# Patient Record
Sex: MALE | Race: BLACK OR AFRICAN AMERICAN | Employment: FULL TIME | URBAN - METROPOLITAN AREA
[De-identification: names, ages, dates, MRNs, and addresses within clinical notes are randomized per-mention and may not be internally consistent; named-entity substitution may affect disease eponyms.]

---

## 2017-10-05 ENCOUNTER — HOSPITAL ENCOUNTER (EMERGENCY)
Age: 37
Discharge: HOME | End: 2017-10-05
Attending: EMERGENCY MEDICINE
Payer: MEDICAID

## 2017-10-05 VITALS
OXYGEN SATURATION: 100 % | DIASTOLIC BLOOD PRESSURE: 97 MMHG | RESPIRATION RATE: 18 BRPM | TEMPERATURE: 98.4 F | SYSTOLIC BLOOD PRESSURE: 171 MMHG | HEART RATE: 83 BPM

## 2017-10-05 DIAGNOSIS — R03.0 ELEVATED BLOOD PRESSURE READING: Primary | ICD-10-CM

## 2017-10-05 DIAGNOSIS — M54.50 ACUTE BILATERAL LOW BACK PAIN WITHOUT SCIATICA: ICD-10-CM
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
## 2017-10-05 PROCEDURE — 99283 EMERGENCY DEPT VISIT LOW MDM: CPT

## 2017-10-05 RX ORDER — CYCLOBENZAPRINE HCL 10 MG
10 TABLET ORAL 3 TIMES DAILY PRN
Qty: 12 TABLET | Refills: 0 | Status: SHIPPED | OUTPATIENT
Start: 2017-10-05 | End: 2017-10-12

## 2017-10-05 RX ORDER — IBUPROFEN 800 MG/1
800 TABLET ORAL EVERY 8 HOURS PRN
Qty: 20 TABLET | Refills: 0 | Status: SHIPPED | OUTPATIENT
Start: 2017-10-05 | End: 2017-10-14

## 2017-10-05 ASSESSMENT — ENCOUNTER SYMPTOMS
NAUSEA: 0
WHEEZING: 0
COUGH: 0
RHINORRHEA: 0
SHORTNESS OF BREATH: 0
EYE PAIN: 0
SORE THROAT: 0
VOMITING: 0
EYE ITCHING: 0
EYE DISCHARGE: 0
BACK PAIN: 1

## 2017-10-05 ASSESSMENT — PAIN SCALES - GENERAL: PAINLEVEL_OUTOF10: 8

## 2017-10-05 ASSESSMENT — PAIN DESCRIPTION - ORIENTATION: ORIENTATION: LOWER

## 2017-10-05 ASSESSMENT — PAIN DESCRIPTION - LOCATION: LOCATION: BACK

## 2017-10-05 ASSESSMENT — PAIN DESCRIPTION - ONSET: ONSET: PROGRESSIVE

## 2017-10-05 ASSESSMENT — PAIN DESCRIPTION - PROGRESSION: CLINICAL_PROGRESSION: GRADUALLY WORSENING

## 2017-10-05 ASSESSMENT — PAIN DESCRIPTION - PAIN TYPE: TYPE: ACUTE PAIN

## 2017-10-05 ASSESSMENT — PAIN DESCRIPTION - DESCRIPTORS: DESCRIPTORS: ACHING
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
## 2017-10-05 NOTE — ED AVS SNAPSHOT
This section may also include educational information about certain health topics that may be of help to you. Important Information if you smoke or are exposed to smoking       SMOKING: QUIT SMOKING. THIS IS THE MOST IMPORTANT ACTION YOU CAN TAKE TO IMPROVE YOUR CURRENT AND FUTURE HEALTH. Call the [facility] [place] NOW ([phone])    Smoking harms nonsmokers. When nonsmokers are around people who smoke, they absorb nicotine, carbon monoxide, and other ingredients of tobacco smoke. DO NOT SMOKE AROUND CHILDREN     Children exposed to secondhand smoke are at an increased risk of:  Sudden Infant Death Syndrome (SIDS), acute respiratory infections, inflammation of the middle ear, and severe asthma. Over a longer time, it causes heart disease and lung cancer. There is no safe level of exposure to secondhand smoke. [employer] Signup     [employer] allows you to send messages to your doctor, view your test results, renew your prescriptions, schedule appointments, view visit notes, and more. How Do I Sign Up? 1. In your Internet browser, go to [email or web address]  2. Click on the Sign Up Now link in the Sign In box. You will see the New Member Sign Up page. 3. Enter your [employer] Access Code exactly as it appears below. You will not need to use this code after youve completed the sign-up process. If you do not sign up before the expiration date, you must request a new code. [employer] Access Code: [email or web address]  Expires: 12/4/2017 12:43 PM    4. Enter your Social Security Number ([id number]) and Date of Birth (mm/dd/yyyy) as indicated and click Submit. You will be taken to the next sign-up page. 5. Create a [employer] ID. This will be your [employer] login ID and cannot be changed, so think of one that is secure and easy to remember. 6. Create a [employer] password. You can change your password at any time. 7. Enter your Password Reset Question and Answer. This can be used at a later time if you forget your password. 8. Enter your e-mail address. You will receive e-mail notification when new information is available in [address] 9. Click Sign Up. You can now view your medical record. Additional Information  If you have questions, please contact the physician practice where you receive care. Remember, MyChart is NOT to be used for urgent needs. For medical emergencies, dial 911. For questions regarding your MyChart account call [phone]. If you have a clinical question, please call your doctor's office. View your information online  ? Review your current list of  medications, immunization, and allergies. ? Review your future test results online . ? Review your discharge instructions provided by your caregivers at discharge    Certain functionality such as prescription refills, scheduling appointments or sending messages to your provider are not activated if your provider does not use [employer] in his/her office    For questions regarding your MyChart account call [phone]. If you have a clinical question, please call your doctor's office. The information on all pages of the After Visit Summary has been reviewed with me, the patient and/or responsible adult, by my health care provider(s). I had the opportunity to ask questions regarding this information. I understand I should dispose of my armband safely at home to protect my health information. A complete copy of the After Visit Summary has been given to me, the patient and/or responsible adult.          Patient Signature/Responsible Adult: ___________________________________    Nurse Signature: ___________________________________  Resident/MLP Signature: ___________________________________  Attending Signature: ___________________________________    Date:____________Time:____________              Discharge Instructions Rest.  No heavy lifting. No driving or working while taking flexeril as it may make you sleepy    Please return to the ED for increased pain, numbness, tingling, weakness, bladder or bowel dysfunction or other concerns deemed emergent. Warm compresses to the area such as a heating pad may be helpful. Do not sleep on a heating pad    ________________________________________________________________________    Your last vital signs in the emergency department were:    BP (!) 171/97  Pulse 83  Temp 98.4 °F (36.9 °C) (Oral)   Resp 18  SpO2 100%    If your systolic blood pressure (the top number) was above 233 or your diastolic blood pressure (the bottom number) was more than 80 you may have early hypertension or pre-hypertension. You should follow up with your family doctor or call to establish a family doctor for follow up within 3 days. ________________________________________________________________________             Back Pain, Emergency or Urgent Symptoms: Care Instructions  Your Care Instructions  Many people have back pain at one time or another. In most cases, pain gets better with self-care that includes over-the-counter pain medicine, ice, heat, and exercises. Unless you have symptoms of a severe injury or heart attack, you may be able to give yourself a few days before you call a doctor. But some back problems are very serious. Do not ignore symptoms that need to be checked right away. Follow-up care is a key part of your treatment and safety. Be sure to make and go to all appointments, and call your doctor if you are having problems. It's also a good idea to know your test results and keep a list of the medicines you take. How can you care for yourself at home? · Sit or lie in positions that are most comfortable and that reduce your pain. Try one of these positions when you lie down:  ¨ Lie on your back with your knees bent and supported by large pillows. Call your doctor now or seek immediate medical care if:  · You have new or worse symptoms in your arms, legs, chest, belly, or buttocks. Symptoms may include:  ¨ Numbness or tingling. ¨ Weakness. ¨ Pain. · You lose bladder or bowel control. · You have back pain and:  ¨ You have injured your back while lifting or doing some other activity. Call if the pain is severe, has not gone away after 1 or 2 days, and you cannot do your normal daily activities. ¨ You have had a back injury before that needed treatment. ¨ Your pain has lasted longer than 4 weeks. ¨ You have had weight loss you cannot explain. ¨ You are age 48 or older. ¨ You have cancer now or have had it before. Watch closely for changes in your health, and be sure to contact your doctor if you are not getting better as expected. Where can you learn more? Go to [email or web address] org and sign in to your [employer] account. Enter A545 in the [place] box to learn more about \"Back Pain, Emergency or Urgent Symptoms: Care Instructions. \"     If you do not have an account, please click on the \"Sign Up Now\" link. Current as of: March 20, 2017  Content Version: 11.3  © 8504-6103 [employer]. Care instructions adapted under license by [facility] ([facility]). If you have questions about a medical condition or this instruction, always ask your healthcare professional. [place] [address] any warranty or liability for your use of this information. Learning About Relief for Back Pain  What is back tension and strain? Back strain happens when you overstretch, or pull, a muscle in your back. You may hurt your back in an accident or when you exercise or lift something. Most back pain will get better with rest and time. You can take care of yourself at home to help your back heal.  What can you do first to relieve back pain?   When you first feel back pain, try these steps: · Walk. Take a short walk (10 to 20 minutes) on a level surface (no slopes, hills, or stairs) every 2 to 3 hours. Walk only distances you can manage without pain, especially leg pain. · Relax. Find a comfortable position for rest. Some people are comfortable on the floor or a medium-firm bed with a small pillow under their head and another under their knees. Some people prefer to lie on their side with a pillow between their knees. Don't stay in one position for too long. · Try heat or ice. Try using a heating pad on a low or medium setting, or take a warm shower, for 15 to 20 minutes every 2 to 3 hours. Or you can buy single-use heat wraps that last up to 8 hours. You can also try an ice pack for 10 to 15 minutes every 2 to 3 hours. You can use an ice pack or a bag of frozen vegetables wrapped in a thin towel. There is not strong evidence that either heat or ice will help, but you can try them to see if they help. You may also want to try switching between heat and cold. · Take pain medicine exactly as directed. ¨ If the doctor gave you a prescription medicine for pain, take it as prescribed. ¨ If you are not taking a prescription pain medicine, ask your doctor if you can take an over-the-counter medicine. What else can you do? · Stretch and exercise. Exercises that increase flexibility may relieve your pain and make it easier for your muscles to keep your spine in a good, neutral position. And don't forget to keep walking. · Do self-massage. You can use self-massage to unwind after work or school or to energize yourself in the morning. You can easily massage your feet, hands, or neck. Self-massage works best if you are in comfortable clothes and are sitting or lying in a comfortable position. Use oil or lotion to massage bare skin. · Reduce stress. Back pain can lead to a vicious [place]: Distress about the pain tenses the muscles in your back, which in turn causes more pain. using your back muscles. As your press up, do not let your hips or pelvis come off the floor. 3. Hold for 15 to 30 seconds, then relax. 4. Repeat 2 to 4 times. Relax and rest    1. Lie on your back with a rolled towel under your neck and a pillow under your knees. Extend your arms comfortably to your sides. 2. Relax and breathe normally. 3. Remain in this position for about 10 minutes. 4. If you can, do this 2 or 3 times each day. Follow-up care is a key part of your treatment and safety. Be sure to make and go to all appointments, and call your doctor if you are having problems. It's also a good idea to know your test results and keep a list of the medicines you take. Where can you learn more? Go to https://Heatwave InteractivepeodetteValnevaeb.Doyenz. org and sign in to your [employer] account. Enter X852 in the Frilp box to learn more about \"Back Stretches: Exercises. \"     If you do not have an account, please click on the \"Sign Up Now\" link. Current as of: March 21, 2017  Content Version: 11.3  © 6777-4844 [employer]. Care instructions adapted under license by [facility] ([facility]). If you have questions about a medical condition or this instruction, always ask your healthcare professional. [address] any warranty or liability for your use of this information.

## 2017-10-14 ENCOUNTER — HOSPITAL ENCOUNTER (EMERGENCY)
Age: 37
Discharge: HOME | End: 2017-10-14
Attending: EMERGENCY MEDICINE
Payer: MEDICAID

## 2017-10-14 VITALS
OXYGEN SATURATION: 100 % | RESPIRATION RATE: 18 BRPM | WEIGHT: 315 LBS | DIASTOLIC BLOOD PRESSURE: 118 MMHG | HEIGHT: 77 IN | BODY MASS INDEX: 37.19 KG/M2 | SYSTOLIC BLOOD PRESSURE: 165 MMHG | TEMPERATURE: 97.7 F | HEART RATE: 82 BPM

## 2017-10-14 DIAGNOSIS — Z76.0 ENCOUNTER FOR MEDICATION REFILL: Primary | ICD-10-CM

## 2017-10-14 PROCEDURE — 6370000000 HC RX 637 (ALT 250 FOR IP): Performed by: EMERGENCY MEDICINE

## 2017-10-14 PROCEDURE — G0380 LEV 1 HOSP TYPE B ED VISIT: HCPCS

## 2017-10-14 RX ORDER — CYCLOBENZAPRINE HCL 10 MG
10 TABLET ORAL ONCE
Status: COMPLETED | OUTPATIENT
Start: 2017-10-14 | End: 2017-10-14

## 2017-10-14 RX ORDER — IBUPROFEN 800 MG/1
800 TABLET ORAL EVERY 8 HOURS PRN
Qty: 20 TABLET | Refills: 1 | Status: SHIPPED | OUTPATIENT
Start: 2017-10-14 | End: 2018-12-31

## 2017-10-14 RX ORDER — IBUPROFEN 800 MG/1
800 TABLET ORAL ONCE
Status: COMPLETED | OUTPATIENT
Start: 2017-10-14 | End: 2017-10-14

## 2017-10-14 RX ADMIN — CYCLOBENZAPRINE 10 MG: 10 TABLET, FILM COATED ORAL at 13:13

## 2017-10-14 RX ADMIN — IBUPROFEN 800 MG: 800 TABLET ORAL at 13:13

## 2017-10-14 ASSESSMENT — PAIN DESCRIPTION - PAIN TYPE: TYPE: ACUTE PAIN

## 2017-10-14 ASSESSMENT — PAIN DESCRIPTION - LOCATION: LOCATION: BACK

## 2017-10-14 ASSESSMENT — ENCOUNTER SYMPTOMS
RESPIRATORY NEGATIVE: 1
ALLERGIC/IMMUNOLOGIC NEGATIVE: 1
GASTROINTESTINAL NEGATIVE: 1
BACK PAIN: 1
EYES NEGATIVE: 1

## 2017-10-14 ASSESSMENT — PAIN DESCRIPTION - ORIENTATION: ORIENTATION: LOWER

## 2017-10-14 ASSESSMENT — PAIN SCALES - GENERAL
PAINLEVEL_OUTOF10: 9
PAINLEVEL_OUTOF10: 9

## 2017-10-14 ASSESSMENT — PAIN DESCRIPTION - FREQUENCY: FREQUENCY: INTERMITTENT

## 2017-10-14 ASSESSMENT — PAIN DESCRIPTION - DESCRIPTORS: DESCRIPTORS: ACHING;SHOOTING

## 2017-10-14 ASSESSMENT — PAIN DESCRIPTION - PROGRESSION: CLINICAL_PROGRESSION: GRADUALLY WORSENING
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
## 2017-10-14 NOTE — ED NOTES
Scheduled pt an [facility] appointment for 11/6/2017 at 0930. Pt advised of appointment date and time as well as facility no show policy. Pt demonstrated verbal understanding.       Milton Hanna  10/14/17 8490

## 2018-12-31 ENCOUNTER — OFFICE VISIT (OUTPATIENT)
Dept: FAMILY MEDICINE CLINIC | Age: 38
End: 2018-12-31
Payer: OTHER MISCELLANEOUS

## 2018-12-31 VITALS
TEMPERATURE: 97.7 F | WEIGHT: 315 LBS | DIASTOLIC BLOOD PRESSURE: 154 MMHG | SYSTOLIC BLOOD PRESSURE: 203 MMHG | BODY MASS INDEX: 37.19 KG/M2 | HEIGHT: 77 IN | HEART RATE: 82 BPM

## 2018-12-31 DIAGNOSIS — I10 ESSENTIAL HYPERTENSION: Primary | ICD-10-CM

## 2018-12-31 DIAGNOSIS — R73.03 PREDIABETES: ICD-10-CM

## 2018-12-31 PROCEDURE — 99203 OFFICE O/P NEW LOW 30 MIN: CPT | Performed by: STUDENT IN AN ORGANIZED HEALTH CARE EDUCATION/TRAINING PROGRAM

## 2018-12-31 PROCEDURE — 99211 OFF/OP EST MAY X REQ PHY/QHP: CPT | Performed by: STUDENT IN AN ORGANIZED HEALTH CARE EDUCATION/TRAINING PROGRAM

## 2018-12-31 RX ORDER — AMLODIPINE BESYLATE 10 MG/1
10 TABLET ORAL DAILY
Qty: 30 TABLET | Refills: 2 | Status: SHIPPED | OUTPATIENT
Start: 2018-12-31 | End: 2018-12-31

## 2018-12-31 RX ORDER — HYDROCHLOROTHIAZIDE 25 MG/1
25 TABLET ORAL DAILY
Qty: 90 TABLET | Refills: 1 | Status: SHIPPED | OUTPATIENT
Start: 2018-12-31 | End: 2020-03-16

## 2018-12-31 RX ORDER — AMLODIPINE BESYLATE 10 MG/1
10 TABLET ORAL DAILY
Qty: 90 TABLET | Refills: 1 | Status: SHIPPED | OUTPATIENT
Start: 2018-12-31 | End: 2020-03-16 | Stop reason: SDUPTHER

## 2018-12-31 ASSESSMENT — PATIENT HEALTH QUESTIONNAIRE - PHQ9
2. FEELING DOWN, DEPRESSED OR HOPELESS: 0
SUM OF ALL RESPONSES TO PHQ QUESTIONS 1-9: 0
1. LITTLE INTEREST OR PLEASURE IN DOING THINGS: 0
SUM OF ALL RESPONSES TO PHQ QUESTIONS 1-9: 0
SUM OF ALL RESPONSES TO PHQ9 QUESTIONS 1 & 2: 0

## 2020-02-25 ENCOUNTER — TELEPHONE (OUTPATIENT)
Dept: FAMILY MEDICINE CLINIC | Age: 40
End: 2020-02-25

## 2020-03-16 ENCOUNTER — OFFICE VISIT (OUTPATIENT)
Dept: FAMILY MEDICINE CLINIC | Age: 40
End: 2020-03-16
Payer: COMMERCIAL

## 2020-03-16 VITALS
BODY MASS INDEX: 40.59 KG/M2 | HEART RATE: 85 BPM | TEMPERATURE: 97.3 F | DIASTOLIC BLOOD PRESSURE: 90 MMHG | WEIGHT: 315 LBS | SYSTOLIC BLOOD PRESSURE: 140 MMHG

## 2020-03-16 PROBLEM — I16.1 HYPERTENSIVE EMERGENCY: Status: ACTIVE | Noted: 2020-02-24

## 2020-03-16 LAB — HBA1C MFR BLD: 5.9 %

## 2020-03-16 PROCEDURE — 99213 OFFICE O/P EST LOW 20 MIN: CPT | Performed by: STUDENT IN AN ORGANIZED HEALTH CARE EDUCATION/TRAINING PROGRAM

## 2020-03-16 PROCEDURE — 83036 HEMOGLOBIN GLYCOSYLATED A1C: CPT | Performed by: STUDENT IN AN ORGANIZED HEALTH CARE EDUCATION/TRAINING PROGRAM

## 2020-03-16 PROCEDURE — 1111F DSCHRG MED/CURRENT MED MERGE: CPT | Performed by: STUDENT IN AN ORGANIZED HEALTH CARE EDUCATION/TRAINING PROGRAM

## 2020-03-16 PROCEDURE — 99211 OFF/OP EST MAY X REQ PHY/QHP: CPT | Performed by: FAMILY MEDICINE

## 2020-03-16 RX ORDER — PREDNISOLONE ACETATE 10 MG/ML
1 SUSPENSION/ DROPS OPHTHALMIC
COMMUNITY
Start: 2020-03-12 | End: 2021-03-12

## 2020-03-16 RX ORDER — AMLODIPINE BESYLATE 10 MG/1
10 TABLET ORAL DAILY
Qty: 90 TABLET | Refills: 1 | Status: ON HOLD | OUTPATIENT
Start: 2020-03-16 | End: 2021-10-13 | Stop reason: SDUPTHER

## 2020-03-16 RX ORDER — CARVEDILOL 25 MG/1
25 TABLET ORAL 2 TIMES DAILY
Qty: 60 TABLET | Refills: 0 | Status: SHIPPED | OUTPATIENT
Start: 2020-03-16 | End: 2020-06-11

## 2020-03-16 RX ORDER — NIFEDIPINE 60 MG/1
60 TABLET, EXTENDED RELEASE ORAL DAILY
Qty: 30 TABLET | Refills: 2 | Status: ON HOLD | OUTPATIENT
Start: 2020-03-16 | End: 2021-10-13 | Stop reason: HOSPADM

## 2020-03-16 RX ORDER — FAMOTIDINE 20 MG/1
20 TABLET, FILM COATED ORAL 2 TIMES DAILY
COMMUNITY
Start: 2020-02-27 | End: 2020-03-28

## 2020-03-16 RX ORDER — POLYMYXIN B SULFATE AND TRIMETHOPRIM 1; 10000 MG/ML; [USP'U]/ML
SOLUTION OPHTHALMIC
COMMUNITY
Start: 2020-02-21 | End: 2021-09-28

## 2020-03-16 RX ORDER — HYDROCHLOROTHIAZIDE 50 MG/1
50 TABLET ORAL DAILY
COMMUNITY
Start: 2020-02-28 | End: 2020-03-16 | Stop reason: SDUPTHER

## 2020-03-16 RX ORDER — HYDROCHLOROTHIAZIDE 50 MG/1
50 TABLET ORAL DAILY
Qty: 30 TABLET | Refills: 0 | Status: SHIPPED | OUTPATIENT
Start: 2020-03-16 | End: 2020-06-11

## 2020-03-16 RX ORDER — FERROUS SULFATE 325(65) MG
325 TABLET ORAL
Status: ON HOLD | COMMUNITY
Start: 2020-02-28 | End: 2021-10-13

## 2020-03-16 RX ORDER — NIFEDIPINE 60 MG/1
60 TABLET, EXTENDED RELEASE ORAL
COMMUNITY
Start: 2020-02-28 | End: 2020-03-16 | Stop reason: SDUPTHER

## 2020-03-16 RX ORDER — BLOOD PRESSURE TEST KIT
1 KIT MISCELLANEOUS DAILY
Qty: 1 KIT | Refills: 0 | Status: SHIPPED | OUTPATIENT
Start: 2020-03-16

## 2020-03-16 RX ORDER — CARVEDILOL 25 MG/1
25 TABLET ORAL 2 TIMES DAILY
COMMUNITY
Start: 2020-02-27 | End: 2020-03-16 | Stop reason: SDUPTHER

## 2020-03-16 ASSESSMENT — ENCOUNTER SYMPTOMS
EYE PAIN: 1
PHOTOPHOBIA: 1
WHEEZING: 0
EYE DISCHARGE: 0
CHEST TIGHTNESS: 0
SHORTNESS OF BREATH: 0
COUGH: 0
EYE ITCHING: 0
EYE REDNESS: 1

## 2020-03-19 NOTE — PROGRESS NOTES
Attending Physician Statement    Wt Readings from Last 3 Encounters:   03/16/20 (!) 342 lb 6.4 oz (155.3 kg)   12/31/18 (!) 322 lb 6.4 oz (146.2 kg)   10/14/17 (!) 315 lb (142.9 kg)     Temp Readings from Last 3 Encounters:   03/16/20 97.3 °F (36.3 °C) (Temporal)   12/31/18 97.7 °F (36.5 °C) (Oral)   10/14/17 97.7 °F (36.5 °C) (Oral)     BP Readings from Last 3 Encounters:   03/16/20 (!) 140/90   12/31/18 (!) 203/154   10/14/17 (!) 165/118     Pulse Readings from Last 3 Encounters:   03/16/20 85   12/31/18 82   10/14/17 82         I have discussed the care of Cyndee Vee, including pertinent history and exam findings with the resident. I have reviewed the key elements of all parts of the encounter with the resident. I agree with the assessment, plan and orders as documented by the resident.   (GE Modifier)
Visit Information    Have you changed or started any medications since your last visit including any over-the-counter medicines, vitamins, or herbal medicines? no   Have you stopped taking any of your medications? Is so, why? -  no  Are you having any side effects from any of your medications? - no    Have you seen any other physician or provider since your last visit?  no   Have you had any other diagnostic tests since your last visit?  no   Have you been seen in the emergency room and/or had an admission in a hospital since we last saw you?  no   Have you had your routine dental cleaning in the past 6 months?  no     Do you have an active MyChart account? If no, what is the barrier?   No:     Patient Care Team:  Cal Yanez MD as PCP - General (Family Medicine)    Medical History Review  Past Medical, Family, and Social History reviewed and does not contribute to the patient presenting condition    Health Maintenance   Topic Date Due    HIV screen  01/22/1994    A1C test (Diabetic or Prediabetic)  02/26/2016    Flu vaccine (1) 09/01/2019    Lipid screen  02/26/2020    DTaP/Tdap/Td vaccine (2 - Td) 02/26/2025    Shingles Vaccine (1 of 2) 01/22/2029    Pneumococcal 0-64 years Vaccine  Completed    Hepatitis A vaccine  Aged Out    Hepatitis B vaccine  Aged Out    Hib vaccine  Aged Out    Meningococcal (ACWY) vaccine  Aged Out
breath sounds. Musculoskeletal: Normal range of motion. Neurological:      General: No focal deficit present. Mental Status: He is alert and oriented to person, place, and time. Lab Results   Component Value Date    WBC 4.3 03/25/2014    HGB 13.2 (L) 03/25/2014    HCT 40.9 (L) 03/25/2014     03/25/2014    CHOL 171 02/26/2015    TRIG 46 02/26/2015    HDL 63 02/26/2015    ALT 64 (H) 03/25/2014    AST 50 (H) 03/25/2014     03/10/2015    K 4.2 03/10/2015     03/10/2015    CREATININE 0.89 03/10/2015    BUN 11 03/10/2015    CO2 22 03/10/2015    TSH 1.07 02/26/2015    LABA1C 6.0 02/26/2015    LABMICR 5 03/10/2015     Lab Results   Component Value Date    CALCIUM 8.6 03/10/2015     Lab Results   Component Value Date    LDLCHOLESTEROL 99 02/26/2015       Assessment and Plan:    1. Essential hypertension  - BP better today  - Continue regimen below  - Extensive counseling provided on wt loss and dietary habits  - Blood Pressure KIT; 1 kit by Does not apply route daily  Dispense: 1 kit; Refill: 0  - carvedilol (COREG) 25 MG tablet; Take 1 tablet by mouth 2 times daily  Dispense: 60 tablet; Refill: 0  - NIFEdipine (PROCARDIA XL) 60 MG extended release tablet; Take 1 tablet by mouth daily  Dispense: 30 tablet; Refill: 2  - hydroCHLOROthiazide (HYDRODIURIL) 50 MG tablet; Take 1 tablet by mouth daily  Dispense: 30 tablet; Refill: 0  - amLODIPine (NORVASC) 10 MG tablet; Take 1 tablet by mouth daily  Dispense: 90 tablet; Refill: 1    2. Screening for hyperlipidemia  - Lipid, Fasting; Future    3. Prediabetes  - A1c was 5.9 percent today, previous was 6.0 about a year ago  - continue weight loss and lifestyle modifications  - POCT glycosylated hemoglobin (Hb A1C)      4.  Anterior Uveitis  -continue care by ophthalmology including follow up visits  -continue steroid eye drops as directed  -no further blurry vision    Requested Prescriptions     Signed Prescriptions Disp Refills    Blood Pressure

## 2020-06-10 NOTE — TELEPHONE ENCOUNTER
Please address the medication refill and close the encounter. If I can be of assistance, please route to the applicable pool. Thank you. Last visit: 03/16/2020  Last Med refill: 04/10/2020  Does patient have enough medication for 72 hours: No:     Next Visit Date:  No future appointments. Health Maintenance   Topic Date Due    HIV screen  01/22/1994    Lipid screen  02/26/2020    Flu vaccine (Season Ended) 09/01/2020    A1C test (Diabetic or Prediabetic)  03/16/2021    DTaP/Tdap/Td vaccine (2 - Td) 02/26/2025    Pneumococcal 0-64 years Vaccine  Completed    Hepatitis A vaccine  Aged Out    Hepatitis B vaccine  Aged Out    Hib vaccine  Aged Out    Meningococcal (ACWY) vaccine  Aged Out       Hemoglobin A1C (%)   Date Value   03/16/2020 5.9   02/26/2015 6.0   03/25/2014 5.8             ( goal A1C is < 7)   Microalb/Crt.  Ratio (mcg/mg creat)   Date Value   03/10/2015 5     LDL Cholesterol (mg/dL)   Date Value   02/26/2015 99   03/25/2014 103 (H)       (goal LDL is <100)   AST (U/L)   Date Value   03/25/2014 50 (H)     ALT (U/L)   Date Value   03/25/2014 64 (H)     BUN (mg/dL)   Date Value   03/10/2015 11     BP Readings from Last 3 Encounters:   03/16/20 (!) 140/90   12/31/18 (!) 203/154   10/14/17 (!) 165/118          (goal 120/80)    All Future Testing planned in [facility]  Lab Frequency Next Occurrence   Lipid, Fasting Once 10/10/2020               Patient Active Problem List:     Moderate persistent asthma     Bipolar 1 disorder ([address]     Hypertension     Obesity, morbid, BMI 40.0-49.9 ([address]     Need for prophylactic vaccination with combined diphtheria-tetanus-pertussis (DTP) vaccine     Hypertensive emergency

## 2020-06-11 RX ORDER — HYDROCHLOROTHIAZIDE 50 MG/1
TABLET ORAL
Qty: 30 TABLET | Refills: 0 | Status: ON HOLD | OUTPATIENT
Start: 2020-06-11 | End: 2021-10-13 | Stop reason: HOSPADM

## 2020-06-11 RX ORDER — CARVEDILOL 25 MG/1
TABLET ORAL
Qty: 60 TABLET | Refills: 0 | Status: ON HOLD | OUTPATIENT
Start: 2020-06-11 | End: 2021-10-13 | Stop reason: SDUPTHER

## 2021-09-28 ENCOUNTER — OFFICE VISIT (OUTPATIENT)
Dept: UROLOGY | Age: 41
End: 2021-09-28
Payer: COMMERCIAL

## 2021-09-28 ENCOUNTER — HOSPITAL ENCOUNTER (OUTPATIENT)
Age: 41
Discharge: HOME | End: 2021-09-28
Payer: COMMERCIAL

## 2021-09-28 VITALS
HEIGHT: 77 IN | BODY MASS INDEX: 37.19 KG/M2 | TEMPERATURE: 97.6 F | SYSTOLIC BLOOD PRESSURE: 136 MMHG | HEART RATE: 85 BPM | DIASTOLIC BLOOD PRESSURE: 84 MMHG | WEIGHT: 315 LBS

## 2021-09-28 DIAGNOSIS — Z13.220 SCREENING FOR HYPERLIPIDEMIA: ICD-10-CM

## 2021-09-28 DIAGNOSIS — R36.1 HEMATOSPERMIA: ICD-10-CM

## 2021-09-28 DIAGNOSIS — R36.1 HEMATOSPERMIA: Primary | ICD-10-CM

## 2021-09-28 LAB — PROSTATE SPECIFIC ANTIGEN: 0.8 UG/L

## 2021-09-28 PROCEDURE — 84153 ASSAY OF PSA TOTAL: CPT

## 2021-09-28 PROCEDURE — 1036F TOBACCO NON-USER: CPT | Performed by: UROLOGY

## 2021-09-28 PROCEDURE — 87491 CHLMYD TRACH DNA AMP PROBE: CPT

## 2021-09-28 PROCEDURE — G8427 DOCREV CUR MEDS BY ELIG CLIN: HCPCS | Performed by: UROLOGY

## 2021-09-28 PROCEDURE — G8417 CALC BMI ABV UP PARAM F/U: HCPCS | Performed by: UROLOGY

## 2021-09-28 PROCEDURE — 99203 OFFICE O/P NEW LOW 30 MIN: CPT | Performed by: UROLOGY

## 2021-09-28 PROCEDURE — 87591 N.GONORRHOEAE DNA AMP PROB: CPT

## 2021-09-28 PROCEDURE — 36415 COLL VENOUS BLD VENIPUNCTURE: CPT

## 2021-09-28 ASSESSMENT — ENCOUNTER SYMPTOMS
VOMITING: 0
EYE PAIN: 0
BACK PAIN: 0
COUGH: 0
SHORTNESS OF BREATH: 0
NAUSEA: 0
ABDOMINAL PAIN: 0
WHEEZING: 0
EYE REDNESS: 0
COLOR CHANGE: 0

## 2021-09-28 NOTE — PROGRESS NOTES
[address] [address]  Dept: [phone]  Dept Fax: [phone] Urology Office Note - New patient    Patient:  Sun Teixeira  YOB: 1980  Date: 9/28/2021    The patient is a 42 y.o. male who presents todayfor evaluation of the following problems:   Chief Complaint   Patient presents with    New Patient     blood in semen     referred by Marcio Samayoa MD.      HPI  He is here for hematospermia. He had one episode, and has resolved. No pain with ejaculation. No pain with urination. He is voiding well. No FHx of prostate cancer that he is aware of. No complaints of any discharge. (Patient's old records have been requested, reviewed and summarized in today's note.)    Summary of old records: N/A    Additional History: N/A    Procedures Today: N/A    Last several PSA's:  No results found for: PSA  Last total testosterone:  No results found for: TESTOSTERONE  Urinalysis today:  No results found for this visit on 09/28/21. AUA Symptom Score (9/28/2021):   INCOMPLETE EMPTYING: How often have you had the sensation of not emptying your bladder?: Not at all  FREQUENCY: How often do you have to urinate less than every two hours?: Not at all  INTERMITTENCY: How often have you found you stopped and started again several times when you urinated?: Not at all  URGENCY: How often have you found it difficult to postpone urination?: Not at all  WEAK STREAM: How often have you had a weak urinary stream?: Not at all  STRAINING: How often have you had to strain to start  urination?: Not at all  NOCTURIA: How many times did you typically get up at night to uriniate?: NONE  TOTAL I-PSS SCORE[de-identified] 0  How would you feel if you were to spend the rest of your life with your urinary condition?: Delighted    Last BUN and creatinine:  Lab Results   Component Value Date    BUN 11 03/10/2015     Lab Results   Component Value Date    CREATININE 0.89 03/10/2015       Additional Lab/Culture results: none    Imaging Reviewed during this Office Visit: none  (results were independently reviewed by physician and radiology report verified)    PAST MEDICAL, FAMILY AND SOCIAL HISTORY:  Past Medical History:   Diagnosis Date    Bipolar 1 disorder ([facility] 2/26/2015    Bronchitis     Bronchitis     Hypertension     Moderate persistent asthma 2/26/2015    Obesity      No past surgical history on file. Family History   Problem Relation Age of Onset    High Blood Pressure Mother     Diabetes Maternal Grandmother      Outpatient Medications Marked as Taking for the 9/28/21 encounter (Office Visit) with Kai Rodriguez MD   Medication Sig Dispense Refill    carvedilol (COREG) 25 MG tablet take 1 tablet by mouth twice a day 60 tablet 0    hydroCHLOROthiazide (HYDRODIURIL) 50 MG tablet take 1 tablet by mouth once daily 30 tablet 0    Blood Pressure KIT 1 kit by Does not apply route daily 1 kit 0    amLODIPine (NORVASC) 10 MG tablet Take 1 tablet by mouth daily 90 tablet 1        Patient has no known allergies. Social History     Tobacco Use   Smoking Status Never Smoker   Smokeless Tobacco Never Used      (If patient a smoker, smoking cessation counseling offered)   Social History     Substance and Sexual Activity   Alcohol Use No       REVIEW OF SYSTEMS:  Review of Systems    Physical Exam:    This a 42 y.o. male   Vitals:    09/28/21 0857   BP: 136/84   Pulse: 85   Temp: 97.6 °F (36.4 °C)     Body mass index is 37.35 kg/m². Physical Exam  Constitutional: Patient in no acute distress. Neuro: Alert and oriented to person, place and time. Psych: Mood normal, affect normal  Skin: No rash noted  Lungs:Respiratory effort is normal  Cardiovascular: Warm & Pink  Abdomen: Soft, non-tender, non-distendedwith no CVA,  No flank tenderness,  Orhepatosplenomegaly   Lymphatics: No palpable lymphadenopathy.   Bladder non-tender and not distended. Musculoskeletal: Normal gait and station  Penis normal and circumcised  Urethral meatus normal  Scrotal exam normal  Testicles normal bilaterally  Epididymis normal bilaterally  ARISTIDES 30 grams, smooth, no nodules. Assessment and Plan      1. Hematospermia           Plan:        Doing better  Blood has resolved. No concern for UTI or STD  Will obtain PSA. F/U as needed. Prescriptions Ordered:  No orders of the defined types were placed in this encounter. Orders Placed:  Orders Placed This Encounter   Procedures    Chlamydia/GC DNA, Urine     Standing Status:   Future     Standing Expiration Date:   9/28/2022    PSA, Diagnostic     Standing Status:   Future     Standing Expiration Date:   9/28/2022             Cielo Guy MD    Agree with the ROS entered by the MA.

## 2021-09-29 LAB
C. TRACHOMATIS DNA ,URINE: NEGATIVE
N. GONORRHOEAE DNA, URINE: NEGATIVE
SPECIMEN DESCRIPTION: NORMAL

## 2021-10-09 ENCOUNTER — APPOINTMENT (OUTPATIENT)
Dept: GENERAL RADIOLOGY | Age: 41
DRG: 280 | End: 2021-10-09
Payer: COMMERCIAL

## 2021-10-09 ENCOUNTER — HOSPITAL ENCOUNTER (INPATIENT)
Age: 41
LOS: 4 days | Discharge: HOME | DRG: 280 | End: 2021-10-13
Attending: EMERGENCY MEDICINE | Admitting: INTERNAL MEDICINE
Payer: COMMERCIAL

## 2021-10-09 ENCOUNTER — APPOINTMENT (OUTPATIENT)
Dept: CT IMAGING | Age: 41
DRG: 280 | End: 2021-10-09
Payer: COMMERCIAL

## 2021-10-09 DIAGNOSIS — I16.1 HYPERTENSIVE EMERGENCY: ICD-10-CM

## 2021-10-09 DIAGNOSIS — I10 ESSENTIAL HYPERTENSION: ICD-10-CM

## 2021-10-09 DIAGNOSIS — J96.01 ACUTE RESPIRATORY FAILURE WITH HYPOXIA (HCC): ICD-10-CM

## 2021-10-09 DIAGNOSIS — N17.9 AKI (ACUTE KIDNEY INJURY) (HCC): Primary | ICD-10-CM

## 2021-10-09 PROBLEM — R77.8 TROPONIN LEVEL ELEVATED: Status: ACTIVE | Noted: 2021-10-09

## 2021-10-09 PROBLEM — R79.89 TROPONIN LEVEL ELEVATED: Status: ACTIVE | Noted: 2021-10-09

## 2021-10-09 PROBLEM — I67.4 HYPERTENSIVE ENCEPHALOPATHY: Status: ACTIVE | Noted: 2021-10-09

## 2021-10-09 PROBLEM — E66.9 OBESITY (BMI 30-39.9): Status: ACTIVE | Noted: 2021-10-09

## 2021-10-09 PROBLEM — R09.02 HYPOXIA: Status: ACTIVE | Noted: 2021-10-09

## 2021-10-09 LAB
-: ABNORMAL
-: NORMAL
ABSOLUTE EOS #: 0.14 K/UL (ref 0–0.44)
ABSOLUTE IMMATURE GRANULOCYTE: 0.03 K/UL (ref 0–0.3)
ABSOLUTE LYMPH #: 1.32 K/UL (ref 1.1–3.7)
ABSOLUTE MONO #: 0.56 K/UL (ref 0.1–1.2)
ALBUMIN SERPL-MCNC: 3.8 G/DL (ref 3.5–5.2)
ALBUMIN/GLOBULIN RATIO: 0.9 (ref 1–2.5)
ALLEN TEST: ABNORMAL
ALLEN TEST: POSITIVE
ALP BLD-CCNC: 85 U/L (ref 40–129)
ALT SERPL-CCNC: 29 U/L (ref 5–41)
AMORPHOUS: ABNORMAL
ANION GAP SERPL CALCULATED.3IONS-SCNC: 14 MMOL/L (ref 9–17)
AST SERPL-CCNC: 36 U/L
BACTERIA: ABNORMAL
BASOPHILS # BLD: 1 % (ref 0–2)
BASOPHILS ABSOLUTE: 0.09 K/UL (ref 0–0.2)
BILIRUB SERPL-MCNC: 0.26 MG/DL (ref 0.3–1.2)
BILIRUBIN URINE: NEGATIVE
BNP INTERPRETATION: ABNORMAL
BUN BLDV-MCNC: 40 MG/DL (ref 6–20)
BUN/CREAT BLD: ABNORMAL (ref 9–20)
C-REACTIVE PROTEIN: 7.7 MG/L (ref 0–5)
CALCIUM SERPL-MCNC: 9 MG/DL (ref 8.6–10.4)
CARBOXYHEMOGLOBIN: 1.9 % (ref 0–5)
CASTS UA: ABNORMAL /LPF (ref 0–8)
CHLORIDE BLD-SCNC: 101 MMOL/L (ref 98–107)
CO2: 21 MMOL/L (ref 20–31)
COLOR: YELLOW
CREAT SERPL-MCNC: 2.44 MG/DL (ref 0.7–1.2)
CREATININE URINE: 102.5 MG/DL (ref 39–259)
CRYSTALS, UA: ABNORMAL /HPF
D-DIMER QUANTITATIVE: 0.8 MG/L FEU
DIFFERENTIAL TYPE: ABNORMAL
EKG ATRIAL RATE: 108 BPM
EKG P AXIS: 37 DEGREES
EKG P-R INTERVAL: 178 MS
EKG Q-T INTERVAL: 342 MS
EKG QRS DURATION: 94 MS
EKG QTC CALCULATION (BAZETT): 458 MS
EKG R AXIS: 36 DEGREES
EKG T AXIS: 110 DEGREES
EKG VENTRICULAR RATE: 108 BPM
EOSINOPHILS RELATIVE PERCENT: 2 % (ref 1–4)
EPITHELIAL CELLS UA: ABNORMAL /HPF (ref 0–5)
FIO2: 70
FIO2: ABNORMAL
GFR AFRICAN AMERICAN: 35 ML/MIN
GFR NON-AFRICAN AMERICAN: 29 ML/MIN
GFR SERPL CREATININE-BSD FRML MDRD: ABNORMAL ML/MIN/{1.73_M2}
GFR SERPL CREATININE-BSD FRML MDRD: ABNORMAL ML/MIN/{1.73_M2}
GLUCOSE BLD-MCNC: 162 MG/DL (ref 74–100)
GLUCOSE BLD-MCNC: 94 MG/DL (ref 70–99)
GLUCOSE URINE: NEGATIVE
HCO3 VENOUS: 24.1 MMOL/L (ref 24–30)
HCT VFR BLD CALC: 39.3 % (ref 40.7–50.3)
HEMOGLOBIN: 11.8 G/DL (ref 13–17)
IMMATURE GRANULOCYTES: 1 %
INR BLD: 0.9
KETONES, URINE: NEGATIVE
LACTATE DEHYDROGENASE: 456 U/L (ref 135–225)
LEUKOCYTE ESTERASE, URINE: NEGATIVE
LV EF: 65 %
LVEF MODALITY: NORMAL
LYMPHOCYTES # BLD: 21 % (ref 24–43)
MAGNESIUM: 2.3 MG/DL (ref 1.6–2.6)
MCH RBC QN AUTO: 25.5 PG (ref 25.2–33.5)
MCHC RBC AUTO-ENTMCNC: 30 G/DL (ref 28.4–34.8)
MCV RBC AUTO: 85.1 FL (ref 82.6–102.9)
METHEMOGLOBIN: ABNORMAL % (ref 0–1.5)
MODE: ABNORMAL
MODE: ABNORMAL
MONOCYTES # BLD: 9 % (ref 3–12)
MUCUS: ABNORMAL
NEGATIVE BASE EXCESS, ART: 3 (ref 0–2)
NEGATIVE BASE EXCESS, VEN: 0.9 MMOL/L (ref 0–2)
NITRITE, URINE: NEGATIVE
NOTIFICATION TIME: ABNORMAL
NOTIFICATION: ABNORMAL
NRBC AUTOMATED: 0 PER 100 WBC
O2 DEVICE/FLOW/%: ABNORMAL
O2 DEVICE/FLOW/%: ABNORMAL
O2 SAT, VEN: 93.1 % (ref 60–85)
OTHER OBSERVATIONS UA: ABNORMAL
OXYHEMOGLOBIN: ABNORMAL % (ref 95–98)
PARTIAL THROMBOPLASTIN TIME: 24.1 SEC (ref 20.5–30.5)
PATIENT TEMP: 37
PATIENT TEMP: ABNORMAL
PCO2, VEN, TEMP ADJ: ABNORMAL MMHG (ref 39–55)
PCO2, VEN: 43.6 (ref 39–55)
PDW BLD-RTO: 13.6 % (ref 11.8–14.4)
PEEP/CPAP: ABNORMAL
PH UA: 6 (ref 5–8)
PH VENOUS: 7.36 (ref 7.32–7.42)
PH, VEN, TEMP ADJ: ABNORMAL (ref 7.32–7.42)
PLATELET # BLD: 314 K/UL (ref 138–453)
PLATELET ESTIMATE: ABNORMAL
PMV BLD AUTO: 12.2 FL (ref 8.1–13.5)
PO2, VEN, TEMP ADJ: ABNORMAL MMHG (ref 30–50)
PO2, VEN: 70.1 (ref 30–50)
POC HCO3: 22.6 MMOL/L (ref 21–28)
POC LACTIC ACID: 1.41 MMOL/L (ref 0.56–1.39)
POC O2 SATURATION: 93 % (ref 94–98)
POC PCO2 TEMP: ABNORMAL MM HG
POC PCO2: 40.3 MM HG (ref 35–48)
POC PH TEMP: ABNORMAL
POC PH: 7.36 (ref 7.35–7.45)
POC PO2 TEMP: ABNORMAL MM HG
POC PO2: 68.7 MM HG (ref 83–108)
POSITIVE BASE EXCESS, ART: ABNORMAL (ref 0–3)
POSITIVE BASE EXCESS, VEN: ABNORMAL MMOL/L (ref 0–2)
POTASSIUM SERPL-SCNC: 4 MMOL/L (ref 3.7–5.3)
PRO-BNP: 522 PG/ML
PROCALCITONIN: 0.07 NG/ML
PROTEIN UA: ABNORMAL
PROTHROMBIN TIME: 9.8 SEC (ref 9.1–12.3)
PSV: ABNORMAL
PT. POSITION: ABNORMAL
RBC # BLD: 4.62 M/UL (ref 4.21–5.77)
RBC # BLD: ABNORMAL 10*6/UL
RBC UA: ABNORMAL /HPF (ref 0–4)
REASON FOR REJECTION: NORMAL
RENAL EPITHELIAL, UA: ABNORMAL /HPF
RESPIRATORY RATE: ABNORMAL
SAMPLE SITE: ABNORMAL
SAMPLE SITE: ABNORMAL
SARS-COV-2, RAPID: NOT DETECTED
SEG NEUTROPHILS: 66 % (ref 36–65)
SEGMENTED NEUTROPHILS ABSOLUTE COUNT: 4.08 K/UL (ref 1.5–8.1)
SET RATE: ABNORMAL
SODIUM BLD-SCNC: 136 MMOL/L (ref 135–144)
SODIUM,UR: 111 MMOL/L
SPECIFIC GRAVITY UA: 1.02 (ref 1–1.03)
SPECIMEN DESCRIPTION: NORMAL
TCO2 (CALC), ART: ABNORMAL MMOL/L (ref 22–29)
TEXT FOR RESPIRATORY: ABNORMAL
TOTAL HB: ABNORMAL G/DL (ref 12–16)
TOTAL PROTEIN: 7.9 G/DL (ref 6.4–8.3)
TOTAL RATE: ABNORMAL
TRICHOMONAS: ABNORMAL
TROPONIN INTERP: ABNORMAL
TROPONIN T: ABNORMAL NG/ML
TROPONIN, HIGH SENSITIVITY: 46 NG/L (ref 0–22)
TROPONIN, HIGH SENSITIVITY: 48 NG/L (ref 0–22)
TROPONIN, HIGH SENSITIVITY: 50 NG/L (ref 0–22)
TROPONIN, HIGH SENSITIVITY: 54 NG/L (ref 0–22)
TURBIDITY: CLEAR
URINE HGB: ABNORMAL
UROBILINOGEN, URINE: NORMAL
VT: ABNORMAL
WBC # BLD: 6.2 K/UL (ref 3.5–11.3)
WBC # BLD: ABNORMAL 10*3/UL
WBC UA: ABNORMAL /HPF (ref 0–5)
YEAST: ABNORMAL
ZZ NTE CLEAN UP: ORDERED TEST: NORMAL
ZZ NTE WITH NAME CLEAN UP: SPECIMEN SOURCE: NORMAL

## 2021-10-09 PROCEDURE — 84145 PROCALCITONIN (PCT): CPT

## 2021-10-09 PROCEDURE — 82728 ASSAY OF FERRITIN: CPT

## 2021-10-09 PROCEDURE — 2700000000 HC OXYGEN THERAPY PER DAY

## 2021-10-09 PROCEDURE — U0005 INFEC AGEN DETEC AMPLI PROBE: HCPCS

## 2021-10-09 PROCEDURE — 85379 FIBRIN DEGRADATION QUANT: CPT

## 2021-10-09 PROCEDURE — 83615 LACTATE (LD) (LDH) ENZYME: CPT

## 2021-10-09 PROCEDURE — 82803 BLOOD GASES ANY COMBINATION: CPT

## 2021-10-09 PROCEDURE — 2500000003 HC RX 250 WO HCPCS: Performed by: STUDENT IN AN ORGANIZED HEALTH CARE EDUCATION/TRAINING PROGRAM

## 2021-10-09 PROCEDURE — 71045 X-RAY EXAM CHEST 1 VIEW: CPT

## 2021-10-09 PROCEDURE — 94664 DEMO&/EVAL PT USE INHALER: CPT

## 2021-10-09 PROCEDURE — 87635 SARS-COV-2 COVID-19 AMP PRB: CPT

## 2021-10-09 PROCEDURE — 84300 ASSAY OF URINE SODIUM: CPT

## 2021-10-09 PROCEDURE — 36415 COLL VENOUS BLD VENIPUNCTURE: CPT

## 2021-10-09 PROCEDURE — 87641 MR-STAPH DNA AMP PROBE: CPT

## 2021-10-09 PROCEDURE — 71260 CT THORAX DX C+: CPT

## 2021-10-09 PROCEDURE — 6360000004 HC RX CONTRAST MEDICATION: Performed by: STUDENT IN AN ORGANIZED HEALTH CARE EDUCATION/TRAINING PROGRAM

## 2021-10-09 PROCEDURE — U0003 INFECTIOUS AGENT DETECTION BY NUCLEIC ACID (DNA OR RNA); SEVERE ACUTE RESPIRATORY SYNDROME CORONAVIRUS 2 (SARS-COV-2) (CORONAVIRUS DISEASE [COVID-19]), AMPLIFIED PROBE TECHNIQUE, MAKING USE OF HIGH THROUGHPUT TECHNOLOGIES AS DESCRIBED BY CMS-2020-01-R: HCPCS

## 2021-10-09 PROCEDURE — 93306 TTE W/DOPPLER COMPLETE: CPT

## 2021-10-09 PROCEDURE — 6370000000 HC RX 637 (ALT 250 FOR IP): Performed by: INTERNAL MEDICINE

## 2021-10-09 PROCEDURE — 93005 ELECTROCARDIOGRAM TRACING: CPT | Performed by: STUDENT IN AN ORGANIZED HEALTH CARE EDUCATION/TRAINING PROGRAM

## 2021-10-09 PROCEDURE — 93010 ELECTROCARDIOGRAM REPORT: CPT | Performed by: INTERNAL MEDICINE

## 2021-10-09 PROCEDURE — 6360000002 HC RX W HCPCS: Performed by: STUDENT IN AN ORGANIZED HEALTH CARE EDUCATION/TRAINING PROGRAM

## 2021-10-09 PROCEDURE — 6360000002 HC RX W HCPCS: Performed by: NURSE PRACTITIONER

## 2021-10-09 PROCEDURE — 96372 THER/PROPH/DIAG INJ SC/IM: CPT

## 2021-10-09 PROCEDURE — 80053 COMPREHEN METABOLIC PANEL: CPT

## 2021-10-09 PROCEDURE — 82570 ASSAY OF URINE CREATININE: CPT

## 2021-10-09 PROCEDURE — 83735 ASSAY OF MAGNESIUM: CPT

## 2021-10-09 PROCEDURE — 84484 ASSAY OF TROPONIN QUANT: CPT

## 2021-10-09 PROCEDURE — 94660 CPAP INITIATION&MGMT: CPT

## 2021-10-09 PROCEDURE — 2580000003 HC RX 258: Performed by: STUDENT IN AN ORGANIZED HEALTH CARE EDUCATION/TRAINING PROGRAM

## 2021-10-09 PROCEDURE — 87040 BLOOD CULTURE FOR BACTERIA: CPT

## 2021-10-09 PROCEDURE — 81001 URINALYSIS AUTO W/SCOPE: CPT

## 2021-10-09 PROCEDURE — 2000000000 HC ICU R&B

## 2021-10-09 PROCEDURE — 83880 ASSAY OF NATRIURETIC PEPTIDE: CPT

## 2021-10-09 PROCEDURE — 94640 AIRWAY INHALATION TREATMENT: CPT

## 2021-10-09 PROCEDURE — 96374 THER/PROPH/DIAG INJ IV PUSH: CPT

## 2021-10-09 PROCEDURE — 85025 COMPLETE CBC W/AUTO DIFF WBC: CPT

## 2021-10-09 PROCEDURE — 6370000000 HC RX 637 (ALT 250 FOR IP): Performed by: STUDENT IN AN ORGANIZED HEALTH CARE EDUCATION/TRAINING PROGRAM

## 2021-10-09 PROCEDURE — 85610 PROTHROMBIN TIME: CPT

## 2021-10-09 PROCEDURE — 83605 ASSAY OF LACTIC ACID: CPT

## 2021-10-09 PROCEDURE — 2580000003 HC RX 258: Performed by: NURSE PRACTITIONER

## 2021-10-09 PROCEDURE — 85730 THROMBOPLASTIN TIME PARTIAL: CPT

## 2021-10-09 PROCEDURE — 82805 BLOOD GASES W/O2 SATURATION: CPT

## 2021-10-09 PROCEDURE — 99285 EMERGENCY DEPT VISIT HI MDM: CPT

## 2021-10-09 PROCEDURE — 36600 WITHDRAWAL OF ARTERIAL BLOOD: CPT

## 2021-10-09 PROCEDURE — 99291 CRITICAL CARE FIRST HOUR: CPT | Performed by: INTERNAL MEDICINE

## 2021-10-09 PROCEDURE — 82947 ASSAY GLUCOSE BLOOD QUANT: CPT

## 2021-10-09 PROCEDURE — 86140 C-REACTIVE PROTEIN: CPT

## 2021-10-09 PROCEDURE — 99223 1ST HOSP IP/OBS HIGH 75: CPT | Performed by: INTERNAL MEDICINE

## 2021-10-09 RX ORDER — AMLODIPINE BESYLATE 10 MG/1
10 TABLET ORAL DAILY
Status: CANCELLED | OUTPATIENT
Start: 2021-10-09

## 2021-10-09 RX ORDER — LABETALOL HYDROCHLORIDE 5 MG/ML
40 INJECTION, SOLUTION INTRAVENOUS ONCE
Status: COMPLETED | OUTPATIENT
Start: 2021-10-09 | End: 2021-10-09

## 2021-10-09 RX ORDER — ONDANSETRON 4 MG/1
4 TABLET, ORALLY DISINTEGRATING ORAL EVERY 8 HOURS PRN
Status: DISCONTINUED | OUTPATIENT
Start: 2021-10-09 | End: 2021-10-09

## 2021-10-09 RX ORDER — POLYETHYLENE GLYCOL 3350 17 G/17G
17 POWDER, FOR SOLUTION ORAL DAILY PRN
Status: DISCONTINUED | OUTPATIENT
Start: 2021-10-09 | End: 2021-10-09

## 2021-10-09 RX ORDER — MAGNESIUM SULFATE IN WATER 40 MG/ML
2000 INJECTION, SOLUTION INTRAVENOUS PRN
Status: DISCONTINUED | OUTPATIENT
Start: 2021-10-09 | End: 2021-10-09

## 2021-10-09 RX ORDER — ALBUTEROL SULFATE 90 UG/1
2 AEROSOL, METERED RESPIRATORY (INHALATION) EVERY 6 HOURS PRN
Status: DISCONTINUED | OUTPATIENT
Start: 2021-10-09 | End: 2021-10-13 | Stop reason: HOSPADM

## 2021-10-09 RX ORDER — CARVEDILOL 25 MG/1
25 TABLET ORAL 2 TIMES DAILY
Status: CANCELLED | OUTPATIENT
Start: 2021-10-09

## 2021-10-09 RX ORDER — ONDANSETRON 2 MG/ML
4 INJECTION INTRAMUSCULAR; INTRAVENOUS EVERY 6 HOURS PRN
Status: DISCONTINUED | OUTPATIENT
Start: 2021-10-09 | End: 2021-10-13 | Stop reason: HOSPADM

## 2021-10-09 RX ORDER — ONDANSETRON 2 MG/ML
4 INJECTION INTRAMUSCULAR; INTRAVENOUS ONCE
Status: COMPLETED | OUTPATIENT
Start: 2021-10-09 | End: 2021-10-09

## 2021-10-09 RX ORDER — ONDANSETRON 2 MG/ML
4 INJECTION INTRAMUSCULAR; INTRAVENOUS EVERY 6 HOURS PRN
Status: DISCONTINUED | OUTPATIENT
Start: 2021-10-09 | End: 2021-10-09

## 2021-10-09 RX ORDER — NICARDIPINE HYDROCHLORIDE 0.1 MG/ML
3-15 INJECTION INTRAVENOUS CONTINUOUS
Status: DISCONTINUED | OUTPATIENT
Start: 2021-10-09 | End: 2021-10-11

## 2021-10-09 RX ORDER — 0.9 % SODIUM CHLORIDE 0.9 %
1000 INTRAVENOUS SOLUTION INTRAVENOUS ONCE
Status: DISCONTINUED | OUTPATIENT
Start: 2021-10-09 | End: 2021-10-09

## 2021-10-09 RX ORDER — ACETAMINOPHEN 325 MG/1
650 TABLET ORAL EVERY 6 HOURS PRN
Status: DISCONTINUED | OUTPATIENT
Start: 2021-10-09 | End: 2021-10-09

## 2021-10-09 RX ORDER — SODIUM CHLORIDE 9 MG/ML
25 INJECTION, SOLUTION INTRAVENOUS PRN
Status: DISCONTINUED | OUTPATIENT
Start: 2021-10-09 | End: 2021-10-09

## 2021-10-09 RX ORDER — SODIUM CHLORIDE 0.9 % (FLUSH) 0.9 %
5-40 SYRINGE (ML) INJECTION PRN
Status: DISCONTINUED | OUTPATIENT
Start: 2021-10-09 | End: 2021-10-09

## 2021-10-09 RX ORDER — CLONIDINE 0.1 MG/24H
1 PATCH, EXTENDED RELEASE TRANSDERMAL WEEKLY
Status: DISCONTINUED | OUTPATIENT
Start: 2021-10-09 | End: 2021-10-12

## 2021-10-09 RX ORDER — POTASSIUM CHLORIDE 7.45 MG/ML
10 INJECTION INTRAVENOUS PRN
Status: DISCONTINUED | OUTPATIENT
Start: 2021-10-09 | End: 2021-10-09

## 2021-10-09 RX ORDER — PANTOPRAZOLE SODIUM 40 MG/1
40 TABLET, DELAYED RELEASE ORAL
Status: DISCONTINUED | OUTPATIENT
Start: 2021-10-10 | End: 2021-10-13 | Stop reason: HOSPADM

## 2021-10-09 RX ORDER — FAMOTIDINE 20 MG/1
20 TABLET, FILM COATED ORAL 2 TIMES DAILY
Status: DISCONTINUED | OUTPATIENT
Start: 2021-10-09 | End: 2021-10-09

## 2021-10-09 RX ORDER — SODIUM CHLORIDE 0.9 % (FLUSH) 0.9 %
5-40 SYRINGE (ML) INJECTION EVERY 12 HOURS SCHEDULED
Status: DISCONTINUED | OUTPATIENT
Start: 2021-10-09 | End: 2021-10-09

## 2021-10-09 RX ORDER — ACETAMINOPHEN 650 MG/1
650 SUPPOSITORY RECTAL EVERY 6 HOURS PRN
Status: DISCONTINUED | OUTPATIENT
Start: 2021-10-09 | End: 2021-10-09

## 2021-10-09 RX ORDER — METHYLPREDNISOLONE SODIUM SUCCINATE 125 MG/2ML
125 INJECTION, POWDER, LYOPHILIZED, FOR SOLUTION INTRAMUSCULAR; INTRAVENOUS ONCE
Status: DISCONTINUED | OUTPATIENT
Start: 2021-10-09 | End: 2021-10-09

## 2021-10-09 RX ORDER — PROMETHAZINE HYDROCHLORIDE 25 MG/ML
12.5 INJECTION, SOLUTION INTRAMUSCULAR; INTRAVENOUS ONCE
Status: COMPLETED | OUTPATIENT
Start: 2021-10-09 | End: 2021-10-09

## 2021-10-09 RX ADMIN — NICARDIPINE HYDROCHLORIDE 12.5 MG/HR: 0.1 INJECTION INTRAVENOUS at 14:04

## 2021-10-09 RX ADMIN — NICARDIPINE HYDROCHLORIDE 10 MG/HR: 0.1 INJECTION INTRAVENOUS at 19:38

## 2021-10-09 RX ADMIN — NICARDIPINE HYDROCHLORIDE 12.5 MG/HR: 0.1 INJECTION INTRAVENOUS at 17:43

## 2021-10-09 RX ADMIN — NICARDIPINE HYDROCHLORIDE 10 MG/HR: 0.1 INJECTION INTRAVENOUS at 23:54

## 2021-10-09 RX ADMIN — ONDANSETRON 4 MG: 2 INJECTION, SOLUTION INTRAMUSCULAR; INTRAVENOUS at 12:06

## 2021-10-09 RX ADMIN — CEFTRIAXONE SODIUM 1000 MG: 1 INJECTION, POWDER, FOR SOLUTION INTRAMUSCULAR; INTRAVENOUS at 22:53

## 2021-10-09 RX ADMIN — Medication 40 MG: at 10:30

## 2021-10-09 RX ADMIN — NICARDIPINE HYDROCHLORIDE 5 MG/HR: 0.1 INJECTION INTRAVENOUS at 11:20

## 2021-10-09 RX ADMIN — SODIUM CHLORIDE 1000 ML: 9 INJECTION, SOLUTION INTRAVENOUS at 11:13

## 2021-10-09 RX ADMIN — ONDANSETRON 4 MG: 2 INJECTION INTRAMUSCULAR; INTRAVENOUS at 20:11

## 2021-10-09 RX ADMIN — NICARDIPINE HYDROCHLORIDE 12.5 MG/HR: 0.1 INJECTION INTRAVENOUS at 15:27

## 2021-10-09 RX ADMIN — ALBUTEROL SULFATE 2 PUFF: 90 AEROSOL, METERED RESPIRATORY (INHALATION) at 09:39

## 2021-10-09 RX ADMIN — NICARDIPINE HYDROCHLORIDE 10 MG/HR: 0.1 INJECTION INTRAVENOUS at 21:54

## 2021-10-09 RX ADMIN — AZITHROMYCIN MONOHYDRATE 500 MG: 500 INJECTION, POWDER, LYOPHILIZED, FOR SOLUTION INTRAVENOUS at 22:53

## 2021-10-09 RX ADMIN — IOPAMIDOL 85 ML: 755 INJECTION, SOLUTION INTRAVENOUS at 11:13

## 2021-10-09 RX ADMIN — PROMETHAZINE HYDROCHLORIDE 12.5 MG: 25 INJECTION INTRAMUSCULAR; INTRAVENOUS at 12:15

## 2021-10-09 ASSESSMENT — ENCOUNTER SYMPTOMS
SHORTNESS OF BREATH: 1
ABDOMINAL DISTENTION: 0
WHEEZING: 0
VOMITING: 0
COUGH: 1
PHOTOPHOBIA: 0
BLOOD IN STOOL: 0
NAUSEA: 1
ABDOMINAL PAIN: 0
SORE THROAT: 1
VOMITING: 1
NAUSEA: 0

## 2021-10-09 ASSESSMENT — PAIN SCALES - GENERAL
PAINLEVEL_OUTOF10: 0
PAINLEVEL_OUTOF10: 0

## 2021-10-09 NOTE — H&P
[facility]  Office: [address], DO, Serenaen Nyhan, DO, Serena Miles, DO, Flex Monique Blood, DO, Candi Colin MD, Nery Baum MD, Ino Jean MD, Ted Lovelace MD, Tej Escalante MD, Teresa Lo MD, Daniella Boyer MD, Carmine Kim, DO, Ina Duron, DO, Noelle Elise MD,  Andres Chavez, DO, Keenan Ojeda MD, Av Barbour MD, Marry Bermudez MD, Chaparrita Palmer MD, Nithya Coronado MD, Eduardo Villa MD, Juanjose House, [facility], [facility], CNP, Cristy Duckworth, CNP, Roman Munoz, CNS, Juan Earl, CNP, Black Davis, CNP, Lorna Baker, CNP, Rufus Robertson, CNP, Gloria Cortez, CNP, Minna Lopez PA-C, Kwaku Houser, [facility], May Patel, CNP, Harinder Cabezas, CNP, Jeffry Avila, CNP, Louann Gillis, CNP, Marlee Sanchez, CNP, Sharon Govea, CNP, Pamela Arreola, CNP         IN-PATIENT SERVICE  History and Physical  [facility]          Name: Anna Murdock  MRN: [id number]     Acct: [de-identified]  Room: [facility]/3525-72    Admit Date: 10/9/2021  PCP: Bryan Gusman MD    Chief Complaint:  Chief Complaint   Patient presents with    Respiratory Distress        History of Present Illness:  Anna Murdock is a 42 y.o.  male who presents with Respiratory Distress    Patient was admitted thru ER with:  Ina Helton is a 42 y. o.yo male who presents with shortness of breath. Patient report that the symptoms has been progressively worsening for the past 2 days. On Tuesday, he states that he was exposed to Drano, not ingested but inhalation. He states that since then his symptoms has worsened. He denies being tested for Covid however he states that he is COVID vaccinated. Denies any chest pain. Patient does report that he has a history of bronchitis.   Patient denies any abdominal pain, no nausea or vomiting, no urinary dysuria,\"     The patient was evaluated in the TICU  He remains on BiPAP  He is somnolent  Having trouble providing historical data  Remains on Cardene drip  BP improving  The patient reportedly has been off all of his meds  BPs as high as 230s/160s    The patient states he is less short of breath  No further nausea or vomiting  He reports seeing hematemesis previously  No cough/sputum    The patient does confirm inhalation Drano fumes on Tuesday  His drain had \"plugged up\"  He poured Drano into the sink but it did not work although it did create \"fumes\"    Initial database has included:  WBC6.2k/uL RBC4.62m/uL Wtqnvqjkxl27.8Low     pH, Ven7.361   pCO2, Ven43.6   pO2, Ven70.1High   HCO3, Aocodu04.1     Pro-[id number]     Dhpzngu81ml/dL     NHJ38Cstt mg/dL   CREATININE2. 44High      Ref. Range 3/25/2014 11:41 3/10/2015 10:13 10/9/2021 09:37   Creatinine Latest Ref Range: 0.70 - 1.20 mg/dL 0.85 0.89 2.44 (H)     Troponin, High [id number]     EKG:  Sinus tachycardia  Right atrial enlargement  T wave abnormality, consider lateral ischemia  Abnormal ECG  No previous ECGs available    CTA  Impression:    1. Motion artifact partially obscures some pulmonary artery subsegmental   branches near the lung bases. With this limitation, no findings of pulmonary   embolism are identified   2. Cardiovascular findings of pulmonary hypertension. 3. Slight reflux of the contrast bolus could be due to right heart   dysfunction but could also be seen as normal variation. 4. Mixed consolidative and groundglass opacities throughout the right lung     PMHx:  Past Medical History:   Diagnosis Date    DEVYN (acute kidney injury) ([facility] 10/9/2021    Bipolar 1 disorder ([facility] 2/26/2015    Bronchitis     Bronchitis     Hypertension     Moderate persistent asthma 2/26/2015    Obesity         PSHx:  History reviewed. No pertinent surgical history. Home Meds:  Prior to Admission medications    Medication Sig Start Date End Date Taking?  Authorizing Provider   carvedilol (COREG) 25 MG tablet take 1 tablet by mouth twice a day 6/11/20   Bonnie Porras MD   hydroCHLOROthiazide (HYDRODIURIL) 50 MG tablet take 1 tablet by mouth once daily 20   Tj Abdalla MD   ferrous sulfate (IRON 325) 325 (65 Fe) MG tablet Take 325 mg by mouth daily (with breakfast) 2/28/20 3/29/20  Historical Provider, MD   Blood Pressure KIT 1 kit by Does not apply route daily 3/16/20   Tj Abdalla MD   NIFEdipine (PROCARDIA XL) 60 MG extended release tablet Take 1 tablet by mouth daily 3/16/20 6/14/20  Tj Abdalla MD   amLODIPine (NORVASC) 10 MG tablet Take 1 tablet by mouth daily 3/16/20   Tj Abdalla MD         Allergies:   Patient has no known allergies. Social Hx:  Tobacco:    reports that he has never smoked. He has never used smokeless tobacco.  Alcohol:      reports no history of alcohol use. Drug Use:  reports no history of drug use. Family Hx; Family History   Problem Relation Age of Onset    High Blood Pressure Mother     Diabetes Maternal Grandmother    Mother and father   Cause of death not known to patient  Patient denies stroke, heart disease or cancer in the immediate family     ROS:  Review of Systems   Constitutional: Positive for activity change (diminished) and appetite change (Decreased). Negative for chills, diaphoresis, fatigue and fever. HENT: Negative for congestion and nosebleeds. Eyes: Negative for photophobia and visual disturbance. Respiratory: Positive for cough and shortness of breath (Dyspnea on exertion). Negative for wheezing. Cardiovascular: Negative for chest pain and palpitations. Gastrointestinal: Positive for nausea and vomiting (Hematemesis reported). Negative for abdominal distention and blood in stool. Genitourinary: Negative for flank pain and hematuria. Musculoskeletal: Negative for arthralgias and myalgias. Skin: Negative for rash and wound. Neurological: Positive for weakness. Negative for dizziness and light-headedness.        Physical Exam:  Vitals:  BP (!) 171/109   Pulse 95   Temp 98 °F (36.7 °C)   Resp 28   Ht 6' 5\" (1.956 m)   Wt (!) 315 lb (142.9 kg)   SpO2 100%   BMI 37.35 kg/m²   Temp (24hrs), Av °F (36.7 °C), Min:98 °F (36.7 °C), Max:98 °F (36.7 °C)    Physical Exam  Vitals reviewed. Constitutional:       General: He is not in acute distress. Appearance: He is ill-appearing. He is not diaphoretic. HENT:      Head: Normocephalic. Nose: Nose normal.   Eyes:      General: No scleral icterus. Conjunctiva/sclera: Conjunctivae normal.   Neck:      Trachea: No tracheal deviation. Cardiovascular:      Rate and Rhythm: Regular rhythm. Tachycardia present. Comments: Tachycardic at times  Pulmonary:      Effort: Pulmonary effort is normal. No respiratory distress (Remains on BiPAP). Breath sounds: Normal breath sounds. No wheezing or rales. Comments: Airflow reduced  No retractions  No accessory muscle use  No cyanosis    The patient has been tachypneic with respiratory rates in the 30s  O2 sats in the 80s noted  Chest:      Chest wall: No tenderness. Abdominal:      General: Bowel sounds are normal. There is no distension. Palpations: Abdomen is soft. Tenderness: There is no abdominal tenderness. There is no guarding. Musculoskeletal:         General: No tenderness. Cervical back: Neck supple. Skin:     General: Skin is warm and dry.          Data:  Laboratory Testing:  Recent Results (from the past 24 hour(s))   EKG 12 Lead    Collection Time: 10/09/21  9:26 AM   Result Value Ref Range    Ventricular Rate 108 BPM    Atrial Rate 108 BPM    P-R Interval 178 ms    QRS Duration 94 ms    Q-T Interval 342 ms    QTc Calculation (Bazett) 458 ms    P Axis 37 degrees    R Axis 36 degrees    T Axis 110 degrees   BLOOD GAS, VENOUS    Collection Time: 10/09/21  9:36 AM   Result Value Ref Range    pH, Khanh 7.361 7.320 - 7.420    pCO2, Khanh 43.6 39 - 55    pO2, Khanh 70.1 (H) 30 - 50    HCO3, Venous 24.1 24 - 30 mmol/L    Positive Base Excess, Khanh NOT REPORTED 0.0 - 2.0 mmol/L    Negative Base Excess, Khanh 0.9 0.0 - 2.0 mmol/L    O2 Sat, Khanh 93.1 (H) 60.0 - 85.0 %    Total Hb NOT REPORTED 12.0 - 16.0 g/dl    Oxyhemoglobin NOT REPORTED 95.0 - 98.0 %    Carboxyhemoglobin 1.9 0 - 5 %    Methemoglobin NOT REPORTED 0.0 - 1.5 %    Pt Temp 37.0     pH, Khanh, Temp Adj NOT REPORTED 7.320 - 7.420    pCO2, Khanh, Temp Adj NOT REPORTED 39 - 55 mmHg    pO2, Khanh, Temp Adj NOT REPORTED 30 - 50 mmHg    O2 Device/Flow/% NOT REPORTED     Respiratory Rate NOT REPORTED     Iban Test NOT REPORTED     Sample Site NOT REPORTED     Pt.  Position NOT REPORTED     Mode NOT REPORTED     Set Rate NOT REPORTED     Total Rate NOT REPORTED     VT NOT REPORTED     FIO2 INFORMATION NOT PROVIDED     Peep/Cpap NOT REPORTED     PSV NOT REPORTED     Text for Respiratory NOT REPORTED     NOTIFICATION NOT REPORTED     NOTIFICATION TIME NOT REPORTED    CBC Auto Differential    Collection Time: 10/09/21  9:37 AM   Result Value Ref Range    WBC 6.2 3.5 - 11.3 k/uL    RBC 4.62 4.21 - 5.77 m/uL    Hemoglobin 11.8 (L) 13.0 - 17.0 g/dL    Hematocrit 39.3 (L) 40.7 - 50.3 %    MCV 85.1 82.6 - 102.9 fL    MCH 25.5 25.2 - 33.5 pg    MCHC 30.0 28.4 - 34.8 g/dL    RDW 13.6 11.8 - 14.4 %    Platelets 803 897 - 603 k/uL    MPV 12.2 8.1 - 13.5 fL    NRBC Automated 0.0 0.0 per 100 WBC    Differential Type NOT REPORTED     Seg Neutrophils 66 (H) 36 - 65 %    Lymphocytes 21 (L) 24 - 43 %    Monocytes 9 3 - 12 %    Eosinophils % 2 1 - 4 %    Basophils 1 0 - 2 %    Immature Granulocytes 1 (H) 0 %    Segs Absolute 4.08 1.50 - 8.10 k/uL    Absolute Lymph # 1.32 1.10 - 3.70 k/uL    Absolute Mono # 0.56 0.10 - 1.20 k/uL    Absolute Eos # 0.14 0.00 - 0.44 k/uL    Basophils Absolute 0.09 0.00 - 0.20 k/uL    Absolute Immature Granulocyte 0.03 0.00 - 0.30 k/uL    WBC Morphology NOT REPORTED     RBC Morphology NOT REPORTED     Platelet Estimate NOT REPORTED    D-Dimer, Quantitative    Collection Time: 10/09/21  9:37 AM   Result Value Ref Range D-Dimer, Quant 0.80 mg/L FEU   APTT    Collection Time: 10/09/21  9:37 AM   Result Value Ref Range    PTT 24.1 20.5 - 30.5 sec   Protime-INR    Collection Time: 10/09/21  9:37 AM   Result Value Ref Range    Protime 9.8 9.1 - 12.3 sec    INR 0.9    Brain Natriuretic Peptide    Collection Time: 10/09/21  9:37 AM   Result Value Ref Range    Pro- (H) <300 pg/mL    BNP Interpretation Pro-BNP Reference Range:    Troponin    Collection Time: 10/09/21  9:37 AM   Result Value Ref Range    Troponin, High Sensitivity 48 (H) 0 - 22 ng/L    Troponin T NOT REPORTED <0.03 ng/mL    Troponin Interp NOT REPORTED    COMPREHENSIVE METABOLIC PANEL    Collection Time: 10/09/21  9:37 AM   Result Value Ref Range    Glucose 94 70 - 99 mg/dL    BUN 40 (H) 6 - 20 mg/dL    CREATININE 2.44 (H) 0.70 - 1.20 mg/dL    Bun/Cre Ratio NOT REPORTED 9 - 20    Calcium 9.0 8.6 - 10.4 mg/dL    Sodium 136 135 - 144 mmol/L    Potassium 4.0 3.7 - 5.3 mmol/L    Chloride 101 98 - 107 mmol/L    CO2 21 20 - 31 mmol/L    Anion Gap 14 9 - 17 mmol/L    Alkaline Phosphatase 85 40 - 129 U/L    ALT 29 5 - 41 U/L    AST 36 <40 U/L    Total Bilirubin 0.26 (L) 0.3 - 1.2 mg/dL    Total Protein 7.9 6.4 - 8.3 g/dL    Albumin 3.8 3.5 - 5.2 g/dL    Albumin/Globulin Ratio 0.9 (L) 1.0 - 2.5    GFR Non-African American 29 (L) >60 mL/min    GFR  35 (L) >60 mL/min    GFR Comment          GFR Staging NOT REPORTED    MAGNESIUM    Collection Time: 10/09/21  9:37 AM   Result Value Ref Range    Magnesium 2.3 1.6 - 2.6 mg/dL   COVID-19, Rapid    Collection Time: 10/09/21  9:41 AM    Specimen: Nasopharyngeal Swab   Result Value Ref Range    Specimen Description . NASOPHARYNGEAL SWAB     SARS-CoV-2, Rapid Not Detected Not Detected   Troponin    Collection Time: 10/09/21 10:44 AM   Result Value Ref Range    Troponin, High Sensitivity 46 (H) 0 - 22 ng/L    Troponin T NOT REPORTED <0.03 ng/mL    Troponin Interp NOT REPORTED    Urinalysis with microscopic    Collection Diagnosis Date Noted    Hypertensive encephalopathy [I67.4] 10/09/2021    [facility] [N17.9] 10/09/2021    Hypoxia [R09.02] 10/09/2021    Troponin level elevated [R77.8] 10/09/2021    Obesity (BMI 30-39. 9) [E66.9] 10/09/2021    Acute respiratory failure with hypoxia ([facility] [J96.01]     Hypertensive emergency [I16.1] 02/24/2020    Hypertension [I10] 02/26/2015       Plan:  Admit  Optimize cardio-pulmonary function   Check BNP  Check echo  -ECG; right atrial enlargement?  -CTA; pulmonary hypertension? Blood Pressure - Monitor and control  Resume home meds  Wean Cardene drip  Respiratory Therapy and Bronchodilators prn  BiPAP  Oxygen  Trend troponins  EKG a.m. Suspect LV strain versus non-ST elevation MI  Check bun and creatinine  Avoid nephrotoxins  Hydrochlorothiazide on hold  Antiemetics  PPI  Encourage compliance  Risk factor management / weight loss    DVT prophylaxis      Patient is admitted as inpatient status because of co-morbidities listed above, severity of signs and symptoms as outlined, requirement for current medical therapies and most importantly because of direct risk to patient if care not provided in a hospital setting. Expected length of stay > 48 hours.      Consultations:   IP CONSULT TO CRITICAL CARE  IP CONSULT TO CARDIOLOGY  IP CONSULT TO PULMONOLOGY    Electronically signed by Khoi Casas DO on 10/9/2021 at 5:02 PM

## 2021-10-09 NOTE — ED NOTES
Report called to Leigh Scales, ICU. All questions answered. Will transport patient on monitor.       Brittanie Sewell RN  10/09/21 [id number]

## 2021-10-09 NOTE — ED PROVIDER NOTES
[facility]     Emergency Department     Faculty Attestation    I performed a history and physical examination of the patient and discussed management with the resident. I have reviewed and agree with the residents findings including all diagnostic interpretations, and treatment plans as written at the time of my review. Any areas of disagreement are noted on the chart. I was personally present for the key portions of any procedures. I have documented in the chart those procedures where I was not present during the key portions. For Physician Assistant/ Nurse Practitioner cases/documentation I have personally evaluated this patient and have completed at least one if not all key elements of the E/M (history, physical exam, and MDM). Additional findings are as noted. This patient was evaluated in the Emergency Department for symptoms described in the history of present illness. The patient was evaluated in the context of the global COVID-19 pandemic, which necessitated consideration that the patient might be at risk for infection with the SARS-CoV-2 virus that causes COVID-19. Institutional protocols and algorithms that pertain to the evaluation of patients at risk for COVID-19 are in a state of rapid change based on information released by regulatory bodies including the CDC and federal and state organizations. These policies and algorithms were followed during the patient's care in the ED. Primary Care Physician: Gayla Shea MD    History: This is a 42 y.o. male who presents to the Emergency Department with complaint of sore throat. The patient presents emergent, and sore throat and shortness of breath. This began earlier today. Patient has not been immunized to Covid. Patient states several days ago was exposed to Drano but was asymptomatic until this morning.   The patient was noted to be significantly hypoxic on room air upon his arrival to

## 2021-10-09 NOTE — PROGRESS NOTES
Patient evaluated at bedside. Currently on BIPAP. Presented with sore throat and dyspnea. Tested negative for COVID. Not immunized. Hx of HTN. No hx of ACS/CAD/CHF. Troponin elevation - 48>46, proBNP 522  ECG with sinus tachycardia, TWI in I and aVL. CT chest - with pHTN, no PE, but concern for airway disease/pneumonia.      - symptoms likely secondary to pna  - Troponin elevation/NSTEMI likely type 2 in setting of resp failure and DEVYN  - trend trop, repeat ekg once stabilized from pulm stand point  - obtain formal echo  - management per primary team.    Kian Aguirre MD  Cardiology

## 2021-10-09 NOTE — ED PROVIDER NOTES
[facility]  Emergency Department Encounter  Emergency Medicine Resident     Pt Name: Foster Ford  MRN: [id number]  [place] 1980  Date of evaluation: 10/9/21  PCP:  Charli Aj MD    [address] [place] [address]       Chief Complaint   Patient presents with    Respiratory Distress       [facility]ORY [facility]  (Location/Symptom, Timing/Onset, Context/Setting, Quality, Duration, Modifying Factors,Severity.)      Foster Ford is a 42 y. o.yo male who presents with shortness of breath. Patient report that the symptoms has been progressively worsening for the past 2 days. On Tuesday, he states that he was exposed to Drano, not ingested but inhalation. He states that since then his symptoms has worsened. He denies being tested for Covid however he states that he is COVID vaccinated. Denies any chest pain. Patient does report that he has a history of bronchitis. Patient denies any abdominal pain, no nausea or vomiting, no urinary dysuria,    PAST MEDICAL / SURGICAL / SOCIAL / FAMILY HISTORY      has a past medical history of Bipolar 1 disorder ([address] Bronchitis, Bronchitis, Hypertension, Moderate persistent asthma, and Obesity. has no past surgical history on file.      Social History     Socioeconomic History    Marital status: Single     Spouse name: Not on file    Number of children: Not on file    Years of education: Not on file    Highest education level: Not on file   Occupational History    Not on file   Tobacco Use    Smoking status: Never Smoker    Smokeless tobacco: Never Used   Substance and Sexual Activity    Alcohol use: No    Drug use: No    Sexual activity: Not on file   Other Topics Concern    Not on file   Social History Narrative    Not on file     Social Determinants of Health     Financial Resource Strain:     Difficulty of Paying Living Expenses:    Food Insecurity:     Worried About Running Out of Food in the Last Year:     [address] in the Last Year:    Transportation Needs:     Lack of Transportation (Medical):  Lack of Transportation (Non-Medical):    Physical Activity:     Days of Exercise per Week:     Minutes of Exercise per Session:    Stress:     Feeling of Stress :    Social Connections:     Frequency of Communication with Friends and Family:     Frequency of Social Gatherings with Friends and Family:     Attends Anabaptism Services:     Active Member of Clubs or Organizations:     Attends Club or Organization Meetings:     Marital Status:    Intimate Partner Violence:     Fear of Current or Ex-Partner:     Emotionally Abused:     Physically Abused:     Sexually Abused:        Family History   Problem Relation Age of Onset    High Blood Pressure Mother     Diabetes Maternal Grandmother         Allergies:  Patient has no known allergies. Home Medications:  Prior to Admission medications    Medication Sig Start Date End Date Taking? Authorizing Provider   carvedilol (COREG) 25 MG tablet take 1 tablet by mouth twice a day 6/11/20   Reshma Lew MD   hydroCHLOROthiazide (HYDRODIURIL) 50 MG tablet take 1 tablet by mouth once daily 6/11/20   Reshma Lew MD   ferrous sulfate (IRON 325) 325 (65 Fe) MG tablet Take 325 mg by mouth daily (with breakfast) 2/28/20 3/29/20  Historical Provider, MD   Blood Pressure KIT 1 kit by Does not apply route daily 3/16/20   Reshma Lew MD   NIFEdipine (PROCARDIA XL) 60 MG extended release tablet Take 1 tablet by mouth daily 3/16/20 6/14/20  Reshma Lew MD   amLODIPine (NORVASC) 10 MG tablet Take 1 tablet by mouth daily 3/16/20   Reshma Lew MD       REVIEW OFSYSTEMS    (2-9 systems for level 4, 10 or more for level 5)      Review of Systems   Constitutional: Negative for chills, fatigue and fever. HENT: Positive for sore throat. Negative for tinnitus. Eyes: Negative for photophobia and visual disturbance. Respiratory: Positive for cough and shortness of breath. suitable admitted adults)    XR CHEST PORTABLE    CT CHEST PULMONARY EMBOLISM W CONTRAST    CBC Auto Differential    D-Dimer, Quantitative    APTT    Protime-INR    Brain Natriuretic Peptide    Troponin    COMPREHENSIVE METABOLIC PANEL    MAGNESIUM    BLOOD GAS, VENOUS    Urinalysis with microscopic    Troponin    Troponin    Comprehensive Metabolic Panel w/ Reflex to MG    Troponin    CBC    Sodium, urine, random    Creatinine, urine, random    Diet NPO    Vital signs per unit routine    Daily weights    Intake and output    Place intermittent pneumatic compression device    Telemetry monitoring - continuous duration    Notify physician    Up with assistance    Elevate Head of Bed     Monitor for signs/symptoms of urinary retention    Full Code    Inpatient consult to Critical Care    Inpatient consult to Cardiology    Consult to Pulmonologist    BIPAP    Initiate Oxygen Therapy Protocol    Respiratory care evaluation only    BIPAP    HHN Treatment    POC Blood Gas    EKG 12 Lead    EKG 12 Lead    ECHO Complete 2D W Doppler W Color    PATIENT STATUS (FROM ED OR OR/PROCEDURAL) Inpatient       MEDICATIONS ORDERED:  Orders Placed This Encounter   Medications    albuterol sulfate  (90 Base) MCG/ACT inhaler 2 puff     Order Specific Question:   Initiate RT Bronchodilator Protocol     Answer:    Yes    DISCONTD: methylPREDNISolone sodium (SOLU-MEDROL) injection 125 mg    labetalol (NORMODYNE;TRANDATE) injection 40 mg    DISCONTD: 0.9 % sodium chloride bolus    niCARdipine (CARDENE) 20 mg in 0.9 % sodium chloride 200 mL solution    iopamidol (ISOVUE-370) 76 % injection 85 mL    ondansetron (ZOFRAN) injection 4 mg    promethazine (PHENERGAN) injection 12.5 mg    DISCONTD: sodium chloride flush 0.9 % injection 5-40 mL    DISCONTD: sodium chloride flush 0.9 % injection 5-40 mL    DISCONTD: 0.9 % sodium chloride infusion    DISCONTD: enoxaparin (LOVENOX) injection 40 mg    DISCONTD: ondansetron (ZOFRAN-ODT) disintegrating tablet 4 mg    DISCONTD: ondansetron (ZOFRAN) injection 4 mg    DISCONTD: polyethylene glycol (GLYCOLAX) packet 17 g    DISCONTD: acetaminophen (TYLENOL) tablet 650 mg    DISCONTD: acetaminophen (TYLENOL) suppository 650 mg    DISCONTD: potassium chloride 10 mEq/100 mL IVPB (Peripheral Line)    DISCONTD: magnesium sulfate 2000 mg in 50 mL IVPB premix    DISCONTD: famotidine (PEPCID) tablet 20 mg    influenza quadrivalent split vaccine (FLUZONE;FLUARIX;FLULAVAL;AFLURIA) injection 0.5 mL       Initial MDM/Plan: 42 y.o. male who presents with shortness of breath. Patient in acute respiratory distress, tachypenic, hypoxia in 80% in RA, mild accessory muscle usage, speaking in short sentences immediately placed on 15L NRB, with saturation up to 97%. He does have have slightly diminished air movement, mild wheezing auscultated. He is tachycardic. Plan for patient is Covid swab, check labs in addition to get dimer on patient assess the need to obtain CT PE study because he is hypoxic and tachycardic. Patient is also in hypertensive emergency given shortness of breath with blood pressure of 861 systolic, plan to give him labetalol 40 mg IV. Plan for an EKG, troponin and will check his electrolytes. Plan to admit patient. Denies any headache, blurry vision or chest pain.   DIAGNOSTIC RESULTS / EMERGENCYDEPARTMENT COURSE / MDM     LABS:  Labs Reviewed   CBC WITH AUTO DIFFERENTIAL - Abnormal; Notable for the following components:       Result Value    Hemoglobin 11.8 (*)     Hematocrit 39.3 (*)     Seg Neutrophils 66 (*)     Lymphocytes 21 (*)     Immature Granulocytes 1 (*)     All other components within normal limits   BRAIN NATRIURETIC PEPTIDE - Abnormal; Notable for the following components:    Pro- (*)     All other components within normal limits   TROPONIN - Abnormal; Notable for the following components:    Troponin, High Sensitivity 48 modulation, iterative reconstruction, and/or weight based adjustment of the mA/kV was utilized to reduce the radiation dose to as low as reasonably achievable. COMPARISON: Chest radiograph 10/09/2021 HISTORY: ORDERING SYSTEM PROVIDED HISTORY: hypoxic with elevated dimer TECHNOLOGIST PROVIDED HISTORY: hypoxic with elevated dimer Decision Support Exception - unselect if not a suspected or confirmed emergency medical condition->Emergency Medical Condition (MA) Reason for Exam: hypoxic with elevated dimer Acuity: Unknown Type of Exam: Unknown FINDINGS: PULMONARY ARTERIES:  Marked dilation of the pulmonary trunk but not out of proportion with the ascending thoracic aorta. Dilation of intrapulmonary branches out of proportion with accompanying bronchi. Adequately opacified for evaluation. Motion artifact partially obscuring some subsegmental branches especially in the lung bases. No visualized filling defects consistent with emboli. MEDIASTINUM:  Mild cardiomegaly. Severe right ventricular hypertrophy. Moderate to severe concentric left ventricular hypertrophy. Flattening of the interventricular septum. No pericardial effusion. Fusiform ectasia of the ascending thoracic aorta up to 4.7 cm x 4.7 cm. Normal variant common origin of the brachiocephalic and left common carotid arteries. Slight reflux of the contrast bolus into the azygos vein. No mediastinal nor hilar lymphadenopathy. LUNGS/PLEURA:  Dependent secretions extending from the jc into the right mainstem bronchus and bronchus intermedius. Otherwise patent central airways. Mixed consolidative and groundglass opacities throughout the right lung with relative apical sparing. Minimal patchy groundglass opacities predominantly in basilar segments of the left lower lobe. Trace right pleural effusion. No pneumothoraces nor left pleural effusion. UPPER ABDOMEN:  Hepatic cysts measuring up to 1.1 cm. Mild splenic atrophy.  Reflux of the contrast bolus failure with hypoxia ([facility] [address]          DISPOSITION / PLAN     DISPOSITION Admitted 10/09/2021 01:08:43 PM      PATIENT REFERRED TO:  No follow-up provider specified.     DISCHARGE MEDICATIONS:  Current Discharge Medication List          Hermila De Los Santos MD  Emergency Medicine Resident    (Please note that portions of this note were completed with a voice recognition program.Efforts were made to edit the dictations but occasionally words are mis-transcribed.)        Hermila De Los Santos MD  Resident  10/09/21 [id number]

## 2021-10-09 NOTE — CONSULTS
Critical Care - Consult Note    Patient's name:  Bard Syed  Medical Record Number: [id number]  Patient's account/billing number: [de-identified]  Patient's YOB: 1980  Age: 42 y.o. Date of Admission: 10/9/2021  9:19 AM  Date of History and Physical Examination: 10/9/2021      Primary Care Physician: Scotty Burgess MD  Attending Physician: Dr. Thor Benson Status: Full Code    Chief complaint:   Chief Complaint   Patient presents with    Respiratory Distress         HISTORY OF PRESENT ILLNESS:      History was obtained from chart review and the patient. Bard Syed is a 42 y.o. with PMH of   -Essential hypertension-on Coreg 25, hydrochlorothiazide 50, Procardia 60, Norvasc 10  -Morbid obesity  -Bronchitis  -Non-smoker  -Iron deficiency anemia     This is a 42 y.o. male who presents to the Emergency Department with complaint of sore throat and shortness of breath which started earlier this morning. Patient reported orthopnea, PND occasionally, associated with some nausea. patient denied any fevers, chills, cough. Patient states that he did not take his antihypertensives for last 2 days. Patient is poor historian. Patient reported that he was exposed to Drano couple of days ago. Patient denies any sick contacts, has not been immunized for Covid. In the ER, patient hypoxic, in respiratory distress, was put on nonrebreather, oxygen saturations slightly better, was finally put on BiPAP. CT PE was done which was negative. Patient found to be hypertensive with systolics in the 398A. Received IV labetalol. Did not help. Patient was started on nicardipine for hypertensive emergency. Patient's EKGs did show some changes, cardiology was consulted. On my evaluation, patient afebrile, hypertensive, on BiPAP and saturating well. Patient drowsy but is able to maintain his airway, wakes up and responds appropriately.   Labs reviewed, showed DEVYN, patient's baseline creatinine normal.  Also elevated troponins and proBNP  Hemoglobin 11.8  UA showed 2+ proteinuria. Chest x-ray showed pulmonary edema. PAST MEDICAL HISTORY:         Diagnosis Date    Bipolar 1 disorder ([address] 2/26/2015    Bronchitis     Bronchitis     Hypertension     Moderate persistent asthma 2/26/2015    Obesity          PAST SURGICAL HISTORY:     History reviewed. No pertinent surgical history. ALLERGIES:      No Known Allergies      HOME MEDS: :      Prior to Admission medications    Medication Sig Start Date End Date Taking? Authorizing Provider   carvedilol (COREG) 25 MG tablet take 1 tablet by mouth twice a day 6/11/20   Gayla Shea MD   hydroCHLOROthiazide (HYDRODIURIL) 50 MG tablet take 1 tablet by mouth once daily 6/11/20   Gayla Shea MD   ferrous sulfate (IRON 325) 325 (65 Fe) MG tablet Take 325 mg by mouth daily (with breakfast) 2/28/20 3/29/20  Historical Provider, MD   Blood Pressure KIT 1 kit by Does not apply route daily 3/16/20   Gayla Shea MD   NIFEdipine (PROCARDIA XL) 60 MG extended release tablet Take 1 tablet by mouth daily 3/16/20 6/14/20  Gayla Shea MD   amLODIPine (NORVASC) 10 MG tablet Take 1 tablet by mouth daily 3/16/20   Gayla Shea MD       SOCIAL HISTORY:       TOBACCO:   reports that he has never smoked. He has never used smokeless tobacco.  ETOH:   reports no history of alcohol use. DRUGS:  reports no history of drug use. FAMILY HISTORY:          Problem Relation Age of Onset    High Blood Pressure Mother     Diabetes Maternal Grandmother        REVIEW OF SYSTEMS (ROS):     Constitutional: Negative for appetite change. Negative for fever. HENT: Negative for congestion. Eyes: Negative for photophobia and itching. Respiratory: Positive for apnea and shortness of breath. Cardiovascular: Negative for chest pain. Gastrointestinal: Negative for abdominal pain. Endocrine: Negative for polydipsia. Polyphagia: :   Genitourinary: Negative for dysuria. Musculoskeletal: Negative for back pain. Skin: Negative for color change. Allergic/Immunologic: Negative for immunocompromised state. Neurological: Negative for dizziness. Hematological: Negative for adenopathy. Psychiatric/Behavioral: Negative for agitation.        OBJECTIVE:     VITAL SIGNS:  BP (!) 153/103   Pulse 97   Temp 98 °F (36.7 °C)   Resp 26   Ht 6' 5\" (1.956 m)   Wt (!) 315 lb (142.9 kg)   SpO2 97%   BMI 37.35 kg/m²   Tmax over 24 hours:  Temp (24hrs), Av °F (36.7 °C), Min:98 °F (36.7 °C), Max:98 °F (36.7 °C)      Patient Vitals for the past 8 hrs:   BP Temp Pulse Resp SpO2 Height Weight   10/09/21 1445 (!) 153/103 -- 97 26 97 % -- --   10/09/21 1430 (!) 179/117 -- 97 19 100 % -- --   10/09/21 1415 (!) 172/114 -- 100 24 100 % 6' 5\" (1.956 m) --   10/09/21 1400 (!) 173/113 -- 99 28 100 % -- --   10/09/21 1355 (!) 163/113 -- 95 29 100 % -- --   10/09/21 1247 (!) 162/107 -- 98 26 92 % -- --   10/09/21 1238 -- -- -- (!) 33 -- -- --   10/09/21 1231 (!) 169/114 -- 100 (!) 31 90 % -- --   10/09/21 1131 (!) 203/131 -- 101 -- 95 % -- --   10/09/21 1040 -- -- -- -- -- -- (!) 315 lb (142.9 kg)   10/09/21 1031 (!) 211/152 -- 93 (!) 35 -- -- --   10/09/21 0946 (!) 231/168 -- 106 (!) 39 98 % -- --   10/09/21 0939 -- -- -- (!) 41 99 % -- --   10/09/21 0938 -- -- -- 26 98 % -- --   10/09/21 0937 -- 98 °F (36.7 °C) 120 (!) 36 (!) 82 % -- --   10/09/21 0932 -- -- 105 (!) 35 100 % -- --         Intake/Output Summary (Last 24 hours) at 10/9/2021 1533  Last data filed at 10/9/2021 1525  Gross per 24 hour   Intake --   Output 550 ml   Net -550 ml     Date 10/09/21 0000 - 10/09/21 2359   Shift 4612-7532 5253-2767 2882-6175 24 Hour Total   INTAKE   Shift Total(mL/kg)       OUTPUT   Urine  550  550   Shift Total(mL/kg)  550(3.8)  550(3.8)   Weight (kg)  142.9 142.9 142.9     Wt Readings from Last 3 Encounters:   10/09/21 (!) 315 lb (142.9 kg)   21 (!) 315 lb (142.9 kg)   20 (!) 342 lb 6.4 oz (155.3 kg)     Body mass index is 37.35 kg/m². PHYSICAL EXAM:  General:  Awake but drowsy, speaking in full sentences  HEENT: Atraumatic, normocephalic, no throat congestion, moist mucosa. Eyes:   Pupils equal, round and reactive to light, EOMI. Neck:   Supple  Chest:   Manage breath sounds. No crackles. Cardiac:  S1 S2 RR, no gallops, murmur or rubs. Abdomen: Soft, non-tender, no masses or organomegaly, BS present. :   No suprapubic or flank tenderness. Neuro:  AAO x 3, No FND. SKIN:  No rashes, good skin turgor. Extremities:  Pedal edema bilateral lower extremity.       MEDICATIONS:  Scheduled Meds:   sodium chloride flush  5-40 mL IntraVENous 2 times per day    enoxaparin  40 mg SubCUTAneous Daily    famotidine  20 mg Oral BID    influenza virus vaccine  0.5 mL IntraMUSCular Prior to discharge     Continuous Infusions:   niCARdipine 12.5 mg/hr (10/09/21 1527)    sodium chloride       PRN Meds:   albuterol sulfate HFA, 2 puff, Q6H PRN  sodium chloride flush, 5-40 mL, PRN  sodium chloride, 25 mL, PRN  ondansetron, 4 mg, Q8H PRN   Or  ondansetron, 4 mg, Q6H PRN  polyethylene glycol, 17 g, Daily PRN  acetaminophen, 650 mg, Q6H PRN   Or  acetaminophen, 650 mg, Q6H PRN  potassium chloride, 10 mEq, PRN  magnesium sulfate, 2,000 mg, PRN          ABGs:   Lab Results   Component Value Date    FIO2 INFORMATION NOT PROVIDED 10/09/2021       DATA:  Complete Blood Count:   Recent Labs     10/09/21  0937   WBC 6.2   RBC 4.62   HGB 11.8*   HCT 39.3*   MCV 85.1   MCH 25.5   MCHC 30.0   RDW 13.6      MPV 12.2        Last 3 Blood Glucose:   Recent Labs     10/09/21  0937   GLUCOSE 94        PT/INR:    Lab Results   Component Value Date    PROTIME 9.8 10/09/2021    INR 0.9 10/09/2021     PTT:    Lab Results   Component Value Date    APTT 24.1 10/09/2021       Comprehensive Metabolic Profile:   Recent Labs     10/09/21  0937      K 4.0      CO2 21   BUN 40*   CREATININE 2.44*   GLUCOSE 94 CALCIUM 9.0   PROT 7.9   LABALBU 3.8   BILITOT 0.26*   ALKPHOS 85   AST 36   ALT 29      Magnesium:   Lab Results   Component Value Date    MG 2.3 10/09/2021     Phosphorus: No results found for: PHOS  Ionized Calcium: No results found for: CAION     Urinalysis:   Lab Results   Component Value Date    NITRU NEGATIVE 10/09/2021    COLORU Yellow 10/09/2021    PHUR 6.0 10/09/2021    WBCUA 0 TO 2 10/09/2021    RBCUA 2 TO 5 10/09/2021    MUCUS NOT REPORTED 10/09/2021    TRICHOMONAS NOT REPORTED 10/09/2021    YEAST NOT REPORTED 10/09/2021    BACTERIA NOT REPORTED 10/09/2021    SPECGRAV 1.021 10/09/2021    LEUKOCYTESUR NEGATIVE 10/09/2021    UROBILINOGEN Normal 10/09/2021    BILIRUBINUR NEGATIVE 10/09/2021    GLUCOSEU NEGATIVE 10/09/2021    KETUA NEGATIVE 10/09/2021    AMORPHOUS NOT REPORTED 10/09/2021       HgBA1c:    Lab Results   Component Value Date    LABA1C 5.9 03/16/2020     TSH:    Lab Results   Component Value Date    TSH 1.07 02/26/2015     Radiological imaging  XR CHEST PORTABLE    Result Date: 10/9/2021  EXAMINATION: ONE XRAY VIEW OF THE CHEST 10/9/2021 10:29 am COMPARISON: 02/20/2015 HISTORY: ORDERING SYSTEM PROVIDED HISTORY: shortness of breath TECHNOLOGIST PROVIDED HISTORY: shortness of breath FINDINGS: There is bilateral airspace disease, right greater than left. The cardiac silhouette is stable. The aorta is tortuous. There is no pneumothorax pleural effusion. 1. Bilateral airspace disease concerning for pneumonia. Recommend chest radiograph in 8 weeks to confirm resolution. CT CHEST PULMONARY EMBOLISM W CONTRAST    Result Date: 10/9/2021  EXAMINATION: CTA OF THE CHEST 10/9/2021 11:05 am TECHNIQUE: CTA of the chest was performed after the administration of intravenous contrast.  Multiplanar reformatted images are provided for review. MIP images are provided for review.  Dose modulation, iterative reconstruction, and/or weight based adjustment of the mA/kV was utilized to reduce the radiation dose to as low as reasonably achievable. COMPARISON: Chest radiograph 10/09/2021 HISTORY: ORDERING SYSTEM PROVIDED HISTORY: hypoxic with elevated dimer TECHNOLOGIST PROVIDED HISTORY: hypoxic with elevated dimer Decision Support Exception - unselect if not a suspected or confirmed emergency medical condition->Emergency Medical Condition (MA) Reason for Exam: hypoxic with elevated dimer Acuity: Unknown Type of Exam: Unknown FINDINGS: PULMONARY ARTERIES:  Marked dilation of the pulmonary trunk but not out of proportion with the ascending thoracic aorta. Dilation of intrapulmonary branches out of proportion with accompanying bronchi. Adequately opacified for evaluation. Motion artifact partially obscuring some subsegmental branches especially in the lung bases. No visualized filling defects consistent with emboli. MEDIASTINUM:  Mild cardiomegaly. Severe right ventricular hypertrophy. Moderate to severe concentric left ventricular hypertrophy. Flattening of the interventricular septum. No pericardial effusion. Fusiform ectasia of the ascending thoracic aorta up to 4.7 cm x 4.7 cm. Normal variant common origin of the brachiocephalic and left common carotid arteries. Slight reflux of the contrast bolus into the azygos vein. No mediastinal nor hilar lymphadenopathy. LUNGS/PLEURA:  Dependent secretions extending from the jc into the right mainstem bronchus and bronchus intermedius. Otherwise patent central airways. Mixed consolidative and groundglass opacities throughout the right lung with relative apical sparing. Minimal patchy groundglass opacities predominantly in basilar segments of the left lower lobe. Trace right pleural effusion. No pneumothoraces nor left pleural effusion. UPPER ABDOMEN:  Hepatic cysts measuring up to 1.1 cm. Mild splenic atrophy. Reflux of the contrast bolus into the inferior vena cava and slightly into the hepatic veins.  SOFT TISSUES/BONES:   Slight reflux of the contrast bolus into venous collaterals about the right shoulder. No supraclavicular nor axillary lymphadenopathy. No acute fractures nor suspicious bony lesions. 1. Motion artifact partially obscures some pulmonary artery subsegmental branches near the lung bases. With this limitation, no findings of pulmonary embolism are identified 2. Cardiovascular findings of pulmonary hypertension. 3. Slight reflux of the contrast bolus could be due to right heart dysfunction but could also be seen as normal variation. 4. Mixed consolidative and groundglass opacities throughout the right lung with minimal patchy groundglass opacities in the left lung base most suggestive of typical pneumonia. Alveolar edema could appear similar, but no significant findings of interstitial edema are present. ASSESSMENT:     Principal Problem:    Hypertensive emergency  Active Problems:    Hypertension  Resolved Problems:    * No resolved hospital problems. *      PLAN:     -Patient to go to trauma ICU with [facility] primary service and pulmonology will follow as a consult.  -Follow-up on ABGs. -Continue BiPAP for now, breathing treatments as needed. -Hypertensive emergency management as per primary  -Follow-up  Covid PCR panel.  -Plan we will follow along.  -Keep patient n.p.o. as long as he is on BiPAP. Iban Espinoza MD  Internal Medicine Resident  [id number] [facility]  10/9/2021 3:33 PM      The critical care team assigned to the patient will be following up the patient in the intensive care unit. I have discussed the current plan with the critical care attending. The above mentioned assessment and plan will be reviewed again in detail by the critical care attending at bedside, and can be further changed or modified accordingly by the attending physician.

## 2021-10-09 NOTE — ED NOTES
Pt to ED with complaints of shortness of breath. Pt states it has been going on the last few days. Pt reports being exposed to draino while cleaning his tub and states that he thinks that is why he is short of breath. Pt arrives at 80% on RA. Pt placed on NRB at 15LPM. Pt unsure if he has COPD or Asthma. Pt denies Covid exposure. Pt placed on full cardiac monitor. EKG obtained. IV established. Dr. Anjana Buchanan at bedside.      Annmarie Sanchez RN  10/09/21 [id number]

## 2021-10-10 LAB
ALBUMIN SERPL-MCNC: 3.3 G/DL (ref 3.5–5.2)
ALBUMIN/GLOBULIN RATIO: 0.9 (ref 1–2.5)
ALP BLD-CCNC: 70 U/L (ref 40–129)
ALT SERPL-CCNC: 24 U/L (ref 5–41)
ANION GAP SERPL CALCULATED.3IONS-SCNC: 13 MMOL/L (ref 9–17)
AST SERPL-CCNC: 26 U/L
BILIRUB SERPL-MCNC: 0.21 MG/DL (ref 0.3–1.2)
BUN BLDV-MCNC: 29 MG/DL (ref 6–20)
BUN/CREAT BLD: ABNORMAL (ref 9–20)
CALCIUM SERPL-MCNC: 8.2 MG/DL (ref 8.6–10.4)
CHLORIDE BLD-SCNC: 103 MMOL/L (ref 98–107)
CO2: 18 MMOL/L (ref 20–31)
CREAT SERPL-MCNC: 2.08 MG/DL (ref 0.7–1.2)
FERRITIN: 179 UG/L (ref 30–400)
GFR AFRICAN AMERICAN: 43 ML/MIN
GFR NON-AFRICAN AMERICAN: 35 ML/MIN
GFR SERPL CREATININE-BSD FRML MDRD: ABNORMAL ML/MIN/{1.73_M2}
GFR SERPL CREATININE-BSD FRML MDRD: ABNORMAL ML/MIN/{1.73_M2}
GLUCOSE BLD-MCNC: 101 MG/DL (ref 70–99)
HCT VFR BLD CALC: 36.9 % (ref 40.7–50.3)
HEMOGLOBIN: 10.9 G/DL (ref 13–17)
LACTIC ACID, WHOLE BLOOD: 1.8 MMOL/L (ref 0.7–2.1)
LEGIONELLA PNEUMOPHILIA AG, URINE: NEGATIVE
MCH RBC QN AUTO: 25.8 PG (ref 25.2–33.5)
MCHC RBC AUTO-ENTMCNC: 29.5 G/DL (ref 28.4–34.8)
MCV RBC AUTO: 87.4 FL (ref 82.6–102.9)
MRSA, DNA, NASAL: NORMAL
NRBC AUTOMATED: 0 PER 100 WBC
PDW BLD-RTO: 14 % (ref 11.8–14.4)
PLATELET # BLD: 246 K/UL (ref 138–453)
PMV BLD AUTO: 12 FL (ref 8.1–13.5)
POTASSIUM SERPL-SCNC: 4.1 MMOL/L (ref 3.7–5.3)
PROCALCITONIN: 0.08 NG/ML
RBC # BLD: 4.22 M/UL (ref 4.21–5.77)
SARS-COV-2: NORMAL
SARS-COV-2: NOT DETECTED
SODIUM BLD-SCNC: 134 MMOL/L (ref 135–144)
SOURCE: NORMAL
SOURCE: NORMAL
SPECIMEN DESCRIPTION: NORMAL
STREP PNEUMONIAE ANTIGEN: NEGATIVE
TOTAL PROTEIN: 7.1 G/DL (ref 6.4–8.3)
TROPONIN INTERP: ABNORMAL
TROPONIN T: ABNORMAL NG/ML
TROPONIN, HIGH SENSITIVITY: 58 NG/L (ref 0–22)
WBC # BLD: 9.1 K/UL (ref 3.5–11.3)

## 2021-10-10 PROCEDURE — 85027 COMPLETE CBC AUTOMATED: CPT

## 2021-10-10 PROCEDURE — 36415 COLL VENOUS BLD VENIPUNCTURE: CPT

## 2021-10-10 PROCEDURE — 87449 NOS EACH ORGANISM AG IA: CPT

## 2021-10-10 PROCEDURE — 99291 CRITICAL CARE FIRST HOUR: CPT | Performed by: INTERNAL MEDICINE

## 2021-10-10 PROCEDURE — 6360000002 HC RX W HCPCS: Performed by: NURSE PRACTITIONER

## 2021-10-10 PROCEDURE — 84145 PROCALCITONIN (PCT): CPT

## 2021-10-10 PROCEDURE — 84484 ASSAY OF TROPONIN QUANT: CPT

## 2021-10-10 PROCEDURE — 87899 AGENT NOS ASSAY W/OPTIC: CPT

## 2021-10-10 PROCEDURE — 93005 ELECTROCARDIOGRAM TRACING: CPT | Performed by: INTERNAL MEDICINE

## 2021-10-10 PROCEDURE — 80053 COMPREHEN METABOLIC PANEL: CPT

## 2021-10-10 PROCEDURE — 6370000000 HC RX 637 (ALT 250 FOR IP): Performed by: STUDENT IN AN ORGANIZED HEALTH CARE EDUCATION/TRAINING PROGRAM

## 2021-10-10 PROCEDURE — 2000000000 HC ICU R&B

## 2021-10-10 PROCEDURE — 6370000000 HC RX 637 (ALT 250 FOR IP): Performed by: INTERNAL MEDICINE

## 2021-10-10 PROCEDURE — 83605 ASSAY OF LACTIC ACID: CPT

## 2021-10-10 PROCEDURE — 6360000002 HC RX W HCPCS: Performed by: INTERNAL MEDICINE

## 2021-10-10 PROCEDURE — 2700000000 HC OXYGEN THERAPY PER DAY

## 2021-10-10 PROCEDURE — 99232 SBSQ HOSP IP/OBS MODERATE 35: CPT | Performed by: FAMILY MEDICINE

## 2021-10-10 PROCEDURE — 2580000003 HC RX 258: Performed by: NURSE PRACTITIONER

## 2021-10-10 PROCEDURE — 6370000000 HC RX 637 (ALT 250 FOR IP): Performed by: FAMILY MEDICINE

## 2021-10-10 PROCEDURE — 94660 CPAP INITIATION&MGMT: CPT

## 2021-10-10 PROCEDURE — 2500000003 HC RX 250 WO HCPCS: Performed by: STUDENT IN AN ORGANIZED HEALTH CARE EDUCATION/TRAINING PROGRAM

## 2021-10-10 RX ORDER — FUROSEMIDE 10 MG/ML
20 INJECTION INTRAMUSCULAR; INTRAVENOUS ONCE
Status: COMPLETED | OUTPATIENT
Start: 2021-10-10 | End: 2021-10-10

## 2021-10-10 RX ORDER — ACETAMINOPHEN 325 MG/1
650 TABLET ORAL EVERY 4 HOURS PRN
Status: DISCONTINUED | OUTPATIENT
Start: 2021-10-10 | End: 2021-10-13 | Stop reason: HOSPADM

## 2021-10-10 RX ORDER — LABETALOL HYDROCHLORIDE 5 MG/ML
10 INJECTION, SOLUTION INTRAVENOUS EVERY 6 HOURS PRN
Status: DISCONTINUED | OUTPATIENT
Start: 2021-10-10 | End: 2021-10-12

## 2021-10-10 RX ORDER — HYDRALAZINE HYDROCHLORIDE 50 MG/1
50 TABLET, FILM COATED ORAL EVERY 8 HOURS SCHEDULED
Status: DISCONTINUED | OUTPATIENT
Start: 2021-10-10 | End: 2021-10-13 | Stop reason: HOSPADM

## 2021-10-10 RX ORDER — CARVEDILOL 25 MG/1
25 TABLET ORAL 2 TIMES DAILY WITH MEALS
Status: DISCONTINUED | OUTPATIENT
Start: 2021-10-10 | End: 2021-10-13 | Stop reason: HOSPADM

## 2021-10-10 RX ORDER — AMLODIPINE BESYLATE 10 MG/1
10 TABLET ORAL DAILY
Status: DISCONTINUED | OUTPATIENT
Start: 2021-10-10 | End: 2021-10-13 | Stop reason: HOSPADM

## 2021-10-10 RX ADMIN — NICARDIPINE HYDROCHLORIDE 10 MG/HR: 0.1 INJECTION INTRAVENOUS at 04:05

## 2021-10-10 RX ADMIN — CARVEDILOL 25 MG: 25 TABLET, FILM COATED ORAL at 11:20

## 2021-10-10 RX ADMIN — NICARDIPINE HYDROCHLORIDE 10 MG/HR: 0.1 INJECTION INTRAVENOUS at 01:57

## 2021-10-10 RX ADMIN — AZITHROMYCIN MONOHYDRATE 500 MG: 500 INJECTION, POWDER, LYOPHILIZED, FOR SOLUTION INTRAVENOUS at 21:43

## 2021-10-10 RX ADMIN — PANTOPRAZOLE SODIUM 40 MG: 40 TABLET, DELAYED RELEASE ORAL at 08:14

## 2021-10-10 RX ADMIN — FUROSEMIDE 20 MG: 10 INJECTION, SOLUTION INTRAMUSCULAR; INTRAVENOUS at 14:31

## 2021-10-10 RX ADMIN — HYDRALAZINE HYDROCHLORIDE 50 MG: 50 TABLET, FILM COATED ORAL at 13:33

## 2021-10-10 RX ADMIN — AMLODIPINE BESYLATE 10 MG: 10 TABLET ORAL at 11:20

## 2021-10-10 RX ADMIN — NICARDIPINE HYDROCHLORIDE 10 MG/HR: 0.1 INJECTION INTRAVENOUS at 10:40

## 2021-10-10 RX ADMIN — CEFTRIAXONE SODIUM 1000 MG: 1 INJECTION, POWDER, FOR SOLUTION INTRAMUSCULAR; INTRAVENOUS at 21:43

## 2021-10-10 RX ADMIN — CARVEDILOL 25 MG: 25 TABLET, FILM COATED ORAL at 16:51

## 2021-10-10 RX ADMIN — NICARDIPINE HYDROCHLORIDE 10 MG/HR: 0.1 INJECTION INTRAVENOUS at 06:02

## 2021-10-10 RX ADMIN — HYDRALAZINE HYDROCHLORIDE 50 MG: 50 TABLET, FILM COATED ORAL at 21:21

## 2021-10-10 RX ADMIN — NICARDIPINE HYDROCHLORIDE 10 MG/HR: 0.1 INJECTION INTRAVENOUS at 08:29

## 2021-10-10 RX ADMIN — BENZOCAINE AND MENTHOL 1 LOZENGE: 15; 3.6 LOZENGE ORAL at 11:20

## 2021-10-10 ASSESSMENT — PAIN SCALES - GENERAL
PAINLEVEL_OUTOF10: 0
PAINLEVEL_OUTOF10: 0

## 2021-10-10 NOTE — PLAN OF CARE
Problem: Falls - Risk of:  Goal: Will remain free from falls  Description: Will remain free from falls  Outcome: Met This Shift  Goal: Absence of physical injury  Description: Absence of physical injury  Outcome: Met This Shift     Problem: Anxiety/Stress:  Goal: Level of anxiety will decrease  Description: Level of anxiety will decrease  Outcome: Ongoing     Problem: Aspiration:  Goal: Absence of aspiration  Description: Absence of aspiration  Outcome: Ongoing     Problem: Gas Exchange - Impaired:  Goal: Levels of oxygenation will improve  Description: Levels of oxygenation will improve  Outcome: Ongoing     Problem: Mental Status - Impaired:  Goal: Mental status will be restored to baseline  Description: Mental status will be restored to baseline  Outcome: Ongoing

## 2021-10-10 NOTE — CARE COORDINATION
Writer paged to bedside due to emesis and subsequent oxygen desaturations. Patient placed back on BiPAP after being given antiemetic. Patient has been on BiPAP all day, taken off for a short period of time to eat and developed tachypnea shortly after. STAT ABG and CXR obtained along with COVID-PCR    Patient denies feeling short of breath despite being tachypneic with RR:35-38 on BiPAP. Denies any CP. Only complaint is of sore throat. Blood Gas: 7.35/40/68/22.6    Moving to isolation room pending COVID-PCR    : Respiratory rate slowly improved after time on BiPAP. Will continue to follow. : Discussed case with ICU resident, asked to evaluate for the potential need for intubation. : page received from ICU resident, agree patient is at risk for intubation however, patient is stating he does not want to be intubated. : Writer at bedside to further discuss CODE status with patient. We discussed intubation in its entirety and potential situations that would lead to respiratory failure. Patient is alert and oriented x4 and capable of independent decision making and maintains he does not want to be intubated. States he \"knows too many people who have  after being intubated\". Patient educated again and confirms he does not want intubation. We then discussed code status-- patient wishes to be a DNRCC-A and wishes to proceed with chest compressions if needed but no intubation. I explained to the patient in a life threatening emergency, it is likely both intubation and cardiac measures (ie: CPR, shock etc) would be needed to sustain life. Patient again verbalizes understanding and wishes to proceed with measures as listed above. Code status changed and staff is aware.

## 2021-10-10 NOTE — CARE COORDINATION
Case Management Initial Discharge Plan  Gita Frandy,             Met with:patient to discuss discharge plans. Information verified: address, contacts, phone number, , insurance Yes  Insurance Provider: [facility]    Emergency Contact/Next of Kin name & number: sister Carrera [phone]  Who are involved in patient's support system? sister    PCP: Shayla Smith MD  Date of last visit: over a year      Discharge Planning    Living Arrangements:  Alone     Home has 1 story  10 stairs to climb to get into front door    Patient able to perform ADL's:Independent    Current Services (outpatient & in home) none  DME equipment: n/  DME provider: n/a    Is patient receiving oral anticoagulation therapy? No    If indicated:   Physician managing anticoagulation treatment:   Where does patient obtain lab work for ATC treatment? Potential Assistance Needed:  Home Care (home O2)    Patient agreeable to home care: No  Prospect of choice provided:  n/a    Prior SNF/Rehab Placement and Facility: none  Agreeable to SNF/Rehab: No  Prospect of choice provided: n/a     Evaluation: no    Expected Discharge date:       Patient expects to be discharged to: If home: is the family and/or caregiver wiling & able to provide support at home? yes  Who will be providing this support? sister*    Follow Up Appointment: Best Day/ Time:      Transportation provider: robert  Transportation arrangements needed for discharge: No    Readmission Risk              Risk of Unplanned Readmission:  13             Does patient have a readmission risk score greater than 14?: No  If yes, follow-up appointment must be made within 7 days of discharge. Goals of Care:   Improved breathing with BP WNL    Educated pt on transitional options, provided freedom of choice and are agreeable with plan      Discharge Plan: Home with sisters support.  Watch O2 needs, currently on PAP 70%          Electronically signed by Miles Pulido RN on 10/10/21 at 3:04 PM EDT

## 2021-10-10 NOTE — PROGRESS NOTES
[facility]  Office: [address], DO, Davina Adkins, DO, Josep Dry, DO, Lucía Whitfieldu Blood, DO, Reba Doherty MD, Rogelio Reno MD, Roderick Smiley MD, Sebastien Weems MD, Le Buckley MD, Ani Gibson MD, Melvi Corado MD, Claudell Orion, DO, Lorna Millan, DO, Jojo Breaux MD,  Kadie Villasenor DO, Brandy Aleman MD, Ally Garnett MD, Carlos Rose MD, Sulaiman Silva MD, Pako Prather MD, Nigel García MD, Alva Valiente, Michael Lee, CNP, Beatrice Avrey, CNP, Xiang Jaffe, CNS, Piero Bermudez, CNP, Dennis De Leon, CNP, Vince Santoyo, CNP, Brielle Farmer, CNP, Chino Gallegos, CNP, Judith Durant PA-C, Lily Aquino, [facility], Tyra Guadarrama, CNP, Yodit Medley, CNP, Hunter Anthony, CNP, Berlinda Boast, CNP, Cynthia Cox, CNP, Daphney Rossi, CNP, Juhi Barrera, [address] [place] [address]    Progress Note    10/10/2021    7:42 AM    Name:   Bard Syed  MRN:     [id number]     Acct:      [de-identified]   Room:   [facility] Day:  1  Admit Date:  10/9/2021  9:19 AM    PCP:   Scotty Burgess MD  Code Status:  DNR-CCA    Subjective:     C/C:   Chief Complaint   Patient presents with    Respiratory Distress      Interval History Status: not changed. Pt was seen and examined this morning  No acute evnets overnight       Review of Systems:     12 point ROS performed and negative for anything other than what was stated in subjective     Medications:      Allergies:  No Known Allergies    Current Meds:   Scheduled Meds:    influenza virus vaccine  0.5 mL IntraMUSCular Prior to discharge    cloNIDine  1 patch TransDERmal Weekly    pantoprazole  40 mg Oral QAM AC    azithromycin  500 mg IntraVENous Q24H    cefTRIAXone (ROCEPHIN) IV  1,000 mg IntraVENous Q24H     Continuous Infusions:    niCARdipine 10 mg/hr (10/10/21 0602)     PRN Meds: albuterol sulfate HFA, ondansetron    Data:     Past Medical History:   has a past medical history of DEVYN (acute kidney injury) ([facility] [address] Bipolar 1 disorder ([facility] [address] Bronchitis, Bronchitis, Hypertension, Moderate persistent asthma, and Obesity. Social History:   reports that he has never smoked. He has never used smokeless tobacco. He reports that he does not drink alcohol and does not use drugs. Family History:   Family History   Problem Relation Age of Onset    High Blood Pressure Mother     Diabetes Maternal Grandmother        Vitals:  BP (!) 149/103   Pulse 92   Temp 98.1 °F (36.7 °C) (Axillary)   Resp (!) 0   Ht 6' 5\" (1.956 m)   Wt (!) 315 lb (142.9 kg)   SpO2 100%   BMI 37.35 kg/m²   Temp (24hrs), Av °F (36.7 °C), Min:97.6 °F (36.4 °C), Max:98.1 °F (36.7 °C)    Recent Labs     10/09/21  2026   POCGLU 162*       I/O (24Hr): Intake/Output Summary (Last 24 hours) at 10/10/2021 0742  Last data filed at 10/10/2021 0400  Gross per 24 hour   Intake 798 ml   Output 2200 ml   Net -1402 ml       Labs:  Hematology:  Recent Labs     10/09/21  0937 10/09/21  2236 10/10/21  035   WBC 6.2  --  9.1   RBC 4.62  --  4.22   HGB 11.8*  --  10.9*   HCT 39.3*  --  36.9*   MCV 85.1  --  87.4   MCH 25.5  --  25.8   MCHC 30.0  --  29.5   RDW 13.6  --  14.0     --  246   MPV 12.2  --  12.0   CRP  --  7.7*  --    INR 0.9  --   --    DDIMER 0.80  --   --      Chemistry:  Recent Labs     10/09/21  0937 10/09/21  1044 10/09/21  1617 10/09/21  2236 10/10/21  0355     --   --   --  134*   K 4.0  --   --   --  4.1     --   --   --  103   CO2 21  --   --   --  18*   GLUCOSE 94  --   --   --  101*   BUN 40*  --   --   --  29*   CREATININE 2.44*  --   --   --  2.08*   MG 2.3  --   --   --   --    ANIONGAP 14  --   --   --  13   LABGLOM 29*  --   --   --  35*   GFRAA 35*  --   --   --  43*   CALCIUM 9.0  --   --   --  8.2*   PROBNP 522*  --   --   --   --    TROPHS 48*   < > 50* 54* 58*   LACTACIDWB  --   --   --   --  1.8    < > = values in this interval not displayed.      Recent Labs 10/09/21  0937 10/09/21  2026 10/09/21  2236 10/10/21  0355   PROT 7.9  --   --  7.1   LABALBU 3.8  --   --  3.3*   AST 36  --   --  26   ALT 29  --   --  24   LDH  --   --  456*  --    ALKPHOS 85  --   --  70   BILITOT 0.26*  --   --  0.21*   POCGLU  --  162*  --   --      ABG:  Lab Results   Component Value Date    POCPH 7.356 10/09/2021    POCPCO2 40.3 10/09/2021    POCPO2 68.7 10/09/2021    POCHCO3 22.6 10/09/2021    NBEA 3 10/09/2021    PBEA NOT REPORTED 10/09/2021    JUT4OCD NOT REPORTED 10/09/2021    HAST8LAN 93 10/09/2021    FIO2 70.0 10/09/2021     No results found for: SPECIAL  No results found for: CULTURE    Radiology:  XR CHEST PORTABLE    Result Date: 10/9/2021  1. Extensive airspace disease again demonstrated, right greater than left, without significant interval change. Differential considerations would include diffuse infection or edema. 2. Cardiomegaly     XR CHEST PORTABLE    Result Date: 10/9/2021  1. Bilateral airspace disease concerning for pneumonia. Recommend chest radiograph in 8 weeks to confirm resolution. CT CHEST PULMONARY EMBOLISM W CONTRAST    Result Date: 10/9/2021  1. Motion artifact partially obscures some pulmonary artery subsegmental branches near the lung bases. With this limitation, no findings of pulmonary embolism are identified 2. Cardiovascular findings of pulmonary hypertension. 3. Slight reflux of the contrast bolus could be due to right heart dysfunction but could also be seen as normal variation. 4. Mixed consolidative and groundglass opacities throughout the right lung with minimal patchy groundglass opacities in the left lung base most suggestive of typical pneumonia. Alveolar edema could appear similar, but no significant findings of interstitial edema are present.        Physical Examination:        General appearance:  alert, cooperative and no distress  Mental Status:  oriented to person, place and time and normal affect  Lungs:  clear to auscultation bilaterally, normal effort  Heart:  regular rate and rhythm, no murmur  Abdomen:  soft, nontender, nondistended, normal bowel sounds  Extremities:  no edema, redness, tenderness in the calves  Skin:  no gross lesions, rashes, induration    Assessment:        Hospital Problems         Last Modified POA    * (Principal) Hypertensive emergency 10/9/2021 Yes    Hypertension 10/9/2021 Yes    Hypertensive encephalopathy 10/9/2021 Yes    DEVYN (acute kidney injury) ([facility] [address] 10/9/2021 Yes    Hypoxia 10/9/2021 Yes    Troponin level elevated 10/9/2021 Yes    Obesity (BMI 30-39.9) 10/9/2021 Yes    Acute respiratory failure with hypoxia ([facility] [address] 10/9/2021 Yes          Plan:        1. Acute respiratory failure   2. Hypertensive emergency  3. Pulmonary HTN   4. CAP   5. Type 2 NSTEMI   6. DEVYN   7.  Metabolic acidosis   - cardiology on board   - no beds available in medical ICU thus pt currently admitted into TICU   - pulmonary on board   - ECHO reviewed, normal LVEF   - pt would like to be DNR-CCA   - remains on cardene gtt, will wean as able   - BUN/Cr continue to trend down   - continue zithromax and rocephin at this time   - v/s per unit protocol   - RT protocol prn breathing treatments  - COVID negative   - started on hydralazine and labetalol prn     Nathalie Quinones MD  10/10/2021  7:42 AM

## 2021-10-10 NOTE — PROGRESS NOTES
Regular Covid 19 swab taken from left nare, labeled, placed in red dot bag, and handed off to second healthcare worker outside of room for transport to laboratory per hospital policy and procedure. Patient tolerated procedure well. 23-Mar-2018 15:26

## 2021-10-10 NOTE — PLAN OF CARE
Problem: Anxiety/Stress:  Goal: Level of anxiety will decrease  Description: Level of anxiety will decrease  Outcome: Ongoing     Problem: Aspiration:  Goal: Absence of aspiration  Description: Absence of aspiration  Outcome: Ongoing     Problem: Gas Exchange - Impaired:  Goal: Levels of oxygenation will improve  Description: Levels of oxygenation will improve  Outcome: Ongoing     Problem: Mental Status - Impaired:  Goal: Mental status will be restored to baseline  Description: Mental status will be restored to baseline  Outcome: Ongoing     Problem: Falls - Risk of:  Goal: Will remain free from falls  Description: Will remain free from falls  Outcome: Ongoing  Goal: Absence of physical injury  Description: Absence of physical injury  Outcome: Ongoing     Problem: Pain:  Goal: Pain level will decrease  Description: Pain level will decrease  Outcome: Ongoing  Goal: Control of acute pain  Description: Control of acute pain  Outcome: Ongoing  Goal: Control of chronic pain  Description: Control of chronic pain  Outcome: Ongoing     Problem: Skin Integrity:  Goal: Will show no infection signs and symptoms  Description: Will show no infection signs and symptoms  Outcome: Ongoing  Goal: Absence of new skin breakdown  Description: Absence of new skin breakdown  Outcome: Ongoing

## 2021-10-10 NOTE — FLOWSHEET NOTE
Writer notified provider that the pt was coughing up blood-tinged frothy sputum, tachypneic in the 30-40's, and desaturating in the 80's on a non-rebreather mask. Pt stated that he was having trouble breathing - Notified RT and placed on BIPAP, stat ABG and chest XR ordered.     Electronically signed by Nati Daniel RN on 10/10/2021 at 3:25 AM

## 2021-10-10 NOTE — CONSULTS
Pulmonary/Critical Care consultation    Patient's name:  Lonny Mcallister  Medical Record Number: [id number]  Patient's account/billing number: [de-identified]  Patient's YOB: 1980  Age: 42 y.o. Date of Admission: 10/9/2021  9:19 AM  Date of Consult: 10/10/2021      Primary Care Physician: Christine Brunson MD      Code Status: DNR-CCA    Reason for consult: Acute hypoxemic respiratory failure secondary to right lower lobe pneumonia versus a combination of pneumonia and pulmonary edema in a patient who presents also with hypertensive emergency      HISTORY OF PRESENT ILLNESS:   History was obtained from chart review and the patient. Lonny Mcallister is a 42 y.o. -American gentleman presented with respiratory distress with 2 days history of shortness of breath. Patient apparently had exposure to [place]ano by way of inhalation about 4-5 days prior to admission. Patient denies any sore throat  He did have cough productive of blood tinged sputum  Has been orthopneic  No proximal nocturnal dyspnea or increasing pedal edema  No chest pain or pressure  No previous history of edema in the lungs  Was observed to have a high blood pressure since admission  Patient required noninvasive ventilation at the time of admission but subsequently he was able to tolerate coming down to 6 L of oxygen by nasal cannula          Past Medical History:        Diagnosis Date    DEVYN (acute kidney injury) ([facility] [address] 10/9/2021    [facility] 2/26/2015    Bronchitis     Bronchitis     Hypertension     Moderate persistent asthma 2/26/2015    Obesity        Past Surgical History:  History reviewed. No pertinent surgical history. Allergies:    No Known Allergies      Home Meds:   Prior to Admission medications    Medication Sig Start Date End Date Taking?  Authorizing Provider   carvedilol (COREG) 25 MG tablet take 1 tablet by mouth twice a day 6/11/20   Christine Brunson MD Completed and except as mentioned above were negative  Dermatological ROS:  Completed and except as mentioned above were negative          Physical Exam:    Vitals: BP (!) 155/104   Pulse 95   Temp 98.8 °F (37.1 °C) (Oral)   Resp 19   Ht 6' 5\" (1.956 m)   Wt (!) 315 lb (142.9 kg)   SpO2 99%   BMI 37.35 kg/m²     Last Body weight:   Wt Readings from Last 3 Encounters:   10/09/21 (!) 315 lb (142.9 kg)   09/28/21 (!) 315 lb (142.9 kg)   03/16/20 (!) 342 lb 6.4 oz (155.3 kg)       Body Mass Index : Body mass index is 37.35 kg/m². Intake and Output summary:     Intake/Output Summary (Last 24 hours) at 10/10/2021 0817  Last data filed at 10/10/2021 0400  Gross per 24 hour   Intake 798 ml   Output 2200 ml   Net -1402 ml       Physical Examination:   PHYSICAL EXAMINATION:  Vitals:    10/10/21 0715 10/10/21 0730 10/10/21 0745 10/10/21 0816   BP: (!) 149/103 (!) 152/105 (!) 155/104    Pulse: 92 94 95    Resp: (!) 0 18 19    Temp:    98.8 °F (37.1 °C)   TempSrc:    Oral   SpO2: 100% 100% 99%    Weight:       Height:         Constitutional: This is a well developed, well nourished, 35-39.9 - Obesity Grade II 42y.o. year old male who is alert, oriented, cooperative and in no apparent distress. Head:normocephalic and atraumatic. EENT:   RAUL. No conjunctival injections. Septum was midline, mucosa was without erythema, exudates or cobblestoning. No thrush was noted. Mallampati III (soft palate, base of uvula visible)  Neck: Supple without thyromegaly. No elevated JVP. Trachea was midline. Respiratory: Chest was symmetrical without dullness to percussion. Breath sounds bilaterally were clear to auscultation. There were no wheezes, rhonchi but has right basilar rales. There is no intercostal retraction or use of accessory muscles. No egophony noted. Cardiovascular: Regular without murmur, clicks, gallops or rubs. Abdomen: Slightly rounded and soft without organomegaly.  No rebound tenderness, rigidity or guarding was appreciated. Lymphatic: No lymphadenopathy. Musculoskeletal: Normal curvature of the spine. No gross muscle weakness. Extremities:  No lower extremity edema, ulcerations, tenderness, varicosities or erythema. Muscle size, tone and strength are normal.  No involuntary movements are noted. Skin:  Warm and dry. Good color, turgor and pigmentation. No lesions or scars. No cyanosis or clubbing  Neurological/Psychiatric: The patient's general behavior, level of consciousness, thought content and emotional status is normal.            Laboratory findings:-    CBC:   Recent Labs     10/10/21  0355   WBC 9.1   HGB 10.9*        BMP:    Recent Labs     10/09/21  0937 10/09/21  0937 10/10/21  0355      < > 134*   K 4.0   < > 4.1      < > 103   CO2 21   < > 18*   BUN 40*   < > 29*   CREATININE 2.44*  --  2.08*   GLUCOSE 94   < > 101*    < > = values in this interval not displayed. S. Calcium:  Recent Labs     10/10/21  0355   CALCIUM 8.2*     S. Ionized Calcium:No results for input(s): IONCA in the last 72 hours. S. Magnesium:  Recent Labs     10/09/21  0937   MG 2.3     S. Phosphorus:No results for input(s): PHOS in the last 72 hours. S. Glucose:  Recent Labs     10/09/21  2026   POCGLU 162*     Glycosylated hemoglobin A1C: No results for input(s): LABA1C in the last 72 hours. INR:   Recent Labs     10/09/21  0937   INR 0.9     Hepatic functions:   Recent Labs     10/10/21  0355   ALKPHOS 70   ALT 24   AST 26   PROT 7.1   BILITOT 0.21*   LABALBU 3.3*     Pancreatic functions:No results for input(s): LACTA, AMYLASE in the last 72 hours. S. Lactic Acid: No results for input(s): LACTA in the last 72 hours. Cardiac enzymes:No results for input(s): CKTOTAL, CKMB, CKMBINDEX, TROPONINI in the last 72 hours. BNP:No results for input(s): BNP in the last 72 hours. Lipid profile: No results for input(s): CHOL, TRIG, HDL, LDLCALC in the last 72 hours.     Invalid input(s): LDL  Blood Gases: No results found for: PH, PCO2, PO2, HCO3, O2SAT  Thyroid functions:   Lab Results   Component Value Date    TSH 1.07 02/26/2015            Microbiology:    His COVID-19 test was negative    His blood culture has been negative    Radiological reports:    XR CHEST PORTABLE    Result Date: 10/9/2021  1. Extensive airspace disease again demonstrated, right greater than left, without significant interval change. Differential considerations would include diffuse infection or edema. 2. Cardiomegaly     XR CHEST PORTABLE    Result Date: 10/9/2021  1. Bilateral airspace disease concerning for pneumonia. Recommend chest radiograph in 8 weeks to confirm resolution. CT CHEST PULMONARY EMBOLISM W CONTRAST    Result Date: 10/9/2021  1. Motion artifact partially obscures some pulmonary artery subsegmental branches near the lung bases. With this limitation, no findings of pulmonary embolism are identified 2. Cardiovascular findings of pulmonary hypertension. 3. Slight reflux of the contrast bolus could be due to right heart dysfunction but could also be seen as normal variation. 4. Mixed consolidative and groundglass opacities throughout the right lung with minimal patchy groundglass opacities in the left lung base most suggestive of typical pneumonia. Alveolar edema could appear similar, but no significant findings of interstitial edema are present. Assessment and Plan       IMPRESSION:   1. DEVYN (acute kidney injury) ([address]    2. Hypertensive emergency    3. Acute respiratory failure with hypoxia (HCC)        Principal Problem:    Hypertensive emergency  Active Problems:    Hypertension    Hypertensive encephalopathy    DEVYN (acute kidney injury) ([address]    Hypoxia    Troponin level elevated    Obesity (BMI 30-39. 9)    Acute respiratory failure with hypoxia (HCC)  Resolved Problems:    * No resolved hospital problems.  *  Bilateral pulmonary infiltrates but more on the right side in a patient with

## 2021-10-10 NOTE — CONSULTS
[facility] Cardiology Cardiology    Consult / H&P               Today's Date: 10/10/2021  Patient Name: Savanna Harper  Date of admission: 10/9/2021  9:19 AM  Patient's age: 42 y. o., 1980  Admission Dx: Hypertensive emergency [I16.1]    Reason for Consult:  Cardiac evaluation    Requesting Physician: Nelson Gonzales MD    CHIEF COMPLAINT:  Elevated troponin, EKG changes    History Obtained From:  patient, electronic medical record    HISTORY OF PRESENT ILLNESS:      The patient is a 42 y.o.  male who is admitted to the hospital for shortness of breath and sore throat that started yesterday. Patient exposed to drano inhalation on Tuesday. Not vaccinated against covid 19. Patient came to ED due to desaturation. On arrival his /168, RR 39. He was desaturating at 80% on room air, placed on NRB at 15 L. Patient admitted to trauma ICU, with pulm as consult. Patient was still desaturating and was put on bipap. As per RN, patient also had bloody tinged frothy sputum. 's/100's, RR down to 20's  Na 134, creatinine 2.08, bicarb 18  Trop 48, 46, 50, 54, 58; EKG showing T wave inversion in I and AVL    CT chest showed - CVS findings of pulmonary hypertension, consolidative and ground glass opacities througout the R lung with minimal patchy ground glass opacities in the L lung base - typical PNA. CXR - extensive airspace disease R > L. showed cardiomegaly    Echo 10/09/2021 - showed - EF > 65%, no wall motion abnormalities, moderately increased wall thickness LV     Past cardiac history - no known    Currently on clonidine, received labetalol once 40 IV  On nicardipine ggt. Past Medical History:   has a past medical history of DEVYN (acute kidney injury) ([address] Bipolar 1 disorder ([address] Bronchitis, Bronchitis, Hypertension, Moderate persistent asthma, and Obesity. Past Surgical History:   has no past surgical history on file.      Home Medications:    Prior to Admission medications Medication Sig Start Date End Date Taking? Authorizing Provider   carvedilol (COREG) 25 MG tablet take 1 tablet by mouth twice a day 6/11/20   Reshma Lew MD   hydroCHLOROthiazide (HYDRODIURIL) 50 MG tablet take 1 tablet by mouth once daily 6/11/20   Reshma Lew MD   ferrous sulfate (IRON 325) 325 (65 Fe) MG tablet Take 325 mg by mouth daily (with breakfast) 2/28/20 3/29/20  Historical Provider, MD   Blood Pressure KIT 1 kit by Does not apply route daily 3/16/20   Reshma Lew MD   NIFEdipine (PROCARDIA XL) 60 MG extended release tablet Take 1 tablet by mouth daily 3/16/20 6/14/20  Reshma Lew MD   amLODIPine (NORVASC) 10 MG tablet Take 1 tablet by mouth daily 3/16/20   Reshma Lew MD      Current Facility-Administered Medications: albuterol sulfate  (90 Base) MCG/ACT inhaler 2 puff, 2 puff, Inhalation, Q6H PRN  niCARdipine (CARDENE) 20 mg in 0.9 % sodium chloride 200 mL solution, 3-15 mg/hr, IntraVENous, Continuous  influenza quadrivalent split vaccine (FLUZONE;FLUARIX;FLULAVAL;AFLURIA) injection 0.5 mL, 0.5 mL, IntraMUSCular, Prior to discharge  cloNIDine (CATAPRES) 0.1 MG/24HR 1 patch, 1 patch, TransDERmal, Weekly  pantoprazole (PROTONIX) tablet 40 mg, 40 mg, Oral, QAM AC  ondansetron (ZOFRAN) injection 4 mg, 4 mg, IntraVENous, Q6H PRN  azithromycin (ZITHROMAX) 500 mg in dextrose 5% 250 mL IVPB, 500 mg, IntraVENous, Q24H  cefTRIAXone (ROCEPHIN) 1000 mg IVPB in 50 mL D5W minibag, 1,000 mg, IntraVENous, Q24H    Allergies:  Patient has no known allergies. Social History:   reports that he has never smoked. He has never used smokeless tobacco. He reports that he does not drink alcohol and does not use drugs. Family History: family history includes Diabetes in his maternal grandmother; High Blood Pressure in his mother. No h/o sudden cardiac death. No for premature CAD    REVIEW OF SYSTEMS:    · Cannot be obtained.       PHYSICAL EXAM:      BP (!) 154/103   Pulse 98   Temp 98.1 °F (36.7 °C) (Axillary)   Resp 20   Ht 6' 5\" (1.956 m)   Wt (!) 315 lb (142.9 kg)   SpO2 96%   BMI 37.35 kg/m²    Constitutional and General Appearance: alert, cooperative, no distress and appears stated age  HEENT: PERRL, no cervical lymphadenopathy. No masses palpable. Normal oral mucosa  Respiratory:  · Normal excursion and expansion without use of accessory muscles  · Resp Auscultation: Good respiratory effort. No for increased work of breathing. On auscultation: clear to auscultation bilaterally  Cardiovascular:  · The apical impulse is not displaced  · Heart tones are crisp and normal. regular S1 and S2.  · Jugular venous pulsation Normal  · The carotid upstroke is normal in amplitude and contour without delay or bruit  · Peripheral pulses are symmetrical and full   Abdomen:   · No masses or tenderness  · Bowel sounds present  Extremities:  ·  No Cyanosis or Clubbing  ·  Lower extremity edema: No  ·  Skin: Warm and dry  Neurological:  · Alert and oriented. · Moves all extremities well  · No abnormalities of mood, affect, memory, mentation, or behavior are noted    DATA:    Diagnostics:      EKG:   EKG showing T wave inversion in I and AVL. ECHO:   Echo 10/09/2021 - showed - EF > 65%, no wall motion abnormalities, moderately increased wall thickness LV .      Stress Test:   .    Cardiac Angiography:   .    Labs:     CBC:   Recent Labs     10/09/21  0937 10/10/21  0355   WBC 6.2 9.1   HGB 11.8* 10.9*   HCT 39.3* 36.9*    246     BMP:   Recent Labs     10/09/21  0937 10/10/21  0355    134*   K 4.0 4.1   CO2 21 18*   BUN 40* 29*   CREATININE 2.44* 2.08*   LABGLOM 29* 35*   GLUCOSE 94 101*     PT/INR:   Recent Labs     10/09/21  0937   PROTIME 9.8   INR 0.9     APTT:  Recent Labs     10/09/21  0937   APTT 24.1     FASTING LIPID PANEL:  Lab Results   Component Value Date    HDL 63 02/26/2015    TRIG 46 02/26/2015     LIVER PROFILE:  Recent Labs     10/09/21  0937 10/10/21  0355   AST 36 26 ALT 29 24   LABALBU 3.8 3.3*     Patient Active Problem List   Diagnosis    Moderate persistent asthma    Bipolar 1 disorder ([facility]    Hypertension    Obesity, morbid, BMI 40.0-49.9 ([facility]    Need for prophylactic vaccination with combined diphtheria-tetanus-pertussis (DTP) vaccine    Hypertensive emergency    Hypertensive encephalopathy    DEVYN (acute kidney injury) ([facility]    Hypoxia    Troponin level elevated    Obesity (BMI 30-39. 9)    Acute respiratory failure with hypoxia (HCC)       IMPRESSION:      1. Hypertensive Urgency. 2. NSTEMI Type II secondary to demand ischemia secondary to hypertensive urgency, DEVYN, hypoxia requiring bipap  3. Pneumonia  4. Acute hypoxic respiratory failure secondary to #2  5. DEVYN - improving      RECOMMENDATIONS:  1. Echo reviewed as above unremarkable. 2. Currently on nicardipine ggt, clonidine patch, Coreg and Amlodipine. 3. Will start on hydralazine 50 mg q8h  4. Add labetalol 10 mg IV q6h for SBP > 160 prn. Alex Earl MD  Cardiology Service  Internal Medicine Residency Program, PGY-1  [facility]      Attending Physician Statement  I have discussed the case of Carina Stanton including pertinent history and exam findings with the resident. I have seen and examined the patient and the key elements of the encounter have been performed by me. I agree with the assessment, plan and orders as documented by the resident With changes made to the note.      Electronically signed by Sharee Steinberg MD on 10/10/2021 at 1:15 PM.    [facility]      [phone]

## 2021-10-11 ENCOUNTER — APPOINTMENT (OUTPATIENT)
Dept: GENERAL RADIOLOGY | Age: 41
DRG: 280 | End: 2021-10-11
Payer: COMMERCIAL

## 2021-10-11 LAB
ALBUMIN SERPL-MCNC: 2.8 G/DL (ref 3.5–5.2)
ALBUMIN/GLOBULIN RATIO: 0.8 (ref 1–2.5)
ALP BLD-CCNC: 66 U/L (ref 40–129)
ALT SERPL-CCNC: 16 U/L (ref 5–41)
ANION GAP SERPL CALCULATED.3IONS-SCNC: 11 MMOL/L (ref 9–17)
AST SERPL-CCNC: 18 U/L
BILIRUB SERPL-MCNC: 0.43 MG/DL (ref 0.3–1.2)
BUN BLDV-MCNC: 25 MG/DL (ref 6–20)
BUN/CREAT BLD: ABNORMAL (ref 9–20)
CALCIUM SERPL-MCNC: 8.5 MG/DL (ref 8.6–10.4)
CHLORIDE BLD-SCNC: 100 MMOL/L (ref 98–107)
CO2: 20 MMOL/L (ref 20–31)
CREAT SERPL-MCNC: 2.19 MG/DL (ref 0.7–1.2)
EKG ATRIAL RATE: 102 BPM
EKG ATRIAL RATE: 113 BPM
EKG P AXIS: 34 DEGREES
EKG P AXIS: 38 DEGREES
EKG P-R INTERVAL: 180 MS
EKG P-R INTERVAL: 198 MS
EKG Q-T INTERVAL: 330 MS
EKG Q-T INTERVAL: 380 MS
EKG QRS DURATION: 90 MS
EKG QRS DURATION: 96 MS
EKG QTC CALCULATION (BAZETT): 452 MS
EKG QTC CALCULATION (BAZETT): 495 MS
EKG R AXIS: 29 DEGREES
EKG R AXIS: 31 DEGREES
EKG T AXIS: 122 DEGREES
EKG T AXIS: 125 DEGREES
EKG VENTRICULAR RATE: 102 BPM
EKG VENTRICULAR RATE: 113 BPM
GFR AFRICAN AMERICAN: 40 ML/MIN
GFR NON-AFRICAN AMERICAN: 33 ML/MIN
GFR SERPL CREATININE-BSD FRML MDRD: ABNORMAL ML/MIN/{1.73_M2}
GFR SERPL CREATININE-BSD FRML MDRD: ABNORMAL ML/MIN/{1.73_M2}
GLUCOSE BLD-MCNC: 96 MG/DL (ref 70–99)
HCT VFR BLD CALC: 38.9 % (ref 40.7–50.3)
HEMOGLOBIN: 11.8 G/DL (ref 13–17)
MCH RBC QN AUTO: 25.9 PG (ref 25.2–33.5)
MCHC RBC AUTO-ENTMCNC: 30.3 G/DL (ref 28.4–34.8)
MCV RBC AUTO: 85.5 FL (ref 82.6–102.9)
NRBC AUTOMATED: 0 PER 100 WBC
PDW BLD-RTO: 13.9 % (ref 11.8–14.4)
PLATELET # BLD: 305 K/UL (ref 138–453)
PMV BLD AUTO: 12 FL (ref 8.1–13.5)
POTASSIUM SERPL-SCNC: 4.2 MMOL/L (ref 3.7–5.3)
RBC # BLD: 4.55 M/UL (ref 4.21–5.77)
SODIUM BLD-SCNC: 131 MMOL/L (ref 135–144)
TOTAL PROTEIN: 6.3 G/DL (ref 6.4–8.3)
WBC # BLD: 10 K/UL (ref 3.5–11.3)

## 2021-10-11 PROCEDURE — 99233 SBSQ HOSP IP/OBS HIGH 50: CPT | Performed by: INTERNAL MEDICINE

## 2021-10-11 PROCEDURE — 1200000000 HC SEMI PRIVATE

## 2021-10-11 PROCEDURE — 93010 ELECTROCARDIOGRAM REPORT: CPT | Performed by: INTERNAL MEDICINE

## 2021-10-11 PROCEDURE — 85027 COMPLETE CBC AUTOMATED: CPT

## 2021-10-11 PROCEDURE — 6370000000 HC RX 637 (ALT 250 FOR IP): Performed by: FAMILY MEDICINE

## 2021-10-11 PROCEDURE — 36415 COLL VENOUS BLD VENIPUNCTURE: CPT

## 2021-10-11 PROCEDURE — 6370000000 HC RX 637 (ALT 250 FOR IP): Performed by: STUDENT IN AN ORGANIZED HEALTH CARE EDUCATION/TRAINING PROGRAM

## 2021-10-11 PROCEDURE — 6370000000 HC RX 637 (ALT 250 FOR IP): Performed by: INTERNAL MEDICINE

## 2021-10-11 PROCEDURE — 80053 COMPREHEN METABOLIC PANEL: CPT

## 2021-10-11 PROCEDURE — 99232 SBSQ HOSP IP/OBS MODERATE 35: CPT | Performed by: FAMILY MEDICINE

## 2021-10-11 PROCEDURE — 71045 X-RAY EXAM CHEST 1 VIEW: CPT

## 2021-10-11 RX ADMIN — HYDRALAZINE HYDROCHLORIDE 50 MG: 50 TABLET, FILM COATED ORAL at 21:23

## 2021-10-11 RX ADMIN — CARVEDILOL 25 MG: 25 TABLET, FILM COATED ORAL at 08:13

## 2021-10-11 RX ADMIN — HYDRALAZINE HYDROCHLORIDE 50 MG: 50 TABLET, FILM COATED ORAL at 06:14

## 2021-10-11 RX ADMIN — CARVEDILOL 25 MG: 25 TABLET, FILM COATED ORAL at 16:39

## 2021-10-11 RX ADMIN — AMLODIPINE BESYLATE 10 MG: 10 TABLET ORAL at 08:13

## 2021-10-11 RX ADMIN — PANTOPRAZOLE SODIUM 40 MG: 40 TABLET, DELAYED RELEASE ORAL at 06:14

## 2021-10-11 RX ADMIN — HYDRALAZINE HYDROCHLORIDE 50 MG: 50 TABLET, FILM COATED ORAL at 14:54

## 2021-10-11 ASSESSMENT — ENCOUNTER SYMPTOMS
VOMITING: 0
WHEEZING: 0
SORE THROAT: 0
CONSTIPATION: 0
DIARRHEA: 0
CHEST TIGHTNESS: 1
NAUSEA: 0
SHORTNESS OF BREATH: 1
COUGH: 0
TROUBLE SWALLOWING: 0
ABDOMINAL PAIN: 0

## 2021-10-11 ASSESSMENT — PAIN SCALES - GENERAL
PAINLEVEL_OUTOF10: 0

## 2021-10-11 NOTE — PROGRESS NOTES
[facility]   Progress Note                   Date:   10/10/2021  Patient name: Dasia Wilkins  Date of admission:  10/9/2021  9:19 AM  MRN:   [id number]  YOB: 1980  PCP: Herminia Thomas MD    Reason for Admission:      Subjective: There were no acute events overnight, remained hemodynamically stable, denies chest pain, dyspnea, orthopnea or palpitations. Medications:   Scheduled Meds:   carvedilol  25 mg Oral BID WC    amLODIPine  10 mg Oral Daily    hydrALAZINE  50 mg Oral 3 times per day    influenza virus vaccine  0.5 mL IntraMUSCular Prior to discharge    cloNIDine  1 patch TransDERmal Weekly    pantoprazole  40 mg Oral QAM AC    azithromycin  500 mg IntraVENous Q24H    cefTRIAXone (ROCEPHIN) IV  1,000 mg IntraVENous Q24H       Continuous Infusions:   niCARdipine Stopped (10/10/21 1325)       CBC:   Recent Labs     10/09/21  0937 10/10/21  0355   WBC 6.2 9.1   HGB 11.8* 10.9*    246     BMP:    Recent Labs     10/09/21  0937 10/10/21  0355    134*   K 4.0 4.1    103   CO2 21 18*   BUN 40* 29*   CREATININE 2.44* 2.08*   GLUCOSE 94 101*     Hepatic:   Recent Labs     10/09/21  0937 10/10/21  0355   AST 36 26   ALT 29 24   BILITOT 0.26* 0.21*   ALKPHOS 85 70     Troponin: No results for input(s): TROPONINI in the last 72 hours. BNP: No results for input(s): BNP in the last 72 hours. Lipids: No results for input(s): CHOL, HDL in the last 72 hours.     Invalid input(s): LDLCALCU  INR:   Recent Labs     10/09/21  0937   INR 0.9       Objective:   Vitals: BP (!) 134/97   Pulse 95   Temp 98.4 °F (36.9 °C) (Oral)   Resp 27   Ht 6' 5\" (1.956 m)   Wt (!) 315 lb (142.9 kg)   SpO2 98%   BMI 37.35 kg/m²     General appearance: awake, alert, in no apparent respiratory distress   HEENT: Head: Normocephalic, no lesions, without obvious abnormality  Neck: no JVD  Lungs: clear to auscultation bilaterally, no basilar rales, no wheezing   Heart: regular rate and rhythm, S1, S2 normal, no murmur, click, rub or gallop  Abdomen: soft, non-tender; bowel sounds normal  Extremities: No LE edema  Neurologic: Mental status: Alert, oriented. Motor and sensory not done. EKG:   Normal LV size , mild to moderately increased wall thickness. No obvious wall motion abnormality seen. Normal LV systolic function with LVEF > 65 %. Normal RV size and function. LA appears mildly dilated and RA appears normal in size. No obvious significant structural valvular abnormality noted. No significant valvular stenosis or regurgitation noted. Normal aortic root dimension. No significant pericardial effusion noted. No obvious intra-cardiac mass or shunt noted. IVC not well visualized       Assessment / Acute Cardiac Problems:     IMPRESSION:       1. Hypertensive Urgency. 2. Pneumonia  3. Acute hypoxic respiratory failure secondary to #2  4. DEVYN - improving        RECOMMENDATIONS:  1. Echo reviewed as above unremarkable. 2. off nicardipine ggt, clonidine patch, Coreg and Amlodipine. 3. Will start on hydralazine 50 mg q8h  4. Add labetalol 10 mg IV q6h for SBP > 160 prn        Discussed with patient and nursing. Camilla Pond MD MD  Fellow, [address] [place] [address]   Pager - [phone]    I performed a history and physical examination of the patient and discussed management with the resident. I reviewed the residents note and agree with the documented findings and plan of care. Any areas of disagreement are noted on the chart. I was personally present for the key portions of any procedures. I have documented in the chart those procedures where I was not present during the key portions. I have personally evaluated this patient and have completed at least one if not all key elements of the E/M (history, physical exam, and MDM). Additional findings are as noted. BP improved. HS trop elevation due to DEVYN. No significant issues on echo. Recommend nephrology consultation.     Brian Hastings MD

## 2021-10-11 NOTE — PROGRESS NOTES
PULMONARY & CRITICAL CARE MEDICINE PROGRESS NOTE     Patient:  Marcie Baez  MRN: [id number]  Admit date: 10/9/2021  Primary Care Physician: Gayla Shea MD  Consulting Physician: Cassidy Forte MD  CODE Status: DNR-CCA  LOS: 2     SUBJECTIVE     CHIEF COMPLAINT/REASON FOR INITIAL CONSULT: Acute respiratory failure/pneumonia    BRIEF HOSPITAL COURSE:   The patient is a 42 y.o. male with history of morbid obesity, hypertension, anemia admitted with complaints of shortness of breath and sore throat. Reported some exposure to Drano. He was hypertensive on presentation in the ER with systolic blood pressure in 220s. Started on nicardipine drip. On BiPAP. Imaging shows bilateral pulmonary infiltrates with right-sided predominance. He has DEVYN with a creatinine of 2.44, BUN 40. proBNP 522, troponins 46 rapid Covid test was negative. Patient being admitted to trauma ICU due to lack of beds in the medical ICU under Intermed service. INTERVAL HISTORY:  10/11/21  Patient is currently room air  He is off the Cardene drip  T-max is 99.8  White count is 10  Ceftriaxone/azithromycin has been discontinued  Chest x-ray reported to show improvement in pulmonary infiltrate    REVIEW OF SYSTEMS:  Review of Systems   Constitutional: Negative for appetite change, chills, fever and unexpected weight change. HENT: Negative for congestion, postnasal drip, sore throat and trouble swallowing. Eyes: Negative for visual disturbance. Respiratory: Positive for chest tightness and shortness of breath. Negative for cough and wheezing. Cardiovascular: Negative for chest pain, palpitations and leg swelling. Gastrointestinal: Negative for abdominal pain, constipation, diarrhea, nausea and vomiting. Genitourinary: Negative for difficulty urinating, dysuria and frequency. Musculoskeletal: Negative for arthralgias and joint swelling. Skin: Negative for rash. Allergic/Immunologic: Negative for immunocompromised state. [facility]  Date 10/11/21 0000 - 10/11/21 2359   Shift 2705-6929 3127-8049 8213-9824 24 Hour Total   INTAKE   Shift Total(mL/kg)       OUTPUT   Urine(mL/kg/hr) 500(0.4) 350(0.3)  850   Shift Total(mL/kg) 500(3.5) 350(2.4)  850(5.9)   Weight (kg) 144.2 144. 2 144. 2 144.2        LABS:  ABGs:   Recent Labs     10/09/21  2026   POCPH 7.356   POCPCO2 40.3   POCPO2 68.7*   POCHCO3 22.6   TQKW3MEE 93*     CBC:   Recent Labs     10/09/21  0937 10/10/21  0355 10/11/21  0906   WBC 6.2 9.1 10.0   HGB 11.8* 10.9* 11.8*   HCT 39.3* 36.9* 38.9*   MCV 85.1 87.4 85.5    246 305   LYMPHOPCT 21*  --   --    RBC 4.62 4.22 4.55   MCH 25.5 25.8 25.9   MCHC 30.0 29.5 30.3   RDW 13.6 14.0 13.9     CRP:   Recent Labs     10/09/21  2236   CRP 7.7*     LDH:   Recent Labs     10/09/21  2236   *     BMP:   Recent Labs     10/09/21  0937 10/10/21  0355 10/11/21  0906    134* 131*   K 4.0 4.1 4.2    103 100   CO2 21 18* 20   BUN 40* 29* 25*   CREATININE 2.44* 2.08* 2.19*   GLUCOSE 94 101* 96     Liver Function Test:   Recent Labs     10/09/21  0937 10/10/21  0355 10/11/21  0906   PROT 7.9 7.1 6.3*   LABALBU 3.8 3.3* 2.8*   ALT 29 24 16   AST 36 26 18   ALKPHOS 85 70 66   BILITOT 0.26* 0.21* 0.43     Coagulation Profile:   Recent Labs     10/09/21  0937   INR 0.9   PROTIME 9.8   APTT 24.1     D-Dimer:  Recent Labs     10/09/21  0937   DDIMER 0.80     Lactic Acid:  No results for input(s): LACTA in the last 72 hours. Cardiac Enzymes:  No results for input(s): CKTOTAL, CKMB, CKMBINDEX, TROPONINI in the last 72 hours. Invalid input(s): TROPONIN, HSTROP  BNP/ProBNP:   Recent Labs     10/09/21  0937   PROBNP 522*     Triglycerides:  No results for input(s): TRIG in the last 72 hours. Microbiology:  Urine Culture:  No components found for: CURINE  Blood Culture:  No components found for: CBLOOD, CFUNGUSBL  Sputum Culture:  No components found for: CSPUTUM  Recent Labs     10/09/21  2230   SPECDESC . BLOOD   SPECIAL significant structural valvular abnormality noted. No significant valvular stenosis or regurgitation noted. Normal aortic root dimension. No significant pericardial effusion noted. No obvious intra-cardiac mass or shunt noted. IVC not well visualized       ASSESSMENT AND PLAN     Assessment:    // Acute hypoxic respiratory failure, resolved  // Pulmonary edema  // Hypertensive emergency  // Suspected right lower lobe pneumonia  // Obesity  // Suspected sleep apnea    Plan:    I personally interviewed/examined the patient; reviewed interval history, interpreted all available radiographic and laboratory data at the time of service. Patient is hemodynamically stable and is currently saturating well on room air  Continue supplemental oxygen to keep oxygen saturation >90%  Encourage incentive spirometry  Continue pulmonary toilet, aspiration precautions and bronchodilators  Monitor I/O, electrolytes with a goal of even/negative fluid balance  CHF management as per primary service  Currently being monitored off antimicrobials  GI/DVT prophylaxis  Physical/occupational therapy    I would like to thank you for allowing me to participate in the care of this patient. Please feel free to call with any further questions or concerns. Stefano Gomez MD  Pulmonary and Critical Care Medicine           10/11/2021, 4:25 PM    Please note that this chart was generated using voice recognition Dragon dictation software. Although every effort was made to ensure the accuracy of this automated transcription, some errors in transcription may have occurred.

## 2021-10-11 NOTE — PLAN OF CARE
Problem: Anxiety/Stress:  Goal: Level of anxiety will decrease  Description: Level of anxiety will decrease  10/11/2021 0257 by Tee Granados RN  Outcome: Ongoing  10/10/2021 1535 by Tracey Argeullo RN  Outcome: Ongoing     Problem: Aspiration:  Goal: Absence of aspiration  Description: Absence of aspiration  10/11/2021 0257 by Tee Granados RN  Outcome: Ongoing  10/10/2021 1535 by Tracey Arguello RN  Outcome: Ongoing     Problem: Gas Exchange - Impaired:  Goal: Levels of oxygenation will improve  Description: Levels of oxygenation will improve  10/11/2021 0257 by Tee Granados RN  Outcome: Ongoing  10/10/2021 1535 by Tracey Arguello RN  Outcome: Ongoing     Problem: Mental Status - Impaired:  Goal: Mental status will be restored to baseline  Description: Mental status will be restored to baseline  10/11/2021 0257 by Tee Granados RN  Outcome: Ongoing  10/10/2021 1535 by Tracey Arguello RN  Outcome: Ongoing     Problem: Falls - Risk of:  Goal: Will remain free from falls  Description: Will remain free from falls  10/11/2021 0257 by Tee Granados RN  Outcome: Ongoing  10/10/2021 1535 by Tracey Arguello RN  Outcome: Ongoing  Goal: Absence of physical injury  Description: Absence of physical injury  10/11/2021 0257 by Tee Granados RN  Outcome: Ongoing  10/10/2021 1535 by Tracey Arguello RN  Outcome: Ongoing     Problem: Pain:  Goal: Pain level will decrease  Description: Pain level will decrease  10/11/2021 0257 by Tee Granados RN  Outcome: Ongoing  10/10/2021 1535 by Tracey Arguello RN  Outcome: Ongoing  Goal: Control of acute pain  Description: Control of acute pain  10/11/2021 0257 by Tee Granados RN  Outcome: Ongoing  10/10/2021 1535 by Tracey Arguello RN  Outcome: Ongoing  Goal: Control of chronic pain  Description: Control of chronic pain  10/11/2021 0257 by Tee Granados RN  Outcome: Ongoing  10/10/2021 1535 by Tracey Arguello RN  Outcome: Ongoing     Problem: Skin Integrity:  Goal: Will show no infection signs and symptoms  Description: Will show no infection signs and symptoms  10/11/2021 0257 by Jaqueline Bernheim, RN  Outcome: Ongoing  10/10/2021 1535 by Adelso Hodgson RN  Outcome: Ongoing  Goal: Absence of new skin breakdown  Description: Absence of new skin breakdown  10/11/2021 0257 by Jaqueline Bernheim, RN  Outcome: Ongoing  10/10/2021 1535 by Adelso Hodgson RN  Outcome: Ongoing

## 2021-10-12 PROBLEM — G47.30 SLEEP-RELATED BREATHING DISORDER: Status: ACTIVE | Noted: 2021-10-12

## 2021-10-12 LAB
ALBUMIN SERPL-MCNC: 3.2 G/DL (ref 3.5–5.2)
ANION GAP SERPL CALCULATED.3IONS-SCNC: 16 MMOL/L (ref 9–17)
BUN BLDV-MCNC: 27 MG/DL (ref 6–20)
BUN/CREAT BLD: ABNORMAL (ref 9–20)
CALCIUM SERPL-MCNC: 8.8 MG/DL (ref 8.6–10.4)
CHLORIDE BLD-SCNC: 102 MMOL/L (ref 98–107)
CO2: 18 MMOL/L (ref 20–31)
CREAT SERPL-MCNC: 2.15 MG/DL (ref 0.7–1.2)
GFR AFRICAN AMERICAN: 41 ML/MIN
GFR NON-AFRICAN AMERICAN: 34 ML/MIN
GFR SERPL CREATININE-BSD FRML MDRD: ABNORMAL ML/MIN/{1.73_M2}
GFR SERPL CREATININE-BSD FRML MDRD: ABNORMAL ML/MIN/{1.73_M2}
GLUCOSE BLD-MCNC: 98 MG/DL (ref 70–99)
MAGNESIUM: 2.1 MG/DL (ref 1.6–2.6)
PHOSPHORUS: 3.4 MG/DL (ref 2.5–4.5)
POTASSIUM SERPL-SCNC: 4.1 MMOL/L (ref 3.7–5.3)
SODIUM BLD-SCNC: 136 MMOL/L (ref 135–144)
TROPONIN INTERP: ABNORMAL
TROPONIN T: ABNORMAL NG/ML
TROPONIN, HIGH SENSITIVITY: 88 NG/L (ref 0–22)

## 2021-10-12 PROCEDURE — 83735 ASSAY OF MAGNESIUM: CPT

## 2021-10-12 PROCEDURE — 6370000000 HC RX 637 (ALT 250 FOR IP): Performed by: STUDENT IN AN ORGANIZED HEALTH CARE EDUCATION/TRAINING PROGRAM

## 2021-10-12 PROCEDURE — 6360000002 HC RX W HCPCS: Performed by: INTERNAL MEDICINE

## 2021-10-12 PROCEDURE — 36415 COLL VENOUS BLD VENIPUNCTURE: CPT

## 2021-10-12 PROCEDURE — 6370000000 HC RX 637 (ALT 250 FOR IP): Performed by: FAMILY MEDICINE

## 2021-10-12 PROCEDURE — 84484 ASSAY OF TROPONIN QUANT: CPT

## 2021-10-12 PROCEDURE — 6370000000 HC RX 637 (ALT 250 FOR IP): Performed by: INTERNAL MEDICINE

## 2021-10-12 PROCEDURE — 99232 SBSQ HOSP IP/OBS MODERATE 35: CPT | Performed by: INTERNAL MEDICINE

## 2021-10-12 PROCEDURE — 80069 RENAL FUNCTION PANEL: CPT

## 2021-10-12 PROCEDURE — 1200000000 HC SEMI PRIVATE

## 2021-10-12 PROCEDURE — 99291 CRITICAL CARE FIRST HOUR: CPT | Performed by: INTERNAL MEDICINE

## 2021-10-12 RX ORDER — HEPARIN SODIUM 5000 [USP'U]/ML
5000 INJECTION, SOLUTION INTRAVENOUS; SUBCUTANEOUS EVERY 8 HOURS SCHEDULED
Status: DISCONTINUED | OUTPATIENT
Start: 2021-10-12 | End: 2021-10-13 | Stop reason: HOSPADM

## 2021-10-12 RX ADMIN — CARVEDILOL 25 MG: 25 TABLET, FILM COATED ORAL at 08:12

## 2021-10-12 RX ADMIN — AMLODIPINE BESYLATE 10 MG: 10 TABLET ORAL at 08:12

## 2021-10-12 RX ADMIN — PANTOPRAZOLE SODIUM 40 MG: 40 TABLET, DELAYED RELEASE ORAL at 06:34

## 2021-10-12 RX ADMIN — HYDRALAZINE HYDROCHLORIDE 50 MG: 50 TABLET, FILM COATED ORAL at 06:34

## 2021-10-12 RX ADMIN — HYDRALAZINE HYDROCHLORIDE 50 MG: 50 TABLET, FILM COATED ORAL at 21:04

## 2021-10-12 RX ADMIN — CARVEDILOL 25 MG: 25 TABLET, FILM COATED ORAL at 16:44

## 2021-10-12 RX ADMIN — HEPARIN SODIUM 5000 UNITS: 5000 INJECTION INTRAVENOUS; SUBCUTANEOUS at 16:44

## 2021-10-12 ASSESSMENT — ENCOUNTER SYMPTOMS
COUGH: 0
WHEEZING: 0
NAUSEA: 0
ABDOMINAL PAIN: 0
SHORTNESS OF BREATH: 1
SORE THROAT: 0
CONSTIPATION: 0
CHEST TIGHTNESS: 1
DIARRHEA: 0
VOMITING: 0
TROUBLE SWALLOWING: 0

## 2021-10-12 ASSESSMENT — PAIN SCALES - GENERAL: PAINLEVEL_OUTOF10: 0

## 2021-10-12 NOTE — PROGRESS NOTES
PULMONARY & CRITICAL CARE MEDICINE PROGRESS NOTE     Patient:  Anna Search  MRN: [id number]  Admit date: 10/9/2021  Primary Care Physician: Bryan Gusman MD  Consulting Physician: Isela Soliman DO  CODE Status: DNR-CCA  LOS: 3     SUBJECTIVE     CHIEF COMPLAINT/REASON FOR INITIAL CONSULT: Acute respiratory failure/pneumonia    BRIEF HOSPITAL COURSE:   The patient is a 42 y.o. male with history of morbid obesity, hypertension, anemia admitted with complaints of shortness of breath and sore throat. Reported some exposure to Drano. He was hypertensive on presentation in the ER with systolic blood pressure in 220s. Started on nicardipine drip. On BiPAP. Imaging shows bilateral pulmonary infiltrates with right-sided predominance. He has DEVYN with a creatinine of 2.44, BUN 40. proBNP 522, troponins 46 rapid Covid test was negative. Patient being admitted to trauma ICU due to lack of beds in the medical ICU under Intermed service. INTERVAL HISTORY:  10/12/21  Is awake and alert no distress slept well no dyspnea  No clinical evidence of her pulmonary edema  Does snore no PND is morbidly obese  REVIEW OF SYSTEMS:  Review of Systems   Constitutional: Negative for appetite change, chills, fever and unexpected weight change. HENT: Negative for congestion, postnasal drip, sore throat and trouble swallowing. Eyes: Negative for visual disturbance. Respiratory: Positive for chest tightness and shortness of breath. Negative for cough and wheezing. Cardiovascular: Negative for chest pain, palpitations and leg swelling. Gastrointestinal: Negative for abdominal pain, constipation, diarrhea, nausea and vomiting. Genitourinary: Negative for difficulty urinating, dysuria and frequency. Musculoskeletal: Negative for arthralgias and joint swelling. Skin: Negative for rash. Allergic/Immunologic: Negative for immunocompromised state. Neurological: Positive for weakness.  Negative for dizziness, speech difficulty and headaches. Hematological: Negative for adenopathy. Psychiatric/Behavioral: Negative for behavioral problems and sleep disturbance. OBJECTIVE     PaO2/FiO2 RATIO:  Recent Labs     10/09/21  2026   POCPO2 68.7*      FiO2 : 70 %     VITAL SIGNS:   LAST:  /85   Pulse 92   Temp 97.7 °F (36.5 °C) (Temporal)   Resp 24   Ht 6' 5\" (1.956 m)   Wt (!) 308 lb 13.8 oz (140.1 kg)   SpO2 99%   BMI 36.63 kg/m²   8-24 HR RANGE:  TEMP Temp  Av.9 °F (36.6 °C)  Min: 97.7 °F (36.5 °C)  Max: 98.1 °F (38.5 °C)   BP Systolic (89HJF), ZIZ:590 , Min:109 , UOQ:550      Diastolic (04YWV), QQZ:17, Min:77, Max:124     PULSE Pulse  Av.7  Min: 86  Max: 97   RR Resp  Av.4  Min: 24  Max: 30   O2 SAT SpO2  Av %  Min: 87 %  Max: 99 %   OXYGEN DELIVERY No data recorded        SYSTEMIC EXAMINATION:   General appearance -comfortable, no acute distress  Mental status - awake & alert, follows commands  Eyes - pupils equal and reactive, sclera anicteric  Mouth - mucous membranes moist  Neck - supple, thick  Chest - Breath sounds bilaterally were dimnished to auscultation at bases. There were no wheezes, rhonchi or rales.    Heart - normal rate, regular rhythm, normal S1, S2, no murmurs, rubs, clicks or gallops  Abdomen - soft, nontender, nondistended, no masses or organomegaly  Neurological - non-focal  Extremities - peripheral pulses normal, 1+ pedal edema, no clubbing or cyanosis  Skin - normal coloration and turgor, no rashes, no suspicious skin lesions noted     DATA REVIEW     Medications: Current Inpatient  Scheduled Meds:   carvedilol  25 mg Oral BID WC    amLODIPine  10 mg Oral Daily    hydrALAZINE  50 mg Oral 3 times per day    influenza virus vaccine  0.5 mL IntraMUSCular Prior to discharge    cloNIDine  1 patch TransDERmal Weekly    pantoprazole  40 mg Oral QAM AC     Continuous Infusions:    INPUT/OUTPUT:  In: 940 [P.O.:940]  Out: 1680 [Urine:1680]  Date 10/12/21 0000 - 10/12/21 10/09/21  2223 10/09/21  2230   SPECDESC . NASAL SWAB . BLOOD   SPECIAL  --  L HAND, 0.5ML   CULTURE  --  NO GROWTH 3 DAYS        Pathology:    Radiology Reports:  XR CHEST PORTABLE   Final Result   No appreciable pneumothorax. Significantly improved right-sided airspace disease. XR CHEST PORTABLE   Final Result   1. Extensive airspace disease again demonstrated, right greater than left,   without significant interval change. Differential considerations would   include diffuse infection or edema. 2. Cardiomegaly         CT CHEST PULMONARY EMBOLISM W CONTRAST   Final Result   1. Motion artifact partially obscures some pulmonary artery subsegmental   branches near the lung bases. With this limitation, no findings of pulmonary   embolism are identified   2. Cardiovascular findings of pulmonary hypertension. 3. Slight reflux of the contrast bolus could be due to right heart   dysfunction but could also be seen as normal variation. 4. Mixed consolidative and groundglass opacities throughout the right lung   with minimal patchy groundglass opacities in the left lung base most   suggestive of typical pneumonia. Alveolar edema could appear similar, but no   significant findings of interstitial edema are present. XR CHEST PORTABLE   Final Result   1. Bilateral airspace disease concerning for pneumonia. Recommend chest   radiograph in 8 weeks to confirm resolution. Echocardiogram:   Results for orders placed during the hospital encounter of 10/09/21    ECHO Complete 2D W Doppler W Color    Summary  Technically difficult study  Normal LV size , mild to moderately increased wall thickness. No obvious wall motion abnormality seen. Normal LV systolic function with LVEF > 65 %. Normal RV size and function. LA appears mildly dilated and RA appears normal in size. No obvious significant structural valvular abnormality noted.   No significant valvular stenosis or regurgitation noted.  Normal aortic root dimension. No significant pericardial effusion noted. No obvious intra-cardiac mass or shunt noted. IVC not well visualized       ASSESSMENT AND PLAN     Assessment:    // Acute hypoxic respiratory failure, resolved  // Pulmonary edema  // Hypertensive emergency  //No right heart failure  // Obesity  // Suspected sleep apnea    Plan:  Clinically much improved. He have had pulmonary edema secondary to hypertension and left ventricular dysfunction. The chest x-ray revealed cardiomegaly. There was also infiltrate in the right lung involving the mid zone. That has almost resolved. This very likely was due to pulmonary edema rather than infection and pneumonia. The antibiotics have been asked to be stopped. He does not have any cough no leukocytosis no fever. We will keep him off the off bronchodilators. Assessing for home oxygen. Patient needs a sleep study. Very likely has obstructive sleep apnea syndrome. Treatment of sleep apnea will improve the outcome of hypertension diabetes and heart failure. Patient encouraged to lose weight. We will see him in the office to schedule the sleep study. Patient should be discharged with advised to take regular diuretics and other cardio stable medication      I would like to thank you for allowing me to participate in the care of this patient. Please feel free to call with any further questions or concerns. Demetrice Khan MD  Pulmonary and Critical Care Medicine           10/12/2021, 12:14 PM  CC 30 minutes  Please note that this chart was generated using voice recognition Dragon dictation software. Although every effort was made to ensure the accuracy of this automated transcription, some errors in transcription may have occurred.

## 2021-10-12 NOTE — PROGRESS NOTES
[facility]  Office: [address], DO, Gatito Mckinney, DO, Tavia Kapadia, DO, Jamar Carrizales Ana, DO, Jamil Buitrago MD, Alicia Aguayo MD, Harpreet Whitaker MD, Rowan Colbert MD, Chalino eHrman MD, Pako Gee MD, Alex Carter MD, Paul Stephen MD, Solange Holguin, DO, Kamilah Ro, DO, Yesenia He MD,  Taran Kovacs, DO, Darrin Smith MD, Roseanna Valladares MD, Monica Villar MD, Kasie Riojas MD, Jovan Tovar MD, Sushma Arguelles MD, Yaneli Wade, [facility], [facility], CNP, Thuy Dunn, CNP, Jessica Ojeda, CNS, Corinne Dennis, CNP, Sorin Shoulders, CNP, Magali Shane, CNP, Zora Flynn, CNP, Leonard Simpson, [facility], Andrew Blum, [facility], BRANDO PedrozaC, Ivelisse Leon, [facility], Juan Eevrett, CNP, Raymond Prater, CNP, Lenin Deluna, CNP, Mena Harrison, CNP, Milagro Bermudez, CNP, Marianela Dixon, CNP, Dora Hooks, [address] [place] [address]    Progress Note    10/12/2021    3:08 PM    Name:   Liane Denson  MRN:     [id number]     Acct:      [de-identified]   Room:   [address] [place] [address] Day:  3  Admit Date:  10/9/2021  9:19 AM    PCP:   Angie Mercado MD  Code Status:  DNR-CCA    Subjective:     C/C:   Chief Complaint   Patient presents with    Respiratory Distress     Interval History Status: not changed. Patient seen and examined this afternoon. No acute events overnight. Creatinine is elevated. He reports good urination and bowel movements. Appetite is fair. Denies any shortness of breath. Currently resting on room air. Blood pressures improved. Brief History:     Ledyard Shallow a 42 y. o.yo male who presents with shortness of breath.  Patient report that the symptoms has been progressively worsening for the past 2 days.  On Tuesday, he states that he was exposed to Drano, not ingested but inhalation.  He states that since then his symptoms has worsened. [facility] denies being tested for Covid however he states that he is COVID 1215 ml   Output 1680 ml   Net -465 ml       Labs:  Hematology:  Recent Labs     10/09/21  2236 10/10/21  0355 10/11/21  0906   WBC  --  9.1 10.0   RBC  --  4.22 4.55   HGB  --  10.9* 11.8*   HCT  --  36.9* 38.9*   MCV  --  87.4 85.5   MCH  --  25.8 25.9   MCHC  --  29.5 30.3   RDW  --  14.0 13.9   PLT  --  246 305   MPV  --  12.0 12.0   CRP 7.7*  --   --      Chemistry:  Recent Labs     10/09/21  2236 10/10/21  0355 10/11/21  0906 10/12/21  0848   NA  --  134* 131* 136   K  --  4.1 4.2 4.1   CL  --  103 100 102   CO2  --  18* 20 18*   GLUCOSE  --  101* 96 98   BUN  --  29* 25* 27*   CREATININE  --  2.08* 2.19* 2.15*   ANIONGAP  --  13 11 16   LABGLOM  --  35* 33* 34*   GFRAA  --  43* 40* 41*   CALCIUM  --  8.2* 8.5* 8.8   PHOS  --   --   --  3.4   TROPHS 54* 58*  --  88*   LACTACIDWB  --  1.8  --   --      Recent Labs     10/09/21  2026 10/09/21  2236 10/10/21  0355 10/11/21  0906 10/12/21  0848   PROT  --   --  7.1 6.3*  --    LABALBU  --   --  3.3* 2.8* 3.2*   AST  --   --  26 18  --    ALT  --   --  24 16  --    LDH  --  456*  --   --   --    ALKPHOS  --   --  70 66  --    BILITOT  --   --  0.21* 0.43  --    POCGLU 162*  --   --   --   --      ABG:  Lab Results   Component Value Date    POCPH 7.356 10/09/2021    POCPCO2 40.3 10/09/2021    POCPO2 68.7 10/09/2021    POCHCO3 22.6 10/09/2021    NBEA 3 10/09/2021    PBEA NOT REPORTED 10/09/2021    XPA9ZDO NOT REPORTED 10/09/2021    PBPQ8KLM 93 10/09/2021    FIO2 70.0 10/09/2021     Lab Results   Component Value Date/Time    SPECIAL L HAND, 0.5ML 10/09/2021 10:30 PM     Lab Results   Component Value Date/Time    CULTURE NO GROWTH 3 DAYS 10/09/2021 10:30 PM       Radiology:  XR CHEST PORTABLE    Result Date: 10/11/2021  No appreciable pneumothorax. Significantly improved right-sided airspace disease. XR CHEST PORTABLE    Result Date: 10/9/2021  1. Extensive airspace disease again demonstrated, right greater than left, without significant interval change. Differential considerations would include diffuse infection or edema. 2. Cardiomegaly     XR CHEST PORTABLE    Result Date: 10/9/2021  1. Bilateral airspace disease concerning for pneumonia. Recommend chest radiograph in 8 weeks to confirm resolution. CT CHEST PULMONARY EMBOLISM W CONTRAST    Result Date: 10/9/2021  1. Motion artifact partially obscures some pulmonary artery subsegmental branches near the lung bases. With this limitation, no findings of pulmonary embolism are identified 2. Cardiovascular findings of pulmonary hypertension. 3. Slight reflux of the contrast bolus could be due to right heart dysfunction but could also be seen as normal variation. 4. Mixed consolidative and groundglass opacities throughout the right lung with minimal patchy groundglass opacities in the left lung base most suggestive of typical pneumonia. Alveolar edema could appear similar, but no significant findings of interstitial edema are present.        Physical Examination:        General appearance:  alert, cooperative and no distress, obese -American gentleman sitting up in bed  Mental Status:  oriented to person, place and time and normal affect  Lungs:  clear to auscultation bilaterally, normal effort  Heart:  regular rate and rhythm, no murmur  Abdomen:  soft, nontender, nondistended, normal bowel sounds, no masses, hepatomegaly, splenomegaly  Extremities:  no edema, redness, tenderness in the calves  Skin:  no gross lesions, rashes, induration    Assessment:        Hospital Problems         Last Modified POA    * (Principal) Hypertensive emergency 10/12/2021 Yes    Hypertensive encephalopathy 10/12/2021 Yes    DEVYN (acute kidney injury) ([address] [address] 10/12/2021 Yes    Troponin level elevated 10/12/2021 Yes    Acute respiratory failure with hypoxia ([address] 10/12/2021 Yes    Essential hypertension 10/12/2021 Yes    Obesity (BMI 30-39.9) 10/12/2021 Yes    Sleep-related breathing disorder 10/12/2021 Yes          Plan: Hypertensive emergency / longstanding hypertension - continue amlodipine 10 mg daily. Continue carvedilol 12 5 mg twice daily. Continue hydralazine. Stop Catapres patch. Ideally, would like to have patient on ACE/ARB. Await input from nephrology. Stop as needed antihypertensives. Acute respiratory failure with hypoxia secondary to pulmonary edema-resolved. Currently on room air. DEVYN versus CKD - nephrology consult. Discussed with nephrologist.  They will see patient in the morning. NSTEMI-likely type II due to demand. Discussed with cardiology. Troponin uptrending to 88 today. 2D echo with essentially no abnormalities. Obesity with BMI of 36.63 -diet/weight loss/exercise strongly encouraged. Likely sleep-related breathing disorder  Hopeful for discharge in the next 24-48 hours.     33 Elisabeth Troncoso DO  10/12/2021  3:08 PM

## 2021-10-12 NOTE — PROGRESS NOTES
[facility]  Progress Note                   Date:   10/12/2021  Patient name: Jaspal Barber  Date of admission:  10/9/2021  9:19 AM  MRN:   [id number]  YOB: 1980  PCP: Precious Griffin MD    Reason for Admission: Hypertensive emergency [I16.1]    Subjective:       Clinical Changes /Abnormalities:  Patient seen and examined in bed in room after discussion with RN. Denies chest pain or SOB. No CV concerns overnight. SR on tele HR 90s    Review of Systems    Medications:   Scheduled Meds:   carvedilol  25 mg Oral BID WC    amLODIPine  10 mg Oral Daily    hydrALAZINE  50 mg Oral 3 times per day    influenza virus vaccine  0.5 mL IntraMUSCular Prior to discharge    cloNIDine  1 patch TransDERmal Weekly    pantoprazole  40 mg Oral QAM AC     Continuous Infusions:  CBC:   Recent Labs     10/10/21  0355 10/11/21  0906   WBC 9.1 10.0   HGB 10.9* 11.8*    305     BMP:    Recent Labs     10/10/21  0355 10/11/21  0906 10/12/21  0848   * 131* 136   K 4.1 4.2 4.1    100 102   CO2 18* 20 18*   BUN 29* 25* 27*   CREATININE 2.08* 2.19* 2.15*   GLUCOSE 101* 96 98     Hepatic:  Recent Labs     10/10/21  0355 10/11/21  0906   AST 26 18   ALT 24 16   BILITOT 0.21* 0.43   ALKPHOS 70 66     Troponin:   Recent Labs     10/09/21  2236 10/10/21  0355 10/12/21  0848   TROPHS 54* 58* 88*     BNP: No results for input(s): BNP in the last 72 hours. Lipids: No results for input(s): CHOL, HDL in the last 72 hours. Invalid input(s): LDLCALCU  INR: No results for input(s): INR in the last 72 hours. DIAGNOSTIC DATA  EKG:       ECHO 10/9/2021  Normal LV size , mild to moderately increased wall thickness. No obvious wall motion abnormality seen. Normal LV systolic function with LVEF > 65 %. Normal RV size and function. LA appears mildly dilated and RA appears normal in size. No obvious significant structural valvular abnormality noted.   No significant valvular stenosis or regurgitation noted. Normal aortic root dimension. No significant pericardial effusion noted. No obvious intra-cardiac mass or shunt noted. IVC not well visualized    Objective:   Vitals: /75   Pulse 92   Temp 97.7 °F (36.5 °C) (Temporal)   Resp 24   Ht 6' 5\" (1.956 m)   Wt (!) 308 lb 13.8 oz (140.1 kg)   SpO2 99%   BMI 36.63 kg/m²   General appearance: alert and cooperative with exam  HEENT: Head: Normocephalic, no lesions, without obvious abnormality. Neck:no JVD, trachea midline, no adenopathy  Lungs: Clear to auscultation  Heart: Regular rate and rhythm, s1/s2 auscultated, no murmurs  Abdomen: soft, non-tender, bowel sounds active  Extremities: +1 edema  Neurologic: not done        Assessment / Acute Cardiac Problems:   1. Hypertensive Urgency. 2. Pneumonia  3. Acute hypoxic respiratory failure secondary to #2  4. DEVYN - improving    Patient Active Problem List:     Moderate persistent asthma     Bipolar 1 disorder (HCC)     Hypertension     Obesity, morbid, BMI 40.0-49.9 ([address]     Need for prophylactic vaccination with combined diphtheria-tetanus-pertussis (DTP) vaccine     Hypertensive emergency     Hypertensive encephalopathy     DEVYN (acute kidney injury) ([address]     Hypoxia     Troponin level elevated     Obesity (BMI 30-39. 9)     Acute respiratory failure with hypoxia ([address]      Plan of Treatment:   1. Elevated troponin secondary to DEVYN  2. ECHO reviewed per Dr Will Mckeon with no significant issues. 3. HTN improved. Continue Norvasc, BB, Hydralazine. 4. DEVYN  Nephrology consulted. 5. Followed by pulmonary  6. Keep K > 4.0 and Mag > 2.0 4.1 today Will order [address] lab. 7. Cardiology will sign off. Call with questions or concerns. Follow up OP as scheduled.      Electronically signed by BHAVYA Lomeli NP on 10/12/2021 at 2:18 PM  [address]  [phone]

## 2021-10-12 NOTE — PLAN OF CARE
Problem: Anxiety/Stress:  Goal: Level of anxiety will decrease  Description: Level of anxiety will decrease  10/11/2021 2344 by Akira Angel RN  Outcome: Ongoing     Problem: Falls - Risk of:  Goal: Will remain free from falls  Description: Will remain free from falls  10/11/2021 2344 by Akira Angel RN  Outcome: Ongoing     Problem: Skin Integrity:  Goal: Will show no infection signs and symptoms  Description: Will show no infection signs and symptoms  10/11/2021 2344 by Akira Angel RN  Outcome: Ongoing

## 2021-10-13 ENCOUNTER — APPOINTMENT (OUTPATIENT)
Dept: ULTRASOUND IMAGING | Age: 41
DRG: 280 | End: 2021-10-13
Payer: COMMERCIAL

## 2021-10-13 VITALS
TEMPERATURE: 98 F | RESPIRATION RATE: 20 BRPM | OXYGEN SATURATION: 99 % | WEIGHT: 308.86 LBS | DIASTOLIC BLOOD PRESSURE: 89 MMHG | HEIGHT: 77 IN | SYSTOLIC BLOOD PRESSURE: 126 MMHG | BODY MASS INDEX: 36.47 KG/M2 | HEART RATE: 86 BPM

## 2021-10-13 PROBLEM — I67.4 HYPERTENSIVE ENCEPHALOPATHY: Status: RESOLVED | Noted: 2021-10-09 | Resolved: 2021-10-13

## 2021-10-13 PROBLEM — I16.1 HYPERTENSIVE EMERGENCY: Status: RESOLVED | Noted: 2020-02-24 | Resolved: 2021-10-13

## 2021-10-13 LAB
ABSOLUTE EOS #: 0.64 K/UL (ref 0–0.44)
ABSOLUTE IMMATURE GRANULOCYTE: <0.03 K/UL (ref 0–0.3)
ABSOLUTE LYMPH #: 1.22 K/UL (ref 1.1–3.7)
ABSOLUTE MONO #: 0.61 K/UL (ref 0.1–1.2)
ALBUMIN SERPL-MCNC: 3.4 G/DL (ref 3.5–5.2)
ANION GAP SERPL CALCULATED.3IONS-SCNC: 13 MMOL/L (ref 9–17)
BASOPHILS # BLD: 1 % (ref 0–2)
BASOPHILS ABSOLUTE: 0.04 K/UL (ref 0–0.2)
BUN BLDV-MCNC: 28 MG/DL (ref 6–20)
BUN/CREAT BLD: ABNORMAL (ref 9–20)
CALCIUM SERPL-MCNC: 8.5 MG/DL (ref 8.6–10.4)
CHLORIDE BLD-SCNC: 102 MMOL/L (ref 98–107)
CO2: 19 MMOL/L (ref 20–31)
CREAT SERPL-MCNC: 2.27 MG/DL (ref 0.7–1.2)
DIFFERENTIAL TYPE: ABNORMAL
EOSINOPHILS RELATIVE PERCENT: 11 % (ref 1–4)
GFR AFRICAN AMERICAN: 39 ML/MIN
GFR NON-AFRICAN AMERICAN: 32 ML/MIN
GFR SERPL CREATININE-BSD FRML MDRD: ABNORMAL ML/MIN/{1.73_M2}
GFR SERPL CREATININE-BSD FRML MDRD: ABNORMAL ML/MIN/{1.73_M2}
GLUCOSE BLD-MCNC: 101 MG/DL (ref 70–99)
HCT VFR BLD CALC: 39.1 % (ref 40.7–50.3)
HEMOGLOBIN: 11.1 G/DL (ref 13–17)
IMMATURE GRANULOCYTES: 0 %
LYMPHOCYTES # BLD: 21 % (ref 24–43)
MAGNESIUM: 2.3 MG/DL (ref 1.6–2.6)
MCH RBC QN AUTO: 25.3 PG (ref 25.2–33.5)
MCHC RBC AUTO-ENTMCNC: 28.4 G/DL (ref 28.4–34.8)
MCV RBC AUTO: 89.1 FL (ref 82.6–102.9)
MONOCYTES # BLD: 10 % (ref 3–12)
NRBC AUTOMATED: 0 PER 100 WBC
PDW BLD-RTO: 13.8 % (ref 11.8–14.4)
PHOSPHORUS: 3.9 MG/DL (ref 2.5–4.5)
PLATELET # BLD: 299 K/UL (ref 138–453)
PLATELET ESTIMATE: ABNORMAL
PMV BLD AUTO: 11.9 FL (ref 8.1–13.5)
POTASSIUM SERPL-SCNC: 4.3 MMOL/L (ref 3.7–5.3)
RBC # BLD: 4.39 M/UL (ref 4.21–5.77)
RBC # BLD: ABNORMAL 10*6/UL
SEG NEUTROPHILS: 58 % (ref 36–65)
SEGMENTED NEUTROPHILS ABSOLUTE COUNT: 3.43 K/UL (ref 1.5–8.1)
SODIUM BLD-SCNC: 134 MMOL/L (ref 135–144)
WBC # BLD: 6 K/UL (ref 3.5–11.3)
WBC # BLD: ABNORMAL 10*3/UL

## 2021-10-13 PROCEDURE — G0008 ADMIN INFLUENZA VIRUS VAC: HCPCS | Performed by: INTERNAL MEDICINE

## 2021-10-13 PROCEDURE — 6370000000 HC RX 637 (ALT 250 FOR IP): Performed by: FAMILY MEDICINE

## 2021-10-13 PROCEDURE — 83735 ASSAY OF MAGNESIUM: CPT

## 2021-10-13 PROCEDURE — 6360000002 HC RX W HCPCS: Performed by: INTERNAL MEDICINE

## 2021-10-13 PROCEDURE — 85025 COMPLETE CBC W/AUTO DIFF WBC: CPT

## 2021-10-13 PROCEDURE — 80069 RENAL FUNCTION PANEL: CPT

## 2021-10-13 PROCEDURE — 99232 SBSQ HOSP IP/OBS MODERATE 35: CPT | Performed by: INTERNAL MEDICINE

## 2021-10-13 PROCEDURE — 99239 HOSP IP/OBS DSCHRG MGMT >30: CPT | Performed by: INTERNAL MEDICINE

## 2021-10-13 PROCEDURE — 6370000000 HC RX 637 (ALT 250 FOR IP): Performed by: INTERNAL MEDICINE

## 2021-10-13 PROCEDURE — 76770 US EXAM ABDO BACK WALL COMP: CPT

## 2021-10-13 PROCEDURE — 36415 COLL VENOUS BLD VENIPUNCTURE: CPT

## 2021-10-13 PROCEDURE — 99222 1ST HOSP IP/OBS MODERATE 55: CPT | Performed by: INTERNAL MEDICINE

## 2021-10-13 PROCEDURE — 6370000000 HC RX 637 (ALT 250 FOR IP): Performed by: STUDENT IN AN ORGANIZED HEALTH CARE EDUCATION/TRAINING PROGRAM

## 2021-10-13 PROCEDURE — 90686 IIV4 VACC NO PRSV 0.5 ML IM: CPT | Performed by: INTERNAL MEDICINE

## 2021-10-13 RX ORDER — FERROUS SULFATE 325(65) MG
325 TABLET ORAL
Qty: 30 TABLET | Refills: 0 | Status: SHIPPED | OUTPATIENT
Start: 2021-10-13 | End: 2021-11-12

## 2021-10-13 RX ORDER — ALBUTEROL SULFATE 90 UG/1
2 AEROSOL, METERED RESPIRATORY (INHALATION) EVERY 6 HOURS PRN
Qty: 18 G | Refills: 0 | Status: SHIPPED | OUTPATIENT
Start: 2021-10-13

## 2021-10-13 RX ORDER — HYDRALAZINE HYDROCHLORIDE 50 MG/1
50 TABLET, FILM COATED ORAL EVERY 8 HOURS SCHEDULED
Qty: 90 TABLET | Refills: 0 | Status: SHIPPED | OUTPATIENT
Start: 2021-10-13 | End: 2022-10-26 | Stop reason: SDUPTHER

## 2021-10-13 RX ORDER — CARVEDILOL 25 MG/1
25 TABLET ORAL 2 TIMES DAILY WITH MEALS
Qty: 60 TABLET | Refills: 0 | Status: SHIPPED | OUTPATIENT
Start: 2021-10-13 | End: 2022-10-26 | Stop reason: SDUPTHER

## 2021-10-13 RX ORDER — AMLODIPINE BESYLATE 10 MG/1
10 TABLET ORAL DAILY
Qty: 30 TABLET | Refills: 0 | Status: SHIPPED | OUTPATIENT
Start: 2021-10-13 | End: 2022-10-26 | Stop reason: SDUPTHER

## 2021-10-13 RX ADMIN — HYDRALAZINE HYDROCHLORIDE 50 MG: 50 TABLET, FILM COATED ORAL at 05:35

## 2021-10-13 RX ADMIN — CARVEDILOL 25 MG: 25 TABLET, FILM COATED ORAL at 08:19

## 2021-10-13 RX ADMIN — HEPARIN SODIUM 5000 UNITS: 5000 INJECTION INTRAVENOUS; SUBCUTANEOUS at 05:35

## 2021-10-13 RX ADMIN — HYDRALAZINE HYDROCHLORIDE 50 MG: 50 TABLET, FILM COATED ORAL at 15:23

## 2021-10-13 RX ADMIN — INFLUENZA A VIRUS A/VICTORIA/2570/2019 IVR-215 (H1N1) ANTIGEN (PROPIOLACTONE INACTIVATED), INFLUENZA A VIRUS A/CAMBODIA/E0826360/2020 IVR-224 (H3N2) ANTIGEN (PROPIOLACTONE INACTIVATED), INFLUENZA B VIRUS B/VICTORIA/705/2018 BVR-11 ANTIGEN (PROPIOLACTONE INACTIVATED), INFLUENZA B VIRUS B/PHUKET/3073/2013 BVR-1B ANTIGEN (PROPIOLACTONE INACTIVATED) 0.5 ML: 15; 15; 15; 15 INJECTION, SUSPENSION INTRAMUSCULAR at 16:47

## 2021-10-13 RX ADMIN — HEPARIN SODIUM 5000 UNITS: 5000 INJECTION INTRAVENOUS; SUBCUTANEOUS at 15:23

## 2021-10-13 RX ADMIN — PANTOPRAZOLE SODIUM 40 MG: 40 TABLET, DELAYED RELEASE ORAL at 05:35

## 2021-10-13 RX ADMIN — AMLODIPINE BESYLATE 10 MG: 10 TABLET ORAL at 08:19

## 2021-10-13 ASSESSMENT — ENCOUNTER SYMPTOMS
ABDOMINAL PAIN: 0
VOMITING: 0
SORE THROAT: 0
COUGH: 0
NAUSEA: 0
CONSTIPATION: 0
WHEEZING: 0
CHEST TIGHTNESS: 1
DIARRHEA: 0
SHORTNESS OF BREATH: 1
TROUBLE SWALLOWING: 0

## 2021-10-13 NOTE — DISCHARGE INSTR - DIET
Good nutrition is important when healing from an illness, injury, or surgery. Follow any nutrition recommendations given to you during your hospital stay. If you were given an oral nutrition supplement while in the hospital, continue to take this supplement at home. You can take it with meals, in-between meals, and/or before bedtime. These supplements can be purchased at most local grocery stores, pharmacies, and chain [employer]-stores. If you have any questions about your diet or nutrition, call the hospital and ask for the dietitian.     Low potassium, low sodium, heart healthy diet

## 2021-10-13 NOTE — CARE COORDINATION
Transitional planning:  Nursing round with Ernestina Wynn RN, states pt has not been wearing any O2. No needs. Home independently.

## 2021-10-13 NOTE — DISCHARGE SUMMARY
[facility]  Office: [address], DO, Krystle Soliman, DO, Latasha Tracy, DO, Marisela Nath Blood, DO, Christian Chung MD, Brigette Bautista MD, Yuval Franklin MD, Bernadette Barber MD, Adeline Goldmann, MD, Marjorie Grififn MD, Jorge Blevins MD, Fernando Osborn MD, Dre , DO, Mirna Bailey, DO, Aida Gill MD,  Kimberly Friday, DO, Dayan Aguirre MD, Cole Etienne MD, Amy Coronado MD, Marcela Triana MD, Caroline Miranda MD, Luis Alberto Marion MD, Erickson Rea, [facility], [facility], [facility], Macrina Nurse, CNP, Miko Hess, CNS, Sandy Osborn, CNP, Keegan Mix, CNP, Yuli Lazcano, CNP, Varun Kelly, CNP, Glory Ortiz, CNP, Kenia Michael, CNP, Arian Leos PA-C, Jenny Aguillon, [facility], Michelle Etienne, [facility], Amanda Pedroza, CNP, Tree Johnson, CNP, Nanci Manzanares, CNP, Kasandra Squibb, CNP, Esvin Mohamud, [facility], [facility], [address] [place] [address]    Discharge Summary     Patient ID: Kamilah Brasher  :  1980   MRN: [id number]     ACCOUNT:  [de-identified]   Patient's PCP: Luis Carlos Denson MD  Admit Date: 10/9/2021   Discharge Date: 10/13/2021   Length of Stay: 4  Code Status:  DNR-CCA  Admitting Physician: Gillian Leslie DO  Discharge Physician: Eva Troncoso DO     Active Discharge Diagnoses:     Hospital Problem Lists:  Principal Problem (Resolved): Hypertensive emergency  Active Problems:    DEVYN (acute kidney injury) ([facility] [address]    Troponin level elevated    Essential hypertension    Obesity (BMI 30-39. 9)    Sleep-related breathing disorder  Resolved Problems:    Hypertensive encephalopathy    Acute respiratory failure with hypoxia [facility])      Admission Condition:  fair     Discharged Condition: good    Hospital Stay:     Hospital Course:  Kamilah Brasher is a 42 y.o.  -American gentleman with a history of morbid obesity and essential hypertension who initially presented to [facility] on 10/9/2021 for evaluation of shortness of breath. Onset of symptoms was 2 days prior to admission. Reportedly had inhaled Drain-o in the days prior by accident. Previously on antihypertensive medications, but had not taken it for several days. In the emergency department, patient with significantly elevated blood pressure. He was also noted to be somewhat confused. Over the course of his hospital stay, patient had improvement of his blood pressure along with his mentation. Blood pressure medications have been adjusted. DEVYN was noted on presentation which was evaluated by nephrology on the day of discharge. Renal ultrasound was performed without any evidence for hydronephrosis or obstruction. He was also noted to have an elevated troponin for which she would need to follow-up with cardiology in the outpatient setting for. Cardiology did evaluate the patient while he was inpatient. 2D echo was reviewed. Ultimately, it is felt that the patient has acute kidney injury on possibly new baseline of CKD stage IIIb. He will follow-up with nephrology and PCP in the outpatient setting. He will follow up in a resident clinic, as he has no PCP at this time. Ultimately, he is appropriate for discharge today.     Significant therapeutic interventions:   Supportive care  Adjustment of antihypertensive medications  Nephrology evaluation  2D echo  Cardiology evaluation    Significant Diagnostic Studies:   Labs / Micro:  CBC:   Lab Results   Component Value Date    WBC 6.0 10/13/2021    RBC 4.39 10/13/2021    HGB 11.1 10/13/2021    HCT 39.1 10/13/2021    MCV 89.1 10/13/2021    MCH 25.3 10/13/2021    MCHC 28.4 10/13/2021    RDW 13.8 10/13/2021     10/13/2021     CMP:    Lab Results   Component Value Date    GLUCOSE 101 10/13/2021     10/13/2021    K 4.3 10/13/2021     10/13/2021    CO2 19 10/13/2021    BUN 28 10/13/2021    CREATININE 2.27 10/13/2021    ANIONGAP 13 10/13/2021    ALKPHOS 66 10/11/2021    ALT 16 10/11/2021    AST 18 10/11/2021    BILITOT 0.43 10/11/2021    LABALBU 3.4 10/13/2021    ALBUMIN 0.8 10/11/2021    LABGLOM 32 10/13/2021    GFRAA 39 10/13/2021    GFR      10/13/2021    GFR NOT REPORTED 10/13/2021    PROT 6.3 10/11/2021    CALCIUM 8.5 10/13/2021       Radiology:  US RENAL COMPLETE    Result Date: 10/13/2021  Limited, overall, unremarkable ultrasound of the kidneys and urinary bladder. XR CHEST PORTABLE    Result Date: 10/11/2021  No appreciable pneumothorax. Significantly improved right-sided airspace disease. XR CHEST PORTABLE    Result Date: 10/9/2021  1. Extensive airspace disease again demonstrated, right greater than left, without significant interval change. Differential considerations would include diffuse infection or edema. 2. Cardiomegaly     XR CHEST PORTABLE    Result Date: 10/9/2021  1. Bilateral airspace disease concerning for pneumonia. Recommend chest radiograph in 8 weeks to confirm resolution. CT CHEST PULMONARY EMBOLISM W CONTRAST    Result Date: 10/9/2021  1. Motion artifact partially obscures some pulmonary artery subsegmental branches near the lung bases. With this limitation, no findings of pulmonary embolism are identified 2. Cardiovascular findings of pulmonary hypertension. 3. Slight reflux of the contrast bolus could be due to right heart dysfunction but could also be seen as normal variation. 4. Mixed consolidative and groundglass opacities throughout the right lung with minimal patchy groundglass opacities in the left lung base most suggestive of typical pneumonia. Alveolar edema could appear similar, but no significant findings of interstitial edema are present.        Consultations:    Consults:     Final Specialist Recommendations/Findings:   IP CONSULT TO CRITICAL CARE  IP CONSULT TO CARDIOLOGY  IP CONSULT TO PULMONOLOGY  IP CONSULT TO NEPHROLOGY      The patient was seen and examined on day of discharge and this discharge summary is in conjunction with any daily progress note from day of discharge. Discharge plan:     Disposition: Home    Physician Follow Up: [facility]  [facility] [address]  [phone]  Call on 10/27/2021  at 3:15 pm with Jed Camara CNP Follow up [facility]  [facility]    [facility] INTERNAL MEDICINE  [address] [facility] [address]  Schedule an appointment as soon as possible for a visit in 1 week  For follow-up after hospitalization    Melody Thomas MD  [address] [place] [id number]    Schedule an appointment as soon as possible for a visit in 2 weeks         Requiring Further Evaluation/Follow Up POST HOSPITALIZATION/Incidental Findings: Follow-up with nephrology as written for    Diet: renal diet    Activity: As tolerated    Instructions to Patient: None    Discharge Medications:      Medication List      START taking these medications    albuterol sulfate  (90 Base) MCG/ACT inhaler  Inhale 2 puffs into the lungs every 6 hours as needed for Wheezing     hydrALAZINE 50 MG tablet  Commonly known as: APRESOLINE  Take 1 tablet by mouth every 8 hours        CHANGE how you take these medications    carvedilol 25 MG tablet  Commonly known as: COREG  Take 1 tablet by mouth 2 times daily (with meals)  What changed: See the new instructions.         CONTINUE taking these medications    amLODIPine 10 MG tablet  Commonly known as: NORVASC  Take 1 tablet by mouth daily     Blood Pressure Kit  1 kit by Does not apply route daily     ferrous sulfate 325 (65 Fe) MG tablet  Commonly known as: IRON 325  Take 1 tablet by mouth daily (with breakfast)        STOP taking these medications    hydroCHLOROthiazide 50 MG tablet  Commonly known as: HYDRODIURIL     NIFEdipine 60 MG extended release tablet  Commonly known as: PROCARDIA XL           Where to Get Your Medications      These medications were sent to [address] [place] [address] [address] [facility] [address]    Phone: [phone]   albuterol sulfate  (90 Base) MCG/ACT inhaler  amLODIPine 10 MG tablet  carvedilol 25 MG tablet  ferrous sulfate 325 (65 Fe) MG tablet  hydrALAZINE 50 MG tablet         No discharge procedures on file. Time Spent on discharge is  39 mins in patient examination, evaluation, counseling as well as medication reconciliation, prescriptions for required medications, discharge plan and follow up. Electronically signed by   Usha Tobar DO  10/13/2021  3:41 PM      Thank you Dr. Janes Greenfield MD for the opportunity to be involved in this patient's care.

## 2021-10-13 NOTE — PLAN OF CARE
Problem: Anxiety/Stress:  Goal: Level of anxiety will decrease  Description: Level of anxiety will decrease  10/13/2021 0208 by Arlene Pack RN  Outcome: Ongoing  10/12/2021 1844 by Joe Pryor RN  Outcome: Ongoing     Problem: Aspiration:  Goal: Absence of aspiration  Description: Absence of aspiration  10/13/2021 0208 by Arlene Pack RN  Outcome: Ongoing  10/12/2021 1844 by Joe Pryor RN  Outcome: Ongoing     Problem: Gas Exchange - Impaired:  Goal: Levels of oxygenation will improve  Description: Levels of oxygenation will improve  10/13/2021 0208 by Arlene Pack RN  Outcome: Ongoing  10/12/2021 1844 by Joe Pryor RN  Outcome: Ongoing     Problem: Mental Status - Impaired:  Goal: Mental status will be restored to baseline  Description: Mental status will be restored to baseline  10/13/2021 0208 by Arlene Pack RN  Outcome: Ongoing  10/12/2021 1844 by Joe Pryor RN  Outcome: Ongoing     Problem: Falls - Risk of:  Goal: Will remain free from falls  Description: Will remain free from falls  10/13/2021 0208 by Arlene Pack RN  Outcome: Ongoing  10/12/2021 1844 by Joe Pryor RN  Outcome: Ongoing  Goal: Absence of physical injury  Description: Absence of physical injury  10/13/2021 0208 by Arlene Pack RN  Outcome: Ongoing  10/12/2021 1844 by Joe Pryor RN  Outcome: Ongoing     Problem: Pain:  Goal: Pain level will decrease  Description: Pain level will decrease  10/13/2021 0208 by Arlene Pack RN  Outcome: Ongoing  10/12/2021 1844 by Joe Pryor RN  Outcome: Ongoing  Goal: Control of acute pain  Description: Control of acute pain  10/13/2021 0208 by Arlene Pack RN  Outcome: Ongoing  10/12/2021 1844 by Joe Pryor RN  Outcome: Ongoing  Goal: Control of chronic pain  Description: Control of chronic pain  10/13/2021 0208 by Arlene Pack RN  Outcome: Ongoing  10/12/2021 1844 by Joe Pryor RN  Outcome: Ongoing

## 2021-10-13 NOTE — PROGRESS NOTES
PATIENT REFUSES TO WEAR BIPAP     [x] Risks and benefits explained to patient   [x] Patient refuses to wear Bipap stating \"no, I don't need to wear it. \"  [x] Patient verbalizes understanding of information presented.

## 2021-10-13 NOTE — CONSULTS
Renal Consult Note    Patient :  Marge Meza; 42 y.o. MRN# [id number]  Location:  [id number]/5787-46  Attending:  Margarito Chaudhari DO  Admit Date:  10/9/2021   Hospital Day: 4    Reason for Consult:     Asked by Dr Margarito Chaudhari DO to see for DEVYN/Elevated Creatinine. History Obtained From:     patient, electronic medical record    History of Present Illness:     Marge Meza; 42 y.o. male with past medical history of morbid obesity, hypertension, and anemia presented with complaints of shortness of breath with the inciting event suspected to be exposure to the chemical Drano. On presentation to the ED he was found to be hypertensive with a systolic BP in the 711'P and patient was started on Nicardipine drip. Chest xray was suggestive of bilateral infiltrates but covid testing was negative; patient has also been vaccinated against COVID. He denies any other complaints and nephrology was consulted due to DEVYN with suspicion of this being his new baseline secondary to uncontrolled hypertension. Past History/Allergies? Social History:     Past Medical History:   Diagnosis Date    DEVYN (acute kidney injury) ([facility] [address] 10/9/2021    Bipolar 1 disorder ([facility] [address] 2/26/2015    Bronchitis     Bronchitis     Hypertension     Moderate persistent asthma 2/26/2015    Obesity      History reviewed. No pertinent surgical history.      No Known Allergies    Social History     Socioeconomic History    Marital status: Single     Spouse name: Not on file    Number of children: Not on file    Years of education: Not on file    Highest education level: Not on file   Occupational History    Not on file   Tobacco Use    Smoking status: Never Smoker    Smokeless tobacco: Never Used   Substance and Sexual Activity    Alcohol use: No    Drug use: No    Sexual activity: Not on file   Other Topics Concern    Not on file   Social History Narrative    Not on file     Social Determinants of Health     Financial Resource Strain: Constitutional: Alert and oriented with no signs of distress. HENT: Negative for congestion and nosebleeds. Eyes: Negative for photophobia and visual disturbance. Respiratory: Positive for cough and improved shortness of breath. Negative for wheezing. Cardiovascular: Negative for chest pain and palpitations. Gastrointestinal: Negative for nausea and vomiting. Negative for abdominal distention and blood in stool. Genitourinary: Negative for flank pain and hematuria. Musculoskeletal: Negative for arthralgias and myalgias. Skin: Negative for rash and wound. Neurological: Negative for dizziness and light-headedness. Input/Output:       I/O last 3 completed shifts:   In: 56 [P.O.:675]  Out: -     Vital Signs:   Temperature:  Temp: 98 °F (36.7 °C)  TMax:   Temp (24hrs), Av.8 °F (36.6 °C), Min:97.7 °F (36.5 °C), Max:98 °F (36.7 °C)    Respirations:  Resp: 20  Pulse:   Pulse: 86  BP:    BP: 130/86  BP Range: Systolic (81AYX), FIF:229 , Min:113 , YG       Diastolic (26DOG), QMJ:48, Min:68, Max:86    Physical Examination:     General appearance:  alert, cooperative and no distress, obese -American gentleman sitting up in bed  Mental Status:  oriented to person, place and time and normal affect  Lungs:  clear to auscultation bilaterally, normal effort  Heart:  regular rate and rhythm, no murmur  Abdomen:  soft, nontender, nondistended, normal bowel sounds, no masses, hepatomegaly, splenomegaly  Extremities:  no edema, redness, tenderness in the calves  Skin:  no gross lesions, rashes, induration    Labs:       Recent Labs     10/11/21  0906 10/13/21  0633   WBC 10.0 6.0   RBC 4.55 4.39   HGB 11.8* 11.1*   HCT 38.9* 39.1*   MCV 85.5 89.1   MCH 25.9 25.3   MCHC 30.3 28.4   RDW 13.9 13.8    299   MPV 12.0 11.9      BMP:   Recent Labs     10/11/21  0906 10/12/21  0848 10/13/21  0633   * 136 134*   K 4.2 4.1 4.3    102 102   CO2 20 18* 19*   BUN 25* 27* 28*   CREATININE 2.19* 2.15* 2.27*   GLUCOSE 96 98 101*   CALCIUM 8.5* 8.8 8.5*      Phosphorus:     Recent Labs     10/12/21  0848 10/13/21  0633   PHOS 3.4 3.9     Magnesium:    Recent Labs     10/12/21  0848 10/13/21  0633   MG 2.1 2.3     Albumin:    Recent Labs     10/11/21  0906 10/12/21  0848 10/13/21  0633   LABALBU 2.8* 3.2* 3.4*     BNP:    No results found for: BNP  JULIA:    No results found for: JULIA  SPEP:  Lab Results   Component Value Date    PROT 6.3 10/11/2021     UPEP:   No results found for: LABPE  C3:   No results found for: C3  C4:   No results found for: C4  MPO ANCA:   No results found for: MPO  PR3 ANCA:   No results found for: PR3  Anti-GBM:   No results found for: GBMABIGG  Hep BsAg:         Lab Results   Component Value Date    HEPBSAG NONREACTIVE 03/10/2015     Hep C AB:          Lab Results   Component Value Date    HEPCAB NONREACTIVE 03/10/2015     Urinalysis/Chemistries:      Lab Results   Component Value Date    NITRU NEGATIVE 10/09/2021    COLORU Yellow 10/09/2021    PHUR 6.0 10/09/2021    WBCUA 0 TO 2 10/09/2021    RBCUA 2 TO 5 10/09/2021    MUCUS NOT REPORTED 10/09/2021    TRICHOMONAS NOT REPORTED 10/09/2021    YEAST NOT REPORTED 10/09/2021    BACTERIA NOT REPORTED 10/09/2021    SPECGRAV 1.021 10/09/2021    LEUKOCYTESUR NEGATIVE 10/09/2021    UROBILINOGEN Normal 10/09/2021    BILIRUBINUR NEGATIVE 10/09/2021    GLUCOSEU NEGATIVE 10/09/2021    KETUA NEGATIVE 10/09/2021    AMORPHOUS NOT REPORTED 10/09/2021     Urine Sodium:     Lab Results   Component Value Date    DELONTE 111 10/09/2021     Urine Potassium:  No results found for: KUR  Urine Chloride:  No results found for: CLUR  Urine Osmolarity: No results found for: OSMOU  Urine Protein:   No results found for: TPU  Urine Creatinine:     Lab Results   Component Value Date    LABCREA 102.5 10/09/2021     UPC:     Urine Eosinophils:  No components found for: UEOS    Radiology:     Reviewed. Assessment:     1.  Acute Kidney Injury/CKD Secondary to visit, no guarantees can be provided that every mistake has been identified and corrected by editing.

## 2021-10-14 NOTE — PROGRESS NOTES
PULMONARY & CRITICAL CARE MEDICINE PROGRESS NOTE     Patient:  Milagro Lopez  MRN: [id number]  Admit date: 10/9/2021  Primary Care Physician: Ros Mcneill MD  Consulting Physician: No att. providers found  CODE Status: Prior  LOS: 4     SUBJECTIVE     CHIEF COMPLAINT/REASON FOR INITIAL CONSULT: Acute respiratory failure/pneumonia    BRIEF HOSPITAL COURSE:   The patient is a 42 y.o. male with history of morbid obesity, hypertension, anemia admitted with complaints of shortness of breath and sore throat. Reported some exposure to Drano. He was hypertensive on presentation in the ER with systolic blood pressure in 220s. Started on nicardipine drip. On BiPAP. Imaging shows bilateral pulmonary infiltrates with right-sided predominance. He has DEVYN with a creatinine of 2.44, BUN 40. proBNP 522, troponins 46 rapid Covid test was negative. Patient being admitted to trauma ICU due to lack of beds in the medical ICU under Intermed service. INTERVAL HISTORY:  10/13/21  Clinically better  BP controll has improved. Slept well. No PND. Does snore and has CHOLO  No heart failure  No headache  No edema   No DVT  Sao2 is 95% 0n RA  REVIEW OF SYSTEMS:  Review of Systems   Constitutional: Negative for appetite change, chills, fever and unexpected weight change. HENT: Negative for congestion, postnasal drip, sore throat and trouble swallowing. Eyes: Negative for visual disturbance. Respiratory: Positive for chest tightness and shortness of breath. Negative for cough and wheezing. Cardiovascular: Negative for chest pain, palpitations and leg swelling. Gastrointestinal: Negative for abdominal pain, constipation, diarrhea, nausea and vomiting. Genitourinary: Negative for difficulty urinating, dysuria and frequency. Musculoskeletal: Negative for arthralgias and joint swelling. Skin: Negative for rash. Allergic/Immunologic: Negative for immunocompromised state. Neurological: Positive for weakness.  Negative for dizziness, speech difficulty and headaches. Hematological: Negative for adenopathy. Psychiatric/Behavioral: Negative for behavioral problems and sleep disturbance. OBJECTIVE     PaO2/FiO2 RATIO:  No results for input(s): POCPO2 in the last 72 hours. FiO2 : 70 %     VITAL SIGNS:   LAST:  /89   Pulse 86   Temp 98 °F (36.7 °C) (Temporal)   Resp 20   Ht 6' 5\" (1.956 m)   Wt (!) 308 lb 13.8 oz (140.1 kg)   SpO2 99%   BMI 36.63 kg/m²   8-24 HR RANGE:  TEMP Temp  Av.9 °F (36.6 °C)  Min: 97.8 °F (36.6 °C)  Max: 98 °F (35.7 °C)   BP Systolic (15QFF), WQX:191 , Min:126 , KRR:835      Diastolic (49GRH), MIV:09, Min:85, Max:89     PULSE Pulse  Av.5  Min: 86  Max: 89   RR No data recorded   O2 SAT No data recorded   OXYGEN DELIVERY No data recorded        SYSTEMIC EXAMINATION:   General appearance -comfortable, no acute distress  Mental status - awake & alert, follows commands  Eyes - pupils equal and reactive, sclera anicteric  Mouth - mucous membranes moist  Neck - supple, thick  Chest - Breath sounds bilaterally were dimnished to auscultation at bases. There were no wheezes, rhonchi or rales.    Heart - normal rate, regular rhythm, normal S1, S2, no murmurs, rubs, clicks or gallops  Abdomen - soft, nontender, nondistended, no masses or organomegaly  Neurological - non-focal  Extremities - peripheral pulses normal, 1+ pedal edema, no clubbing or cyanosis  Skin - normal coloration and turgor, no rashes, no suspicious skin lesions noted     DATA REVIEW     Medications: Current Inpatient  Scheduled Meds:    Continuous Infusions:    INPUT/OUTPUT:  In: 915 [P.O.:915]  Out: -   Date 10/13/21 0000 - 10/13/21 2359(Discharged)   Shift  8864-4453 2486-9476 24 Hour Total   INTAKE   P.O.(mL/kg/hr)  915(0.8)  915   Shift Total(mL/kg)  915(6.5)  915(6.5)   OUTPUT   Shift Total(mL/kg)       Weight (kg) 140.1 140.1 140.1 140.1        LABS:  ABGs:   No results for input(s): POCPH, POCPCO2, POCPO2, Erik Tillman in the last 72 hours. CBC:   Recent Labs     10/11/21  0906 10/13/21  0633   WBC 10.0 6.0   HGB 11.8* 11.1*   HCT 38.9* 39.1*   MCV 85.5 89.1    299   LYMPHOPCT  --  21*   RBC 4.55 4.39   MCH 25.9 25.3   MCHC 30.3 28.4   RDW 13.9 13.8     CRP:   No results for input(s): CRP in the last 72 hours. LDH:   No results for input(s): LDH in the last 72 hours. BMP:   Recent Labs     10/11/21  0906 10/12/21  0848 10/13/21  0633   * 136 134*   K 4.2 4.1 4.3    102 102   CO2 20 18* 19*   BUN 25* 27* 28*   CREATININE 2.19* 2.15* 2.27*   GLUCOSE 96 98 101*   PHOS  --  3.4 3.9     Liver Function Test:   Recent Labs     10/11/21  0906 10/12/21  0848 10/13/21  0633   PROT 6.3*  --   --    LABALBU 2.8* 3.2* 3.4*   ALT 16  --   --    AST 18  --   --    ALKPHOS 66  --   --    BILITOT 0.43  --   --      Coagulation Profile:   No results for input(s): INR, PROTIME, APTT in the last 72 hours. D-Dimer:  No results for input(s): DDIMER in the last 72 hours. Lactic Acid:  No results for input(s): LACTA in the last 72 hours. Cardiac Enzymes:  No results for input(s): CKTOTAL, CKMB, CKMBINDEX, TROPONINI in the last 72 hours. Invalid input(s): TROPONIN, HSTROP  BNP/ProBNP:   No results for input(s): BNP, PROBNP in the last 72 hours. Triglycerides:  No results for input(s): TRIG in the last 72 hours. Microbiology:  Urine Culture:  No components found for: CURINE  Blood Culture:  No components found for: CBLOOD, CFUNGUSBL  Sputum Culture:  No components found for: CSPUTUM  No results for input(s): SPECDESC, SPECIAL, CULTURE, STATUS, ORG, CDIFFTOXPCR, CAMPYLOBPCR, SALMONELLAPC, SHIGAPCR, SHIGELLAPCR, MPNEUG, MPNEUM, LACTOQL in the last 72 hours. No results for input(s): SPUTUM, SPECDESC, SPECIAL, CULTURE, STATUS, ORG, CDIFFTOXPCR, MPNEUM, MPNEUG in the last 72 hours.     Invalid input(s): CURINE, [address], CFUNGUSBL     Pathology:    Radiology Reports:  US RENAL COMPLETE   Final Result   Limited, overall, unremarkable ultrasound of the kidneys and urinary bladder. XR CHEST PORTABLE   Final Result   No appreciable pneumothorax. Significantly improved right-sided airspace disease. XR CHEST PORTABLE   Final Result   1. Extensive airspace disease again demonstrated, right greater than left,   without significant interval change. Differential considerations would   include diffuse infection or edema. 2. Cardiomegaly         CT CHEST PULMONARY EMBOLISM W CONTRAST   Final Result   1. Motion artifact partially obscures some pulmonary artery subsegmental   branches near the lung bases. With this limitation, no findings of pulmonary   embolism are identified   2. Cardiovascular findings of pulmonary hypertension. 3. Slight reflux of the contrast bolus could be due to right heart   dysfunction but could also be seen as normal variation. 4. Mixed consolidative and groundglass opacities throughout the right lung   with minimal patchy groundglass opacities in the left lung base most   suggestive of typical pneumonia. Alveolar edema could appear similar, but no   significant findings of interstitial edema are present. XR CHEST PORTABLE   Final Result   1. Bilateral airspace disease concerning for pneumonia. Recommend chest   radiograph in 8 weeks to confirm resolution. Echocardiogram:   Results for orders placed during the hospital encounter of 10/09/21    ECHO Complete 2D W Doppler W Color    Summary  Technically difficult study  Normal LV size , mild to moderately increased wall thickness. No obvious wall motion abnormality seen. Normal LV systolic function with LVEF > 65 %. Normal RV size and function. LA appears mildly dilated and RA appears normal in size. No obvious significant structural valvular abnormality noted. No significant valvular stenosis or regurgitation noted. Normal aortic root dimension. No significant pericardial effusion noted.   No obvious intra-cardiac mass or shunt noted. IVC not well visualized       ASSESSMENT AND PLAN     Assessment:    // Acute hypoxic respiratory failure, resolved  // Pulmonary edema  // Hypertensive emergency  //No right heart failure  // Obesity  // Suspected sleep apnea    Plan:  Clinically much improved. He have had pulmonary edema secondary to hypertension and left ventricular dysfunction. The chest x-ray revealed cardiomegaly. There was also infiltrate in the right lung involving the mid zone. That has almost resolved. This very likely was due to pulmonary edema rather than infection and pneumonia. The antibiotics have been asked to be stopped. He does not have any cough no leukocytosis no fever. We will keep him off the off bronchodilators. Assessing for home oxygen. Patient needs a sleep study. Very likely has obstructive sleep apnea syndrome. Treatment of sleep apnea will improve the outcome of hypertension diabetes and heart failure. Patient encouraged to lose weight. We will see him in the office to schedule the sleep study. Patient should be discharged with advised to take regular diuretics and other cardio stable medication    OK to go  home  I would like to thank you for allowing me to participate in the care of this patient. Please feel free to call with any further questions or concerns. Devi Patel MD  Pulmonary and Critical Care Medicine           10/13/2021, 8:19 PM  CC 30 minutes  Please note that this chart was generated using voice recognition Dragon dictation software. Although every effort was made to ensure the accuracy of this automated transcription, some errors in transcription may have occurred.

## 2021-10-15 LAB
CULTURE: NORMAL
Lab: NORMAL
SPECIMEN DESCRIPTION: NORMAL

## 2022-03-18 ENCOUNTER — HOSPITAL ENCOUNTER (OUTPATIENT)
Age: 42
Setting detail: SPECIMEN
Discharge: HOME | End: 2022-03-18

## 2022-03-19 LAB
ABSOLUTE EOS #: 0.24 K/UL (ref 0–0.44)
ABSOLUTE IMMATURE GRANULOCYTE: <0.03 K/UL (ref 0–0.3)
ABSOLUTE LYMPH #: 1.82 K/UL (ref 1.1–3.7)
ABSOLUTE MONO #: 0.56 K/UL (ref 0.1–1.2)
ALBUMIN SERPL-MCNC: 3.8 G/DL (ref 3.5–5.2)
ALBUMIN/GLOBULIN RATIO: 1 (ref 1–2.5)
ALP BLD-CCNC: 60 U/L (ref 40–129)
ALT SERPL-CCNC: 29 U/L (ref 5–41)
ANION GAP SERPL CALCULATED.3IONS-SCNC: 12 MMOL/L (ref 9–17)
AST SERPL-CCNC: 26 U/L
BASOPHILS # BLD: 1 % (ref 0–2)
BASOPHILS ABSOLUTE: 0.08 K/UL (ref 0–0.2)
BILIRUB SERPL-MCNC: <0.1 MG/DL (ref 0.3–1.2)
BUN BLDV-MCNC: 44 MG/DL (ref 6–20)
CALCIUM SERPL-MCNC: 9 MG/DL (ref 8.6–10.4)
CHLORIDE BLD-SCNC: 106 MMOL/L (ref 98–107)
CHOLESTEROL/HDL RATIO: 2.5
CHOLESTEROL: 184 MG/DL
CO2: 22 MMOL/L (ref 20–31)
CREAT SERPL-MCNC: 2.08 MG/DL (ref 0.7–1.2)
EOSINOPHILS RELATIVE PERCENT: 4 % (ref 1–4)
GFR AFRICAN AMERICAN: 42 ML/MIN
GFR NON-AFRICAN AMERICAN: 35 ML/MIN
GFR SERPL CREATININE-BSD FRML MDRD: ABNORMAL ML/MIN/{1.73_M2}
GLUCOSE BLD-MCNC: 87 MG/DL (ref 70–99)
HCT VFR BLD CALC: 35.3 % (ref 40.7–50.3)
HDLC SERPL-MCNC: 73 MG/DL
HEMOGLOBIN: 10.6 G/DL (ref 13–17)
IMMATURE GRANULOCYTES: 0 %
LDL CHOLESTEROL: 100 MG/DL (ref 0–130)
LYMPHOCYTES # BLD: 32 % (ref 24–43)
MCH RBC QN AUTO: 26 PG (ref 25.2–33.5)
MCHC RBC AUTO-ENTMCNC: 30 G/DL (ref 28.4–34.8)
MCV RBC AUTO: 86.7 FL (ref 82.6–102.9)
MONOCYTES # BLD: 10 % (ref 3–12)
NRBC AUTOMATED: 0 PER 100 WBC
PDW BLD-RTO: 14 % (ref 11.8–14.4)
PLATELET # BLD: ABNORMAL K/UL (ref 138–453)
PLATELET, FLUORESCENCE: 277 K/UL (ref 138–453)
PLATELET, IMMATURE FRACTION: 5.6 % (ref 1.1–10.3)
POTASSIUM SERPL-SCNC: 4.4 MMOL/L (ref 3.7–5.3)
PRO-BNP: 163 PG/ML
RBC # BLD: 4.07 M/UL (ref 4.21–5.77)
SEG NEUTROPHILS: 53 % (ref 36–65)
SEGMENTED NEUTROPHILS ABSOLUTE COUNT: 3.01 K/UL (ref 1.5–8.1)
SODIUM BLD-SCNC: 140 MMOL/L (ref 135–144)
TOTAL PROTEIN: 7.5 G/DL (ref 6.4–8.3)
TRIGL SERPL-MCNC: 54 MG/DL
TSH SERPL DL<=0.05 MIU/L-ACNC: 2.33 MIU/L (ref 0.3–5)
VITAMIN D 25-HYDROXY: 11.6 NG/ML
WBC # BLD: 5.7 K/UL (ref 3.5–11.3)

## 2022-03-21 LAB
ABSOLUTE RETIC #: 0.05 M/UL (ref 0.03–0.08)
FERRITIN: 119 UG/L (ref 30–400)
HOMOCYSTEINE: 20.9 UMOL/L
IMMATURE RETIC FRACT: 12.3 % (ref 2.7–18.3)
IRON SATURATION: 16 % (ref 20–55)
IRON: 44 UG/DL (ref 59–158)
LACTATE DEHYDROGENASE: 244 U/L (ref 135–225)
REASON FOR REJECTION: NORMAL
RETIC %: 1.3 % (ref 0.5–1.9)
RETIC HEMOGLOBIN: 29 PG (ref 28.2–35.7)
TOTAL IRON BINDING CAPACITY: 280 UG/DL (ref 250–450)
UNSATURATED IRON BINDING CAPACITY: 236 UG/DL (ref 112–347)
ZZ NTE CLEAN UP: ORDERED TEST: NORMAL
ZZ NTE WITH NAME CLEAN UP: SPECIMEN SOURCE: NORMAL

## 2022-03-22 LAB
PATHOLOGIST REVIEW: NORMAL
SURGICAL PATHOLOGY REPORT: NORMAL

## 2022-10-26 ENCOUNTER — HOSPITAL ENCOUNTER (EMERGENCY)
Age: 42
Discharge: HOME | End: 2022-10-26
Attending: EMERGENCY MEDICINE
Payer: COMMERCIAL

## 2022-10-26 VITALS
BODY MASS INDEX: 36.6 KG/M2 | WEIGHT: 310 LBS | HEIGHT: 77 IN | TEMPERATURE: 98 F | HEART RATE: 97 BPM | SYSTOLIC BLOOD PRESSURE: 190 MMHG | RESPIRATION RATE: 18 BRPM | OXYGEN SATURATION: 98 % | DIASTOLIC BLOOD PRESSURE: 139 MMHG

## 2022-10-26 DIAGNOSIS — I10 ESSENTIAL HYPERTENSION: ICD-10-CM

## 2022-10-26 DIAGNOSIS — R03.0 ELEVATED BLOOD PRESSURE READING: Primary | ICD-10-CM

## 2022-10-26 DIAGNOSIS — B34.9 VIRAL ILLNESS: ICD-10-CM

## 2022-10-26 LAB
INFLUENZA A: NOT DETECTED
INFLUENZA B: NOT DETECTED
SARS-COV-2 RNA, RT PCR: NOT DETECTED
SOURCE: NORMAL
SPECIMEN DESCRIPTION: NORMAL

## 2022-10-26 PROCEDURE — 99283 EMERGENCY DEPT VISIT LOW MDM: CPT

## 2022-10-26 PROCEDURE — 6370000000 HC RX 637 (ALT 250 FOR IP): Performed by: STUDENT IN AN ORGANIZED HEALTH CARE EDUCATION/TRAINING PROGRAM

## 2022-10-26 PROCEDURE — 93005 ELECTROCARDIOGRAM TRACING: CPT | Performed by: STUDENT IN AN ORGANIZED HEALTH CARE EDUCATION/TRAINING PROGRAM

## 2022-10-26 PROCEDURE — 87636 SARSCOV2 & INF A&B AMP PRB: CPT

## 2022-10-26 RX ORDER — HYDRALAZINE HYDROCHLORIDE 50 MG/1
50 TABLET, FILM COATED ORAL ONCE
Status: COMPLETED | OUTPATIENT
Start: 2022-10-26 | End: 2022-10-26

## 2022-10-26 RX ORDER — AMLODIPINE BESYLATE 5 MG/1
10 TABLET ORAL ONCE
Status: COMPLETED | OUTPATIENT
Start: 2022-10-26 | End: 2022-10-26

## 2022-10-26 RX ORDER — CARVEDILOL 25 MG/1
25 TABLET ORAL 2 TIMES DAILY WITH MEALS
Qty: 60 TABLET | Refills: 0 | Status: SHIPPED | OUTPATIENT
Start: 2022-10-26

## 2022-10-26 RX ORDER — HYDRALAZINE HYDROCHLORIDE 50 MG/1
50 TABLET, FILM COATED ORAL EVERY 8 HOURS SCHEDULED
Qty: 90 TABLET | Refills: 0 | Status: ON HOLD | OUTPATIENT
Start: 2022-10-26 | End: 2022-11-10 | Stop reason: HOSPADM

## 2022-10-26 RX ORDER — CARVEDILOL 12.5 MG/1
25 TABLET ORAL ONCE
Status: COMPLETED | OUTPATIENT
Start: 2022-10-26 | End: 2022-10-26

## 2022-10-26 RX ORDER — AMLODIPINE BESYLATE 10 MG/1
10 TABLET ORAL DAILY
Qty: 30 TABLET | Refills: 0 | Status: SHIPPED | OUTPATIENT
Start: 2022-10-26

## 2022-10-26 RX ORDER — ACETAMINOPHEN 325 MG/1
650 TABLET ORAL ONCE
Status: COMPLETED | OUTPATIENT
Start: 2022-10-26 | End: 2022-10-26

## 2022-10-26 RX ADMIN — ACETAMINOPHEN 650 MG: 325 TABLET, FILM COATED ORAL at 19:47

## 2022-10-26 RX ADMIN — AMLODIPINE BESYLATE 10 MG: 5 TABLET ORAL at 19:47

## 2022-10-26 RX ADMIN — HYDRALAZINE HYDROCHLORIDE 50 MG: 50 TABLET, FILM COATED ORAL at 19:54

## 2022-10-26 RX ADMIN — CARVEDILOL 25 MG: 12.5 TABLET, FILM COATED ORAL at 19:47

## 2022-10-26 ASSESSMENT — ENCOUNTER SYMPTOMS
VOMITING: 0
SHORTNESS OF BREATH: 0
ABDOMINAL PAIN: 0
COUGH: 1
DIARRHEA: 0
BACK PAIN: 0
NAUSEA: 0
PHOTOPHOBIA: 0

## 2022-10-26 ASSESSMENT — LIFESTYLE VARIABLES
HOW OFTEN DO YOU HAVE A DRINK CONTAINING ALCOHOL: NEVER
HOW MANY STANDARD DRINKS CONTAINING ALCOHOL DO YOU HAVE ON A TYPICAL DAY: PATIENT DOES NOT DRINK

## 2022-10-26 ASSESSMENT — PAIN SCALES - GENERAL: PAINLEVEL_OUTOF10: 3

## 2022-10-26 ASSESSMENT — PAIN - FUNCTIONAL ASSESSMENT: PAIN_FUNCTIONAL_ASSESSMENT: NONE - DENIES PAIN

## 2022-10-26 NOTE — Clinical Note
Tamie Mary was seen and treated in our emergency department on 10/26/2022. He may return to work on 10/27/2022. If you have any questions or concerns, please don't hesitate to call.       Jada Borja, DO

## 2022-10-26 NOTE — ED PROVIDER NOTES
[facility] ED  Emergency Department Encounter  Emergency Medicine Resident     Pt Name: Nigel Jorge  MRN: [id number]  [place] 1980  Date of evaluation: 10/26/22  PCP:  Yash Armas MD    [address] [place] [address]       Chief Complaint   Patient presents with    Cough       HISTORY OFPRESENT ILLNESS  (Location/Symptom, Timing/Onset, Context/Setting, Quality, Duration, Modifying Factors,Severity.)      Nigel Jorge is a 36 y. o.yo male who presents with cough and congestion for past few days. Patient states he was getting go to work today however felt occasion come and get checked out. Denies any fevers. Denies any chest pain or shortness of breath. Denies any sick contacts. Additionally patient found to be quite hypertensive here. Patient states he has been out of his home antihypertensives for an unknown period of time. States he needs refills from his primary physician. States he has not been checking his blood pressure at home. PAST MEDICAL / SURGICAL / SOCIAL / FAMILY HISTORY      has a past medical history of DEVYN (acute kidney injury) ([facility] [address] Bipolar 1 disorder ([facility] [address] Bronchitis, Bronchitis, Hypertension, Moderate persistent asthma, and Obesity. has no past surgical history on file.      Social History     Socioeconomic History    Marital status: Single     Spouse name: Not on file    Number of children: Not on file    Years of education: Not on file    Highest education level: Not on file   Occupational History    Not on file   Tobacco Use    Smoking status: Never    Smokeless tobacco: Never   Substance and Sexual Activity    Alcohol use: No    Drug use: No    Sexual activity: Not on file   Other Topics Concern    Not on file   Social History Narrative    Not on file     Social Determinants of Health     Financial Resource Strain: Not on file   Food Insecurity: Not on file   Transportation Needs: Not on file   Physical Activity: Not on file   Stress: Not on file   Social Connections: Not on file   Intimate Partner Violence: Not on file   Housing Stability: Not on file       Family History   Problem Relation Age of Onset    High Blood Pressure Mother     Diabetes Maternal Grandmother         Allergies:  Patient has no known allergies. Home Medications:  Prior to Admission medications    Medication Sig Start Date End Date Taking? Authorizing Provider   carvedilol (COREG) 25 MG tablet Take 1 tablet by mouth 2 times daily (with meals) 10/26/22  Yes Adrienne De La Cruz, DO   amLODIPine (NORVASC) 10 MG tablet Take 1 tablet by mouth daily 10/26/22  Yes Adrienne De La Cruz, DO   hydrALAZINE (APRESOLINE) 50 MG tablet Take 1 tablet by mouth every 8 hours 10/26/22  Yes Adrienne Jono, DO   ferrous sulfate (IRON 325) 325 (65 Fe) MG tablet Take 1 tablet by mouth daily (with breakfast) 10/13/21 11/12/21  Reynaldo Strauss DO   albuterol sulfate  (90 Base) MCG/ACT inhaler Inhale 2 puffs into the lungs every 6 hours as needed for Wheezing 10/13/21   Ami Sawyer, DO   Blood Pressure KIT 1 kit by Does not apply route daily 3/16/20   Luis Armando Ryan MD       REVIEW OFSYSTEMS    (2-9 systems for level 4, 10 or more for level 5)      Review of Systems   Constitutional:  Negative for chills, fatigue and fever. HENT:  Positive for congestion. Eyes:  Negative for photophobia and visual disturbance. Respiratory:  Positive for cough. Negative for shortness of breath. Cardiovascular:  Negative for chest pain. Gastrointestinal:  Negative for abdominal pain, diarrhea, nausea and vomiting. Genitourinary:  Negative for dysuria. Musculoskeletal:  Negative for back pain and neck pain. Skin:  Negative for rash. Neurological:  Negative for headaches. Psychiatric/Behavioral:  Negative for confusion.       PHYSICAL EXAM   (up to 7 for level 4, 8 or more forlevel 5)      INITIAL VITALS:   Vitals:    10/26/22 1912 10/26/22 1914 10/26/22 1947 10/26/22 2101   BP: (!) 255/169 (!) 255/169 (!) 255/169 (!) 190/139   Pulse:   100 97   Resp:       Temp:       TempSrc:       SpO2:       Weight:       Height:             Physical Exam  Vitals reviewed. Constitutional:       General: He is not in acute distress. Appearance: Normal appearance. HENT:      Head: Normocephalic and atraumatic. Right Ear: External ear normal.      Left Ear: External ear normal.      Nose: Congestion present. Mouth/Throat:      Mouth: Mucous membranes are moist.      Pharynx: No oropharyngeal exudate or posterior oropharyngeal erythema. Eyes:      General:         Right eye: No discharge. Left eye: No discharge. Extraocular Movements: Extraocular movements intact. Cardiovascular:      Rate and Rhythm: Normal rate and regular rhythm. Pulmonary:      Effort: Pulmonary effort is normal. No respiratory distress. Breath sounds: No wheezing, rhonchi or rales. Abdominal:      General: There is no distension. Palpations: Abdomen is soft. Tenderness: There is no abdominal tenderness. Musculoskeletal:      Cervical back: Normal range of motion. Comments: Moving all 4 extremities   Skin:     General: Skin is warm. Capillary Refill: Capillary refill takes less than 2 seconds. Neurological:      General: No focal deficit present. Mental Status: He is alert and oriented to person, place, and time. Cranial Nerves: No cranial nerve deficit.    Psychiatric:         Mood and Affect: Mood normal.         Behavior: Behavior normal.       DIFFERENTIAL  DIAGNOSIS     PLAN (LABS / IMAGING / EKG):  Orders Placed This Encounter   Procedures    COVID-19 & Influenza Combo       MEDICATIONS ORDERED:  Orders Placed This Encounter   Medications    acetaminophen (TYLENOL) tablet 650 mg    hydrALAZINE (APRESOLINE) tablet 50 mg    carvedilol (COREG) tablet 25 mg    amLODIPine (NORVASC) tablet 10 mg    carvedilol (COREG) 25 MG tablet     Sig: Take 1 tablet by mouth 2 times daily (with meals)     Dispense:  60 tablet     Refill:  0    amLODIPine (NORVASC) 10 MG tablet     Sig: Take 1 tablet by mouth daily     Dispense:  30 tablet     Refill:  0    hydrALAZINE (APRESOLINE) 50 MG tablet     Sig: Take 1 tablet by mouth every 8 hours     Dispense:  90 tablet     Refill:  0       DDX: Viral illness, hypertensive    Initial MDM/Plan: 36 y.o. male who presents with cough and congestion for past few days. Denies any fevers, no sick contacts. Patient well-appearing initial evaluation, afebrile, very hypertensive but otherwise stable vital signs. Patient out of his home antihypertensive medications. Patient is completely asymptomatic regarding hypertension. Will swab patient for COVID and flu. Will dose patient with his home antihypertensive medications. Will reassess. DIAGNOSTIC RESULTS / EMERGENCYDEPARTMENT COURSE / MDM     LABS:  Labs Reviewed   COVID-19 & INFLUENZA COMBO         RADIOLOGY:  No results found. EKG  EKG Interpretation    Interpreted by me    Rhythm: Sinus tachycardia  Rate: 101  Axis: normal  Ectopy: none  Conduction: normal  ST Segments: no acute change  T Waves: Nonspecific changes  Q Waves: none    Clinical Impression: Nonspecific EKG,. Unchanged from prior    All EKG's are interpreted by the Emergency Department Physicianwho either signs or Co-signs this chart in the absence of a cardiologist.    EMERGENCY DEPARTMENT COURSE:  ED Course as of 10/26/22 2108   Wed Oct 26, 2022   2052 SARS-CoV-2 RNA, RT PCR: Not Detected [AB]   2053 INFLUENZA A: Not Detected [AB]   2053 INFLUENZA B: Not Detected [AB]   2104 BP(!): 190/139  Much improved [AB]   2104 Updated patient on results. Patient will be discharged at this time. Provided with refills for his blood pressure medicines. Given strict return precautions. Advise follow-up with primary care provider.   Patient agreed with discharge plan at this time. [AB]      ED Course User Index  [AB] Trevon Sherman DO PROCEDURES:  None    CONSULTS:  None    CRITICAL CARE:  See attending physician note    FINAL IMPRESSION      1. Elevated blood pressure reading    2. Viral illness    3.  Essential hypertension          DISPOSITION / PLAN     DISPOSITION Decision To Discharge 10/26/2022 09:04:56 PM      PATIENT REFERRED TO:  Teressa Chaves MD  [facility] [address]  [phone]    Schedule an appointment as soon as possible for a visit in 2 days      [facility] ED  [facility] [address]  [address] [place] [address]  [phone]  Go to   If symptoms worsen    DISCHARGE MEDICATIONS:  Current Discharge Medication List          Cristo Sewell DO  Emergency Medicine Resident    (Please note that portions of this note were completed with a voice recognition program.Efforts were made to edit the dictations but occasionally words are mis-transcribed.)        Riley Meyers DO  Resident  10/26/22 0688

## 2022-10-27 NOTE — ED PROVIDER NOTES
[address] ED  eMERGENCY dEPARTMENT eNCOUnter   Attending Attestation     Pt Name: Fred López  MRN: [id number]  [place] 1980  Date of evaluation: 10/26/22       Fred López is a 36 y.o. male who presents with Cough      History:   Patient has been having a cough productive of yellow sputum. Patient also admits that he has not been taking his blood pressure medication for a while now. Patient has no chest pain no headache no numbness tingling or weakness. Patient was just wanting to make sure he did not have COVID or the flu. Exam: Vitals:   Vitals:    10/26/22 1907 10/26/22 1912 10/26/22 1914 10/26/22 1947   BP:  (!) 255/169 (!) 255/169 (!) 255/169   Pulse: 100   100   Resp: 18      Temp: 98 °F (36.7 °C)      TempSrc: Oral      SpO2: 98%      Weight: (!) 310 lb (140.6 kg)      Height: 6' 5\" (1.956 m)        Heart regular rate rhythm no murmurs. Lungs clear to auscultation bilaterally. I performed a history and physical examination of the patient and discussed management with the resident. I reviewed the residents note and agree with the documented findings and plan of care. Any areas of disagreement are noted on the chart. I was personally present for the key portions of any procedures. I have documented in the chart those procedures where I was not present during the key portions. I have personally reviewed all images and agree with the resident's interpretation. I have reviewed the emergency nurses triage note. I agree with the chief complaint, past medical history, past surgical history, allergies, medications, social and family history as documented unless otherwise noted below. Documentation of the HPI, Physical Exam and Medical Decision Making performed by medical students or scribes is based on my personal performance of the HPI, PE and MDM.  I personally evaluated and examined the patient in conjunction with the APC and agree with the assessment, treatment plan, and disposition of the patient as recorded by the APC. Additional findings are as noted.     Dhiraj Baxter MD  Attending Emergency  Physician             Dora Garrison MD  10/26/22 2004

## 2022-10-27 NOTE — DISCHARGE INSTRUCTIONS
Your COVID and flu test were negative. You most likely have a viral illness. Please take Motrin and Tylenol as needed for your symptoms. Your blood pressure was elevated. Please discuss this with your primary care provider. Please take your blood pressure medicines as prescribed. Please return to the emergency department if you develop any worsening symptoms, chest pain, shortness of breath, severe headaches, difficulty breathing, any other concerning symptoms.

## 2022-11-01 LAB
EKG ATRIAL RATE: 101 BPM
EKG P AXIS: 52 DEGREES
EKG P-R INTERVAL: 184 MS
EKG Q-T INTERVAL: 366 MS
EKG QRS DURATION: 88 MS
EKG QTC CALCULATION (BAZETT): 474 MS
EKG R AXIS: 46 DEGREES
EKG T AXIS: 145 DEGREES
EKG VENTRICULAR RATE: 101 BPM

## 2022-11-01 PROCEDURE — 93010 ELECTROCARDIOGRAM REPORT: CPT | Performed by: INTERNAL MEDICINE

## 2022-11-08 ENCOUNTER — APPOINTMENT (OUTPATIENT)
Dept: NUCLEAR MEDICINE | Age: 42
DRG: 305 | End: 2022-11-08
Payer: COMMERCIAL

## 2022-11-08 ENCOUNTER — APPOINTMENT (OUTPATIENT)
Dept: GENERAL RADIOLOGY | Age: 42
DRG: 305 | End: 2022-11-08
Payer: COMMERCIAL

## 2022-11-08 ENCOUNTER — APPOINTMENT (OUTPATIENT)
Dept: CT IMAGING | Age: 42
DRG: 305 | End: 2022-11-08
Payer: COMMERCIAL

## 2022-11-08 ENCOUNTER — HOSPITAL ENCOUNTER (INPATIENT)
Age: 42
LOS: 2 days | Discharge: HOME | DRG: 305 | End: 2022-11-10
Attending: EMERGENCY MEDICINE | Admitting: INTERNAL MEDICINE
Payer: COMMERCIAL

## 2022-11-08 DIAGNOSIS — E66.9 OBESITY (BMI 30-39.9): ICD-10-CM

## 2022-11-08 DIAGNOSIS — R09.02 HYPOXIA: ICD-10-CM

## 2022-11-08 DIAGNOSIS — I10 ESSENTIAL HYPERTENSION: ICD-10-CM

## 2022-11-08 DIAGNOSIS — G47.33 OSA (OBSTRUCTIVE SLEEP APNEA): ICD-10-CM

## 2022-11-08 DIAGNOSIS — G47.30 SLEEP-RELATED BREATHING DISORDER: ICD-10-CM

## 2022-11-08 DIAGNOSIS — I16.1 HYPERTENSIVE EMERGENCY: Primary | ICD-10-CM

## 2022-11-08 DIAGNOSIS — N17.9 AKI (ACUTE KIDNEY INJURY) (HCC): ICD-10-CM

## 2022-11-08 LAB
ABSOLUTE EOS #: 0.2 K/UL (ref 0–0.4)
ABSOLUTE LYMPH #: 1 K/UL (ref 1–4.8)
ABSOLUTE MONO #: 0.6 K/UL (ref 0.1–1.3)
ALBUMIN SERPL-MCNC: 3.8 G/DL (ref 3.5–5.2)
ALP BLD-CCNC: 83 U/L (ref 40–129)
ALT SERPL-CCNC: 28 U/L (ref 5–41)
AMPHETAMINE SCREEN URINE: NEGATIVE
ANION GAP SERPL CALCULATED.3IONS-SCNC: 12 MMOL/L (ref 9–17)
AST SERPL-CCNC: 33 U/L
BACTERIA: NORMAL
BARBITURATE SCREEN URINE: NEGATIVE
BASOPHILS # BLD: 1 % (ref 0–2)
BASOPHILS ABSOLUTE: 0.1 K/UL (ref 0–0.2)
BENZODIAZEPINE SCREEN, URINE: NEGATIVE
BILIRUB SERPL-MCNC: 0.3 MG/DL (ref 0.3–1.2)
BILIRUBIN URINE: NEGATIVE
BUN BLDV-MCNC: 50 MG/DL (ref 6–20)
CALCIUM SERPL-MCNC: 8.9 MG/DL (ref 8.6–10.4)
CANNABINOID SCREEN URINE: NEGATIVE
CASTS UA: NORMAL /LPF
CHLORIDE BLD-SCNC: 100 MMOL/L (ref 98–107)
CO2: 23 MMOL/L (ref 20–31)
COCAINE METABOLITE, URINE: NEGATIVE
COLOR: YELLOW
CREAT SERPL-MCNC: 2.8 MG/DL (ref 0.7–1.2)
D-DIMER QUANTITATIVE: 1.28 MG/L FEU (ref 0–0.59)
EKG ATRIAL RATE: 93 BPM
EKG ATRIAL RATE: 93 BPM
EKG P AXIS: 39 DEGREES
EKG P AXIS: 40 DEGREES
EKG P-R INTERVAL: 196 MS
EKG P-R INTERVAL: 206 MS
EKG Q-T INTERVAL: 390 MS
EKG Q-T INTERVAL: 398 MS
EKG QRS DURATION: 94 MS
EKG QRS DURATION: 94 MS
EKG QTC CALCULATION (BAZETT): 484 MS
EKG QTC CALCULATION (BAZETT): 494 MS
EKG R AXIS: 26 DEGREES
EKG R AXIS: 29 DEGREES
EKG T AXIS: 121 DEGREES
EKG T AXIS: 126 DEGREES
EKG VENTRICULAR RATE: 93 BPM
EKG VENTRICULAR RATE: 93 BPM
EOSINOPHILS RELATIVE PERCENT: 3 % (ref 0–4)
EPITHELIAL CELLS UA: NORMAL /HPF
FENTANYL URINE: NEGATIVE
GFR SERPL CREATININE-BSD FRML MDRD: 28 ML/MIN/1.73M2
GLUCOSE BLD-MCNC: 99 MG/DL (ref 70–99)
GLUCOSE URINE: NEGATIVE
HCT VFR BLD CALC: 37 % (ref 41–53)
HEMOGLOBIN: 12 G/DL (ref 13.5–17.5)
INFLUENZA A: NOT DETECTED
INFLUENZA B: NOT DETECTED
INR BLD: 0.9
KETONES, URINE: NEGATIVE
LEUKOCYTE ESTERASE, URINE: NEGATIVE
LIPASE: 164 U/L (ref 13–60)
LYMPHOCYTES # BLD: 15 % (ref 24–44)
MAGNESIUM: 2.1 MG/DL (ref 1.6–2.6)
MCH RBC QN AUTO: 25.9 PG (ref 26–34)
MCHC RBC AUTO-ENTMCNC: 32.4 G/DL (ref 31–37)
MCV RBC AUTO: 80 FL (ref 80–100)
METHADONE SCREEN, URINE: NEGATIVE
MONOCYTES # BLD: 8 % (ref 1–7)
NITRITE, URINE: NEGATIVE
OPIATES, URINE: NEGATIVE
OXYCODONE SCREEN URINE: NEGATIVE
PDW BLD-RTO: 14.8 % (ref 11.5–14.9)
PH UA: 6.5 (ref 5–8)
PHENCYCLIDINE, URINE: NEGATIVE
PLATELET # BLD: 280 K/UL (ref 150–450)
PMV BLD AUTO: 9.5 FL (ref 6–12)
POTASSIUM SERPL-SCNC: 3.8 MMOL/L (ref 3.7–5.3)
PRO-BNP: 765 PG/ML
PROTEIN UA: ABNORMAL
PROTHROMBIN TIME: 12.4 SEC (ref 11.8–14.6)
RBC # BLD: 4.62 M/UL (ref 4.5–5.9)
RBC UA: NORMAL /HPF
SARS-COV-2 RNA, RT PCR: NOT DETECTED
SEG NEUTROPHILS: 73 % (ref 36–66)
SEGMENTED NEUTROPHILS ABSOLUTE COUNT: 5.3 K/UL (ref 1.3–9.1)
SODIUM BLD-SCNC: 135 MMOL/L (ref 135–144)
SOURCE: NORMAL
SPECIFIC GRAVITY UA: 1.01 (ref 1–1.03)
SPECIMEN DESCRIPTION: NORMAL
TEST INFORMATION: NORMAL
TOTAL PROTEIN: 8 G/DL (ref 6.4–8.3)
TROPONIN, HIGH SENSITIVITY: 57 NG/L (ref 0–22)
TROPONIN, HIGH SENSITIVITY: 62 NG/L (ref 0–22)
TURBIDITY: CLEAR
URINE HGB: NEGATIVE
UROBILINOGEN, URINE: NORMAL
WBC # BLD: 7.2 K/UL (ref 3.5–11)
WBC UA: NORMAL /HPF

## 2022-11-08 PROCEDURE — 6370000000 HC RX 637 (ALT 250 FOR IP): Performed by: INTERNAL MEDICINE

## 2022-11-08 PROCEDURE — 3430000000 HC RX DIAGNOSTIC RADIOPHARMACEUTICAL: Performed by: EMERGENCY MEDICINE

## 2022-11-08 PROCEDURE — 93005 ELECTROCARDIOGRAM TRACING: CPT | Performed by: EMERGENCY MEDICINE

## 2022-11-08 PROCEDURE — 85379 FIBRIN DEGRADATION QUANT: CPT

## 2022-11-08 PROCEDURE — 85025 COMPLETE CBC W/AUTO DIFF WBC: CPT

## 2022-11-08 PROCEDURE — 83880 ASSAY OF NATRIURETIC PEPTIDE: CPT

## 2022-11-08 PROCEDURE — 2500000003 HC RX 250 WO HCPCS: Performed by: EMERGENCY MEDICINE

## 2022-11-08 PROCEDURE — 83735 ASSAY OF MAGNESIUM: CPT

## 2022-11-08 PROCEDURE — 2580000003 HC RX 258: Performed by: EMERGENCY MEDICINE

## 2022-11-08 PROCEDURE — 96365 THER/PROPH/DIAG IV INF INIT: CPT

## 2022-11-08 PROCEDURE — 80053 COMPREHEN METABOLIC PANEL: CPT

## 2022-11-08 PROCEDURE — 81001 URINALYSIS AUTO W/SCOPE: CPT

## 2022-11-08 PROCEDURE — 6370000000 HC RX 637 (ALT 250 FOR IP): Performed by: EMERGENCY MEDICINE

## 2022-11-08 PROCEDURE — 2060000000 HC ICU INTERMEDIATE R&B

## 2022-11-08 PROCEDURE — 2580000003 HC RX 258: Performed by: STUDENT IN AN ORGANIZED HEALTH CARE EDUCATION/TRAINING PROGRAM

## 2022-11-08 PROCEDURE — 84484 ASSAY OF TROPONIN QUANT: CPT

## 2022-11-08 PROCEDURE — 2500000003 HC RX 250 WO HCPCS: Performed by: STUDENT IN AN ORGANIZED HEALTH CARE EDUCATION/TRAINING PROGRAM

## 2022-11-08 PROCEDURE — 85610 PROTHROMBIN TIME: CPT

## 2022-11-08 PROCEDURE — 93010 ELECTROCARDIOGRAM REPORT: CPT | Performed by: INTERNAL MEDICINE

## 2022-11-08 PROCEDURE — 83690 ASSAY OF LIPASE: CPT

## 2022-11-08 PROCEDURE — 71045 X-RAY EXAM CHEST 1 VIEW: CPT

## 2022-11-08 PROCEDURE — 78580 LUNG PERFUSION IMAGING: CPT

## 2022-11-08 PROCEDURE — 80307 DRUG TEST PRSMV CHEM ANLYZR: CPT

## 2022-11-08 PROCEDURE — 99285 EMERGENCY DEPT VISIT HI MDM: CPT

## 2022-11-08 PROCEDURE — 87636 SARSCOV2 & INF A&B AMP PRB: CPT

## 2022-11-08 PROCEDURE — A9540 TC99M MAA: HCPCS | Performed by: EMERGENCY MEDICINE

## 2022-11-08 PROCEDURE — 36415 COLL VENOUS BLD VENIPUNCTURE: CPT

## 2022-11-08 RX ORDER — ONDANSETRON 4 MG/1
4 TABLET, ORALLY DISINTEGRATING ORAL EVERY 8 HOURS PRN
Status: DISCONTINUED | OUTPATIENT
Start: 2022-11-08 | End: 2022-11-08

## 2022-11-08 RX ORDER — HYDRALAZINE HYDROCHLORIDE 50 MG/1
50 TABLET, FILM COATED ORAL EVERY 8 HOURS SCHEDULED
Status: DISCONTINUED | OUTPATIENT
Start: 2022-11-08 | End: 2022-11-09

## 2022-11-08 RX ORDER — ENOXAPARIN SODIUM 100 MG/ML
30 INJECTION SUBCUTANEOUS 2 TIMES DAILY
Status: DISCONTINUED | OUTPATIENT
Start: 2022-11-08 | End: 2022-11-08

## 2022-11-08 RX ORDER — ONDANSETRON 4 MG/1
4 TABLET, ORALLY DISINTEGRATING ORAL EVERY 8 HOURS PRN
Status: DISCONTINUED | OUTPATIENT
Start: 2022-11-08 | End: 2022-11-10 | Stop reason: HOSPADM

## 2022-11-08 RX ORDER — POLYETHYLENE GLYCOL 3350 17 G/17G
17 POWDER, FOR SOLUTION ORAL DAILY PRN
Status: DISCONTINUED | OUTPATIENT
Start: 2022-11-08 | End: 2022-11-08

## 2022-11-08 RX ORDER — SODIUM CHLORIDE 0.9 % (FLUSH) 0.9 %
5-40 SYRINGE (ML) INJECTION EVERY 12 HOURS SCHEDULED
Status: DISCONTINUED | OUTPATIENT
Start: 2022-11-08 | End: 2022-11-10 | Stop reason: HOSPADM

## 2022-11-08 RX ORDER — ONDANSETRON 2 MG/ML
4 INJECTION INTRAMUSCULAR; INTRAVENOUS EVERY 6 HOURS PRN
Status: DISCONTINUED | OUTPATIENT
Start: 2022-11-08 | End: 2022-11-10 | Stop reason: HOSPADM

## 2022-11-08 RX ORDER — ONDANSETRON 2 MG/ML
4 INJECTION INTRAMUSCULAR; INTRAVENOUS EVERY 6 HOURS PRN
Status: DISCONTINUED | OUTPATIENT
Start: 2022-11-08 | End: 2022-11-08

## 2022-11-08 RX ORDER — ACETAMINOPHEN 650 MG/1
650 SUPPOSITORY RECTAL EVERY 6 HOURS PRN
Status: DISCONTINUED | OUTPATIENT
Start: 2022-11-08 | End: 2022-11-10 | Stop reason: HOSPADM

## 2022-11-08 RX ORDER — POLYETHYLENE GLYCOL 3350 17 G/17G
17 POWDER, FOR SOLUTION ORAL DAILY PRN
Status: DISCONTINUED | OUTPATIENT
Start: 2022-11-08 | End: 2022-11-10 | Stop reason: HOSPADM

## 2022-11-08 RX ORDER — SODIUM CHLORIDE 0.9 % (FLUSH) 0.9 %
5-40 SYRINGE (ML) INJECTION PRN
Status: DISCONTINUED | OUTPATIENT
Start: 2022-11-08 | End: 2022-11-10 | Stop reason: HOSPADM

## 2022-11-08 RX ORDER — CARVEDILOL 25 MG/1
25 TABLET ORAL 2 TIMES DAILY WITH MEALS
Status: DISCONTINUED | OUTPATIENT
Start: 2022-11-08 | End: 2022-11-10 | Stop reason: HOSPADM

## 2022-11-08 RX ORDER — HEPARIN SODIUM 1000 [USP'U]/ML
2000 INJECTION, SOLUTION INTRAVENOUS; SUBCUTANEOUS PRN
Status: DISCONTINUED | OUTPATIENT
Start: 2022-11-08 | End: 2022-11-08

## 2022-11-08 RX ORDER — ACETAMINOPHEN 325 MG/1
650 TABLET ORAL EVERY 6 HOURS PRN
Status: DISCONTINUED | OUTPATIENT
Start: 2022-11-08 | End: 2022-11-10 | Stop reason: HOSPADM

## 2022-11-08 RX ORDER — SODIUM CHLORIDE 0.9 % (FLUSH) 0.9 %
10 SYRINGE (ML) INJECTION PRN
Status: DISCONTINUED | OUTPATIENT
Start: 2022-11-08 | End: 2022-11-10 | Stop reason: HOSPADM

## 2022-11-08 RX ORDER — HEPARIN SODIUM 1000 [USP'U]/ML
4000 INJECTION, SOLUTION INTRAVENOUS; SUBCUTANEOUS ONCE
Status: DISCONTINUED | OUTPATIENT
Start: 2022-11-08 | End: 2022-11-08

## 2022-11-08 RX ORDER — NITROGLYCERIN 20 MG/100ML
5-200 INJECTION INTRAVENOUS CONTINUOUS
Status: DISCONTINUED | OUTPATIENT
Start: 2022-11-08 | End: 2022-11-08

## 2022-11-08 RX ORDER — ASPIRIN 81 MG/1
324 TABLET, CHEWABLE ORAL ONCE
Status: COMPLETED | OUTPATIENT
Start: 2022-11-08 | End: 2022-11-08

## 2022-11-08 RX ORDER — SODIUM CHLORIDE 9 MG/ML
INJECTION, SOLUTION INTRAVENOUS PRN
Status: DISCONTINUED | OUTPATIENT
Start: 2022-11-08 | End: 2022-11-10 | Stop reason: HOSPADM

## 2022-11-08 RX ORDER — HEPARIN SODIUM 5000 [USP'U]/ML
5000 INJECTION, SOLUTION INTRAVENOUS; SUBCUTANEOUS EVERY 8 HOURS SCHEDULED
Status: DISCONTINUED | OUTPATIENT
Start: 2022-11-08 | End: 2022-11-10 | Stop reason: HOSPADM

## 2022-11-08 RX ORDER — MAGNESIUM SULFATE HEPTAHYDRATE 40 MG/ML
2000 INJECTION, SOLUTION INTRAVENOUS PRN
Status: DISCONTINUED | OUTPATIENT
Start: 2022-11-08 | End: 2022-11-10 | Stop reason: HOSPADM

## 2022-11-08 RX ORDER — HEPARIN SODIUM 10000 [USP'U]/100ML
5-30 INJECTION, SOLUTION INTRAVENOUS CONTINUOUS
Status: DISCONTINUED | OUTPATIENT
Start: 2022-11-08 | End: 2022-11-08

## 2022-11-08 RX ORDER — POTASSIUM CHLORIDE 7.45 MG/ML
10 INJECTION INTRAVENOUS PRN
Status: DISCONTINUED | OUTPATIENT
Start: 2022-11-08 | End: 2022-11-10 | Stop reason: HOSPADM

## 2022-11-08 RX ORDER — POTASSIUM CHLORIDE 20 MEQ/1
40 TABLET, EXTENDED RELEASE ORAL PRN
Status: DISCONTINUED | OUTPATIENT
Start: 2022-11-08 | End: 2022-11-10 | Stop reason: HOSPADM

## 2022-11-08 RX ORDER — AMLODIPINE BESYLATE 10 MG/1
10 TABLET ORAL DAILY
Status: DISCONTINUED | OUTPATIENT
Start: 2022-11-08 | End: 2022-11-09

## 2022-11-08 RX ORDER — ACETAMINOPHEN 325 MG/1
650 TABLET ORAL EVERY 6 HOURS PRN
Status: DISCONTINUED | OUTPATIENT
Start: 2022-11-08 | End: 2022-11-08

## 2022-11-08 RX ORDER — HEPARIN SODIUM 1000 [USP'U]/ML
4000 INJECTION, SOLUTION INTRAVENOUS; SUBCUTANEOUS PRN
Status: DISCONTINUED | OUTPATIENT
Start: 2022-11-08 | End: 2022-11-08

## 2022-11-08 RX ORDER — SODIUM CHLORIDE 9 MG/ML
25 INJECTION, SOLUTION INTRAVENOUS PRN
Status: DISCONTINUED | OUTPATIENT
Start: 2022-11-08 | End: 2022-11-10 | Stop reason: HOSPADM

## 2022-11-08 RX ORDER — ACETAMINOPHEN 650 MG/1
650 SUPPOSITORY RECTAL EVERY 6 HOURS PRN
Status: DISCONTINUED | OUTPATIENT
Start: 2022-11-08 | End: 2022-11-08

## 2022-11-08 RX ADMIN — NITROGLYCERIN 5 MCG/MIN: 20 INJECTION INTRAVENOUS at 14:39

## 2022-11-08 RX ADMIN — SODIUM CHLORIDE 5 MG/HR: 9 INJECTION, SOLUTION INTRAVENOUS at 18:20

## 2022-11-08 RX ADMIN — ACETAMINOPHEN 650 MG: 650 SUPPOSITORY RECTAL at 18:55

## 2022-11-08 RX ADMIN — SODIUM CHLORIDE 15 MG/HR: 9 INJECTION, SOLUTION INTRAVENOUS at 23:15

## 2022-11-08 RX ADMIN — ASPIRIN 324 MG: 81 TABLET, CHEWABLE ORAL at 10:57

## 2022-11-08 RX ADMIN — SODIUM CHLORIDE, PRESERVATIVE FREE 10 ML: 5 INJECTION INTRAVENOUS at 14:00

## 2022-11-08 RX ADMIN — SODIUM CHLORIDE 15 MG/HR: 9 INJECTION, SOLUTION INTRAVENOUS at 21:32

## 2022-11-08 RX ADMIN — Medication 8.2 MILLICURIE: at 14:00

## 2022-11-08 ASSESSMENT — ENCOUNTER SYMPTOMS
COUGH: 1
ABDOMINAL PAIN: 0
NAUSEA: 0
BACK PAIN: 0
CHEST TIGHTNESS: 1
SHORTNESS OF BREATH: 1
EYE PAIN: 0
SORE THROAT: 0
DIARRHEA: 0
ABDOMINAL DISTENTION: 0
COLOR CHANGE: 0

## 2022-11-08 ASSESSMENT — PAIN SCALES - GENERAL: PAINLEVEL_OUTOF10: 8

## 2022-11-08 ASSESSMENT — PAIN DESCRIPTION - LOCATION: LOCATION: HEAD

## 2022-11-08 NOTE — Clinical Note
Discharge Plan[de-identified] Other/[facility])   Telemetry/Cardiac Monitoring Required?: Yes   Bed request comments: intermediate ICU

## 2022-11-08 NOTE — ED PROVIDER NOTES
EMERGENCY DEPARTMENT ENCOUNTER    Pt Name: Manoj Lebron  MRN: [id number]  [place] 1980  Date of evaluation: 11/8/22  CHIEF COMPLAINT       Chief Complaint   Patient presents with    Shortness of Breath    Cough     HISTORY OF PRESENT ILLNESS   76-year-old male presents for complaints of cough and shortness of breath. He reports that he got home from work last night states that he was sleeping this morning and then woke up feeling that he was short of breath states that he has been coughing a little bit and noted some blood-tinged sputum as well. He denies any associated chest pain but reports he is still feeling short of breath both at rest and with exertion and after coughing. Denies any associated nausea or vomiting fevers or chills. He does report a history of bronchitis. The history is provided by the patient. REVIEW OF SYSTEMS     Review of Systems   Constitutional:  Negative for chills and fever. HENT:  Negative for congestion and ear pain. Eyes:  Negative for pain. Respiratory:  Positive for cough and shortness of breath. Cardiovascular:  Negative for chest pain, palpitations and leg swelling. Gastrointestinal:  Negative for abdominal pain. Genitourinary:  Negative for dysuria and flank pain. Musculoskeletal:  Negative for back pain. Skin:  Negative for color change. Neurological:  Negative for numbness and headaches. Psychiatric/Behavioral:  Negative for confusion. All other systems reviewed and are negative.   PASTMEDICAL HISTORY     Past Medical History:   Diagnosis Date    DEVYN (acute kidney injury) ([facility] [address] 10/9/2021    Bipolar 1 disorder ([facility] [address] 2/26/2015    Bronchitis     Bronchitis     Hypertension     Moderate persistent asthma 2/26/2015    Obesity      Past Problem List  Patient Active Problem List   Diagnosis Code    Moderate persistent asthma J45.40    Bipolar 1 disorder ([facility] [address] F31.9    Essential hypertension I10    Obesity, morbid, BMI 40.0-49.9 ([facility] [address] [facility] E66.01 Need for prophylactic vaccination with combined diphtheria-tetanus-pertussis (DTP) vaccine Z23    DEVYN (acute kidney injury) ([facility] [address] N17.9    Hypoxia R09.02    Troponin level elevated R77.8    Obesity (BMI 30-39. 9) E66.9    Sleep-related breathing disorder G47.30    Hypertensive emergency I16.1     SURGICAL HISTORY     History reviewed. No pertinent surgical history. CURRENT MEDICATIONS       Previous Medications    AMLODIPINE (NORVASC) 10 MG TABLET    Take 1 tablet by mouth daily    BLOOD PRESSURE KIT    1 kit by Does not apply route daily    CARVEDILOL (COREG) 25 MG TABLET    Take 1 tablet by mouth 2 times daily (with meals)    HYDRALAZINE (APRESOLINE) 50 MG TABLET    Take 1 tablet by mouth every 8 hours     ALLERGIES     has No Known Allergies. FAMILY HISTORY     He indicated that his mother is . He indicated that his father is . He indicated that the status of his maternal grandmother is unknown. SOCIAL HISTORY       Social History     Tobacco Use    Smoking status: Never    Smokeless tobacco: Never   Substance Use Topics    Alcohol use: No    Drug use: No     PHYSICAL EXAM     INITIAL VITALS: BP (!) 221/144   Pulse 98   Temp 98 °F (36.7 °C) (Oral)   Resp (!) 38   Ht 6' 5\" (1.956 m)   Wt 290 lb (131.5 kg)   SpO2 98%   BMI 34.39 kg/m²    Physical Exam  Vitals and nursing note reviewed. Constitutional:       General: He is not in acute distress. Appearance: Normal appearance. He is not ill-appearing. HENT:      Head: Normocephalic and atraumatic. Right Ear: External ear normal.      Left Ear: External ear normal.      Nose: Nose normal.      Mouth/Throat:      Mouth: Mucous membranes are moist.   Eyes:      General: No visual field deficit. Extraocular Movements: Extraocular movements intact. Pupils: Pupils are equal, round, and reactive to light. Cardiovascular:      Rate and Rhythm: Normal rate and regular rhythm. Pulses: Normal pulses.       Heart sounds: Normal heart sounds. Pulmonary:      Effort: Pulmonary effort is normal.      Breath sounds: Normal breath sounds. Abdominal:      General: Abdomen is flat. Palpations: Abdomen is soft. Tenderness: There is no abdominal tenderness. Musculoskeletal:         General: No tenderness. Normal range of motion. Cervical back: Neck supple. No spinous process tenderness or muscular tenderness. Skin:     General: Skin is warm and dry. Capillary Refill: Capillary refill takes less than 2 seconds. Neurological:      General: No focal deficit present. Mental Status: He is alert and oriented to person, place, and time. Cranial Nerves: Cranial nerves 2-12 are intact. No cranial nerve deficit, dysarthria or facial asymmetry. Sensory: Sensation is intact. No sensory deficit. Motor: Motor function is intact. No weakness. Coordination: Coordination is intact. Finger-Nose-Finger Test and Heel to [facility] Test normal.   Psychiatric:         Behavior: Behavior normal.         Thought Content: Thought content does not include homicidal or suicidal ideation. MEDICAL DECISION MAKIN-year-old male presents for complaints of cough and shortness of breath.   On initial exam patient in no acute distress, is noted to be hypertensive, will check labs and imaging    Troponin not significantly changed from baseline, creatinine of 2.8 which is elevated from his baseline    Dimer was noted be elevated and V/Q was obtained and very low probability for PE      Given that patient had elevation of creatinine from baseline and significant elevated blood pressure concern for hypertensive emergency was started on nitro drip, will admit for further monitoring and treatment         CRITICAL CARE: 33 min      PROCEDURES:    Procedures    DIAGNOSTIC RESULTS   EKG:All EKG's are interpreted by the Emergency Department Physician who either signs or Co-signs this chart in the absence of a cardiologist.    Sinus rhythm rate of 93, normal axis, nonspecific T wave changes, when compared to prior, no significant changes are noted, abnormal    RADIOLOGY:All plain film, CT, MRI, and formal ultrasound images (except ED bedside ultrasound) are read by the radiologist, see reports below, unless otherwisenoted in MDM or here. NM LUNG SCAN PERFUSION ONLY   Final Result   Very low probability for pulmonary embolism. XR CHEST PORTABLE   Final Result   No acute abnormality identified. CT HEAD WO CONTRAST    (Results Pending)     LABS: All lab results were reviewed by myself, and all abnormals are listed below.   Labs Reviewed   CBC WITH AUTO DIFFERENTIAL - Abnormal; Notable for the following components:       Result Value    Hemoglobin 12.0 (*)     Hematocrit 37.0 (*)     MCH 25.9 (*)     Seg Neutrophils 73 (*)     Lymphocytes 15 (*)     Monocytes 8 (*)     All other components within normal limits   COMPREHENSIVE METABOLIC PANEL - Abnormal; Notable for the following components:    BUN 50 (*)     Creatinine 2.80 (*)     Est, Glom Filt Rate 28 (*)     All other components within normal limits   LIPASE - Abnormal; Notable for the following components:    Lipase 164 (*)     All other components within normal limits   TROPONIN - Abnormal; Notable for the following components:    Troponin, High Sensitivity 62 (*)     All other components within normal limits   TROPONIN - Abnormal; Notable for the following components:    Troponin, High Sensitivity 57 (*)     All other components within normal limits   D-DIMER, QUANTITATIVE - Abnormal; Notable for the following components:    D-Dimer, Quant 1.28 (*)     All other components within normal limits   BRAIN NATRIURETIC PEPTIDE - Abnormal; Notable for the following components:    Pro- (*)     All other components within normal limits   COVID-19 & INFLUENZA COMBO   MAGNESIUM   PROTIME-INR   ANTI-XA, UNFRACTIONATED HEPARIN   ANTI-XA, UNFRACTIONATED HEPARIN       EMERGENCY DEPARTMENTCOURSE:         Vitals:    Vitals:    11/08/22 1600 11/08/22 1615 11/08/22 1630 11/08/22 1650   BP: (!) 199/134 (!) 194/121 (!) 211/146 (!) 221/144   Pulse: 100 98 (!) 103 98   Resp: 27 25 16 (!) 38   Temp:    98 °F (36.7 °C)   TempSrc:    Oral   SpO2: 92% 92% 95% 98%   Weight:       Height:           The patient was given the following medications while in the emergency department:  Orders Placed This Encounter   Medications    aspirin chewable tablet 324 mg    DISCONTD: heparin (porcine) injection 4,000 Units    DISCONTD: heparin (porcine) injection 4,000 Units    DISCONTD: heparin (porcine) injection 2,000 Units    DISCONTD: heparin 25,000 units in dextrose 5% 250 mL (premix) infusion    technetium albumin aggregated (MAA) solution 8 millicurie    sodium chloride flush 0.9 % injection 10 mL    nitroGLYCERIN 50 mg in dextrose 5% 250 mL infusion     Order Specific Question:   Titrate Infusion? Answer:   Yes     Order Specific Question:   Initial Infusion Dose: Answer:   5 mcg/min     Order Specific Question:   Goal of Therapy is: Answer:    Other     Order Specific Question:   Other Goal:     Answer:   Map 147     Order Specific Question:   Contact Provider if:     Answer:   SBP less than 90 mmHg    sodium chloride flush 0.9 % injection 5-40 mL    sodium chloride flush 0.9 % injection 5-40 mL    0.9 % sodium chloride infusion    DISCONTD: enoxaparin Sodium (LOVENOX) injection 30 mg     Order Specific Question:   Indication of Use     Answer:   Prophylaxis-DVT/PE    OR Linked Order Group     ondansetron (ZOFRAN-ODT) disintegrating tablet 4 mg     ondansetron (ZOFRAN) injection 4 mg    polyethylene glycol (GLYCOLAX) packet 17 g    OR Linked Order Group     acetaminophen (TYLENOL) tablet 650 mg     acetaminophen (TYLENOL) suppository 650 mg     CONSULTS:  IP CONSULT TO INTERNAL MEDICINE  IP CONSULT TO PULMONOLOGY  IP CONSULT TO SOCIAL WORK    FINAL IMPRESSION      1. Hypertensive emergency    2. DEVYN (acute kidney injury) [facility])          DISPOSITION/PLAN   DISPOSITION Admitted 11/08/2022 03:35:58 PM      PATIENT REFERRED TO:  No follow-up provider specified. DISCHARGE MEDICATIONS:  New Prescriptions    No medications on file     The care is provided during an unprecedented national emergency due to the novel coronavirus, COVID 19.   [address], DO                     [address], DO  11/08/22 8519

## 2022-11-08 NOTE — LETTER
[address] [address]  Phone: [phone]            November 10, 2022     Patient: Jackie Arboleda   YOB: 1980   Date of Visit: 11/8/2022       To Whom It May Concern:    Jackie Arboleda was hospitalized on 11/8/22 through 11/10/22    If you have any questions or concerns, please don't hesitate to call the progressive unit.     Sincerely,      [facility]

## 2022-11-08 NOTE — H&P
[address] [place] [address]     HISTORY AND PHYSICAL EXAMINATION            Date:   11/8/2022  Patient name:  Dulce Quijano  Date of admission:  11/8/2022  9:39 AM  MRN:   [id number]  Account:  [de-identified]  YOB: 1980  PCP:    Patti Ledezma MD  Room:   02/02  Code Status:    Full Code    Chief Complaint:     Chief Complaint   Patient presents with    Shortness of Breath    Cough       History Obtained From:     patient    History of Present Illness:     Mr. Leeroy Rapp is a pleasant 58-year-old male with a past medical history of hypertension, asthma, bipolar disorder, presented to the ED with symptoms of shortness of breath, tinged sputum/coughing blood since this morning had no chest pain, vomiting or nausea, or hematemesis, or fevers. He also mentions that he felt his heart racing and headache. Further detail he describes very low amount of blood that he coughed, and could have been mixed with sputum. However he does describe it as light-colored    Patient mentions that he has been noncompliant with his hypertension medications, and only got them refilled today. He is not clear how long he has not been taking his medications, which include hydralazine. Patient was diagnosed with hypertensive emergency because he had increased troponins, tachycardia, tachypnea, headache, increased creatinine at 2.8. , lipase 164 D-dimer 1.28. In the ED his blood pressure is highest was 246/170, and fluctuating between 190s and 220s. Pulse was around 100, respirate was anywhere from 20-38. The patient was ill-appearing. CT scan was ordered, NM lung scan perfusion showed low probability of PE.        Past Medical History:     Past Medical History:   Diagnosis Date    DEVYN (acute kidney injury) ([facility] [address] 10/9/2021    [facility] 2/26/2015 Bronchitis     Bronchitis     Hypertension     Moderate persistent asthma 2/26/2015    Obesity         Past SurgicalHistory:     History reviewed. No pertinent surgical history. Medications Prior to Admission:        Prior to Admission medications    Medication Sig Start Date End Date Taking? Authorizing Provider   carvedilol (COREG) 25 MG tablet Take 1 tablet by mouth 2 times daily (with meals) 10/26/22   Luis Eduardo Bain,    amLODIPine (NORVASC) 10 MG tablet Take 1 tablet by mouth daily 10/26/22   Luis Eduardo Bain, DO   hydrALAZINE (APRESOLINE) 50 MG tablet Take 1 tablet by mouth every 8 hours 10/26/22   Luis Eduardo Bain,    Blood Pressure KIT 1 kit by Does not apply route daily 3/16/20   Yash Armas MD        Allergies:     Patient has no known allergies. Social History:     Tobacco:    reports that he has never smoked. He has never used smokeless tobacco.  Alcohol:      reports no history of alcohol use. Drug Use:  reports no history of drug use. Family History:     Family History   Problem Relation Age of Onset    High Blood Pressure Mother     Diabetes Maternal Grandmother        Review of Systems:     Positive and Negative as described in HPI. Review of Systems   Constitutional:  Positive for fatigue. Negative for activity change, fever and unexpected weight change. HENT:  Negative for congestion and sore throat. Respiratory:  Positive for cough, chest tightness and shortness of breath. Coughing blood   Cardiovascular:  Positive for palpitations. Negative for chest pain and leg swelling. Gastrointestinal:  Negative for abdominal distention, abdominal pain, diarrhea and nausea. Endocrine: Negative for polyuria. Genitourinary:  Negative for dysuria, frequency and urgency. Musculoskeletal:  Negative for arthralgias and back pain. Skin:  Negative for color change and pallor. Neurological:  Positive for light-headedness and headaches.  Negative for dizziness, facial asymmetry, weakness and numbness. Psychiatric/Behavioral:  Negative for agitation and behavioral problems. Physical Exam:   BP (!) 211/146   Pulse (!) 103   Temp 98.1 °F (36.7 °C) (Oral)   Resp 16   Ht 6' 5\" (1.956 m)   Wt 290 lb (131.5 kg)   SpO2 95%   BMI 34.39 kg/m²   Temp (24hrs), Av.1 °F (36.7 °C), Min:98.1 °F (36.7 °C), Max:98.1 °F (36.7 °C)    No results for input(s): POCGLU in the last 72 hours. No intake or output data in the 24 hours ending 22 1635    Physical Exam  Constitutional:       General: He is not in acute distress. Appearance: He is not ill-appearing. HENT:      Head: Atraumatic. Nose: No congestion. Mouth/Throat:      Mouth: Mucous membranes are moist.   Cardiovascular:      Rate and Rhythm: Regular rhythm. Tachycardia present. Heart sounds: No murmur heard. Pulmonary:      Effort: Respiratory distress present. Breath sounds: Normal breath sounds. No wheezing. Chest:      Chest wall: No tenderness. Abdominal:      General: Abdomen is flat. Bowel sounds are normal. There is no distension. Palpations: Abdomen is soft. There is no mass. Tenderness: There is no abdominal tenderness. Hernia: No hernia is present. Musculoskeletal:      Cervical back: No rigidity or tenderness. Neurological:      General: No focal deficit present. Mental Status: He is alert and oriented to person, place, and time.    Psychiatric:         Mood and Affect: Mood normal.       Investigations:     Laboratory Testing:  Recent Results (from the past 24 hour(s))   EKG 12 Lead    Collection Time: 22 10:21 AM   Result Value Ref Range    Ventricular Rate 93 BPM    Atrial Rate 93 BPM    P-R Interval 206 ms    QRS Duration 94 ms    Q-T Interval 390 ms    QTc Calculation (Bazett) 484 ms    P Axis 39 degrees    R Axis 26 degrees    T Axis 121 degrees   CBC with Auto Differential    Collection Time: 22 11:16 AM   Result Value Ref Range WBC 7.2 3.5 - 11.0 k/uL    RBC 4.62 4.5 - 5.9 m/uL    Hemoglobin 12.0 (L) 13.5 - 17.5 g/dL    Hematocrit 37.0 (L) 41 - 53 %    MCV 80.0 80 - 100 fL    MCH 25.9 (L) 26 - 34 pg    MCHC 32.4 31 - 37 g/dL    RDW 14.8 11.5 - 14.9 %    Platelets 777 527 - 167 k/uL    MPV 9.5 6.0 - 12.0 fL    Seg Neutrophils 73 (H) 36 - 66 %    Lymphocytes 15 (L) 24 - 44 %    Monocytes 8 (H) 1 - 7 %    Eosinophils % 3 0 - 4 %    Basophils 1 0 - 2 %    Segs Absolute 5.30 1.3 - 9.1 k/uL    Absolute Lymph # 1.00 1.0 - 4.8 k/uL    Absolute Mono # 0.60 0.1 - 1.3 k/uL    Absolute Eos # 0.20 0.0 - 0.4 k/uL    Basophils Absolute 0.10 0.0 - 0.2 k/uL   CMP    Collection Time: 11/08/22 11:16 AM   Result Value Ref Range    Glucose 99 70 - 99 mg/dL    BUN 50 (H) 6 - 20 mg/dL    Creatinine 2.80 (H) 0.70 - 1.20 mg/dL    Est, Glom Filt Rate 28 (L) >60 mL/min/1.73m2    Calcium 8.9 8.6 - 10.4 mg/dL    Sodium 135 135 - 144 mmol/L    Potassium 3.8 3.7 - 5.3 mmol/L    Chloride 100 98 - 107 mmol/L    CO2 23 20 - 31 mmol/L    Anion Gap 12 9 - 17 mmol/L    Alkaline Phosphatase 83 40 - 129 U/L    ALT 28 5 - 41 U/L    AST 33 <40 U/L    Total Bilirubin 0.3 0.3 - 1.2 mg/dL    Total Protein 8.0 6.4 - 8.3 g/dL    Albumin 3.8 3.5 - 5.2 g/dL   Lipase    Collection Time: 11/08/22 11:16 AM   Result Value Ref Range    Lipase 164 (H) 13 - 60 U/L   Magnesium    Collection Time: 11/08/22 11:16 AM   Result Value Ref Range    Magnesium 2.1 1.6 - 2.6 mg/dL   Protime-INR    Collection Time: 11/08/22 11:16 AM   Result Value Ref Range    Protime 12.4 11.8 - 14.6 sec    INR 0.9    Troponin    Collection Time: 11/08/22 11:16 AM   Result Value Ref Range    Troponin, High Sensitivity 62 (HH) 0 - 22 ng/L   D-Dimer, Quantitative    Collection Time: 11/08/22 11:16 AM   Result Value Ref Range    D-Dimer, Quant 1.28 (H) 0.00 - 0.59 mg/L FEU   Brain Natriuretic Peptide    Collection Time: 11/08/22 11:16 AM   Result Value Ref Range    Pro- (H) <300 pg/mL   COVID-19 & Influenza Combo Collection Time: 11/08/22 11:21 AM    Specimen: Nasopharyngeal Swab   Result Value Ref Range    Specimen Description . NASOPHARYNGEAL SWAB     Source . NASOPHARYNGEAL SWAB     SARS-CoV-2 RNA, RT PCR Not Detected Not Detected    INFLUENZA A Not Detected Not Detected    INFLUENZA B Not Detected Not Detected   EKG 12 Lead    Collection Time: 11/08/22 11:59 AM   Result Value Ref Range    Ventricular Rate 93 BPM    Atrial Rate 93 BPM    P-R Interval 196 ms    QRS Duration 94 ms    Q-T Interval 398 ms    QTc Calculation (Bazett) 494 ms    P Axis 40 degrees    R Axis 29 degrees    T Axis 126 degrees   Troponin    Collection Time: 11/08/22  1:00 PM   Result Value Ref Range    Troponin, High Sensitivity 57 (HH) 0 - 22 ng/L       Imaging/Diagnostics:  NM LUNG SCAN PERFUSION ONLY    Result Date: 11/8/2022  EXAMINATION: NUCLEAR MEDICINE PERFUSION SCAN. 11/8/2022 TECHNIQUE: Ventilation not performed as part of COVID-19 safety precautions. 8.2 millicuries of Tc 31V MAA was administered intravenously prior to planar imaging of the lungs in multiple projections. COMPARISON: Chest radiograph 11/08/2022. HISTORY: ORDERING SYSTEM PROVIDED HISTORY: r/o pe TECHNOLOGIST PROVIDED HISTORY: r/o pe Reason for Exam: r/o PE Additional signs and symptoms: SOB, Cough with blood FINDINGS: PERFUSION: Mildly heterogeneous distribution of radiotracer. No segmental perfusion defects. CHEST RADIOGRAPH: No focal areas of consolidation or significant effusions on recent chest radiograph. Very low probability for pulmonary embolism. XR CHEST PORTABLE    Result Date: 11/8/2022  EXAMINATION: ONE XRAY VIEW OF THE CHEST 11/8/2022 7:06 am COMPARISON: 10/11/2021 HISTORY: ORDERING SYSTEM PROVIDED HISTORY: cough TECHNOLOGIST PROVIDED HISTORY: cough Reason for Exam: cough, sob FINDINGS: Cardial pericardial silhouette is enlarged but stable. No acute infiltrate identified. No pneumothorax. No free air. No acute bony abnormality.      No acute abnormality identified. Assessment :      Primary Problem  Hypertensive emergency    Active Hospital Problems    Diagnosis Date Noted    Hypertensive emergency [I16.1] 11/08/2022     Priority: Medium       Plan:     Patient status Admit as inpatient in the  Medical ICU      Hypertensive emergency with endorgan failure   -Blood pressure currently 221/144,    -Initially started on nitroglycerin 50 mg drip titrate to goal systolic 412-807,    -Nicardipine drip started, nitro drip did not have effect on lowering BP  -restart home hypertensive medications tomorrow   -CT head pending to rule out brain bleed due to hypertensive    -Pulmonology consulted, NM scan ruled out PE,    -Monitor neurologic status, consult neurology if neurological focal signs appear   -Repeat echo ordered   -Patient loaded with chewable aspirin 324,        DEVYN   -Creatinine 2.8  -IV fluids, monitor creatinine levels in the morning        Consultations:   IP CONSULT TO INTERNAL MEDICINE  IP CONSULT TO PULMONOLOGY  IP CONSULT TO SOCIAL WORK    Patient is admitted as inpatient status because of co-morbiditieslisted above, severity of signs and symptoms as outlined, requirement for current medical therapies and most importantly because of direct risk to patient if care not provided in a hospital setting.     Lv Whitaker MD  11/8/2022  4:35 PM    Copy sent to Dr. Tam Harden MD

## 2022-11-08 NOTE — LETTER
[address] [address]  Phone: [phone]            November 10, 2022     Patient: Rip Flores   YOB: 1980   Date of Visit: 11/8/2022       To Whom It May Concern:    Rip Flores was hospitalized on 11/8/22 through 11/10/22    If you have any questions or concerns, please don't hesitate to call the progressive unit.     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   overnight had episode of A.fib RVR in the setting of hypoxia, converted to sinus after Amio push. her beta blockers were held yesterday.   feels better, c/o pain in the Rt thigh. Trach collar     now in sinus rhythm, resume metoprolol 12.5mg q8 and cont flecainide.   IS as per Lung txp team  follow tacrolimus level  Last EMbx 8/2 (1week) 0R and C4D neg  TTE: 8/14 - Normal Biv function, new circumferential pericardial effusion, w/o signs of tamponade.  next biopsy at 6 weeks     A.fib:  cont betablocker and flecainide  AC    ID/OI:  cont caspo 7 day course  cont cresemba and inhaled ampho  resume Valcyte  CMV PCR pending this week  cont atovaquone    BREEZY:  creat improved 1.5--> 1.2   baseline 0.6  cont monitor    trach collor  plan for Bronch on thursday    speech and swallow follow up  cont tube feeds in the interim  Ambulate pt

## 2022-11-08 NOTE — ED NOTES
Mode of arrival (squad #, walk in, police, etc) : Walk in         Chief complaint(s): Cough, SOB, blood inn sputum         Arrival Note (brief scenario, treatment PTA, etc). : Pt states having a cough, feeling SOB, and having blood color in his sputum this morning. Pt states this all started this more. Pt has a hx of hypertension states taking his medications regulary. Pt denies any chest pain, denies an palpitations. Pt BP is elevated. C= \"Have you ever felt that you should Cut down on your drinking? \"  No  A= \"Have people Annoyed you by criticizing your drinking? \"  No  G= \"Have you ever felt bad or Guilty about your drinking? \"  No  E= \"Have you ever had a drink as an Eye-opener first thing in the morning to steady your nerves or to help a hangover? \"  No      Deferred []      Reason for deferring: N/A    *If yes to two or more: probable alcohol abuse. Zulay Shaw  11/08/22 1033

## 2022-11-08 NOTE — PROGRESS NOTES
Medication History completed:    New medications: none    Medications discontinued: ferrous sulfate 325 mg tablet    Changes to dosing: none    Stated allergies: NKDA    Other pertinent information: Medications confirmed with patient's prescription bottles from [facility]. The patient requested new prescription for the Albuterol sulfate  mcg/ACT inhaler because he is out of refills for several months. Medication history completed by Lizeth Hope PharmD candidate [phone] under my supervision.     Thank you,  Jose Toscano PharmD, BCPS  [phone]

## 2022-11-08 NOTE — CONSULTS
[facility]   CONSULT NOTE:      DATE OF CONSULT 11/8/2022    REASON FOR CONSULTATION:  Hypertensive urgency      PCP Teressa Chaves MD     CHIEF COMPLAINT: Dyspnea    HISTORY OF PRESENT ILLNESS:     Prema Walker is a 70-year-old male with a history of poorly controlled hypertension and chronic kidney disease. He came into the hospital to be evaluated because of complaints of shortness of breath and occasional cough. He said he coughed up some blood-tinged phlegm. He says that he has been on antihypertensive medications but has not been taking them for the last month because he was unable to get filled. Apparently, he is changing physicians. He has noticed more dyspnea with exertion as well. He denies any fevers, chills, or pleurisy. He had a VQ scan done when he came to the emergency which I personally reviewed and was essentially normal.  Chest x-ray did not show any obvious infiltrates. He admits to daytime sleepiness daytime fatigue, morning headaches, and snoring. In the past, he has been told to be worked up for sleep apnea but is never gone for sleep study. He denies smoking, drinking, or illicit drug use. He said he was born with \"bronchitis\" as an infant. Is not currently taking inhalers      ALLERGIES:  No Known Allergies    HOME MEDICATIONS:  Medications Prior to Admission: carvedilol (COREG) 25 MG tablet, Take 1 tablet by mouth 2 times daily (with meals)  amLODIPine (NORVASC) 10 MG tablet, Take 1 tablet by mouth daily  hydrALAZINE (APRESOLINE) 50 MG tablet, Take 1 tablet by mouth every 8 hours  Blood Pressure KIT, 1 kit by Does not apply route daily      PAST MEDICAL HISTORY:  Past Medical History:   Diagnosis Date    DEVYN (acute kidney injury) ([facility] [address] 10/9/2021    Bipolar 1 disorder ([facility] [address] 2/26/2015    Bronchitis     Bronchitis     Hypertension     Moderate persistent asthma 2/26/2015    Obesity        PAST SURGICAL HISTORY:  History reviewed.  No pertinent surgical history. SOCIAL HISTORY:  Social History     Socioeconomic History    Marital status: Single     Spouse name: Not on file    Number of children: Not on file    Years of education: Not on file    Highest education level: Not on file   Occupational History    Not on file   Tobacco Use    Smoking status: Never    Smokeless tobacco: Never   Substance and Sexual Activity    Alcohol use: No    Drug use: No    Sexual activity: Not on file   Other Topics Concern    Not on file   Social History Narrative    Not on file     Social Determinants of Health     Financial Resource Strain: Not on file   Food Insecurity: Not on file   Transportation Needs: Not on file   Physical Activity: Not on file   Stress: Not on file   Social Connections: Not on file   Intimate Partner Violence: Not on file   Housing Stability: Not on file       FAMILY HISTORY:  Family History   Problem Relation Age of Onset    High Blood Pressure Mother     Diabetes Maternal Grandmother        REVIEW OF SYSTEMS:  All other systems reviewed and are negative. PHYSICAL EXAM:  Vital Signs Blood pressure (!) 221/144, pulse 98, temperature 98 °F (36.7 °C), temperature source Oral, resp. rate (!) 38, height 6' 5\" (1.956 m), weight 290 lb (131.5 kg), SpO2 98 %. Oxygen Amount and Delivery:      Admission Weight Weight: 290 lb (131.5 kg)    General Appearance   Obese black male looking fatigued but in no acute respiratory distress  Head  Normocephalic, without obvious abnormality, atraumatic    Eyes  conjunctivae/corneas clear. PERRL, EOM's intact.      ENT macroglossia, low overhanging soft palate  Neck  no adenopathy, no carotid bruit, no JVD, supple, symmetrical, trachea midline and thyroid not enlarged, symmetric, no tenderness/mass/nodules  Lungs clear  Heart: regular rate and rhythm, S1, S2 normal, no murmur, click, rub or gallop  Abdomen  soft, non-tender; bowel sounds normal; no masses,  no organomegaly  Extremities  's edema  Skin Skin color, texture, turgor normal. No rashes or lesions  Neurologic: Alert and oriented X 3, normal strength and tone. Imaging  Chest x-ray is clear, mild cardiomegaly    VQ scan on my review is normal    Lab Review  CBC     Lab Results   Component Value Date/Time    WBC 7.2 11/08/2022 11:16 AM    RBC 4.62 11/08/2022 11:16 AM    HGB 12.0 11/08/2022 11:16 AM    HCT 37.0 11/08/2022 11:16 AM     11/08/2022 11:16 AM    MCV 80.0 11/08/2022 11:16 AM    MCH 25.9 11/08/2022 11:16 AM    MCHC 32.4 11/08/2022 11:16 AM    RDW 14.8 11/08/2022 11:16 AM    LYMPHOPCT 15 11/08/2022 11:16 AM    MONOPCT 8 11/08/2022 11:16 AM    BASOPCT 1 11/08/2022 11:16 AM    MONOSABS 0.60 11/08/2022 11:16 AM    LYMPHSABS 1.00 11/08/2022 11:16 AM    EOSABS 0.20 11/08/2022 11:16 AM    BASOSABS 0.10 11/08/2022 11:16 AM    DIFFTYPE NOT REPORTED 10/13/2021 06:33 AM       BMP   Lab Results   Component Value Date/Time     11/08/2022 11:16 AM    K 3.8 11/08/2022 11:16 AM     11/08/2022 11:16 AM    CO2 23 11/08/2022 11:16 AM    BUN 50 11/08/2022 11:16 AM    CREATININE 2.80 11/08/2022 11:16 AM    GLUCOSE 99 11/08/2022 11:16 AM    CALCIUM 8.9 11/08/2022 11:16 AM       LFTS  Lab Results   Component Value Date/Time    ALKPHOS 83 11/08/2022 11:16 AM    ALT 28 11/08/2022 11:16 AM    AST 33 11/08/2022 11:16 AM    PROT 8.0 11/08/2022 11:16 AM    BILITOT 0.3 11/08/2022 11:16 AM    LABALBU 3.8 11/08/2022 11:16 AM       INR  Recent Labs     11/08/22  1116   PROTIME 12.4   INR 0.9       APTT  No results for input(s): APTT in the last 72 hours.     Lactic Acid  No results found for: LACTA     PRO-BNP   Recent Labs     11/08/22  1116   PROBNP 765*           ABGs: No results found for: PHART, PO2ART, [id number]    Lab Results   Component Value Date/Time    MODE Bi-Level Ventilation 10/09/2021 08:26 PM         Impression    Hypertensive urgency  Dyspnea related to uncontrolled blood pressure  Acute on chronic kidney disease probably secondary to hypertension  Questionable hemoptysis versus bronchitis-this could also be due to severely elevated blood pressure  Nonmorbid obesity  Probable CHOLO  Non-smoker  Vaccinated against COVID  Full code    Plan:      Start nitroglycerin drip, keep systolic blood pressure between 160-180  If unable to do so just nitro drip, okay to start nicardipine drip  Suggest nephrology evaluation and consultation  Outpatient sleep study  Would repeat echocardiogram  Needs to lose weight  Oxygen keep saturations greater than 88%, currently on room air may desaturated nighttime due to CHOLO  Outpatient pulmonary function testing

## 2022-11-09 ENCOUNTER — APPOINTMENT (OUTPATIENT)
Dept: NON INVASIVE DIAGNOSTICS | Age: 42
DRG: 305 | End: 2022-11-09
Payer: COMMERCIAL

## 2022-11-09 LAB
ABSOLUTE EOS #: 0.2 K/UL (ref 0–0.4)
ABSOLUTE LYMPH #: 1.1 K/UL (ref 1–4.8)
ABSOLUTE MONO #: 0.6 K/UL (ref 0.1–1.3)
ANION GAP SERPL CALCULATED.3IONS-SCNC: 12 MMOL/L (ref 9–17)
BASOPHILS # BLD: 1 % (ref 0–2)
BASOPHILS ABSOLUTE: 0 K/UL (ref 0–0.2)
BUN BLDV-MCNC: 39 MG/DL (ref 6–20)
CALCIUM SERPL-MCNC: 8.5 MG/DL (ref 8.6–10.4)
CHLORIDE BLD-SCNC: 102 MMOL/L (ref 98–107)
CO2: 20 MMOL/L (ref 20–31)
CREAT SERPL-MCNC: 2.25 MG/DL (ref 0.7–1.2)
CREATININE URINE: 75.7 MG/DL (ref 39–259)
EOSINOPHILS RELATIVE PERCENT: 2 % (ref 0–4)
GFR SERPL CREATININE-BSD FRML MDRD: 36 ML/MIN/1.73M2
GLUCOSE BLD-MCNC: 113 MG/DL (ref 70–99)
HCT VFR BLD CALC: 34.5 % (ref 41–53)
HEMOGLOBIN: 11 G/DL (ref 13.5–17.5)
LV EF: 55 %
LVEF MODALITY: NORMAL
LYMPHOCYTES # BLD: 13 % (ref 24–44)
MCH RBC QN AUTO: 25.6 PG (ref 26–34)
MCHC RBC AUTO-ENTMCNC: 31.8 G/DL (ref 31–37)
MCV RBC AUTO: 80.5 FL (ref 80–100)
MONOCYTES # BLD: 7 % (ref 1–7)
PDW BLD-RTO: 14.8 % (ref 11.5–14.9)
PLATELET # BLD: 273 K/UL (ref 150–450)
PMV BLD AUTO: 9.6 FL (ref 6–12)
POTASSIUM SERPL-SCNC: 3.8 MMOL/L (ref 3.7–5.3)
RBC # BLD: 4.28 M/UL (ref 4.5–5.9)
SEG NEUTROPHILS: 77 % (ref 36–66)
SEGMENTED NEUTROPHILS ABSOLUTE COUNT: 6.5 K/UL (ref 1.3–9.1)
SODIUM BLD-SCNC: 134 MMOL/L (ref 135–144)
TOTAL PROTEIN, URINE: 90 MG/DL
URINE TOTAL PROTEIN CREATININE RATIO: 1.19 (ref 0–0.2)
WBC # BLD: 8.3 K/UL (ref 3.5–11)

## 2022-11-09 PROCEDURE — 80048 BASIC METABOLIC PNL TOTAL CA: CPT

## 2022-11-09 PROCEDURE — 2060000000 HC ICU INTERMEDIATE R&B

## 2022-11-09 PROCEDURE — 6370000000 HC RX 637 (ALT 250 FOR IP)

## 2022-11-09 PROCEDURE — 84244 ASSAY OF RENIN: CPT

## 2022-11-09 PROCEDURE — 97162 PT EVAL MOD COMPLEX 30 MIN: CPT

## 2022-11-09 PROCEDURE — 36415 COLL VENOUS BLD VENIPUNCTURE: CPT

## 2022-11-09 PROCEDURE — 6370000000 HC RX 637 (ALT 250 FOR IP): Performed by: INTERNAL MEDICINE

## 2022-11-09 PROCEDURE — 82570 ASSAY OF URINE CREATININE: CPT

## 2022-11-09 PROCEDURE — 2580000003 HC RX 258: Performed by: STUDENT IN AN ORGANIZED HEALTH CARE EDUCATION/TRAINING PROGRAM

## 2022-11-09 PROCEDURE — 6370000000 HC RX 637 (ALT 250 FOR IP): Performed by: STUDENT IN AN ORGANIZED HEALTH CARE EDUCATION/TRAINING PROGRAM

## 2022-11-09 PROCEDURE — 99223 1ST HOSP IP/OBS HIGH 75: CPT | Performed by: INTERNAL MEDICINE

## 2022-11-09 PROCEDURE — 83835 ASSAY OF METANEPHRINES: CPT

## 2022-11-09 PROCEDURE — 84156 ASSAY OF PROTEIN URINE: CPT

## 2022-11-09 PROCEDURE — 6360000002 HC RX W HCPCS: Performed by: STUDENT IN AN ORGANIZED HEALTH CARE EDUCATION/TRAINING PROGRAM

## 2022-11-09 PROCEDURE — 97166 OT EVAL MOD COMPLEX 45 MIN: CPT

## 2022-11-09 PROCEDURE — 82088 ASSAY OF ALDOSTERONE: CPT

## 2022-11-09 PROCEDURE — 2580000003 HC RX 258: Performed by: INTERNAL MEDICINE

## 2022-11-09 PROCEDURE — 6360000004 HC RX CONTRAST MEDICATION: Performed by: INTERNAL MEDICINE

## 2022-11-09 PROCEDURE — 97535 SELF CARE MNGMENT TRAINING: CPT

## 2022-11-09 PROCEDURE — 85025 COMPLETE CBC W/AUTO DIFF WBC: CPT

## 2022-11-09 PROCEDURE — C8929 TTE W OR WO FOL WCON,DOPPLER: HCPCS

## 2022-11-09 PROCEDURE — 2500000003 HC RX 250 WO HCPCS: Performed by: STUDENT IN AN ORGANIZED HEALTH CARE EDUCATION/TRAINING PROGRAM

## 2022-11-09 RX ORDER — HYDRALAZINE HYDROCHLORIDE 50 MG/1
100 TABLET, FILM COATED ORAL EVERY 8 HOURS SCHEDULED
Status: DISCONTINUED | OUTPATIENT
Start: 2022-11-09 | End: 2022-11-10 | Stop reason: HOSPADM

## 2022-11-09 RX ORDER — DOXAZOSIN MESYLATE 1 MG/1
2 TABLET ORAL NIGHTLY
Status: DISCONTINUED | OUTPATIENT
Start: 2022-11-09 | End: 2022-11-10 | Stop reason: HOSPADM

## 2022-11-09 RX ORDER — HYDRALAZINE HYDROCHLORIDE 50 MG/1
100 TABLET, FILM COATED ORAL EVERY 8 HOURS SCHEDULED
Status: DISCONTINUED | OUTPATIENT
Start: 2022-11-09 | End: 2022-11-09

## 2022-11-09 RX ORDER — AMLODIPINE BESYLATE 10 MG/1
10 TABLET ORAL DAILY
Status: DISCONTINUED | OUTPATIENT
Start: 2022-11-09 | End: 2022-11-10 | Stop reason: HOSPADM

## 2022-11-09 RX ADMIN — HYDRALAZINE HYDROCHLORIDE 50 MG: 50 TABLET, FILM COATED ORAL at 13:07

## 2022-11-09 RX ADMIN — ACETAMINOPHEN 650 MG: 325 TABLET ORAL at 21:38

## 2022-11-09 RX ADMIN — SODIUM CHLORIDE 10 MG/HR: 9 INJECTION, SOLUTION INTRAVENOUS at 01:43

## 2022-11-09 RX ADMIN — CARVEDILOL 25 MG: 25 TABLET, FILM COATED ORAL at 17:26

## 2022-11-09 RX ADMIN — SODIUM CHLORIDE, PRESERVATIVE FREE 10 ML: 5 INJECTION INTRAVENOUS at 20:05

## 2022-11-09 RX ADMIN — ACETAMINOPHEN 650 MG: 325 TABLET ORAL at 02:12

## 2022-11-09 RX ADMIN — HYDRALAZINE HYDROCHLORIDE 100 MG: 50 TABLET, FILM COATED ORAL at 19:26

## 2022-11-09 RX ADMIN — HYDRALAZINE HYDROCHLORIDE 50 MG: 50 TABLET, FILM COATED ORAL at 08:30

## 2022-11-09 RX ADMIN — Medication 20 ML: at 20:05

## 2022-11-09 RX ADMIN — AMLODIPINE BESYLATE 10 MG: 10 TABLET ORAL at 14:59

## 2022-11-09 RX ADMIN — SODIUM CHLORIDE 10 MG/HR: 9 INJECTION, SOLUTION INTRAVENOUS at 07:19

## 2022-11-09 RX ADMIN — SODIUM CHLORIDE 12.5 MG/HR: 9 INJECTION, SOLUTION INTRAVENOUS at 04:56

## 2022-11-09 RX ADMIN — HEPARIN SODIUM 5000 UNITS: 5000 INJECTION INTRAVENOUS; SUBCUTANEOUS at 13:17

## 2022-11-09 RX ADMIN — CARVEDILOL 25 MG: 25 TABLET, FILM COATED ORAL at 07:15

## 2022-11-09 RX ADMIN — HEPARIN SODIUM 5000 UNITS: 5000 INJECTION INTRAVENOUS; SUBCUTANEOUS at 21:38

## 2022-11-09 RX ADMIN — PERFLUTREN 2 ML: 6.52 INJECTION, SUSPENSION INTRAVENOUS at 10:38

## 2022-11-09 RX ADMIN — DOXAZOSIN 2 MG: 1 TABLET ORAL at 21:38

## 2022-11-09 ASSESSMENT — ENCOUNTER SYMPTOMS
SHORTNESS OF BREATH: 0
COUGH: 0
BACK PAIN: 0
ABDOMINAL PAIN: 0
COLOR CHANGE: 0
DIARRHEA: 0
SHORTNESS OF BREATH: 1
CHEST TIGHTNESS: 0
NAUSEA: 0
CHEST TIGHTNESS: 1
SORE THROAT: 0
ABDOMINAL DISTENTION: 0
COUGH: 1

## 2022-11-09 ASSESSMENT — PAIN SCALES - GENERAL
PAINLEVEL_OUTOF10: 0
PAINLEVEL_OUTOF10: 7
PAINLEVEL_OUTOF10: 9

## 2022-11-09 ASSESSMENT — PAIN DESCRIPTION - LOCATION: LOCATION: LEG

## 2022-11-09 ASSESSMENT — PAIN DESCRIPTION - DESCRIPTORS: DESCRIPTORS: CRAMPING

## 2022-11-09 NOTE — PROGRESS NOTES
[address]    PROGRESS NOTE             11/9/2022    7:39 AM    Name:   Mechelle Toscano  MRN:     [id number]     Acct:      [de-identified]   Room:   2015/2015-01  IP Day:  1  Admit Date:  11/8/2022  9:39 AM    PCP:  Wild Joya MD  Code Status:  Full Code    Subjective:     C/C:   Chief Complaint   Patient presents with    Shortness of Breath    Cough     Interval History Status: improved. Patient seen and examined at bedside  His blood pressure is improved today, last reading was 168/113, was within the goal range  UDS negative  Creatinine 2.25      Brief History:     Mr Carol Martinez is a pleasant 65-year-old male with a past medical history of hypertension, asthma, bipolar disorder, presented to the ED with symptoms of shortness of breath, tinged sputum/coughing blood since this morning had no chest pain, vomiting or nausea, or hematemesis, or fevers. He also mentions that he felt his heart racing and headache. Further detail he describes very low amount of blood that he coughed, and could have been mixed with sputum. However he does describe it as light-colored     Patient mentions that he has been noncompliant with his hypertension medications, and only got them refilled today. He is not clear how long he has not been taking his medications, which include hydralazine. Patient was diagnosed with hypertensive emergency because he had increased troponins, tachycardia, tachypnea, headache, increased creatinine at 2.8. , lipase 164 D-dimer 1.28. In the ED his blood pressure is highest was 246/170, and fluctuating between 190s and 220s. Pulse was around 100, respirate was anywhere from 20-38. The patient was ill-appearing. CT scan was ordered, NM lung scan perfusion showed low probability of PE. Review of Systems:     Review of Systems   Constitutional:  Positive for fatigue.  Negative for activity change, fever and unexpected weight change. HENT:  Negative for congestion and sore throat. Respiratory:  Negative for cough, chest tightness and shortness of breath. Cardiovascular:  Negative for chest pain and leg swelling. Gastrointestinal:  Negative for abdominal distention, abdominal pain, diarrhea and nausea. Endocrine: Negative for polyuria. Genitourinary:  Negative for dysuria, frequency and urgency. Musculoskeletal:  Negative for arthralgias and back pain. Skin:  Negative for color change and pallor. Neurological:  Negative for dizziness, facial asymmetry, weakness, light-headedness, numbness and headaches. Psychiatric/Behavioral:  Negative for agitation and behavioral problems. Medications: Allergies:  No Known Allergies    Current Meds:   Scheduled Meds:    sodium chloride flush  5-40 mL IntraVENous 2 times per day    [Held by provider] amLODIPine  10 mg Oral Daily    carvedilol  25 mg Oral BID WC    hydrALAZINE  50 mg Oral 3 times per day    sodium chloride flush  5-40 mL IntraVENous 2 times per day    [Held by provider] heparin (porcine)  5,000 Units SubCUTAneous 3 times per day     Continuous Infusions:    sodium chloride      sodium chloride      niCARdipine 10 mg/hr (11/09/22 0719)     PRN Meds: perflutren lipid microspheres, sodium chloride flush, sodium chloride flush, sodium chloride, ondansetron **OR** ondansetron, polyethylene glycol, acetaminophen **OR** acetaminophen, sodium chloride flush, sodium chloride, potassium chloride **OR** potassium alternative oral replacement **OR** potassium chloride, magnesium sulfate    Data:     Past Medical History:   has a past medical history of DEVYN (acute kidney injury) ([facility] [address] Bipolar 1 disorder ([facility] [address] Bronchitis, Bronchitis, Hypertension, Moderate persistent asthma, and Obesity. Social History:   reports that he has never smoked. He has never used smokeless tobacco. He reports that he does not drink alcohol and does not use drugs. Family History:   Family History   Problem Relation Age of Onset    High Blood Pressure Mother     Diabetes Maternal Grandmother        Vitals:  BP (!) 168/113   Pulse (!) 102   Temp 98.7 °F (37.1 °C)   Resp 17   Ht 6' 5\" (1.956 m)   Wt (!) 314 lb 2.5 oz (142.5 kg) Comment: pt has two blankets on and is asleep  SpO2 91%   BMI 37.25 kg/m²   Temp (24hrs), Av.3 °F (36.8 °C), Min:97.9 °F (36.6 °C), Max:99 °F (37.2 °C)    No results for input(s): POCGLU in the last 72 hours. I/O(24Hr): Intake/Output Summary (Last 24 hours) at 2022 0739  Last data filed at 2022 3746  Gross per 24 hour   Intake --   Output 1425 ml   Net -1425 ml       Labs:  [unfilled]    Lab Results   Component Value Date/Time    SPECIAL L HAND, 0.5ML 10/09/2021 10:30 PM     Lab Results   Component Value Date/Time    CULTURE NO GROWTH 6 DAYS 10/09/2021 10:30 PM       [unfilled]    Radiology:    NM LUNG SCAN PERFUSION ONLY    Result Date: 2022  EXAMINATION: NUCLEAR MEDICINE PERFUSION SCAN. 2022 TECHNIQUE: Ventilation not performed as part of COVID-19 safety precautions. 8.2 millicuries of Tc 03E MAA was administered intravenously prior to planar imaging of the lungs in multiple projections. COMPARISON: Chest radiograph 2022. HISTORY: ORDERING SYSTEM PROVIDED HISTORY: r/o pe TECHNOLOGIST PROVIDED HISTORY: r/o pe Reason for Exam: r/o PE Additional signs and symptoms: SOB, Cough with blood FINDINGS: PERFUSION: Mildly heterogeneous distribution of radiotracer. No segmental perfusion defects. CHEST RADIOGRAPH: No focal areas of consolidation or significant effusions on recent chest radiograph. Very low probability for pulmonary embolism.      XR CHEST PORTABLE    Result Date: 2022  EXAMINATION: ONE XRAY VIEW OF THE CHEST 2022 7:06 am COMPARISON: 10/11/2021 HISTORY: ORDERING SYSTEM PROVIDED HISTORY: cough TECHNOLOGIST PROVIDED HISTORY: cough Reason for Exam: cough, sob FINDINGS: Cardial pericardial silhouette is enlarged but stable. No acute infiltrate identified. No pneumothorax. No free air. No acute bony abnormality. No acute abnormality identified. Physical Examination:        Physical Exam  Constitutional:       General: He is not in acute distress. Appearance: He is obese. He is not ill-appearing. HENT:      Head: Atraumatic. Nose: No congestion. Mouth/Throat:      Mouth: Mucous membranes are moist.   Cardiovascular:      Rate and Rhythm: Normal rate and regular rhythm. Heart sounds: No murmur heard. Pulmonary:      Effort: Pulmonary effort is normal. No respiratory distress. Breath sounds: Normal breath sounds. No wheezing. Comments: 2 L oxygen  Chest:      Chest wall: No tenderness. Abdominal:      General: Abdomen is flat. Bowel sounds are normal. There is no distension. Palpations: Abdomen is soft. There is no mass. Tenderness: There is no abdominal tenderness. Hernia: No hernia is present. Musculoskeletal:      Cervical back: No rigidity or tenderness. Neurological:      General: No focal deficit present. Mental Status: He is alert and oriented to person, place, and time. Psychiatric:         Mood and Affect: Mood normal.         Assessment:        Primary Problem  Hypertensive emergency    Active Hospital Problems    Diagnosis Date Noted    Hypertensive emergency [I16.1] 11/08/2022     Priority: Medium       Plan:        Hypertensive emergency with endorgan failure              -Blood pressure currently 168/113             -Currently on a Cardene drip, will try restarting home antihypertensives, hydralazine and amlodipine.   He was restarted on Coreg yesterday              -Pulmonology consulted, NM scan ruled out PE,               -Monitor neurologic status, consult neurology if neurological focal signs appear              -Repeat echo ordered              -Patient loaded with chewable aspirin 324,               -UDS negative     DEVYN              -Creatinine 2.25 today  -IV fluids, monitor repeat creatinine levels  -Nephrology consulted       Joo Guzman MD  11/9/2022  7:39 AM       I have discussed the care of Malia Casey , including pertinent history and exam findings,    today with the resident. I have seen and examined the patient and the key elements of all parts of the encounter have been performed by me . I agree with the assessment, plan and orders as documented by the resident. Principal Problem:    Hypertensive emergency  Resolved Problems:    * No resolved hospital problems. *        Overall  course ;                                   are improving over time.         Patient mated with hypertensive urgency, not compliant with medication  CKD, not following with nephrologist  3+ proteinuria  Will order urine protein creatinine ratio, may have hypertensive nephrosclerosis  Restarting home medications for blood pressure control  Shifting out of ICU            Electronically signed by Erling Mcardle, MD

## 2022-11-09 NOTE — PLAN OF CARE
Problem: Discharge Planning  Goal: Discharge to home or other facility with appropriate resources  Outcome: Progressing  Flowsheets (Taken 11/9/2022 0730 by Abram Bermudez, RN)  Discharge to home or other facility with appropriate resources: Identify barriers to discharge with patient and caregiver     Problem: Safety - Adult  Goal: Free from fall injury  Outcome: Progressing  Flowsheets (Taken 11/9/2022 0730 by Abram Bermudez RN)  Free From Fall Injury: Instruct family/caregiver on patient safety     Problem: Pain  Goal: Verbalizes/displays adequate comfort level or baseline comfort level  Outcome: Progressing

## 2022-11-09 NOTE — PROGRESS NOTES
Previous shift RN informed this RN that Nephrology is aware if the patient and will see in the morning.

## 2022-11-09 NOTE — PROGRESS NOTES
ICU Progress Note (Non-Vent)  [facility]    Patient - Brian Smith,  Age - 36 y.o.    - 1980      Room Number -    [facility]  [id number]   [facility]  Date of Admission -  2022  9:39 AM    Events of Past 24 Hours   He looks and feels better, no further episodes of hemoptysis    Vitals    height is 6' 5\" (1.956 m) and weight is 314 lb 2.5 oz (142.5 kg) (abnormal). His temperature is 98.7 °F (37.1 °C). His blood pressure is 144/93 (abnormal) and his pulse is 92. His respiration is 23 and oxygen saturation is 91%.        Temperature Range: Temp: 98.7 °F (37.1 °C) Temp  Av.3 °F (36.8 °C)  Min: 97.9 °F (36.6 °C)  Max: 99 °F (37.2 °C)  BP Range:  Systolic (59UFW), WKO:876 , Min:119 , LMM:808     Diastolic (94MTK), BPZ:157, Min:69, Max:172    Pulse Range: Pulse  Av.8  Min: 88  Max: 110  Respiration Range: Resp  Av.2  Min: 16  Max: 38  Current Pulse Ox[de-identified]  SpO2: 91 %  24HR Pulse Ox Range:  SpO2  Av.7 %  Min: 91 %  Max: 99 %  Oxygen Amount and Delivery: O2 Flow Rate (L/min): 4 L/min    Wt Readings from Last 3 Encounters:   22 (!) 314 lb 2.5 oz (142.5 kg)   10/26/22 (!) 310 lb (140.6 kg)   10/12/21 (!) 308 lb 13.8 oz (140.1 kg)     I/O     Intake/Output Summary (Last 24 hours) at 2022 0952  Last data filed at 2022 0926  Gross per 24 hour   Intake 1573.5 ml   Output 1425 ml   Net 148.5 ml     DRAIN/TUBE OUTPUT       Invasive Lines   ICP PRESSURE RANGE  No data recorded  CVP PRESSURE RANGE  No data recorded      Medications      sodium chloride flush  5-40 mL IntraVENous 2 times per day    [Held by provider] amLODIPine  10 mg Oral Daily    carvedilol  25 mg Oral BID WC    hydrALAZINE  50 mg Oral 3 times per day    sodium chloride flush  5-40 mL IntraVENous 2 times per day    [Held by provider] heparin (porcine)  5,000 Units SubCUTAneous 3 times per day perflutren lipid microspheres, sodium chloride flush, sodium chloride flush, sodium chloride, ondansetron **OR** ondansetron, polyethylene glycol, acetaminophen **OR** acetaminophen, sodium chloride flush, sodium chloride, potassium chloride **OR** potassium alternative oral replacement **OR** potassium chloride, magnesium sulfate  IV Drips/Infusions   sodium chloride      sodium chloride      niCARdipine Stopped (11/09/22 0933)       Diet/Nutrition   ADULT DIET; Regular; Low Sodium (2 gm)    Exam      Constitutional - Alert, arousable  General Appearance  well developed, well nourished, morbidly obese  HEENT -normocephalic, atraumatic. PERRLA macroglossia, retrognathic, low overhanging palate  Lungs - Chest expands equally, no wheezes, rales or rhonchi. Cardiovascular - Heart sounds are normal.  normal rate and rhythm regular, no murmur, gallop or rub. Abdomen - soft, nontender, nondistended, no masses or organomegaly  Neurologic - CN II-XII are grossly intact.  There are no focal motor deficits  Skin - no bruising or bleeding  Extremities - no cyanosis, clubbing or edema    Lab Results   CBC     Lab Results   Component Value Date/Time    WBC 8.3 11/09/2022 05:37 AM    RBC 4.28 11/09/2022 05:37 AM    HGB 11.0 11/09/2022 05:37 AM    HCT 34.5 11/09/2022 05:37 AM     11/09/2022 05:37 AM    MCV 80.5 11/09/2022 05:37 AM    MCH 25.6 11/09/2022 05:37 AM    MCHC 31.8 11/09/2022 05:37 AM    RDW 14.8 11/09/2022 05:37 AM    LYMPHOPCT 13 11/09/2022 05:37 AM    MONOPCT 7 11/09/2022 05:37 AM    BASOPCT 1 11/09/2022 05:37 AM    MONOSABS 0.60 11/09/2022 05:37 AM    LYMPHSABS 1.10 11/09/2022 05:37 AM    EOSABS 0.20 11/09/2022 05:37 AM    BASOSABS 0.00 11/09/2022 05:37 AM    DIFFTYPE NOT REPORTED 10/13/2021 06:33 AM       BMP   Lab Results   Component Value Date/Time     11/09/2022 05:37 AM    K 3.8 11/09/2022 05:37 AM     11/09/2022 05:37 AM    CO2 20 11/09/2022 05:37 AM    BUN 39 11/09/2022 05:37 AM CREATININE 2.25 11/09/2022 05:37 AM    GLUCOSE 113 11/09/2022 05:37 AM       LFTS  Lab Results   Component Value Date/Time    ALKPHOS 83 11/08/2022 11:16 AM    ALT 28 11/08/2022 11:16 AM    AST 33 11/08/2022 11:16 AM    PROT 8.0 11/08/2022 11:16 AM    BILITOT 0.3 11/08/2022 11:16 AM    LABALBU 3.8 11/08/2022 11:16 AM       ABG ABGs: No results found for: PHART, PO2ART, [id number]    Lab Results   Component Value Date/Time    MODE Bi-Level Ventilation 10/09/2021 08:26 PM         INR  Recent Labs     11/08/22  1116   PROTIME 12.4   INR 0.9       APTT  No results for input(s): APTT in the last 72 hours. Lactic Acid  No results found for: LACTA     BNP   No results for input(s): BNP in the last 72 hours.      Cultures       Radiology     CXR      CT Scans    (See actual reports for details)      SYSTEMS ASSESSMENT    Hypertensive urgency  Dyspnea related to uncontrolled blood pressure  Acute on chronic kidney disease probably secondary to hypertension  Questionable hemoptysis versus bronchitis-this could also be due to severely elevated blood pressure  Nonmorbid obesity  Probable CHOLO  Non-smoker  Vaccinated against COVID  Full code    Off Cardene drip, monitor blood sugar, keep between 1 61-6 70 systolic  Awaiting repeat echocardiogram  Mobilize  Oxygen to keep saturations greater than 88%, he probably will not need it during the day but may desaturate night due to CHOLO  Renal function slowly improving, suspect chronic kidney disease is due to poorly controlled hypertension  Okay to transfer out to progressive unit as long as blood pressure remains controlled  Hopefully he will be agreeable to outpatient sleep study    Critical Care Time   0 min    Electronically signed by Earnestine Adams MD on 11/9/2022 at 9:52 AM

## 2022-11-09 NOTE — PROGRESS NOTES
Physical Therapy  Facility/Department: [facility] ICU  Physical Therapy Initial Assessment    Name: Patel Jones  : 1980  MRN: [id number]  Date of Service: 2022    Discharge Recommendations:  Continue to assess pending progress   PT Equipment Recommendations  Equipment Needed:  (TBD)      Patient Diagnosis(es): The primary encounter diagnosis was Hypertensive emergency. A diagnosis of DEVYN (acute kidney injury) ([facility] [address] was also pertinent to this visit. Past Medical History:  has a past medical history of DEVYN (acute kidney injury) ([facility] [address] Bipolar 1 disorder ([facility] [address] Bronchitis, Bronchitis, Hypertension, Moderate persistent asthma, and Obesity. Past Surgical History:  has no past surgical history on file. Assessment   Body Structures, Functions, Activity Limitations Requiring Skilled Therapeutic Intervention: Decreased functional mobility ; Decreased safe awareness;Decreased endurance;Decreased balance;Decreased tolerance to work activity  Assessment: Pt demo's fair tolerance of session as he denies adverse symptoms and vitals stable. Pt demo's balance and endurance deficits and needs assist with mobility to ensure safety. COnt per POC to prepare for d/c. Treatment Diagnosis: impaired mobility and decreased IND d/t endurance deficits  Therapy Prognosis: Good  Decision Making: Medium Complexity  History: Mr Emy Hills is a pleasant 40-year-old male with a past medical history of hypertension, asthma, bipolar disorder, presented to the ED with symptoms of shortness of breath, tinged sputum/coughing blood since this morning had no chest pain, vomiting or nausea, or hematemesis, or fevers. He also mentions that he felt his heart racing and headache. Further detail he describes very low amount of blood that he coughed, and could have been mixed with sputum. However he does describe it as light-colored     Patient mentions that he has been noncompliant with his hypertension medications, and only got them refilled today. He is not clear how long he has not been taking his medications, which include hydralazine. Patient was diagnosed with hypertensive emergency because he had increased troponins, tachycardia, tachypnea, headache, increased creatinine at 2.8. , lipase 164 D-dimer 1.28. In the ED his blood pressure is highest was 246/170, and fluctuating between 190s and 220s. Pulse was around 100, respirate was anywhere from 20-38. The patient was ill-appearing. CT scan was ordered, NM lung scan perfusion showed low probability of PE.   Exam: social hx, ROM, MMT, sensation, balance, vitals monitoring, functional mobility  Clinical Presentation: pleasant, cooperative, vitals stable  Barriers to Learning: none  Requires PT Follow-Up: Yes  Activity Tolerance  Activity Tolerance: Patient tolerated treatment well     Plan   Physcial Therapy Plan  General Plan: 5-7 times per week  Current Treatment Recommendations: Balance training, Functional mobility training, Transfer training, Stair training, Gait training, Endurance training, Safety education & training, Positioning, Patient/Caregiver education & training, Therapeutic activities, Equipment evaluation, education, & procurement, Home exercise program  Safety Devices  Type of Devices: Nurse notified, Left in bed, Call light within reach (imaging tech present at end of session)  Restraints  Restraints Initially in Place: No     Restrictions  Restrictions/Precautions  Restrictions/Precautions: General Precautions, Fall Risk, Up as Tolerated  Required Braces or Orthoses?: No  Position Activity Restriction  Other position/activity restrictions: HOB up 30 degrees, per chart -180 and maintain O2 greater than 88%     Subjective   Pain: pt denies pain at this time  General  Chart Reviewed: Yes  Patient assessed for rehabilitation services?: Yes  Additional Pertinent Hx: uncontrolled BP at 221/144 in ER, acute on Chronic CKD, elevated troponins 62 -> 57, VQ: low probability of PE, tachycardia and tachypnea  Response To Previous Treatment: Not applicable  Family / Caregiver Present: No  Diagnosis: HTN emergency  Follows Commands: Within Functional Limits  General Comment  Comments: RN ok's session, states BP has been controlled today  Subjective  Subjective: Pt is in bed, alert and agreeable to PT/OT. States he attempted to stand but c/o LE cramping and needing to return to bed. Pt has not ambulated since being hospitalized. Social/Functional History  Social/Functional History  Lives With: Alone  Type of Home: Apartment (duplex, second floor)  Home Layout: Two level  Home Access: Stairs to enter with rails  Entrance Stairs - Number of Steps: 12 SELIN  Entrance Stairs - Rails: Right  Bathroom Shower/Tub: Tub/Shower unit, Curtain  Bathroom Toilet: Standard  Home Equipment:  (no DME at this time)  Has the patient had two or more falls in the past year or any fall with injury in the past year?: No  ADL Assistance: [address] [place] [address] Avenue: Independent  Homemaking Responsibilities: Yes  Ambulation Assistance: Independent  Transfer Assistance: Independent  Active : No  Patient's  Info: needs a vehicle, pt uses public transportation and cabs  Mode of Transportation: Cab  Occupation: Full time employment  Additional Comments: pt reports having close friends/family who live locally and able to assist as needed  Vision/Hearing  Vision  Vision: Within Functional Limits  Hearing  Hearing: Within functional limits    Cognition   Orientation  Overall Orientation Status: Within Functional Limits  Cognition  Overall Cognitive Status: WFL     Objective      Observation/Palpation  Posture: Good  Observation: Vitals monitored during session: SpO2 94% without O2 on during mobility, /93 and 147/101, HR 92-93 bpm. Dr Whitney Chawla in room and removes pt's O2 to determine if needed during therapy session.  Pt denies adverse symptoms at all times  Edema: mild distal LE edema  Gross Assessment  Tone: Normal  Sensation: Intact     AROM RLE (degrees)  RLE AROM: WFL  AROM LLE (degrees)  LLE AROM : WFL  AROM RUE (degrees)  RUE AROM : WFL  AROM LUE (degrees)  LUE AROM : WFL  Strength RLE  Strength RLE: WFL  Strength LLE  Strength LLE: WFL  Strength RUE  Comment: per OT  Strength LUE  Comment: per OT           Bed mobility  Rolling to Left: Stand by assistance  Supine to Sit: Stand by assistance (HOB up partially, no difficulty noted)  Transfers  Sit to Stand: Contact guard assistance  Stand to Sit: Contact guard assistance  Comment: no device  Ambulation  Surface: Level tile  Device: No Device  Other Apparatus: O2  Assistance: Stand by assistance  Gait Deviations: Slow Kerrie  Distance: 12 ft around EOB to recliner chair  Comments: Pt reports feeling better in chair then in bed d/t body habitus  More Ambulation?: No  Stairs/Curb  Stairs?: No     Balance  Sitting - Static: Good  Sitting - Dynamic: Good  Standing - Static: Fair;+  Standing - Dynamic: Fair;+  Comments: no device           OutComes Score                                                  AM-PAC Score  AM-PAC Inpatient Mobility Raw Score : 16 (11/09/22 1106)  AM-PAC Inpatient T-Scale Score : 40.78 (11/09/22 1106)  Mobility Inpatient CMS 0-100% Score: 54.16 (11/09/22 1106)  Mobility Inpatient CMS G-Code Modifier : CK (11/09/22 1106)          Tinneti Score       Goals  Short Term Goals  Time Frame for Short Term Goals: 1 week  Short Term Goal 1: Pt will improve endurance to good to maximize functional mobility  Short Term Goal 2: Pt will perform bed mobility IND  Short Term Goal 3: Pt will transfer IND  Short Term Goal 4: Pt will ambulate 150 ft IND  Short Term Goal 5: Pt will navigate 4 steps SBA with rail as needed  Additional Goals?: No       Education  Patient Education  Education Given To: Patient  Education Provided: Role of Therapy;Plan of Care;Precautions  Education Method: Demonstration;Verbal  Barriers to Learning: None  Education Outcome: Verbalized understanding;Continued education needed;Demonstrated understanding      Therapy Time   Individual Concurrent Group Co-treatment   Time In       0913   Time Out       0947   Minutes       [address]

## 2022-11-09 NOTE — PROGRESS NOTES
Rn spoke with Dr. Santi Lenz while rounding an was made aware of pt elevation in blood pressures, states he will adjust medications.

## 2022-11-09 NOTE — PLAN OF CARE
Problem: Discharge Planning  Goal: Discharge to home or other facility with appropriate resources  11/9/2022 1558 by David Dc RN  Outcome: Progressing     Problem: Safety - Adult  Goal: Free from fall injury  11/9/2022 1558 by David Dc RN  Outcome: Progressing     Problem: Pain  Goal: Verbalizes/displays adequate comfort level or baseline comfort level  11/9/2022 1558 by David Dc RN  Outcome: Progressing

## 2022-11-09 NOTE — CARE COORDINATION
CASE MANAGEMENT NOTE:    Admission Date:  11/8/2022 Jinny Herrera is a 36 y.o.  male    Admitted for : DEVYN (acute kidney injury) ([facility] [address] [N17.9]  Hypertensive emergency [I16.1]    Met with:  Patient    PCP:  Sana Keller                                Insurance:  [address]      Is patient alert and oriented at time of discussion:  Yes    Current Residence/ Living Arrangements:  independently at home, alone             Current Services PTA:  No    Does patient go to outpatient dialysis: No  If yes, location and chair time: NA  Who is their nephrologist? NA    Is patient agreeable to VNS: No    Freedom of choice provided:  No    List of [address] provided: No    VNS chosen:  No, Denies the need    DME:  BP Cuff    Home Oxygen: No    Nebulizer: No    CPAP/BIPAP: No    Supplier: N/A    Potential Assistance Needed: No    SNF needed: No    Freedom of choice and list provided: NA    Pharmacy:  [facility] [address]       Is patient currently receiving oral anticoagulation therapy? No    Is the Patient an [facility] with Readmission Risk Score greater than 14%? No  If yes, pt needs a follow up appointment made within 7 days. Family Members/Caregivers that pt would like involved in their care:    Yes    If yes, list name here:  Sister-Kadie    Transportation Provider:  Patient, Follow for possible Ride home. Discharge Plan:  11/9/22 [facility] Pt. Lives alone, in upper Duplex. DME- BP Cuff, Weaning Cardene GTT, Echo, CR 2.80/2.25, Nephro following, ? PCU.  Denies needs//KB                 Electronically signed by: Jorge Steele RN on 11/9/2022 at 12:07 PM

## 2022-11-09 NOTE — PROGRESS NOTES
Rn paged Dr. Smith Friend due to increase in blood pressure, Rn awaiting call back.     1818: orders to give BP  meds at 2000, Rn will call pharmacy to adjust times

## 2022-11-09 NOTE — CONSULTS
NEPHROLOGY CONSULT     Patient :  Jackie Arboleda; 36 y.o. MRN# [id number]  Location:  2121/2121-01  Attending:  Carlos Wilks MD  Admit Date:  11/8/2022   Hospital Day: 1      Reason for Consult: Acute kidney injury uncontrolled hypertension      Chief Complaint: Cough shortness of breath  History Obtained From:  patient    History of Present Illness: This is a 36 y.o. male with past medical history of essential hypertension, asthma, bipolar disorder, noncompliance with medication, patient presented to the hospital with complaints of shortness of breath and cough pinkish sputum, no chest pain vomiting nausea vomiting fever chills no chest pain  Patient has been noncompliant with his blood pressure medication and has not used in a year  In the ER blood pressure was noted to be 246/170 patient was admitted to ICU and was on Cardene drip blood pressure improved and Cardene drip was stopped and patient was transferred to [facility] care    Chest x-ray no acute cardiopulmonary disease  2D echo showed moderate left ventricular hypertrophy EF 55%  Pt denies any history of  prolonged NSAID use. Patient denies dysuria, gross hematuria, flank pain, nocturia, urgency, passing frothy urine or urinary incontinence. There has been no recent exposure to IV contrast.   There is no history  of paraprotein disease. Pt denies any history of recurrent UTI or kidney stones. Past Medical History:        Diagnosis Date    DEVYN (acute [facility]) [facility] 10/9/2021    Bipolar 1 disorder ([facility] 2/26/2015    Bronchitis     Bronchitis     Hypertension     Moderate persistent asthma 2/26/2015    Obesity        Past Surgical History:    History reviewed. No pertinent surgical history.     Current Medications:    amLODIPine (NORVASC) tablet 10 mg, Daily  sodium chloride flush 0.9 % injection 10 mL, PRN  sodium chloride flush 0.9 % injection 5-40 mL, 2 times per day  sodium chloride flush 0.9 % injection 5-40 mL, PRN  0.9 % sodium chloride infusion, PRN  ondansetron (ZOFRAN-ODT) disintegrating tablet 4 mg, Q8H PRN   Or  ondansetron (ZOFRAN) injection 4 mg, Q6H PRN  polyethylene glycol (GLYCOLAX) packet 17 g, Daily PRN  acetaminophen (TYLENOL) tablet 650 mg, Q6H PRN   Or  acetaminophen (TYLENOL) suppository 650 mg, Q6H PRN  carvedilol (COREG) tablet 25 mg, BID WC  hydrALAZINE (APRESOLINE) tablet 50 mg, 3 times per day  sodium chloride flush 0.9 % injection 5-40 mL, 2 times per day  sodium chloride flush 0.9 % injection 5-40 mL, PRN  0.9 % sodium chloride infusion, PRN  potassium chloride (KLOR-CON M) extended release tablet 40 mEq, PRN   Or  potassium bicarb-citric acid (EFFER-K) effervescent tablet 40 mEq, PRN   Or  potassium chloride 10 mEq/100 mL IVPB (Peripheral Line), PRN  magnesium sulfate 2000 mg in water 50 mL IVPB, PRN  heparin (porcine) injection 5,000 Units, 3 times per day  niCARdipine (CARDENE) 25 mg in sodium chloride 0.9 % 250 mL infusion, Continuous        Allergies:  Patient has no known allergies.     Social History:   Social History     Socioeconomic History    Marital status: Single     Spouse name: Not on file    Number of children: Not on file    Years of education: Not on file    Highest education level: Not on file   Occupational History    Not on file   Tobacco Use    Smoking status: Never    Smokeless tobacco: Never   Substance and Sexual Activity    Alcohol use: No    Drug use: No    Sexual activity: Not on file   Other Topics Concern    Not on file   Social History Narrative    Not on file     Social Determinants of Health     Financial Resource Strain: Not on file   Food Insecurity: Not on file   Transportation Needs: Not on file   Physical Activity: Not on file   Stress: Not on file   Social Connections: Not on file   Intimate Partner Violence: Not on file   Housing Stability: Not on file       Family History:   Family History   Problem Relation Age of Onset    High Blood Pressure Mother     Diabetes Maternal Grandmother Review of Systems:    Constitutional: No fever, no chills, no night sweats, fatigue, generalized weakness, loss of appetite  HEENT:  No headache, otalgia, itchy eyes, epistaxis, nasal discharge or sore throat. Cardiac:  No chest pain, dyspnea, orthopnea or PND, palpitations  Chest:  No cough, hemoptysis, pleuritic chest pain, wheezing,SOB  Abdomen:  No abdominal pain, nausea, vomiting, diarrhea, melena, dysphagia hematemesis,constipation, abdominal bloating, flank pain  Neuro:  No CVA, TIA or seizure like activity. Skin:   No rashes, no itching. :   No hematuria, no pyuria, no dysuria, no flank pain. Extremities:  No swelling or joint pains. Objective:  CURRENT TEMPERATURE:  Temp: 97.8 °F (36.6 °C)  MAXIMUM TEMPERATURE OVER 24HRS:  Temp (24hrs), Av.2 °F (36.8 °C), Min:97.8 °F (36.6 °C), Max:99 °F (37.2 °C)    CURRENT RESPIRATORY RATE:  Resp: 24  CURRENT PULSE:  Heart Rate: 94  CURRENT BLOOD PRESSURE:  BP: (!) 156/125  24HR BLOOD PRESSURE RANGE:  Systolic (46DQK), YLI:645 , Min:119 , ERE:463   ; Diastolic (34IHA), MRO:369, Min:69, Max:161    24HR INTAKE/OUTPUT:    Intake/Output Summary (Last 24 hours) at 2022 1623  Last data filed at 2022 0208  Gross per 24 hour   Intake 1573.5 ml   Output 1425 ml   Net 148.5 ml     Patient Vitals for the past 96 hrs (Last 3 readings):   Weight   22 0600 (!) 314 lb 2.5 oz (142.5 kg)   22 0937 290 lb (131.5 kg)       Physical Exam:  GENERAL APPEARANCE: Alert and cooperative, and appears to be in no acute distress. HEAD: normocephalic  EYES: EOMI. Not pale, anicteric   NOSE:  No nasal discharge. THROAT:  Oral cavity and pharynx normal. Moist  CARDIAC: Normal S1 and S2. No S3, S4 or murmurs. Rhythm is regular. LUNGS: Clear to auscultation and percussion without rales, rhonchi, wheezing or diminished breath sounds. NECK: Neck supple, non-tender without lymphadenopathy, masses or thyromegaly.  JVD-neg  Abdomen-soft nontender  MUSKULOSKELETAL: Adequately aligned spine. No joint erythema or tenderness. EXTREMITIES: Trace edema. Peripheral pulses intact. NEURO: Nonfocal      Labs:   CBC:  Recent Labs     11/08/22  1116 11/09/22  0537   WBC 7.2 8.3   RBC 4.62 4.28*   HGB 12.0* 11.0*   HCT 37.0* 34.5*   MCV 80.0 80.5   MCH 25.9* 25.6*   MCHC 32.4 31.8   RDW 14.8 14.8    273   MPV 9.5 9.6      BMP:   Recent Labs     11/08/22  1116 11/09/22  0537    134*   K 3.8 3.8    102   CO2 23 20   BUN 50* 39*   CREATININE 2.80* 2.25*   GLUCOSE 99 113*   CALCIUM 8.9 8.5*      Phosphorus:  No results for input(s): PHOS in the last 72 hours. Magnesium:   Recent Labs     11/08/22  1116   MG 2.1     Albumin:   Recent Labs     11/08/22  1116   LABALBU 3.8       IRON:  No results for input(s): IRON in the last 72 hours. Iron Saturation:  Invalid input(s): PERCENTFE  TIBC:  No results for input(s): TIBC in the last 72 hours. FERRITIN:  No results for input(s): FERRITIN in the last 72 hours. SPEP: No results for input(s): SPEP in the last 72 hours. Recent Labs     11/08/22  1116   PROT 8.0     UPEP: No results for input(s): TPU in the last 72 hours. Urine Sodium:  No results for input(s): DELONTE in the last 72 hours. Urine Potassium: No results for input(s): KUR in the last 72 hours. Urine Chloride:  No results for input(s): CLU in the last 72 hours. Urine Ph:  Invalid input(s): PO4U  Urine Osmolarity: No results for input(s): OSMOU in the last 72 hours. Urine Creatinine:  No results for input(s): LABCREA in the last 72 hours. Urine Eosinophils: Invalid input(s): EOSU  Urine Protein:  No results for input(s): TPU in the last 72 hours.   Urinalysis:    Recent Labs     11/08/22  1700   NITRU NEGATIVE   COLORU Yellow   PHUR 6.5   WBCUA 0 TO 2   RBCUA 0 TO 2   BACTERIA None   SPECGRAV 1.014   LEUKOCYTESUR NEGATIVE   UROBILINOGEN Normal   BILIRUBINUR NEGATIVE   GLUCOSEU NEGATIVE   Chick [place]ron NEGATIVE         Radiology:  Reviewed as available. Assessment:   DEVYN on CKD 3B, most likely secondary to prerenal azotemia due to hemodynamic changes, serum creatinine peaked to 2.8 mg/dL on admission Baseline serum creatinine is between 2.0 to 2.2 mg/dL  Prior investigation in April showed JULIA ANCA electrophoresis within normal limits urine protein to creatinine ratio 0.47    2. CKD 3B most likely secondary to hypertensive nephropathy, baseline serum creatinine is 2.0 to 2.2 mg/dL    3. Hypertension uncontrolled-secondary to noncompliance  Prior investigation showed 24-hour urine elevated metanephrines and normetanephrine's in February 2020  Currently systolic blood pressures 542F 170s range    CHF with preserved EF moderate LVH diastolic dysfunction hypertensive heart disease           Plan:  Work-up for secondary hypertension check free metanephrines, renin, aldosterone  Renal artery Dopplers  Continue amlodipine 10 mg daily carvedilol 25 mg twice a day increase hydralazine 100 mg 3 times daily  Add Cardura 2 mg nightly      Thank you for the consultation.       Electronically signed by Katya Calixto MD on 11/9/2022 at 4:23 PM

## 2022-11-09 NOTE — PROGRESS NOTES
Three staff members attempted IV access start for additional access on this patient due to a cardene  drip that must be rotated lucía 12 hours. Orders recieved for IV peripheral through access RN. Access RN notified.

## 2022-11-09 NOTE — PROGRESS NOTES
[address]    Occupational Therapy Evaluation  Date: 22  Patient Name: Nigel Jorge       Room:   MRN: [id number]  Account: [de-identified]   : 1980  (36 y.o.) Gender: male     Discharge Recommendations:  Further Occupational Therapy is recommended upon facility discharge. OT Equipment Recommendations  Equipment Needed: No    Referring Practitioner: Felecia Sewell MD  Diagnosis: Hypertensive emergency Additional Pertinent Hx: 50-year-old male presents for complaints of cough and shortness of breath. He reports that he got home from work last night states that he was sleeping this morning and then woke up feeling that he was short of breath states that he has been coughing a little bit and noted some blood-tinged sputum as well. He denies any associated chest pain but reports he is still feeling short of breath both at rest and with exertion and after coughing. Denies any associated nausea or vomiting fevers or chills. He does report a history of bronchitis    Treatment Diagnosis: impaired self care status    Past Medical History:  has a past medical history of DEVYN (acute kidney injury) ([facility] [address] Bipolar 1 disorder ([facility] [address] Bronchitis, Bronchitis, Hypertension, Moderate persistent asthma, and Obesity. Past Surgical History:   has no past surgical history on file. Restrictions  Restrictions/Precautions  Restrictions/Precautions: General Precautions; Fall Risk;Up as Tolerated  Required Braces or Orthoses?: No  Position Activity Restriction  Other position/activity restrictions: HOB up 30 degrees, per chart -180 and maintain O2 greater than 88%      Vitals  Vitals  Heart Rate: 92  Heart Rate Source: Monitor  BP: (!) 144/93  BP Location: Left lower arm  BP Method: Automatic  Patient Position: High fowlers  MAP (Calculated): 110  Resp: 23  SpO2: 96 %  O2 Device: Nasal cannula  Comment: 2L     Subjective  Subjective: \"I have got up a couple times but my legs started cramping real bad and I had to sit down\" pt reports. Comments: RN Wilbert Isaacs ok'd pt for OT/PT eval and treat. Pt agreeable to participate and pleasant/cooperative throughout. Subjective  Pain: pt denies pain at this time      Social/Functional History  Social/Functional History  Lives With: Alone  Type of Home: Apartment (duplex, second floor)  Home Layout: Two level  Home Access: Stairs to enter with rails  Entrance Stairs - Number of Steps: 12 SELIN  Entrance Stairs - Rails: Right  Bathroom Shower/Tub: Tub/Shower unit, Curtain  Bathroom Toilet: Standard  Home Equipment:  (no DME at this time)  Has the patient had two or more falls in the past year or any fall with injury in the past year?: No  ADL Assistance: [address] [place] [address] Avenue: Independent  Homemaking Responsibilities: Yes  Ambulation Assistance: Independent  Transfer Assistance: Independent  Active : No  Patient's  Info: needs a vehicle, pt uses public transportation and cabs  Mode of Transportation: Cab  Occupation: Full time employment  Additional Comments: pt reports having close friends/family who live locally and able to assist as needed      Objective  Cognition  Orientation  Overall Orientation Status: Within Functional Limits  Cognition  Overall Cognitive Status: WFL   Sensation  Overall Sensation Status: WFL (denies numbness/tingling at this time)    Activities of Daily Living  ADL  Feeding: Independent  Grooming: Setup  Grooming Skilled Clinical Factors: pt completed oral hygiene and face washing while sitting in bedside chair  UE Bathing: Stand by assistance  LE Bathing: Contact guard assistance  UE Dressing: Stand by assistance  LE Dressing: Contact guard assistance  LE Dressing Skilled Clinical Factors: able to don B socks while bending forward; required increased time and effort to complete.  Pt reports feeling SOB after however SpO2 remains WFL  Toileting: Contact guard assistance  Additional Comments: ADL scores based on skilled observation and clinical reasoning unless otherwise noted. Pt is currently limited by endurance and SOB impacting performance in self care tasks. UE Function  LUE AROM (degrees)  LUE AROM : WFL  Left Hand AROM (degrees)  Left Hand AROM: WFL  Tone LUE  LUE Tone: Normotonic  LUE Strength  Gross LUE Strength: WFL  L Hand General: 5/5  LUE Strength Comment: grossly 5/5    RUE AROM (degrees)  RUE AROM : WFL  Right Hand AROM (degrees)  Right Hand AROM: WFL  Tone RUE  RUE Tone: Normotonic  RUE Strength  Gross RUE Strength: WFL  R Hand General: 5/5  RUE Strength Comment: grossly 5/5         Fine Motor Skills/Coordination  Coordination  Movements Are Fluid And Coordinated: Yes            Mobility  Bed Mobility  Bed mobility  Rolling to Left: Stand by assistance  Supine to Sit: Stand by assistance  Sit to Supine: Unable to assess (pt retired in bedside chair)  Scooting: Stand by assistance    Balance  Balance  Sitting Balance: Stand by assistance (dynamic and static sitting EOB ~7 minutes, no loss of balance.  Vitals remain WFL)  Standing Balance: Stand by assistance  Standing Balance  Time: 1-2 minutes  Activity: functional transfers, functional mobility  Comment: no device, no LOB    Transfers  Transfers  Sit to stand: Contact guard assistance  Stand to sit: Contact guard assistance  Transfer Comments: no device; increased time to complete; CGA for safety    Functional Mobility  Functional - Mobility Device: No device  Activity: Other (from EOB, around bed, to bedside chair)  Assist Level: Stand by assistance  Functional Mobility Comments: no loss of balance; after mobility vitals taken BP reading 147/101 and O2 at 94% on room air    Assessment  Assessment  Performance deficits / Impairments: Decreased functional mobility , Decreased ADL status, Decreased endurance, Decreased balance, Decreased high-level IADLs  Treatment Diagnosis: impaired self care status  Prognosis: Good  Decision Making: Medium Complexity  Discharge Recommendations: Patient would benefit from continued therapy after discharge    Activity Tolerance  Activity Tolerance: Patient Tolerated treatment well, Patient limited by fatigue  Activity Tolerance Comments: pt required 1 rest break after donning socks due to increased SOB and impaired endurance    Safety Devices  Type of Devices: Nurse notified, Left in bed, Call light within reach (imaging tech present in room upon OT exit)    Patient Education  Patient Education  Education Given To: Patient  Education Provided: Role of Therapy, Plan of Care, Transfer Training, Energy Conservation  Education Method: Verbal  Barriers to Learning: None  Education Outcome: Verbalized understanding, Demonstrated understanding, Continued education needed      Functional Outcome Measures  AM-PAC Daily Activity Inpatient   How much help for putting on and taking off regular lower body clothing?: A Little  How much help for Bathing?: A Little  How much help for Toileting?: A Little  How much help for putting on and taking off regular upper body clothing?: A Little  How much help for taking care of personal grooming?: A Little  How much help for eating meals?: None  AM-[facility] Inpatient Daily Activity Raw Score: 19  AM-PAC Inpatient ADL T-Scale Score : 40.22  ADL Inpatient CMS 0-100% Score: 42.8  ADL Inpatient CMS G-Code Modifier : CK       Goals     Short Term Goals  Time Frame for Short Term Goals: By discharge, pt will  Short Term Goal 1: perform functional transfers/mobility during self care tasks with Mod I  Short Term Goal 2: verbalize/demonstrate Good understanding of pursed lip breathing techs and energy conservation/work simplification techs to increase activity tolerance for participation in ADLs/IADLs  Short Term Goal 3: complete BADLs with Mod I  Short Term Goal 4: tolerate dynamic standing and reaching for 8+ minutes with Mod I to increase overall balance/endurance needed for self care tasks  Short Term Goal 5: actively participate in 15+ minutes of therapeutic exercise/functional activity to increase overalll endurance/strength needed for ADLs/IADLs    Plan  Occupational Therapy Plan  Times Per Week: 3-5  Current Treatment Recommendations: Balance training, Functional mobility training, Endurance training, Pain management, Safety education & training, Patient/Caregiver education & training, Positioning, Self-Care / ADL, Return to work related activity, Home management training      OT Individual Minutes  OT Individual Minutes  Time In: 0913  Time Out: 0067  Minutes: 34  Time Code Minutes   Timed Code Treatment Minutes: 20 Minutes        Electronically signed by Katelynn Mayfield OT on 11/9/22 at 10:53 AM EST

## 2022-11-09 NOTE — PROGRESS NOTES
Patient consistently having a RR of 37 or higher and does show signs of mild labored breathing. Nasal cannula started at 2L. RT consulted. Will be in to evaluate patient.

## 2022-11-10 VITALS
DIASTOLIC BLOOD PRESSURE: 108 MMHG | SYSTOLIC BLOOD PRESSURE: 167 MMHG | TEMPERATURE: 98.1 F | RESPIRATION RATE: 17 BRPM | BODY MASS INDEX: 37.09 KG/M2 | HEART RATE: 96 BPM | WEIGHT: 314.16 LBS | OXYGEN SATURATION: 100 % | HEIGHT: 77 IN

## 2022-11-10 LAB
ABSOLUTE EOS #: 0.2 K/UL (ref 0–0.4)
ABSOLUTE LYMPH #: 1.2 K/UL (ref 1–4.8)
ABSOLUTE MONO #: 0.7 K/UL (ref 0.1–1.3)
ANION GAP SERPL CALCULATED.3IONS-SCNC: 10 MMOL/L (ref 9–17)
BASOPHILS # BLD: 1 % (ref 0–2)
BASOPHILS ABSOLUTE: 0.1 K/UL (ref 0–0.2)
BUN BLDV-MCNC: 31 MG/DL (ref 6–20)
CALCIUM SERPL-MCNC: 8.9 MG/DL (ref 8.6–10.4)
CHLORIDE BLD-SCNC: 104 MMOL/L (ref 98–107)
CO2: 21 MMOL/L (ref 20–31)
CREAT SERPL-MCNC: 2.13 MG/DL (ref 0.7–1.2)
EOSINOPHILS RELATIVE PERCENT: 2 % (ref 0–4)
GFR SERPL CREATININE-BSD FRML MDRD: 39 ML/MIN/1.73M2
GLUCOSE BLD-MCNC: 112 MG/DL (ref 70–99)
HCT VFR BLD CALC: 37.5 % (ref 41–53)
HEMOGLOBIN: 12 G/DL (ref 13.5–17.5)
LYMPHOCYTES # BLD: 16 % (ref 24–44)
MCH RBC QN AUTO: 25.6 PG (ref 26–34)
MCHC RBC AUTO-ENTMCNC: 32 G/DL (ref 31–37)
MCV RBC AUTO: 80.1 FL (ref 80–100)
MONOCYTES # BLD: 9 % (ref 1–7)
PDW BLD-RTO: 14.8 % (ref 11.5–14.9)
PLATELET # BLD: 276 K/UL (ref 150–450)
PMV BLD AUTO: 9.2 FL (ref 6–12)
POTASSIUM SERPL-SCNC: 3.8 MMOL/L (ref 3.7–5.3)
RBC # BLD: 4.68 M/UL (ref 4.5–5.9)
SEG NEUTROPHILS: 72 % (ref 36–66)
SEGMENTED NEUTROPHILS ABSOLUTE COUNT: 5.2 K/UL (ref 1.3–9.1)
SODIUM BLD-SCNC: 135 MMOL/L (ref 135–144)
WBC # BLD: 7.3 K/UL (ref 3.5–11)

## 2022-11-10 PROCEDURE — 80048 BASIC METABOLIC PNL TOTAL CA: CPT

## 2022-11-10 PROCEDURE — 6360000002 HC RX W HCPCS: Performed by: STUDENT IN AN ORGANIZED HEALTH CARE EDUCATION/TRAINING PROGRAM

## 2022-11-10 PROCEDURE — 2580000003 HC RX 258: Performed by: STUDENT IN AN ORGANIZED HEALTH CARE EDUCATION/TRAINING PROGRAM

## 2022-11-10 PROCEDURE — 6370000000 HC RX 637 (ALT 250 FOR IP): Performed by: STUDENT IN AN ORGANIZED HEALTH CARE EDUCATION/TRAINING PROGRAM

## 2022-11-10 PROCEDURE — 6370000000 HC RX 637 (ALT 250 FOR IP): Performed by: INTERNAL MEDICINE

## 2022-11-10 PROCEDURE — 99239 HOSP IP/OBS DSCHRG MGMT >30: CPT | Performed by: INTERNAL MEDICINE

## 2022-11-10 PROCEDURE — 36415 COLL VENOUS BLD VENIPUNCTURE: CPT

## 2022-11-10 PROCEDURE — 6360000002 HC RX W HCPCS: Performed by: NURSE PRACTITIONER

## 2022-11-10 PROCEDURE — 85025 COMPLETE CBC W/AUTO DIFF WBC: CPT

## 2022-11-10 PROCEDURE — 6370000000 HC RX 637 (ALT 250 FOR IP): Performed by: NURSE PRACTITIONER

## 2022-11-10 PROCEDURE — 6370000000 HC RX 637 (ALT 250 FOR IP)

## 2022-11-10 PROCEDURE — 2580000003 HC RX 258: Performed by: INTERNAL MEDICINE

## 2022-11-10 RX ORDER — HYDRALAZINE HYDROCHLORIDE 20 MG/ML
20 INJECTION INTRAMUSCULAR; INTRAVENOUS ONCE
Status: COMPLETED | OUTPATIENT
Start: 2022-11-10 | End: 2022-11-10

## 2022-11-10 RX ORDER — SPIRONOLACTONE 25 MG/1
25 TABLET ORAL DAILY
Status: DISCONTINUED | OUTPATIENT
Start: 2022-11-10 | End: 2022-11-10

## 2022-11-10 RX ORDER — SPIRONOLACTONE 25 MG/1
25 TABLET ORAL 2 TIMES DAILY
Qty: 30 TABLET | Refills: 3 | Status: SHIPPED | OUTPATIENT
Start: 2022-11-10

## 2022-11-10 RX ORDER — SPIRONOLACTONE 25 MG/1
25 TABLET ORAL 2 TIMES DAILY
Status: DISCONTINUED | OUTPATIENT
Start: 2022-11-10 | End: 2022-11-10 | Stop reason: HOSPADM

## 2022-11-10 RX ORDER — HYDRALAZINE HYDROCHLORIDE 100 MG/1
100 TABLET, FILM COATED ORAL EVERY 8 HOURS SCHEDULED
Qty: 90 TABLET | Refills: 3 | Status: SHIPPED | OUTPATIENT
Start: 2022-11-10

## 2022-11-10 RX ORDER — BUTALBITAL, ACETAMINOPHEN AND CAFFEINE 300; 40; 50 MG/1; MG/1; MG/1
1 CAPSULE ORAL EVERY 4 HOURS PRN
Status: DISCONTINUED | OUTPATIENT
Start: 2022-11-10 | End: 2022-11-10 | Stop reason: HOSPADM

## 2022-11-10 RX ORDER — CLONIDINE HYDROCHLORIDE 0.1 MG/1
0.1 TABLET ORAL 3 TIMES DAILY
Status: DISCONTINUED | OUTPATIENT
Start: 2022-11-10 | End: 2022-11-10 | Stop reason: HOSPADM

## 2022-11-10 RX ORDER — DOXAZOSIN 2 MG/1
2 TABLET ORAL NIGHTLY
Qty: 30 TABLET | Refills: 3 | Status: SHIPPED | OUTPATIENT
Start: 2022-11-10

## 2022-11-10 RX ORDER — CLONIDINE HYDROCHLORIDE 0.1 MG/1
0.1 TABLET ORAL 3 TIMES DAILY
Qty: 60 TABLET | Refills: 3 | Status: ON HOLD | OUTPATIENT
Start: 2022-11-10 | End: 2022-11-23 | Stop reason: HOSPADM

## 2022-11-10 RX ADMIN — SPIRONOLACTONE 25 MG: 25 TABLET ORAL at 11:29

## 2022-11-10 RX ADMIN — HYDRALAZINE HYDROCHLORIDE 100 MG: 50 TABLET, FILM COATED ORAL at 14:38

## 2022-11-10 RX ADMIN — SODIUM CHLORIDE, PRESERVATIVE FREE 10 ML: 5 INJECTION INTRAVENOUS at 07:56

## 2022-11-10 RX ADMIN — CLONIDINE HYDROCHLORIDE 0.1 MG: 0.1 TABLET ORAL at 07:56

## 2022-11-10 RX ADMIN — CLONIDINE HYDROCHLORIDE 0.1 MG: 0.1 TABLET ORAL at 01:15

## 2022-11-10 RX ADMIN — HYDRALAZINE HYDROCHLORIDE 100 MG: 50 TABLET, FILM COATED ORAL at 06:48

## 2022-11-10 RX ADMIN — BUTALBITA,ACETAMINOPHEN AND CAFFEINE 1 CAPSULE: 50; 300; 40 CAPSULE ORAL at 07:52

## 2022-11-10 RX ADMIN — Medication 5 ML: at 09:00

## 2022-11-10 RX ADMIN — HYDRALAZINE HYDROCHLORIDE 20 MG: 20 INJECTION INTRAMUSCULAR; INTRAVENOUS at 03:33

## 2022-11-10 RX ADMIN — AMLODIPINE BESYLATE 10 MG: 10 TABLET ORAL at 07:56

## 2022-11-10 RX ADMIN — CARVEDILOL 25 MG: 25 TABLET, FILM COATED ORAL at 07:56

## 2022-11-10 RX ADMIN — CLONIDINE HYDROCHLORIDE 0.1 MG: 0.1 TABLET ORAL at 14:38

## 2022-11-10 RX ADMIN — BUTALBITA,ACETAMINOPHEN AND CAFFEINE 1 CAPSULE: 50; 300; 40 CAPSULE ORAL at 03:33

## 2022-11-10 RX ADMIN — HEPARIN SODIUM 5000 UNITS: 5000 INJECTION INTRAVENOUS; SUBCUTANEOUS at 06:48

## 2022-11-10 ASSESSMENT — PAIN SCALES - GENERAL
PAINLEVEL_OUTOF10: 8
PAINLEVEL_OUTOF10: 8
PAINLEVEL_OUTOF10: 4

## 2022-11-10 ASSESSMENT — ENCOUNTER SYMPTOMS
DIARRHEA: 0
CHEST TIGHTNESS: 0
BACK PAIN: 0
ABDOMINAL PAIN: 0
ABDOMINAL DISTENTION: 0
NAUSEA: 0
COUGH: 0
COLOR CHANGE: 0
SHORTNESS OF BREATH: 0
SORE THROAT: 0

## 2022-11-10 ASSESSMENT — PAIN DESCRIPTION - LOCATION: LOCATION: HEAD

## 2022-11-10 ASSESSMENT — PAIN SCALES - WONG BAKER: WONGBAKER_NUMERICALRESPONSE: 0

## 2022-11-10 ASSESSMENT — PAIN DESCRIPTION - DESCRIPTORS: DESCRIPTORS: ACHING

## 2022-11-10 NOTE — FLOWSHEET NOTE
11/10/22 0247   Treatment Team Notification   Reason for Communication Review case   Team Member Name Hnoorio Woods NP   Treatment Team Role Advanced Practice Nurse   Method of Communication Face to face   Response Waiting for response   Notification Time [id number]   Pt still complaining of headache after Tylenol given. Perfect serve sent to PAO Franco NP. See new orders.

## 2022-11-10 NOTE — CARE COORDINATION
ONGOING DISCHARGE PLAN:    Patient is alert and oriented x4. Spoke with patient regarding discharge plan and patient confirms that plan is still to discharge to home with no needs  On IV fluids  Cre 2.13  add Aldactone 25 mg twice daily  Possible discharge to home today    Labs ordered     Will continue to follow for additional discharge needs.     Electronically signed by Bal Peters RN on 11/10/2022 at 2:06 PM

## 2022-11-10 NOTE — PROGRESS NOTES
ICU Progress Note (Non-Vent)  [facility]    Patient - Jenna Bee,  Age - 36 y.o.    - 1980      Room Number - 2121/2121-01   N -  [id number]   [facility]  Date of Admission -  2022  9:39 AM    Events of Past 24 Hours   Complains of mild headache, denies any dyspnea on room air    Vitals    height is 6' 5\" (1.956 m) and weight is 314 lb 2.5 oz (142.5 kg) (abnormal). His oral temperature is 97.5 °F (36.4 °C). His blood pressure is 146/104 (abnormal) and his pulse is 91. His respiration is 16 and oxygen saturation is 100%.        Temperature Range: Temp: 97.5 °F (36.4 °C) Temp  Av.8 °F (36.6 °C)  Min: 97.3 °F (36.3 °C)  Max: 98.6 °F (37 °C)  BP Range:  Systolic (07LKT), QZX:779 , Min:146 , UFX:155     Diastolic (28MGH), KGA:223, Min:91, Max:130    Pulse Range: Pulse  Av.8  Min: 90  Max: 97  Respiration Range: Resp  Av.3  Min: 16  Max: 25  Current Pulse Ox[de-identified]  SpO2: 100 %  24HR Pulse Ox Range:  SpO2  Av.3 %  Min: 87 %  Max: 100 %  Oxygen Amount and Delivery: O2 Flow Rate (L/min): 1 L/min    Wt Readings from Last 3 Encounters:   22 (!) 314 lb 2.5 oz (142.5 kg)   10/26/22 (!) 310 lb (140.6 kg)   10/12/21 (!) 308 lb 13.8 oz (140.1 kg)     I/O   No intake or output data in the 24 hours ending 11/10/22 1019  DRAIN/TUBE OUTPUT       Invasive Lines   ICP PRESSURE RANGE  No data recorded  CVP PRESSURE RANGE  No data recorded      Medications      cloNIDine  0.1 mg Oral TID    spironolactone  25 mg Oral Daily    amLODIPine  10 mg Oral Daily    doxazosin  2 mg Oral Nightly    hydrALAZINE  100 mg Oral 3 times per day    sodium chloride flush  5-40 mL IntraVENous 2 times per day    carvedilol  25 mg Oral BID WC    sodium chloride flush  5-40 mL IntraVENous 2 times per day    heparin (porcine)  5,000 Units SubCUTAneous 3 times per day     butalbital-APAP-caffeine, sodium chloride flush, sodium chloride flush, sodium chloride, ondansetron **OR** ondansetron, polyethylene glycol, acetaminophen **OR** acetaminophen, sodium chloride flush, sodium chloride, potassium chloride **OR** potassium alternative oral replacement **OR** potassium chloride, magnesium sulfate  IV Drips/Infusions   sodium chloride      sodium chloride         Diet/Nutrition   ADULT DIET; Regular; Low Sodium (2 gm)    Exam      Constitutional - Alert, arousable  General Appearance  well developed, well nourished, obese  HEENT -normocephalic, atraumatic. PERRLA macroglossia  Lungs - Chest expands equally, no wheezes, rales or rhonchi. Cardiovascular - Heart sounds are normal.  normal rate and rhythm regular, no murmur, gallop or rub. Abdomen - soft, nontender, nondistended, no masses or organomegaly  Neurologic - CN II-XII are grossly intact.  There are no focal motor deficits  Skin - no bruising or bleeding  Extremities - no cyanosis, clubbing or edema    Lab Results   CBC     Lab Results   Component Value Date/Time    WBC 7.3 11/10/2022 05:03 AM    RBC 4.68 11/10/2022 05:03 AM    HGB 12.0 11/10/2022 05:03 AM    HCT 37.5 11/10/2022 05:03 AM     11/10/2022 05:03 AM    MCV 80.1 11/10/2022 05:03 AM    MCH 25.6 11/10/2022 05:03 AM    MCHC 32.0 11/10/2022 05:03 AM    RDW 14.8 11/10/2022 05:03 AM    LYMPHOPCT 16 11/10/2022 05:03 AM    MONOPCT 9 11/10/2022 05:03 AM    BASOPCT 1 11/10/2022 05:03 AM    MONOSABS 0.70 11/10/2022 05:03 AM    LYMPHSABS 1.20 11/10/2022 05:03 AM    EOSABS 0.20 11/10/2022 05:03 AM    BASOSABS 0.10 11/10/2022 05:03 AM    DIFFTYPE NOT REPORTED 10/13/2021 06:33 AM       BMP   Lab Results   Component Value Date/Time     11/10/2022 05:03 AM    K 3.8 11/10/2022 05:03 AM     11/10/2022 05:03 AM    CO2 21 11/10/2022 05:03 AM    BUN 31 11/10/2022 05:03 AM    CREATININE 2.13 11/10/2022 05:03 AM    GLUCOSE 112 11/10/2022 05:03 AM       LFTS  Lab Results   Component Value Date/Time    ALKPHOS 83 11/08/2022 11:16 AM    ALT 28 11/08/2022 11:16 AM    AST 33 11/08/2022 11:16 AM    PROT 8.0 11/08/2022 11:16 AM    BILITOT 0.3 11/08/2022 11:16 AM    LABALBU 3.8 11/08/2022 11:16 AM       ABG ABGs: No results found for: PHART, PO2ART, [id number]    Lab Results   Component Value Date/Time    MODE Bi-Level Ventilation 10/09/2021 08:26 PM         INR  Recent Labs     11/08/22  1116   PROTIME 12.4   INR 0.9       APTT  No results for input(s): APTT in the last 72 hours. Lactic Acid  No results found for: LACTA     BNP   No results for input(s): BNP in the last 72 hours.      Cultures       Radiology     CXR      CT Scans    (See actual reports for details)      SYSTEMS ASSESSMENT    Hypertensive urgency  Dyspnea related to uncontrolled blood pressure  Acute on chronic kidney disease probably secondary to hypertension  Questionable hemoptysis versus bronchitis-this could also be due to severely elevated blood pressure  Nonmorbid obesity  Probable CHOLO  Non-smoker  Vaccinated against COVID  Full code    Blood pressure is much better controlled, he denies any chest pain or dyspnea  No further episodes of hemoptysis  Cough is improved  He needs an outpatient sleep study and I will order a split-night  He has daytime sleepiness, daytime fatigue, morning headaches, poorly controlled hypertension and he is obese all risk factors for CHOLO  He can follow-up in our office in 3 to 4 months  Further management of other issues per primary service and nephrology  We will sign off      Critical Care Time   0 min    Electronically signed by Samantha Reese MD on 11/10/2022 at 10:19 AM

## 2022-11-10 NOTE — PROGRESS NOTES
Physical Therapy        Physical Therapy Cancel Note      DATE: 11/10/2022    NAME: [facility]  MRN: [id number]   : 1980      Patient not seen this date for Physical Therapy due to:    Pt is dressed and ready to go home, GOLDY Schwartz is finalizing pt's discharge.       Electronically signed by Diana Driscoll PTA on 11/10/2022 at 3:32 PM

## 2022-11-10 NOTE — PROGRESS NOTES
RN called and spoke with Dr. Lisa Klein. He was informed of BP of 173/120. He requested that RN d/c cardene gtt and instructed that the hydralazine dose for tonight could be given now.

## 2022-11-10 NOTE — PROGRESS NOTES
NEPHROLOGY progress note    Patient :  Fer Zheng; 36 y.o. MRN# [id number]  Location:  2121/2121-01  Attending:  Cheyenne Herman MD  Admit Date:  11/8/2022   Hospital Day: 2      Reason for Consult: Acute kidney injury uncontrolled hypertension      Chief Complaint: Cough shortness of breath    Subjective/interval history. Patient seen and examined. Patient denies any complaints today no chest pain shortness of breath or headache. Blood pressure suboptimally controlled systolic blood pressure averaging 140s to 180s  BP meds adjusted yesterday  Serum creatinine slowly improving to 2.1 mg/dL  Urine output not recorded    History of Present Illness: This is a 36 y.o. male with past medical history of essential hypertension, asthma, bipolar disorder, noncompliance with medication, patient presented to the hospital with complaints of shortness of breath and cough pinkish sputum, no chest pain vomiting nausea vomiting fever chills no chest pain  Patient has been noncompliant with his blood pressure medication and has not used in a year  In the ER blood pressure was noted to be 246/170 patient was admitted to ICU and was on Cardene drip blood pressure improved and Cardene drip was stopped and patient was transferred to [facility] care    Chest x-ray no acute cardiopulmonary disease  2D echo showed moderate left ventricular hypertrophy EF 55%  Pt denies any history of  prolonged NSAID use. Patient denies dysuria, gross hematuria, flank pain, nocturia, urgency, passing frothy urine or urinary incontinence. There has been no recent exposure to IV contrast.   There is no history  of paraprotein disease. Pt denies any history of recurrent UTI or kidney stones.       Past Medical History:        Diagnosis Date    DEVYN (acute kidney injury) ([facility] [address] 10/9/2021    [facility] 2/26/2015    Bronchitis     Bronchitis     Hypertension     Moderate persistent asthma 2/26/2015    Obesity        Past Surgical History: History reviewed. No pertinent surgical history. Current Medications:    cloNIDine (CATAPRES) tablet 0.1 mg, TID  butalbital-APAP-caffeine -40 MG per capsule 1 capsule, Q4H PRN  spironolactone (ALDACTONE) tablet 25 mg, Daily  amLODIPine (NORVASC) tablet 10 mg, Daily  doxazosin (CARDURA) tablet 2 mg, Nightly  hydrALAZINE (APRESOLINE) tablet 100 mg, 3 times per day  sodium chloride flush 0.9 % injection 10 mL, PRN  sodium chloride flush 0.9 % injection 5-40 mL, 2 times per day  sodium chloride flush 0.9 % injection 5-40 mL, PRN  0.9 % sodium chloride infusion, PRN  ondansetron (ZOFRAN-ODT) disintegrating tablet 4 mg, Q8H PRN   Or  ondansetron (ZOFRAN) injection 4 mg, Q6H PRN  polyethylene glycol (GLYCOLAX) packet 17 g, Daily PRN  acetaminophen (TYLENOL) tablet 650 mg, Q6H PRN   Or  acetaminophen (TYLENOL) suppository 650 mg, Q6H PRN  carvedilol (COREG) tablet 25 mg, BID WC  sodium chloride flush 0.9 % injection 5-40 mL, 2 times per day  sodium chloride flush 0.9 % injection 5-40 mL, PRN  0.9 % sodium chloride infusion, PRN  potassium chloride (KLOR-CON M) extended release tablet 40 mEq, PRN   Or  potassium bicarb-citric acid (EFFER-K) effervescent tablet 40 mEq, PRN   Or  potassium chloride 10 mEq/100 mL IVPB (Peripheral Line), PRN  magnesium sulfate 2000 mg in water 50 mL IVPB, PRN  heparin (porcine) injection 5,000 Units, 3 times per day      Allergies:  Patient has no known allergies.     Social History:   Social History     Socioeconomic History    Marital status: Single     Spouse name: Not on file    Number of children: Not on file    Years of education: Not on file    Highest education level: Not on file   Occupational History    Not on file   Tobacco Use    Smoking status: Never    Smokeless tobacco: Never   Substance and Sexual Activity    Alcohol use: No    Drug use: No    Sexual activity: Not on file   Other Topics Concern    Not on file   Social History Narrative    Not on file     Social Determinants of Health     Financial Resource Strain: Not on file   Food Insecurity: Not on file   Transportation Needs: Not on file   Physical Activity: Not on file   Stress: Not on file   Social Connections: Not on file   Intimate Partner Violence: Not on file   Housing Stability: Not on file             Objective:  CURRENT TEMPERATURE:  Temp: 97.5 °F (36.4 °C)  MAXIMUM TEMPERATURE OVER 24HRS:  Temp (24hrs), Av.8 °F (36.6 °C), Min:97.3 °F (36.3 °C), Max:98.6 °F (37 °C)    CURRENT RESPIRATORY RATE:  Resp: 16  CURRENT PULSE:  Heart Rate: 91  CURRENT BLOOD PRESSURE:  BP: (!) 146/104  24HR BLOOD PRESSURE RANGE:  Systolic (13FGQ), PUW:806 , Min:146 , QJC:796   ; Diastolic (89IQC), IUK:252, Min:97, Max:130    24HR INTAKE/OUTPUT:  No intake or output data in the 24 hours ending 11/10/22 1137    Patient Vitals for the past 96 hrs (Last 3 readings):   Weight   22 0600 (!) 314 lb 2.5 oz (142.5 kg)   22 0937 290 lb (131.5 kg)       Physical Exam:  GENERAL APPEARANCE: Alert and cooperative, and appears to be in no acute distress. HEAD: normocephalic  EYES: EOMI. Not pale, anicteric   NOSE:  No nasal discharge. THROAT:  Oral cavity and pharynx normal. Moist  CARDIAC: Normal S1 and S2. No S3, S4 or murmurs. Rhythm is regular. LUNGS: Clear to auscultation and percussion without rales, rhonchi, wheezing or diminished breath sounds. NECK: Neck supple, non-tender without lymphadenopathy, masses or thyromegaly. JVD-neg  Abdomen-soft nontender  MUSKULOSKELETAL: Adequately aligned spine. No joint erythema or tenderness. EXTREMITIES: Trace edema. Peripheral pulses intact.    NEURO: Nonfocal      Labs:   CBC:  Recent Labs     22  1116 22  0537 11/10/22  0503   WBC 7.2 8.3 7.3   RBC 4.62 4.28* 4.68   HGB 12.0* 11.0* 12.0*   HCT 37.0* 34.5* 37.5*   MCV 80.0 80.5 80.1   MCH 25.9* 25.6* 25.6*   MCHC 32.4 31.8 32.0   RDW 14.8 14.8 14.8    273 276   MPV 9.5 9.6 9.2      BMP:   Recent Labs 11/08/22  1116 11/09/22  0537 11/10/22  0503    134* 135   K 3.8 3.8 3.8    102 104   CO2 23 20 21   BUN 50* 39* 31*   CREATININE 2.80* 2.25* 2.13*   GLUCOSE 99 113* 112*   CALCIUM 8.9 8.5* 8.9      Phosphorus:  No results for input(s): PHOS in the last 72 hours. Magnesium:   Recent Labs     11/08/22  1116   MG 2.1     Albumin:   Recent Labs     11/08/22  1116   LABALBU 3.8       Recent Labs     11/08/22  1116   PROT 8.0       Urine Creatinine:    Recent Labs     11/09/22  2320   LABCREA 75.7     Urine Eosinophils: Invalid input(s): EOSU  Urine Protein:  No results for input(s): TPU in the last 72 hours. Urinalysis:    Recent Labs     11/08/22  1700   NITRU NEGATIVE   COLORU Yellow   PHUR 6.5   WBCUA 0 TO 2   RBCUA 0 TO 2   BACTERIA None   SPECGRAV 1.014   LEUKOCYTESUR NEGATIVE   UROBILINOGEN Normal   BILIRUBINUR NEGATIVE   GLUCOSEU NEGATIVE   Tovar Settles NEGATIVE         Radiology:  Reviewed as available. Assessment:   DEVYN on CKD 3B, most likely secondary to prerenal azotemia due to hemodynamic changes, serum creatinine peaked to 2.8 mg/dL on admission Baseline serum creatinine is between 2.0 to 2.2 mg/dL  Prior investigation in April showed JULIA ANCA electrophoresis within normal limits urine protein to creatinine ratio 0.47    2. CKD 3B most likely secondary to hypertensive nephropathy, baseline serum creatinine is 2.0 to 2.2 mg/dL    3.    Hypertension uncontrolled-secondary to noncompliance  Prior investigation showed 24-hour urine elevated metanephrines and normetanephrine's in February 2020  Currently systolic blood pressures 439N 180s range    CHF with preserved EF moderate LVH diastolic dysfunction hypertensive heart disease           Plan:  Work-up for secondary hypertension check free metanephrines, renin, aldosterone-follow results  Renal artery Dopplers  Continue amlodipine 10 mg daily carvedilol 25 mg twice a day increase hydralazine 100 mg 3 times daily  Continue Cardura 2 mg nightly  Agree with adding Aldactone, Aldactone 25 mg twice daily, watch for hyperkalemia      Thank you for the consultation.       Electronically signed by Sherine Marcus MD on 11/10/2022 at 11:37 AM

## 2022-11-10 NOTE — PROGRESS NOTES
Physician Progress Note      PATIENT:               Aroldo Willard  [facility] #:                  [id number]  :                       1980  ADMIT DATE:       2022 9:39 AM  DISCH DATE:  RESPONDING  PROVIDER #:        Nikita Toribio TEXT:    Patient admitted for treatment of hypertensive emergency and DEVYN. On   admission, Creatinine noted to be 2.80 which improved to 2.25 w/GFR between   28-36. On review of recent labs and medical records from 2022 a diagnosis of   CKD 3b was documented and kidney function shows Creatinine 2.20- 2.79 w/GFR   30- 40. If possible, please clarify if this patient has been evaluated and/or   treated for any of the following: The medical record reflects the following:  -Risk Factors: HTN, Non-compliance, obesity  -Clinical Indicators: Pt presented with hypertensive emergency. Kidney   function since admission shows CR 2.25, 2.80; GFR 36, 28; BUN 39, 50. Per   review of medical records and recent labs, patient was treated at a [address] [facility]M   facility between -2022 and a diagnosis of CKD 3b was documented in the   clinical summary. During that time frame, labs show Creatinine 2.20- 2.79, BUN   37-45 and a GFR of 30- 40.  -Treatment: Admission, Labs, management of hypertension, IVFs  Options provided:  -- CKD Stage 3a GFR 45-59, please provide baseline if known. -- CKD Stage 3b GFR 30-44, please provide baseline if known. -- CKD Stage 4 GFR 15-29, please provide baseline if known.   -- CKD has been ruled out  -- Other - I will add my own diagnosis  -- Disagree - Not applicable / Not valid  -- Disagree - Clinically unable to determine / Unknown  -- Refer to Clinical Documentation Reviewer    PROVIDER RESPONSE TEXT:    This patient has CKD Stage 3b with a baseline creatinine of    Query created by: Chadd Harding on 2022 12:05 PM      Electronically signed by:  Christin Dennis 11/10/2022 8:05 AM

## 2022-11-10 NOTE — PROGRESS NOTES
CLINICAL PHARMACY NOTE: MEDS TO BEDS    Total # of Prescriptions Filled: 4   The following medications were delivered to the patient:  Clonidine HCl 0.1mg  Doxazosin Mesylate 2mg  Hydralazine HCL 100mg  Spironolactone 25mg    Additional Documentation:  Delivered Medication to Patients Room

## 2022-11-10 NOTE — PROGRESS NOTES
[address]    PROGRESS NOTE             11/10/2022    8:04 AM    Name:   Molly Lima  MRN:     [id number]     Acct:      [de-identified]   Room:   2121/2121-01  IP Day:  2  Admit Date:  11/8/2022  9:39 AM    PCP:  Petey Benson MD  Code Status:  Full Code    Subjective:     C/C:   Chief Complaint   Patient presents with    Shortness of Breath    Cough     Interval History Status: improved. Patient seen and examined at bedside  Is an episode her blood pressure was 180/114, Dr. Nelson Deleon prescribed clonidine point 1 mg. Blood pressure today 146/104  Echo showed normal ejection fraction  Cr. Improving- 2.13  Urine protein creatinine ratio 1.19  Patient had a headache and was given Fioricet      Brief History:     Mr Yuval Johnson is a pleasant 60-year-old male with a past medical history of hypertension, asthma, bipolar disorder, presented to the ED with symptoms of shortness of breath, tinged sputum/coughing blood since this morning had no chest pain, vomiting or nausea, or hematemesis, or fevers. He also mentions that he felt his heart racing and headache. Further detail he describes very low amount of blood that he coughed, and could have been mixed with sputum. However he does describe it as light-colored     Patient mentions that he has been noncompliant with his hypertension medications, and only got them refilled today. He is not clear how long he has not been taking his medications, which include hydralazine. Patient was diagnosed with hypertensive emergency because he had increased troponins, tachycardia, tachypnea, headache, increased creatinine at 2.8. , lipase 164 D-dimer 1.28. In the ED his blood pressure is highest was 246/170, and fluctuating between 190s and 220s. Pulse was around 100, respirate was anywhere from 20-38. The patient was ill-appearing.       CT scan was ordered, NM lung scan perfusion showed low probability of PE. Review of Systems:     Review of Systems   Constitutional:  Positive for fatigue. Negative for activity change, fever and unexpected weight change. HENT:  Negative for congestion and sore throat. Respiratory:  Negative for cough, chest tightness and shortness of breath. Cardiovascular:  Negative for chest pain and leg swelling. Gastrointestinal:  Negative for abdominal distention, abdominal pain, diarrhea and nausea. Endocrine: Negative for polyuria. Genitourinary:  Negative for dysuria, frequency and urgency. Musculoskeletal:  Negative for arthralgias and back pain. Skin:  Negative for color change and pallor. Neurological:  Negative for dizziness, facial asymmetry, weakness, light-headedness, numbness and headaches. Psychiatric/Behavioral:  Negative for agitation and behavioral problems. Medications: Allergies:  No Known Allergies    Current Meds:   Scheduled Meds:    cloNIDine  0.1 mg Oral TID    amLODIPine  10 mg Oral Daily    doxazosin  2 mg Oral Nightly    hydrALAZINE  100 mg Oral 3 times per day    sodium chloride flush  5-40 mL IntraVENous 2 times per day    carvedilol  25 mg Oral BID WC    sodium chloride flush  5-40 mL IntraVENous 2 times per day    heparin (porcine)  5,000 Units SubCUTAneous 3 times per day     Continuous Infusions:    sodium chloride      sodium chloride       PRN Meds: butalbital-APAP-caffeine, sodium chloride flush, sodium chloride flush, sodium chloride, ondansetron **OR** ondansetron, polyethylene glycol, acetaminophen **OR** acetaminophen, sodium chloride flush, sodium chloride, potassium chloride **OR** potassium alternative oral replacement **OR** potassium chloride, magnesium sulfate    Data:     Past Medical History:   has a past medical history of DEVYN (acute kidney injury) ([facility] [address] Bipolar 1 disorder ([facility] [address] Bronchitis, Bronchitis, Hypertension, Moderate persistent asthma, and Obesity.     Social History:   reports that he has never smoked. He has never used smokeless tobacco. He reports that he does not drink alcohol and does not use drugs. Family History:   Family History   Problem Relation Age of Onset    High Blood Pressure Mother     Diabetes Maternal Grandmother        Vitals:  BP (!) 146/104   Pulse 91   Temp 97.5 °F (36.4 °C) (Oral)   Resp 16   Ht 6' 5\" (1.956 m)   Wt (!) 314 lb 2.5 oz (142.5 kg) Comment: pt has two blankets on and is asleep  SpO2 100%   BMI 37.25 kg/m²   Temp (24hrs), Av.8 °F (36.6 °C), Min:97.3 °F (36.3 °C), Max:98.6 °F (37 °C)    No results for input(s): POCGLU in the last 72 hours. I/O(24Hr): Intake/Output Summary (Last 24 hours) at 11/10/2022 0804  Last data filed at 2022 7343  Gross per 24 hour   Intake 1573.5 ml   Output --   Net 1573.5 ml         Labs:  [unfilled]    Lab Results   Component Value Date/Time    SPECIAL L HAND, 0.5ML 10/09/2021 10:30 PM     Lab Results   Component Value Date/Time    CULTURE NO GROWTH 6 DAYS 10/09/2021 10:30 PM       [unfilled]    Radiology:    NM LUNG SCAN PERFUSION ONLY    Result Date: 2022  EXAMINATION: NUCLEAR MEDICINE PERFUSION SCAN. 2022 TECHNIQUE: Ventilation not performed as part of COVID-19 safety precautions. 8.2 millicuries of Tc 85V MAA was administered intravenously prior to planar imaging of the lungs in multiple projections. COMPARISON: Chest radiograph 2022. HISTORY: ORDERING SYSTEM PROVIDED HISTORY: r/o pe TECHNOLOGIST PROVIDED HISTORY: r/o pe Reason for Exam: r/o PE Additional signs and symptoms: SOB, Cough with blood FINDINGS: PERFUSION: Mildly heterogeneous distribution of radiotracer. No segmental perfusion defects. CHEST RADIOGRAPH: No focal areas of consolidation or significant effusions on recent chest radiograph. Very low probability for pulmonary embolism.      XR CHEST PORTABLE    Result Date: 2022  EXAMINATION: ONE XRAY VIEW OF THE CHEST 2022 7:06 am COMPARISON: 10/11/2021 HISTORY: ORDERING SYSTEM PROVIDED HISTORY: cough TECHNOLOGIST PROVIDED HISTORY: cough Reason for Exam: cough, sob FINDINGS: Cardial pericardial silhouette is enlarged but stable. No acute infiltrate identified. No pneumothorax. No free air. No acute bony abnormality. No acute abnormality identified. Physical Examination:        Physical Exam  Constitutional:       General: He is not in acute distress. Appearance: He is obese. He is not ill-appearing. HENT:      Head: Atraumatic. Nose: No congestion. Mouth/Throat:      Mouth: Mucous membranes are moist.   Cardiovascular:      Rate and Rhythm: Normal rate and regular rhythm. Heart sounds: No murmur heard. Pulmonary:      Effort: Pulmonary effort is normal. No respiratory distress. Breath sounds: Normal breath sounds. No wheezing. Comments: 2 L oxygen  Chest:      Chest wall: No tenderness. Abdominal:      General: Abdomen is flat. Bowel sounds are normal. There is no distension. Palpations: Abdomen is soft. There is no mass. Tenderness: There is no abdominal tenderness. Hernia: No hernia is present. Musculoskeletal:      Cervical back: No rigidity or tenderness. Neurological:      General: No focal deficit present. Mental Status: He is alert and oriented to person, place, and time.    Psychiatric:         Mood and Affect: Mood normal.         Assessment:        Primary Problem  Hypertensive emergency    Active Hospital Problems    Diagnosis Date Noted    Hypertensive emergency [I16.1] 11/08/2022     Priority: Medium       Plan:        Hypertensive emergency with endorgan failure             -Blood pressure currently 168/113             -Pulmonology consulted, NM scan ruled out PE   -BP stable at 146/104, pulm want to keep systolic between 650 and 695, they also wants oxygen sats above 88%  -Currently on Norvasc 10 mg, carvedilol 25 mg, clonidine 0.1 mg, hydralazine 100 mg tab              -Monitor neurologic status, consult neurology if neurological focal signs appear              -Repeat echo-EF 55%              -Patient loaded with chewable aspirin 324 on admit,               -UDS negative     DEVYN on CKD              -Creatinine 2.13 today  -IV fluids, monitor repeat creatinine levels  -Nephrology consulted-CKD 3B  -Protein/creatinine ratio 1.29  -Follow nephrology for work-up on free metanephrines, renin and aldosterone  -Nephro want renal artery Dopplers       DVT prophylaxis Heparin 5000 units    Edgardo Galindo MD  11/10/2022  8:04 AM         Electronically signed by Catherine Medina MD     I have discussed the care of Brian Smith , including pertinent history and exam findings,    today with the resident. I have seen and examined the patient and the key elements of all parts of the encounter have been performed by me . I agree with the assessment, plan and orders as documented by the resident. Principal Problem:    Hypertensive emergency  Resolved Problems:    * No resolved hospital problems. *        Overall  course ;                                   are improving over time.         Patient, clinically doing better  Blood pressure still high but better controlled  Asymptomatic  Antihypertensive getting adjusted  Will give meds to beds   BMP in 1 week  Need to follow-up with Dr. Essence Coelho as outpatient  [address] [place] [address]           Electronically signed by Dolly Phoenix MD

## 2022-11-10 NOTE — PROGRESS NOTES
Comprehensive Nutrition Assessment    Type and Reason for Visit:  Positive Nutrition Screen (weight loss and poor intake)    Nutrition Recommendations/Plan:   Continue diet as ordered. Malnutrition Assessment:  Malnutrition Status:  No malnutrition (11/10/22 1418)    Context:  Acute Illness     Findings of the 6 clinical characteristics of malnutrition:  Energy Intake:  No significant decrease in energy intake  Weight Loss:  No significant weight loss     Body Fat Loss:  No significant body fat loss     Muscle Mass Loss:  No significant muscle mass loss    Fluid Accumulation:  No significant fluid accumulation     Strength:  Not Performed    Nutrition Assessment:    Pt went to ED with complaints of SOB, heart racing & headache found to be in hypertensive emergency. Pt admitted to being non-compliant with blood pressure meds. Pt is eating % of meals and pt is considerably overweight with BMI of 37.25. Nutrition Related Findings:    Edema: trace Generalized. Labs & meds reviewed. Hx: bipolar, HTN non-compliant with medication, Obesity. Wound Type: None       Current Nutrition Intake & Therapies:    Average Meal Intake: %     ADULT DIET; Regular; Low Sodium (2 gm)    Anthropometric Measures:  Height: 6' 5\" (195.6 cm)  Ideal Body Weight (IBW): 208 lbs (95 kg)    Admission Body Weight: 314 lb (142.4 kg)  Current Body Weight: 314 lb (142.4 kg), 151 % IBW.  Weight Source: Bed Scale  Current BMI (kg/m2): 37.2                          BMI Categories: Obese Class 2 (BMI 35.0 -39.9)    Estimated Daily Nutrient Needs:  Energy Requirements Based On: Kcal/kg  Weight Used for Energy Requirements: Admission  Energy (kcal/day): 4033-1835 kcals based on 14-16 kcals/kg  Weight Used for Protein Requirements: Ideal  Protein (g/day): 113-123 gm protein based 1.2-1.3 gm/kg         Nutrition Diagnosis:   Overweight/Obese related to excessive energy intake as evidenced by BMI    Nutrition Interventions:   Food and/or Nutrient Delivery: Continue Current Diet  Nutrition Education/Counseling: No recommendation at this time  Coordination of Nutrition Care: Continue to monitor while inpatient       Goals:     Goals: Meet at least 75% of estimated needs       Nutrition Monitoring and Evaluation:   Behavioral-Environmental Outcomes: None Identified  Food/Nutrient Intake Outcomes: Food and Nutrient Intake  Physical Signs/Symptoms Outcomes: Biochemical Data, Nutrition Focused Physical Findings, Skin, Weight    Discharge Planning:    Continue current diet     Sergio Love, [address] [place] [address], [address]

## 2022-11-10 NOTE — PROGRESS NOTES
Pt's blood pressure 180/114. Dr. Miriam Ro notified. See new order for clonidine . 1mg TID and one dose now. Writer also notified Dr. Miriam Ro that urine protein/ creatine ratio 1.19. No new orders received.

## 2022-11-10 NOTE — PROGRESS NOTES
Pt verbalized understanding of all discharge instructions per discharge orders.  All questions answered

## 2022-11-10 NOTE — PLAN OF CARE
Problem: Discharge Planning  Goal: Discharge to home or other facility with appropriate resources  11/10/2022 0418 by Charmaine Rodriguez RN  Outcome: Progressing     Problem: Safety - Adult  Goal: Free from fall injury  11/10/2022 0418 by Charmaine Rodriguez RN  Outcome: Progressing     Problem: Pain  Goal: Verbalizes/displays adequate comfort level or baseline comfort level  11/10/2022 0418 by Charmaine Rodriguez RN  Outcome: Progressing

## 2022-11-10 NOTE — DISCHARGE INSTR - COC
Continuity of Care Form    Patient Name: Brian Smith   :  1980  MRN:  [id number]    Admit date:  2022  Discharge date:  ***    Code Status Order: Full Code   Advance Directives:     Admitting Physician:  Dolly Phoenix MD  PCP: Geno Goldstein MD    Discharging Nurse: [facility] Unit/Room#: 2121/2121-01  Discharging Unit Phone Number: ***    Emergency Contact:   Extended Emergency Contact Information  Primary Emergency Contact: James Herrera [address] Phone: [phone]  Mobile Phone: [phone]  Relation: Brother/Sister    Past Surgical History:  History reviewed. No pertinent surgical history. Immunization History:   Immunization History   Administered Date(s) Administered    Influenza, FLUARIX, FLULAVAL, FLUZONE (age 10 mo+) AND AFLURIA, (age 1 y+), PF, 0.5mL 10/13/2021    Pneumococcal Polysaccharide (Kznzjyvbr24) 2015    Tdap (Boostrix, Adacel) 2015       Active Problems:  Patient Active Problem List   Diagnosis Code    Moderate persistent asthma J45.40    Bipolar 1 disorder ([facility] [address] F31.9    Essential hypertension I10    Obesity, morbid, BMI 40.0-49.9 (HCC) E66.01    Need for prophylactic vaccination with combined diphtheria-tetanus-pertussis (DTP) vaccine Z23    DEVYN (acute kidney injury) ([facility] [address] N17.9    Hypoxia R09.02    Troponin level elevated R77.8    Obesity (BMI 30-39. 9) E66.9    Sleep-related breathing disorder G47.30    Hypertensive emergency I16.1       Isolation/Infection:   Isolation            No Isolation          Patient Infection Status       Infection Onset Added Last Indicated Last Indicated By Review Planned Expiration Resolved Resolved By    None active    Resolved    COVID-19 (Rule Out) 22 COVID-19 & Influenza Combo (Ordered)   22 Rule-Out Test Resulted    COVID-19 (Rule Out) 10/26/22 10/26/22 10/26/22 COVID-19 & Influenza Combo (Ordered)   10/26/22 Rule-Out Test Resulted    COVID-19 (Rule Out) 10/09/21 10/09/21 10/09/21 COVID-19 (Ordered)   10/10/21 Rule-Out Test Resulted    COVID-19 (Rule Out) 10/09/21 10/09/21 10/09/21 COVID-19, Rapid (Ordered)   10/09/21 Rule-Out Test Resulted            Nurse Assessment:  Last Vital Signs: BP (!) 167/108   Pulse 96   Temp 98.1 °F (36.7 °C) (Oral)   Resp 17   Ht 6' 5\" (1.956 m)   Wt (!) 314 lb 2.5 oz (142.5 kg) Comment: pt has two blankets on and is asleep  SpO2 100%   BMI 37.25 kg/m²     Last documented pain score (0-10 scale): Pain Level: 4  Last Weight:   Wt Readings from Last 1 Encounters:   11/09/22 (!) 314 lb 2.5 oz (142.5 kg)     Mental Status:  {IP PT MENTAL STATUS:90420}    IV Access:  { NIVIA IV ACCESS:640744247}    Nursing Mobility/ADLs:  Walking   {CHP DME UWML:592827781}  Transfer  {CHP DME RZMS:883470481}  Bathing  {CHP DME ITPF:900585856}  Dressing  {CHP DME OIMR:010102023}  Toileting  {CHP DME XZMA:338926974}  Feeding  {CHP DME AVEK:133949859}  Med Admin  {CHP DME ERET:507863200}  Med Delivery   { NIVIA MED Delivery:159934068}    Wound Care Documentation and Therapy:        Elimination:  Continence: Bowel: {YES / TY:63413}  Bladder: {YES / EB:36101}  Urinary Catheter: {Urinary Catheter:592807462}   Colostomy/Ileostomy/Ileal Conduit: {YES / YC:24824}       Date of Last BM: ***    Intake/Output Summary (Last 24 hours) at 11/10/2022 1431  Last data filed at 11/10/2022 1210  Gross per 24 hour   Intake 1550 ml   Output --   Net 1550 ml     I/O last 3 completed shifts:   In: 1573.5 [I.V.:1573.5]  Out: 825 [Urine:825]    Safety Concerns:     [address] NIVIA Safety Concerns:833726888}    Impairments/Disabilities:      [address] NIVIA Impairments/Disabilities:599165217}    Nutrition Therapy:  Current Nutrition Therapy:   [address] [employer] Diet List:861296313}    Routes of Feeding: {CHP DME Other Feedings:633204975}  Liquids: {Slp liquid thickness:98789}  Daily Fluid Restriction: {CHP DME Yes amt example:626095537}  Last Modified Barium Swallow with Video (Video Swallowing Test): {Done Not Done EBIA:892119375}    Treatments at the Time of Hospital Discharge:   Respiratory Treatments: ***  Oxygen Therapy:  {Therapy; copd oxygen:35354}  Ventilator:    {[facility] Vent KYPE:851492233}    Rehab Therapies: {THERAPEUTIC INTERVENTION:9904039108}  Weight Bearing Status/Restrictions: {[facility] Weight Bearin}  Other Medical Equipment (for information only, NOT a DME order):  {EQUIPMENT:481187877}  Other Treatments: ***    Patient's personal belongings (please select all that are sent with patient):  {[facility] DME Belongings:068207342}    RN SIGNATURE:  {Esignature:336979013}    CASE MANAGEMENT/SOCIAL WORK SECTION    Inpatient Status Date: ***    Readmission Risk Assessment Score:  Readmission Risk              Risk of Unplanned Readmission:  14           Discharging to Facility/ Agency   Name:   Address:  Phone:  Fax:    Dialysis Facility (if applicable)   Name:  Address:  Dialysis Schedule:  Phone:  Fax:    / signature: {Esignature:133194285}    PHYSICIAN SECTION    Prognosis: {Prognosis:6267357202}    Condition at Discharge: [facility] Patient Condition:568696850}    Rehab Potential (if transferring to Rehab): {Prognosis:2616429745}    Recommended Labs or Other Treatments After Discharge: ***    Physician Certification: I certify the above information and transfer of Praveena Ibrahim  is necessary for the continuing treatment of the diagnosis listed and that he requires {Admit to Appropriate Level of Care:17734} for {GREATER/LESS:850005458} 30 days.      Update Admission H&P: {CHP DME Changes in DVKAA:799159255}    PHYSICIAN SIGNATURE:  {Esignature:805274273}

## 2022-11-11 LAB
ALDOSTERONE COMMENT: NORMAL
ALDOSTERONE: 19.8 NG/DL
EKG ATRIAL RATE: 95 BPM
EKG P AXIS: 43 DEGREES
EKG P-R INTERVAL: 202 MS
EKG Q-T INTERVAL: 392 MS
EKG QRS DURATION: 96 MS
EKG QTC CALCULATION (BAZETT): 492 MS
EKG R AXIS: 39 DEGREES
EKG T AXIS: 121 DEGREES
EKG VENTRICULAR RATE: 95 BPM

## 2022-11-11 PROCEDURE — 93010 ELECTROCARDIOGRAM REPORT: CPT | Performed by: INTERNAL MEDICINE

## 2022-11-11 NOTE — PROGRESS NOTES
Physician Progress Note      PATIENT:               Marlen Gotti  CSN #:                  [id number]  :                       1980  ADMIT DATE:       2022 9:39 AM  DISCH DATE:        11/10/2022 6:32 PM  RESPONDING  PROVIDER #:        Radha Gibbs MD          QUERY TEXT:    Patient admitted for treatment of hypertensive emergency and has a secondary   diagnosis of DEVYN noted in the setting of CKD 3b. Baseline Creatinine   documented to be between 2.0 to 2.2 mg/dL. Creatinine peaked at 2.80 mg/dL and   improved to 2.13 mg/dL at the time of discharge. Based on KDIGO criteria,   further indicators are needed in order to support the diagnosis of DEVYN. Please   include additional clinical indicators or please document if the diagnosis of   DEVYN has been ruled out after further study. The medical record reflects the following:  -Risk Factors: HTN, Noncompliance, CKD 3b  -Clinical Indicators: Per progress notes \"DEVYN on CKD 3B, most likely secondary   to prerenal azotemia due to hemodynamic changes, serum creatinine peaked to   2.8 mg/dL on admission Baseline serum creatinine is between 2.0 to 2.2 mg/dL\". As mentioned, serum creatinine improved to 2.13 mg/dL at the time of   discharge, within baseline range. -Treatment: Treatment of hypertension. Nephrology consult. Medication changes. Hemodynamic monitoring, Diet restrictions. Defined by Kidney Disease Improving Global Outcomes (KDIGO) clinical practice   guideline for DEVYN:  -Increase or decrease in SCr to greater than or equal to 1.5 times baseline,   which is known or presumed to have occurred within the prior 7 days and/or   documented urine volume < 0.5ml/kg/h for 6 hours. Options provided:  -- Acute kidney injury evidenced by, Please document evidence.   -- Acute kidney injury ruled out after study  -- Other - I will add my own diagnosis  -- Disagree - Not applicable / Not valid  -- Disagree - Clinically unable to determine / Unknown  -- Refer to Clinical Documentation Reviewer    PROVIDER RESPONSE TEXT:    Acute kidney injury was ruled out after study. Query created by: Cahdd Harding on 11/11/2022 7:48 AM      QUERY TEXT:    Pt admitted for treatment of hypertensive emergency. Patient noted to have   lightheadedness and persistent headaches since presentation. Please determine   if there is a relationship between this patient's hypertensive state and the   neurologic symptoms reported that can further be specified as: The medical record reflects the following:  -Risk Factors: HTN, CKD, Non-compliance  -Clinical Indicators: Pt presented to the ED for c/o hemoptysis and sob w/   associated lightheadedness and headache. Severity per documented by pain scale   has been 7/10-9/10 and unrelieved with PRN pain medications. Pain of 0/10   reported with documented /99. Tachycardia noted up to 109. BP as high as   246/170, 231/144, 226/133. Nursing documentation reports generalized weakness   and headache. Neuro assessment done Q2-4h  -Treatment: Nicardipine & Nitro gtt, IV /PO Hydralazine, Norvasc, Coreg,   Clonidine, Spironolactone, Doxazosin, Acetaminophen, Fioricet  Options provided:  -- Hypertensive encephalopathy due to neurologic symptoms (lightheadedness,   headache) that are associated with hypertensive state  -- Headaches and lightheadedness unrelated to hypertensive state  -- Other - I will add my own diagnosis  -- Disagree - Not applicable / Not valid  -- Disagree - Clinically unable to determine / Unknown  -- Refer to Clinical Documentation Reviewer    PROVIDER RESPONSE TEXT:    This patient has hypertensive encephalopathy due to neurologic symptoms   (lightheadedness, headache) that are associated with hypertensive state.     Query created by: Chadd Harding on 11/10/2022 12:25 PM      Electronically signed by:  Tahir Vela MD 11/11/2022 8:56 AM

## 2022-11-12 LAB
METANEPH/PLASMA INTERP: ABNORMAL
METANEPHRINE: 0.36 NMOL/L (ref 0–0.49)
NORMETANEPHRINE PLASMA: 1.25 NMOL/L (ref 0–0.89)

## 2022-11-14 LAB
RENIN ACTIVITY: 8.9 NG/ML/HR
RENIN COMMENT: NORMAL

## 2022-11-19 ENCOUNTER — APPOINTMENT (OUTPATIENT)
Dept: CT IMAGING | Age: 42
DRG: 280 | End: 2022-11-19
Payer: COMMERCIAL

## 2022-11-19 ENCOUNTER — HOSPITAL ENCOUNTER (INPATIENT)
Age: 42
LOS: 4 days | Discharge: HOME | DRG: 280 | End: 2022-11-23
Attending: EMERGENCY MEDICINE | Admitting: INTERNAL MEDICINE
Payer: COMMERCIAL

## 2022-11-19 ENCOUNTER — APPOINTMENT (OUTPATIENT)
Dept: GENERAL RADIOLOGY | Age: 42
DRG: 280 | End: 2022-11-19
Payer: COMMERCIAL

## 2022-11-19 DIAGNOSIS — I16.1 HYPERTENSIVE EMERGENCY: Primary | ICD-10-CM

## 2022-11-19 DIAGNOSIS — N17.9 AKI (ACUTE KIDNEY INJURY) (HCC): ICD-10-CM

## 2022-11-19 DIAGNOSIS — J18.9 PNEUMONIA OF RIGHT LUNG DUE TO INFECTIOUS ORGANISM, UNSPECIFIED PART OF LUNG: ICD-10-CM

## 2022-11-19 DIAGNOSIS — I71.21 ANEURYSM OF ASCENDING AORTA WITHOUT RUPTURE: ICD-10-CM

## 2022-11-19 LAB
ABSOLUTE EOS #: 0.09 K/UL (ref 0–0.44)
ABSOLUTE IMMATURE GRANULOCYTE: 0.03 K/UL (ref 0–0.3)
ABSOLUTE LYMPH #: 1.26 K/UL (ref 1.1–3.7)
ABSOLUTE MONO #: 0.97 K/UL (ref 0.1–1.2)
ALBUMIN SERPL-MCNC: 3.4 G/DL (ref 3.5–5.2)
ALBUMIN/GLOBULIN RATIO: 1 (ref 1–2.5)
ALP BLD-CCNC: 69 U/L (ref 40–129)
ALT SERPL-CCNC: 37 U/L (ref 5–41)
AMORPHOUS: ABNORMAL
AMPHETAMINE SCREEN URINE: NEGATIVE
ANION GAP SERPL CALCULATED.3IONS-SCNC: 16 MMOL/L (ref 9–17)
AST SERPL-CCNC: 32 U/L
BACTERIA: ABNORMAL
BARBITURATE SCREEN URINE: NEGATIVE
BASOPHILS # BLD: 1 % (ref 0–2)
BASOPHILS ABSOLUTE: 0.06 K/UL (ref 0–0.2)
BENZODIAZEPINE SCREEN, URINE: NEGATIVE
BILIRUB SERPL-MCNC: 0.3 MG/DL (ref 0.3–1.2)
BILIRUBIN DIRECT: 0.1 MG/DL
BILIRUBIN URINE: NEGATIVE
BILIRUBIN, INDIRECT: 0.2 MG/DL (ref 0–1)
BUN BLDV-MCNC: 44 MG/DL (ref 6–20)
CALCIUM SERPL-MCNC: 9 MG/DL (ref 8.6–10.4)
CANNABINOID SCREEN URINE: NEGATIVE
CARBOXYHEMOGLOBIN: 1.1 % (ref 0–5)
CARBOXYHEMOGLOBIN: 1.7 % (ref 0–5)
CASTS UA: ABNORMAL /LPF (ref 0–2)
CASTS UA: ABNORMAL /LPF (ref 0–2)
CHLORIDE BLD-SCNC: 100 MMOL/L (ref 98–107)
CO2: 18 MMOL/L (ref 20–31)
COCAINE METABOLITE, URINE: NEGATIVE
COLOR: YELLOW
CREAT SERPL-MCNC: 2.44 MG/DL (ref 0.7–1.2)
D-DIMER QUANTITATIVE: 0.99 MG/L FEU
EOSINOPHILS RELATIVE PERCENT: 1 % (ref 1–4)
EPITHELIAL CELLS UA: ABNORMAL /HPF (ref 0–5)
FENTANYL URINE: NEGATIVE
FIO2: ABNORMAL
FIO2: ABNORMAL
FLU A ANTIGEN: NEGATIVE
FLU B ANTIGEN: NEGATIVE
GFR SERPL CREATININE-BSD FRML MDRD: 33 ML/MIN/1.73M2
GLUCOSE BLD-MCNC: 106 MG/DL (ref 70–99)
GLUCOSE URINE: NEGATIVE
HCO3 VENOUS: 18.7 MMOL/L (ref 24–30)
HCO3 VENOUS: 19 MMOL/L (ref 24–30)
HCT VFR BLD CALC: 33.5 % (ref 40.7–50.3)
HEMOGLOBIN: 10.3 G/DL (ref 13–17)
IMMATURE GRANULOCYTES: 0 %
KETONES, URINE: NEGATIVE
LACTIC ACID, WHOLE BLOOD: 0.9 MMOL/L (ref 0.7–2.1)
LEUKOCYTE ESTERASE, URINE: NEGATIVE
LIPASE: 220 U/L (ref 13–60)
LYMPHOCYTES # BLD: 14 % (ref 24–43)
MCH RBC QN AUTO: 25.7 PG (ref 25.2–33.5)
MCHC RBC AUTO-ENTMCNC: 30.7 G/DL (ref 28.4–34.8)
MCV RBC AUTO: 83.5 FL (ref 82.6–102.9)
METHADONE SCREEN, URINE: NEGATIVE
MONOCYTES # BLD: 11 % (ref 3–12)
NEGATIVE BASE EXCESS, VEN: 4.2 MMOL/L (ref 0–2)
NEGATIVE BASE EXCESS, VEN: 5.2 MMOL/L (ref 0–2)
NITRITE, URINE: NEGATIVE
NRBC AUTOMATED: 0 PER 100 WBC
O2 SAT, VEN: 96.9 % (ref 60–85)
O2 SAT, VEN: 98.9 % (ref 60–85)
OPIATES, URINE: NEGATIVE
OXYCODONE SCREEN URINE: NEGATIVE
PATIENT TEMP: 37
PATIENT TEMP: 37
PCO2, VEN: 30.2 MM HG (ref 39–55)
PCO2, VEN: 32.4 MM HG (ref 39–55)
PDW BLD-RTO: 14.5 % (ref 11.8–14.4)
PH UA: 5.5 (ref 5–8)
PH VENOUS: 7.38 (ref 7.32–7.42)
PH VENOUS: 7.42 (ref 7.32–7.42)
PHENCYCLIDINE, URINE: NEGATIVE
PLATELET # BLD: 321 K/UL (ref 138–453)
PMV BLD AUTO: 11.9 FL (ref 8.1–13.5)
PO2, VEN: 109 MM HG (ref 30–50)
PO2, VEN: 158 MM HG (ref 30–50)
POTASSIUM SERPL-SCNC: 4.2 MMOL/L (ref 3.7–5.3)
PRO-BNP: 985 PG/ML
PROTEIN UA: ABNORMAL
RBC # BLD: 4.01 M/UL (ref 4.21–5.77)
RBC # BLD: ABNORMAL 10*6/UL
RBC UA: ABNORMAL /HPF (ref 0–2)
SALICYLATE LEVEL: <1 MG/DL (ref 3–10)
SARS-COV-2, RAPID: NOT DETECTED
SEG NEUTROPHILS: 73 % (ref 36–65)
SEGMENTED NEUTROPHILS ABSOLUTE COUNT: 6.68 K/UL (ref 1.5–8.1)
SODIUM BLD-SCNC: 134 MMOL/L (ref 135–144)
SPECIFIC GRAVITY UA: 1.02 (ref 1–1.03)
SPECIMEN DESCRIPTION: NORMAL
TEST INFORMATION: NORMAL
TOTAL PROTEIN: 6.8 G/DL (ref 6.4–8.3)
TROPONIN, HIGH SENSITIVITY: 78 NG/L (ref 0–22)
TROPONIN, HIGH SENSITIVITY: 80 NG/L (ref 0–22)
TSH SERPL DL<=0.05 MIU/L-ACNC: 1.27 UIU/ML (ref 0.3–5)
TURBIDITY: CLEAR
URINE HGB: NEGATIVE
UROBILINOGEN, URINE: NORMAL
WBC # BLD: 9.1 K/UL (ref 3.5–11.3)
WBC UA: ABNORMAL /HPF (ref 0–5)

## 2022-11-19 PROCEDURE — 2580000003 HC RX 258: Performed by: STUDENT IN AN ORGANIZED HEALTH CARE EDUCATION/TRAINING PROGRAM

## 2022-11-19 PROCEDURE — 85025 COMPLETE CBC W/AUTO DIFF WBC: CPT

## 2022-11-19 PROCEDURE — 93005 ELECTROCARDIOGRAM TRACING: CPT

## 2022-11-19 PROCEDURE — 2500000003 HC RX 250 WO HCPCS

## 2022-11-19 PROCEDURE — 36415 COLL VENOUS BLD VENIPUNCTURE: CPT

## 2022-11-19 PROCEDURE — 82805 BLOOD GASES W/O2 SATURATION: CPT

## 2022-11-19 PROCEDURE — 6360000004 HC RX CONTRAST MEDICATION

## 2022-11-19 PROCEDURE — 99291 CRITICAL CARE FIRST HOUR: CPT | Performed by: INTERNAL MEDICINE

## 2022-11-19 PROCEDURE — 87635 SARS-COV-2 COVID-19 AMP PRB: CPT

## 2022-11-19 PROCEDURE — 2580000003 HC RX 258

## 2022-11-19 PROCEDURE — 6360000002 HC RX W HCPCS: Performed by: STUDENT IN AN ORGANIZED HEALTH CARE EDUCATION/TRAINING PROGRAM

## 2022-11-19 PROCEDURE — 83605 ASSAY OF LACTIC ACID: CPT

## 2022-11-19 PROCEDURE — 94660 CPAP INITIATION&MGMT: CPT

## 2022-11-19 PROCEDURE — 85379 FIBRIN DEGRADATION QUANT: CPT

## 2022-11-19 PROCEDURE — 83690 ASSAY OF LIPASE: CPT

## 2022-11-19 PROCEDURE — 71045 X-RAY EXAM CHEST 1 VIEW: CPT

## 2022-11-19 PROCEDURE — 80076 HEPATIC FUNCTION PANEL: CPT

## 2022-11-19 PROCEDURE — 83835 ASSAY OF METANEPHRINES: CPT

## 2022-11-19 PROCEDURE — 80307 DRUG TEST PRSMV CHEM ANLYZR: CPT

## 2022-11-19 PROCEDURE — 81001 URINALYSIS AUTO W/SCOPE: CPT

## 2022-11-19 PROCEDURE — 80179 DRUG ASSAY SALICYLATE: CPT

## 2022-11-19 PROCEDURE — 74176 CT ABD & PELVIS W/O CONTRAST: CPT

## 2022-11-19 PROCEDURE — 6360000002 HC RX W HCPCS

## 2022-11-19 PROCEDURE — 99291 CRITICAL CARE FIRST HOUR: CPT

## 2022-11-19 PROCEDURE — 87641 MR-STAPH DNA AMP PROBE: CPT

## 2022-11-19 PROCEDURE — 0202U NFCT DS 22 TRGT SARS-COV-2: CPT

## 2022-11-19 PROCEDURE — 2000000000 HC ICU R&B

## 2022-11-19 PROCEDURE — 84443 ASSAY THYROID STIM HORMONE: CPT

## 2022-11-19 PROCEDURE — 87804 INFLUENZA ASSAY W/OPTIC: CPT

## 2022-11-19 PROCEDURE — 80048 BASIC METABOLIC PNL TOTAL CA: CPT

## 2022-11-19 PROCEDURE — 83880 ASSAY OF NATRIURETIC PEPTIDE: CPT

## 2022-11-19 PROCEDURE — 71260 CT THORAX DX C+: CPT

## 2022-11-19 PROCEDURE — 6370000000 HC RX 637 (ALT 250 FOR IP): Performed by: STUDENT IN AN ORGANIZED HEALTH CARE EDUCATION/TRAINING PROGRAM

## 2022-11-19 PROCEDURE — 2500000003 HC RX 250 WO HCPCS: Performed by: STUDENT IN AN ORGANIZED HEALTH CARE EDUCATION/TRAINING PROGRAM

## 2022-11-19 PROCEDURE — 87040 BLOOD CULTURE FOR BACTERIA: CPT

## 2022-11-19 PROCEDURE — 2500000003 HC RX 250 WO HCPCS: Performed by: EMERGENCY MEDICINE

## 2022-11-19 PROCEDURE — 84484 ASSAY OF TROPONIN QUANT: CPT

## 2022-11-19 RX ORDER — FUROSEMIDE 10 MG/ML
20 INJECTION INTRAMUSCULAR; INTRAVENOUS ONCE
Status: COMPLETED | OUTPATIENT
Start: 2022-11-19 | End: 2022-11-19

## 2022-11-19 RX ORDER — SODIUM CHLORIDE 0.9 % (FLUSH) 0.9 %
5-40 SYRINGE (ML) INJECTION PRN
Status: DISCONTINUED | OUTPATIENT
Start: 2022-11-19 | End: 2022-11-23 | Stop reason: HOSPADM

## 2022-11-19 RX ORDER — ACETAMINOPHEN 650 MG/1
650 SUPPOSITORY RECTAL EVERY 6 HOURS PRN
Status: DISCONTINUED | OUTPATIENT
Start: 2022-11-19 | End: 2022-11-23 | Stop reason: HOSPADM

## 2022-11-19 RX ORDER — SODIUM CHLORIDE 9 MG/ML
INJECTION, SOLUTION INTRAVENOUS PRN
Status: DISCONTINUED | OUTPATIENT
Start: 2022-11-19 | End: 2022-11-23 | Stop reason: HOSPADM

## 2022-11-19 RX ORDER — MIDAZOLAM HYDROCHLORIDE 2 MG/2ML
0.5 INJECTION, SOLUTION INTRAMUSCULAR; INTRAVENOUS ONCE
Status: COMPLETED | OUTPATIENT
Start: 2022-11-19 | End: 2022-11-19

## 2022-11-19 RX ORDER — NICARDIPINE HYDROCHLORIDE 0.1 MG/ML
2.5-15 INJECTION INTRAVENOUS CONTINUOUS
Status: DISPENSED | OUTPATIENT
Start: 2022-11-19 | End: 2022-11-19

## 2022-11-19 RX ORDER — NICARDIPINE HYDROCHLORIDE 0.1 MG/ML
INJECTION INTRAVENOUS
Status: COMPLETED
Start: 2022-11-19 | End: 2022-11-19

## 2022-11-19 RX ORDER — ESMOLOL HYDROCHLORIDE 10 MG/ML
25-300 INJECTION, SOLUTION INTRAVENOUS CONTINUOUS
Status: DISCONTINUED | OUTPATIENT
Start: 2022-11-19 | End: 2022-11-19

## 2022-11-19 RX ORDER — ALBUTEROL SULFATE 2.5 MG/3ML
2.5 SOLUTION RESPIRATORY (INHALATION)
Status: DISCONTINUED | OUTPATIENT
Start: 2022-11-19 | End: 2022-11-23 | Stop reason: HOSPADM

## 2022-11-19 RX ORDER — POLYETHYLENE GLYCOL 3350 17 G/17G
17 POWDER, FOR SOLUTION ORAL DAILY PRN
Status: DISCONTINUED | OUTPATIENT
Start: 2022-11-19 | End: 2022-11-23 | Stop reason: HOSPADM

## 2022-11-19 RX ORDER — ACETAMINOPHEN 325 MG/1
650 TABLET ORAL EVERY 6 HOURS PRN
Status: DISCONTINUED | OUTPATIENT
Start: 2022-11-19 | End: 2022-11-23 | Stop reason: HOSPADM

## 2022-11-19 RX ORDER — MIDAZOLAM HYDROCHLORIDE 2 MG/2ML
0.5 INJECTION, SOLUTION INTRAMUSCULAR; INTRAVENOUS ONCE
Status: DISCONTINUED | OUTPATIENT
Start: 2022-11-19 | End: 2022-11-19

## 2022-11-19 RX ORDER — SODIUM CHLORIDE 0.9 % (FLUSH) 0.9 %
5-40 SYRINGE (ML) INJECTION EVERY 12 HOURS SCHEDULED
Status: DISCONTINUED | OUTPATIENT
Start: 2022-11-19 | End: 2022-11-23 | Stop reason: HOSPADM

## 2022-11-19 RX ORDER — NITROGLYCERIN 20 MG/100ML
5-200 INJECTION INTRAVENOUS CONTINUOUS
Status: DISCONTINUED | OUTPATIENT
Start: 2022-11-19 | End: 2022-11-19

## 2022-11-19 RX ORDER — ONDANSETRON 2 MG/ML
4 INJECTION INTRAMUSCULAR; INTRAVENOUS EVERY 6 HOURS PRN
Status: DISCONTINUED | OUTPATIENT
Start: 2022-11-19 | End: 2022-11-23 | Stop reason: HOSPADM

## 2022-11-19 RX ORDER — ENOXAPARIN SODIUM 100 MG/ML
30 INJECTION SUBCUTANEOUS 2 TIMES DAILY
Status: DISCONTINUED | OUTPATIENT
Start: 2022-11-19 | End: 2022-11-23 | Stop reason: HOSPADM

## 2022-11-19 RX ORDER — ESMOLOL HYDROCHLORIDE 10 MG/ML
25-300 INJECTION, SOLUTION INTRAVENOUS CONTINUOUS
Status: DISCONTINUED | OUTPATIENT
Start: 2022-11-19 | End: 2022-11-20

## 2022-11-19 RX ORDER — ONDANSETRON 4 MG/1
4 TABLET, ORALLY DISINTEGRATING ORAL EVERY 8 HOURS PRN
Status: DISCONTINUED | OUTPATIENT
Start: 2022-11-19 | End: 2022-11-23 | Stop reason: HOSPADM

## 2022-11-19 RX ORDER — 0.9 % SODIUM CHLORIDE 0.9 %
500 INTRAVENOUS SOLUTION INTRAVENOUS ONCE
Status: COMPLETED | OUTPATIENT
Start: 2022-11-19 | End: 2022-11-19

## 2022-11-19 RX ADMIN — ESMOLOL HYDROCHLORIDE 150 MCG/KG/MIN: 10 INJECTION INTRAVENOUS at 13:36

## 2022-11-19 RX ADMIN — VANCOMYCIN HYDROCHLORIDE 2000 MG: 10 INJECTION, POWDER, LYOPHILIZED, FOR SOLUTION INTRAVENOUS at 15:49

## 2022-11-19 RX ADMIN — Medication 500 MG: at 19:10

## 2022-11-19 RX ADMIN — FUROSEMIDE 20 MG: 10 INJECTION, SOLUTION INTRAMUSCULAR; INTRAVENOUS at 14:32

## 2022-11-19 RX ADMIN — ESMOLOL HYDROCHLORIDE 200 MCG/KG/MIN: 10 INJECTION INTRAVENOUS at 13:50

## 2022-11-19 RX ADMIN — ENOXAPARIN SODIUM 30 MG: 100 INJECTION SUBCUTANEOUS at 20:53

## 2022-11-19 RX ADMIN — CEFTRIAXONE SODIUM 1000 MG: 1 INJECTION, POWDER, FOR SOLUTION INTRAMUSCULAR; INTRAVENOUS at 18:29

## 2022-11-19 RX ADMIN — PIPERACILLIN AND TAZOBACTAM 3375 MG: 3; .375 INJECTION, POWDER, FOR SOLUTION INTRAVENOUS at 15:09

## 2022-11-19 RX ADMIN — NICARDIPINE HYDROCHLORIDE 5 MG: 0.1 INJECTION INTRAVENOUS at 20:47

## 2022-11-19 RX ADMIN — SODIUM CHLORIDE 500 ML: 9 INJECTION, SOLUTION INTRAVENOUS at 14:29

## 2022-11-19 RX ADMIN — ESMOLOL HYDROCHLORIDE 300 MCG/KG/MIN: 10 INJECTION INTRAVENOUS at 14:03

## 2022-11-19 RX ADMIN — NICARDIPINE HYDROCHLORIDE 5 MG/HR: 0.1 INJECTION INTRAVENOUS at 12:47

## 2022-11-19 RX ADMIN — NITROGLYCERIN 100 MCG/MIN: 20 INJECTION INTRAVENOUS at 11:53

## 2022-11-19 RX ADMIN — ACETAMINOPHEN 650 MG: 325 TABLET ORAL at 22:53

## 2022-11-19 RX ADMIN — SODIUM CHLORIDE 10 MG/HR: 9 INJECTION, SOLUTION INTRAVENOUS at 14:47

## 2022-11-19 RX ADMIN — ESMOLOL HYDROCHLORIDE 50 MCG/KG/MIN: 10 INJECTION INTRAVENOUS at 12:45

## 2022-11-19 RX ADMIN — SODIUM CHLORIDE, PRESERVATIVE FREE 10 ML: 5 INJECTION INTRAVENOUS at 22:56

## 2022-11-19 RX ADMIN — NICARDIPINE HYDROCHLORIDE 12.5 MG/HR: 0.1 INJECTION INTRAVENOUS at 13:43

## 2022-11-19 RX ADMIN — ESMOLOL HYDROCHLORIDE 150 MCG/KG/MIN: 10 INJECTION INTRAVENOUS at 17:13

## 2022-11-19 RX ADMIN — IOPAMIDOL 85 ML: 755 INJECTION, SOLUTION INTRAVENOUS at 12:53

## 2022-11-19 RX ADMIN — ESMOLOL HYDROCHLORIDE 250 MCG/KG/MIN: 10 INJECTION INTRAVENOUS at 13:56

## 2022-11-19 RX ADMIN — ESMOLOL HYDROCHLORIDE 300 MCG/KG/MIN: 10 INJECTION INTRAVENOUS at 15:45

## 2022-11-19 RX ADMIN — MIDAZOLAM 0.5 MG: 1 INJECTION INTRAMUSCULAR; INTRAVENOUS at 14:29

## 2022-11-19 ASSESSMENT — PAIN SCALES - WONG BAKER: WONGBAKER_NUMERICALRESPONSE: 0

## 2022-11-19 ASSESSMENT — PAIN SCALES - GENERAL
PAINLEVEL_OUTOF10: 3
PAINLEVEL_OUTOF10: 7
PAINLEVEL_OUTOF10: 3

## 2022-11-19 ASSESSMENT — ENCOUNTER SYMPTOMS
CHEST TIGHTNESS: 1
ABDOMINAL PAIN: 0
BACK PAIN: 0
SHORTNESS OF BREATH: 1
PHOTOPHOBIA: 0
WHEEZING: 0
ABDOMINAL DISTENTION: 0
COUGH: 0
TROUBLE SWALLOWING: 0
CONSTIPATION: 0
VOMITING: 0
DIARRHEA: 0
VOICE CHANGE: 0
NAUSEA: 0
RHINORRHEA: 0
SORE THROAT: 1

## 2022-11-19 ASSESSMENT — PAIN DESCRIPTION - FREQUENCY: FREQUENCY: CONTINUOUS

## 2022-11-19 ASSESSMENT — PAIN DESCRIPTION - LOCATION: LOCATION: HEAD

## 2022-11-19 ASSESSMENT — PAIN DESCRIPTION - DESCRIPTORS: DESCRIPTORS: SQUEEZING

## 2022-11-19 ASSESSMENT — PAIN - FUNCTIONAL ASSESSMENT: PAIN_FUNCTIONAL_ASSESSMENT: 0-10

## 2022-11-19 NOTE — ED PROVIDER NOTES
[facility]  FACULTY HANDOFF       Handoff taken on the following patient from prior Attending Physician:  Pt Name: Mechelle Toscano  PCP:  Wild Joya MD    Attestation  I was available and discussed any additional care issues that arose and coordinated the management plans with the resident(s) caring for the patient during my duty period. Any areas of disagreement with resident's documentation of care or procedures are noted on the chart. I was personally present for the key portions of any/all procedures during my duty period. I have documented in the chart those procedures where I was not present during the key portions. CHIEF COMPLAINT       Chief Complaint   Patient presents with    Shortness of Breath    Headache    Dysphagia         CURRENT MEDICATIONS     Previous Medications  Previous Medications    AMLODIPINE (NORVASC) 10 MG TABLET    Take 1 tablet by mouth daily    BLOOD PRESSURE KIT    1 kit by Does not apply route daily    CARVEDILOL (COREG) 25 MG TABLET    Take 1 tablet by mouth 2 times daily (with meals)    CLONIDINE (CATAPRES) 0.1 MG TABLET    Take 1 tablet by mouth 3 times daily    DOXAZOSIN (CARDURA) 2 MG TABLET    Take 1 tablet by mouth at bedtime    HYDRALAZINE (APRESOLINE) 100 MG TABLET    Take 1 tablet by mouth every 8 hours    SPIRONOLACTONE (ALDACTONE) 25 MG TABLET    Take 1 tablet by mouth in the morning and 1 tablet in the evening. Encounter Medications  Orders Placed This Encounter   Medications    DISCONTD: nitroGLYCERIN 50 mg in dextrose 5% 250 mL infusion     Order Specific Question:   Titrate Infusion? Answer:   Yes     Order Specific Question:   Initial Infusion Dose: Answer:   100 mcg/min     Order Specific Question:   Goal of Therapy is: Answer:    Other     Order Specific Question:   Other Goal:     Answer:   -180     Order Specific Question:   Contact Provider if:     Answer:   SBP less than 90 mmHg    DISCONTD: midazolam PF (VERSED) injection 0.5 mg    DISCONTD: esmolol (BREVIBLOC) 2.5gm/250ml NS infusion     Order Specific Question:   Titrate Infusion? Answer:   Yes     Order Specific Question:   Initial Infusion Dose: Answer:   50 mcg/kg/min     Order Specific Question:   Titrate no more frequently than every 5 minutes in increments of: Answer:   50 mcg/kg/min     Order Specific Question:   Goal of Therapy:     Answer: Other     Order Specific Question:   Other Goal:     Answer:   HR < 60     Order Specific Question:   Contact Provider if:     Answer: Other     Order Specific Question:   Other:     Answer:   HR < 50     Order Specific Question:   HOLD for:     Answer:   Do not hold    niCARdipine (CARDENE) 20 mg in 0.9 % sodium chloride 200 mL solution     Order Specific Question:   Titrate Infusion? Answer:   Yes     Order Specific Question:   Initial Infusion Rate: Answer:   5 mg/hr     Order Specific Question:   Goal of Therapy is: Answer: Other     Order Specific Question:   Other Goal:     Answer:   SBP < 120     Order Specific Question:   Contact Provider if:     Answer:   Patient is receiving the maximum dose and is not achieving the goal of therapy    iopamidol (ISOVUE-370) 76 % injection 85 mL    DISCONTD: esmolol (BREVIBLOC) 2.5gm/250ml NS infusion     Order Specific Question:   Titrate Infusion? Answer:   Yes     Order Specific Question:   Initial Infusion Dose: Answer:   50 mcg/kg/min     Order Specific Question:   Titrate no more frequently than every 5 minutes in increments of: Answer: Other     Order Specific Question:   Other (mcg/kg/min): Answer:   50 mcg/kg/min WITH 500 mcg/kg bolus with each titration     Order Specific Question:   Goal of Therapy:     Answer: Other     Order Specific Question:   Other Goal:     Answer:   HR < 60     Order Specific Question:   Contact Provider if:     Answer:    Other     Order Specific Question:   Other:     Answer:   HR < 50 Order Specific Question:   HOLD for:     Answer:   Do not hold    niCARdipine (CARDENE) 25 mg in sodium chloride 0.9 % 250 mL infusion     Order Specific Question:   Titrate Infusion? Answer:   Yes     Order Specific Question:   Initial Infusion Rate: Answer:   5 mg/hr     Order Specific Question:   Goal of Therapy is: Answer:   -160 mmHg     Order Specific Question:   Contact Provider if:     Answer:   Patient is receiving the maximum dose and is not achieving the goal of therapy    furosemide (LASIX) injection 20 mg    0.9 % sodium chloride bolus    midazolam PF (VERSED) injection 0.5 mg    piperacillin-tazobactam (ZOSYN) 3,375 mg in dextrose 5 % 50 mL IVPB (mini-bag)     Order Specific Question:   Antimicrobial Indications     Answer:   Pneumonia (CAP)    vancomycin (VANCOCIN) intermittent dosing (placeholder)     Order Specific Question:   Antimicrobial Indications     Answer:   Sepsis of Unknown Etiology    vancomycin (VANCOCIN) 2000 mg in 0.9% sodium chloride 500 mL IVPB     Order Specific Question:   Antimicrobial Indications     Answer:   Sepsis of Unknown Etiology    cefTRIAXone (ROCEPHIN) 1,000 mg in sodium chloride 0.9 % 50 mL IVPB mini-bag     Order Specific Question:   Antimicrobial Indications     Answer:   Pneumonia (CAP)     Order Specific Question:   CAP duration of therapy     Answer:   5 days    azithromycin (ZITHROMAX) 500 mg in dextrose 5% 250 mL IVPB     Order Specific Question:   Antimicrobial Indications     Answer:   Pneumonia (CAP)     Order Specific Question:   CAP duration of therapy     Answer:   5 days    esmolol (BREVIBLOC) 2.5gm/250ml NS infusion     Order Specific Question:   Titrate Infusion? Answer:   Yes     Order Specific Question:   Initial Infusion Dose: Answer:   50 mcg/kg/min     Order Specific Question:   Titrate no more frequently than every 5 minutes in increments of:      Answer:   25 mcg/kg/min     Order Specific Question:   Goal of Therapy: Answer:   HR less than 100 bpm     Order Specific Question:   Contact Provider if:     Answer:   HR less than 60 bpm     Order Specific Question:   HOLD for:     Answer:   HR less than 60 bpm       ALLERGIES     has No Known Allergies. RECENT VITALS:   Temp: 96.8 °F (36 °C),  Heart Rate: 92, Resp: 16, BP: (!) 132/99    RADIOLOGY:   CT ABDOMEN PELVIS WO CONTRAST Additional Contrast? None   Final Result   1. CHEST: No pulmonary embolus is identified. 2. Multifocal ground-glass opacities and few interlobular and septal   opacities. Trace pleural effusions are noted. Pulmonary edema versus   pneumonia. Atypical pneumonia is considered in the differential, such as   viral, for instance influenza. Interestingly, there was a similar, more   prominent presentation with ground-glass and alveolar opacities on 10/09/2021.   3. Cardiomegaly. Ectasia of the ascending thoracic aorta, measuring 44 mm in   diameter. 4. ABDOMEN PELVIS: No acute abdominopelvic abnormality. CT CHEST PULMONARY EMBOLISM W CONTRAST   Final Result   1. CHEST: No pulmonary embolus is identified. 2. Multifocal ground-glass opacities and few interlobular and septal   opacities. Trace pleural effusions are noted. Pulmonary edema versus   pneumonia. Atypical pneumonia is considered in the differential, such as   viral, for instance influenza. Interestingly, there was a similar, more   prominent presentation with ground-glass and alveolar opacities on 10/09/2021.   3. Cardiomegaly. Ectasia of the ascending thoracic aorta, measuring 44 mm in   diameter. 4. ABDOMEN PELVIS: No acute abdominopelvic abnormality.          XR CHEST PORTABLE   Final Result   Findings suggest congestive heart failure             LABS:  Labs Reviewed   CBC WITH AUTO DIFFERENTIAL - Abnormal; Notable for the following components:       Result Value    RBC 4.01 (*)     Hemoglobin 10.3 (*)     Hematocrit 33.5 (*)     RDW 14.5 (*)     Seg Neutrophils 73 (*) Lymphocytes 14 (*)     All other components within normal limits   BASIC METABOLIC PANEL - Abnormal; Notable for the following components:    Glucose 106 (*)     BUN 44 (*)     Creatinine 2.44 (*)     Est, Glom Filt Rate 33 (*)     Sodium 134 (*)     CO2 18 (*)     All other components within normal limits   BRAIN NATRIURETIC PEPTIDE - Abnormal; Notable for the following components:    Pro- (*)     All other components within normal limits   TROPONIN - Abnormal; Notable for the following components:    Troponin, High Sensitivity 78 (*)     All other components within normal limits   LIPASE - Abnormal; Notable for the following components:    Lipase 220 (*)     All other components within normal limits   BLOOD GAS, VENOUS - Abnormal; Notable for the following components:    pCO2, Khanh 30.2 (*)     pO2, Khanh 109.0 (*)     HCO3, Venous 19.0 (*)     Negative Base Excess, Khanh 4.2 (*)     O2 Sat, Khanh 96.9 (*)     All other components within normal limits   SALICYLATE LEVEL - Abnormal; Notable for the following components:    Salicylate Lvl <1 (*)     All other components within normal limits   BLOOD GAS, VENOUS - Abnormal; Notable for the following components:    pCO2, Khanh 32.4 (*)     pO2, Khanh 158.0 (*)     HCO3, Venous 18.7 (*)     Negative Base Excess, Khanh 5.2 (*)     O2 Sat, Khanh 98.9 (*)     All other components within normal limits   TROPONIN - Abnormal; Notable for the following components:    Troponin, High Sensitivity 80 (*)     All other components within normal limits   URINALYSIS WITH REFLEX TO CULTURE - Abnormal; Notable for the following components:    Protein, UA 2+ (*)     All other components within normal limits   MICROSCOPIC URINALYSIS - Abnormal; Notable for the following components:    Bacteria, UA FEW (*)     Amorphous, UA 1+ (*)     All other components within normal limits   RAPID INFLUENZA A/B ANTIGENS   CULTURE, BLOOD 1   CULTURE, BLOOD 1   COVID-19, RAPID   D-DIMER, QUANTITATIVE   TSH WITH REFLEX   URINE DRUG SCREEN   LACTIC ACID   HEPATIC FUNCTION PANEL   METANEPHRINES URINE           PLAN/ TASKS OUTSTANDING     With elevated tropes and baseline repeat EKG shows questionable elevation in V1 V2 patient without overt anginal symptoms. This was specifically discussed and reviewed with interventional cardiology who deemed not a STEMI      (Please note that portions of this note were completed with a voice recognition program.  Efforts were made to edit the dictations but occasionally words are mis-transcribed. )    Tino Schwab MD, MD,   Attending Emergency Physician        Rosa Rooney MD  11/19/22 [id number]

## 2022-11-19 NOTE — ED PROVIDER NOTES
[facility] ED  Emergency Department Encounter  Emergency Medicine Resident     Pt Name:Bennett Cunha  MRN: [id number]  [place] 1980  Date of evaluation: 11/19/22  PCP:  Balaji Olsen MD      [address] [place] [address]       Chief Complaint   Patient presents with    Shortness of Breath    Headache    Dysphagia       HISTORY OF PRESENT ILLNESS  (Location/Symptom, Timing/Onset, Context/Setting, Quality, Duration, Modifying Factors, Severity.)      Malia Casey is a 36 y.o. male who presents with chest pain, shortness of breath, headache, and difficulty swallowing. Patient has a past medical history of uncontrolled hypertension and chronic kidney disease. Patient was recently mid to the hospital for hypertensive emergency. Patient states that he has had viral-like symptoms for the past 1 to 2 weeks and today woke with worsening shortness of breath, pressure-like chest pain throughout his entire chest that does not radiate anywhere, and headache. He states his headache has been ongoing for approximately 1 month, is not associated with vision changes or photophobia. He is also complaining of difficulty swallowing which she describes as pain while swallowing, he states his throat has been extremely dry for for the past 1 to 2 weeks. Patient was recently seen at [facility] for similar complaints, had hypertensive urgency secondary to not taking his home medications. He states that he took all of his home medications including hydralazine, carvedilol, Norvasc. He states that he does not normally take his blood pressure at home. Patient denies abdominal pain, nausea, vomiting, calf tenderness. Patient states he does not smoke, drink or use any drugs. PAST MEDICAL / SURGICAL / SOCIAL / FAMILY HISTORY      has a past medical history of DEVYN (acute kidney injury) ([facility] [address] Bipolar 1 disorder ([facility] [address] Bronchitis, Bronchitis, Hypertension, Moderate persistent asthma, and Obesity.        has no past surgical history on file. Social History     Socioeconomic History    Marital status: Single     Spouse name: Not on file    Number of children: Not on file    Years of education: Not on file    Highest education level: Not on file   Occupational History    Not on file   Tobacco Use    Smoking status: Never    Smokeless tobacco: Never   Substance and Sexual Activity    Alcohol use: No    Drug use: No    Sexual activity: Not on file   Other Topics Concern    Not on file   Social History Narrative    Not on file     Social Determinants of Health     Financial Resource Strain: Not on file   Food Insecurity: Not on file   Transportation Needs: Not on file   Physical Activity: Not on file   Stress: Not on file   Social Connections: Not on file   Intimate Partner Violence: Not on file   Housing Stability: Not on file       Family History   Problem Relation Age of Onset    High Blood Pressure Mother     Diabetes Maternal Grandmother        Allergies:  Patient has no known allergies. Home Medications:  Prior to Admission medications    Medication Sig Start Date End Date Taking? Authorizing Provider   cloNIDine (CATAPRES) 0.1 MG tablet Take 1 tablet by mouth 3 times daily 11/10/22   Nella Pereira MD   doxazosin (CARDURA) 2 MG tablet Take 1 tablet by mouth at bedtime 11/10/22   Nella Pereira MD   hydrALAZINE (APRESOLINE) 100 MG tablet Take 1 tablet by mouth every 8 hours 11/10/22   Nella Pereira MD   spironolactone (ALDACTONE) 25 MG tablet Take 1 tablet by mouth in the morning and 1 tablet in the evening.  11/10/22   Claudell Mainland, MD   carvedilol (COREG) 25 MG tablet Take 1 tablet by mouth 2 times daily (with meals) 10/26/22   Evon Deng DO   amLODIPine (NORVASC) 10 MG tablet Take 1 tablet by mouth daily 10/26/22   Evon Deng DO   Blood Pressure KIT 1 kit by Does not apply route daily 3/16/20   Laura Cabrales MD       REVIEW OF SYSTEMS    (2-9 systems for level 4, 10 or more for level 5)      Review of Systems   Constitutional:  Negative for chills and fever. HENT:  Positive for sore throat. Negative for congestion, rhinorrhea, trouble swallowing and voice change. Eyes:  Negative for photophobia and visual disturbance. Respiratory:  Positive for chest tightness and shortness of breath. Negative for cough and wheezing. Cardiovascular:  Positive for chest pain. Negative for palpitations. Gastrointestinal:  Negative for abdominal distention, abdominal pain, constipation, diarrhea, nausea and vomiting. Genitourinary:  Negative for dysuria, flank pain, frequency and urgency. Musculoskeletal:  Negative for arthralgias, back pain, myalgias and neck pain. Skin:  Negative for wound. Neurological:  Positive for headaches. Negative for dizziness, syncope, weakness, light-headedness and numbness. Psychiatric/Behavioral:  Negative for agitation and confusion. PHYSICAL EXAM   (up to 7 for level 4, 8 or more for level 5)      INITIAL VITALS:   BP (!) 145/99   Pulse 95   Temp 96.8 °F (36 °C) (Oral)   Resp 23   Ht 6' 5\" (1.956 m)   Wt 300 lb (136.1 kg)   SpO2 95%   BMI 35.57 kg/m²     Physical Exam  Constitutional:       General: He is in acute distress. Appearance: Normal appearance. He is obese. HENT:      Head: Normocephalic and atraumatic. Nose: Nose normal.      Mouth/Throat:      Mouth: Mucous membranes are moist.      Pharynx: Oropharynx is clear. Eyes:      Extraocular Movements: Extraocular movements intact. Conjunctiva/sclera: Conjunctivae normal.      Pupils: Pupils are equal, round, and reactive to light. Cardiovascular:      Rate and Rhythm: Normal rate and regular rhythm. Pulses: Normal pulses. Heart sounds: Normal heart sounds. Pulmonary:      Effort: Tachypnea and respiratory distress present. Breath sounds: Decreased breath sounds present. No wheezing or rhonchi.    Chest:      Chest wall: No tenderness. Abdominal:      General: Abdomen is flat. Palpations: Abdomen is soft. Musculoskeletal:         General: Normal range of motion. Cervical back: Normal range of motion. Skin:     General: Skin is warm and dry. Capillary Refill: Capillary refill takes less than 2 seconds. Neurological:      General: No focal deficit present. Mental Status: He is alert and oriented to person, place, and time. Mental status is at baseline. Psychiatric:         Behavior: Behavior normal.       DIFFERENTIAL  DIAGNOSIS     PLAN (LABS / IMAGING / EKG):  Orders Placed This Encounter   Procedures    Culture, Blood 1    Culture, Blood 1    COVID-19, Rapid    RAPID INFLUENZA A/B ANTIGENS    XR CHEST PORTABLE    CT CHEST PULMONARY EMBOLISM W CONTRAST    CT ABDOMEN PELVIS WO CONTRAST Additional Contrast? None    CBC with Auto Differential    Basic Metabolic Panel    Brain Natriuretic Peptide    Troponin    Lipase    D-Dimer, Quantitative    BLOOD GAS, VENOUS    Salicylate    METANEPHRINES URINE    TSH with Reflex    BLOOD GAS, VENOUS    Urine Drug Screen    Troponin    Lactic Acid    Vancomycin Level, Random    Urinalysis with Reflex to Culture    Inpatient consult to Vascular Surgery    Inpatient consult to Cardiothoracic Surgery    Pharmacy to Dose: Vancomycin    Inpatient consult to Critical Care    Adult NIV/Positive Airway Pressure    Pulse oximetry, continuous    ECHO Complete 2D W Doppler W Color       MEDICATIONS ORDERED:  Orders Placed This Encounter   Medications    DISCONTD: nitroGLYCERIN 50 mg in dextrose 5% 250 mL infusion     Order Specific Question:   Titrate Infusion? Answer:   Yes     Order Specific Question:   Initial Infusion Dose: Answer:   100 mcg/min     Order Specific Question:   Goal of Therapy is: Answer:    Other     Order Specific Question:   Other Goal:     Answer:   -180     Order Specific Question:   Contact Provider if:     Answer:   SBP less than 90 mmHg    DISCONTD: midazolam PF (VERSED) injection 0.5 mg    DISCONTD: esmolol (BREVIBLOC) 2.5gm/250ml NS infusion     Order Specific Question:   Titrate Infusion? Answer:   Yes     Order Specific Question:   Initial Infusion Dose: Answer:   50 mcg/kg/min     Order Specific Question:   Titrate no more frequently than every 5 minutes in increments of: Answer:   50 mcg/kg/min     Order Specific Question:   Goal of Therapy:     Answer: Other     Order Specific Question:   Other Goal:     Answer:   HR < 60     Order Specific Question:   Contact Provider if:     Answer: Other     Order Specific Question:   Other:     Answer:   HR < 50     Order Specific Question:   HOLD for:     Answer:   Do not hold    niCARdipine (CARDENE) 20 mg in 0.9 % sodium chloride 200 mL solution     Order Specific Question:   Titrate Infusion? Answer:   Yes     Order Specific Question:   Initial Infusion Rate: Answer:   5 mg/hr     Order Specific Question:   Goal of Therapy is: Answer: Other     Order Specific Question:   Other Goal:     Answer:   SBP < 120     Order Specific Question:   Contact Provider if:     Answer:   Patient is receiving the maximum dose and is not achieving the goal of therapy    iopamidol (ISOVUE-370) 76 % injection 85 mL    esmolol (BREVIBLOC) 2.5gm/250ml NS infusion     Order Specific Question:   Titrate Infusion? Answer:   Yes     Order Specific Question:   Initial Infusion Dose: Answer:   50 mcg/kg/min     Order Specific Question:   Titrate no more frequently than every 5 minutes in increments of: Answer: Other     Order Specific Question:   Other (mcg/kg/min): Answer:   50 mcg/kg/min WITH 500 mcg/kg bolus with each titration     Order Specific Question:   Goal of Therapy:     Answer: Other     Order Specific Question:   Other Goal:     Answer:   HR < 60     Order Specific Question:   Contact Provider if:     Answer:    Other     Order Specific Question:   Other: Answer:   HR < 50     Order Specific Question:   HOLD for:     Answer:   Do not hold    niCARdipine (CARDENE) 25 mg in sodium chloride 0.9 % 250 mL infusion     Order Specific Question:   Titrate Infusion? Answer:   Yes     Order Specific Question:   Initial Infusion Rate: Answer:   5 mg/hr     Order Specific Question:   Goal of Therapy is: Answer:   -160 mmHg     Order Specific Question:   Contact Provider if:     Answer:   Patient is receiving the maximum dose and is not achieving the goal of therapy    furosemide (LASIX) injection 20 mg    0.9 % sodium chloride bolus    midazolam PF (VERSED) injection 0.5 mg    piperacillin-tazobactam (ZOSYN) 3,375 mg in dextrose 5 % 50 mL IVPB (mini-bag)     Order Specific Question:   Antimicrobial Indications     Answer:   Pneumonia (CAP)    vancomycin (VANCOCIN) intermittent dosing (placeholder)     Order Specific Question:   Antimicrobial Indications     Answer:   Sepsis of Unknown Etiology    vancomycin (VANCOCIN) 2000 mg in 0.9% sodium chloride 500 mL IVPB     Order Specific Question:   Antimicrobial Indications     Answer:   Sepsis of Unknown Etiology       DDX: CHF, cardiac chest pain, MI, PE, viral infection    MDM: Patient is a 41-year-old obese male with past medical history of hypertension chronic kidney disease who presents with chest pain, shortness of breath, headache. Patient is GCS 15, in respiratory distress, speaking in short sentences, able to ambulate under his own power. He is hypertensive at 229/126, tachycardic at 121, afebrile, satting well on room air however tachypneic. Patient is hypertensive despite stating that he took his blood pressure medications this morning including hydralazine, carvedilol, Norvasc. Plan is to obtain cardiac chest pain work-up including CBC, BMP, BNP, EKG, chest x-ray, troponin, D-dimer. Given patient's chronic hypertension, respiratory distress, hypertension and tachycardia possible CHF exacerbation. We will start patient on nitroglycerin drip with blood pressure goal of 152-091 systolic. Chart review shows that echo from 11/8/2022 showed EF of greater than 55% with moderate LV hypertrophy. Chest x-ray showed wide mediastinum, patient was taken for urgent CT which did not show any dissection however did show an aorta of 4.4 mm. CT surgery contacted and they stated that they do not feel this is a surgical intervention at this time. Patient started on Cardene and esmolol drip for rate and pressure control with goal heart rate of 60 and blood pressure of 140-1 60. Blood cultures, lactic acid drawn due to findings on CT that showed possible pneumonia. Patient started on Zosyn and [address] [facility] pharmacy dosing. Patient to be admitted to medical ICU for possible sepsis, widened aorta, elevated troponin, and further medical management.     DIAGNOSTIC RESULTS / EMERGENCY DEPARTMENT COURSE / MDM   LAB RESULTS:  Results for orders placed or performed during the hospital encounter of 11/19/22   CBC with Auto Differential   Result Value Ref Range    WBC 9.1 3.5 - 11.3 k/uL    RBC 4.01 (L) 4.21 - 5.77 m/uL    Hemoglobin 10.3 (L) 13.0 - 17.0 g/dL    Hematocrit 33.5 (L) 40.7 - 50.3 %    MCV 83.5 82.6 - 102.9 fL    MCH 25.7 25.2 - 33.5 pg    MCHC 30.7 28.4 - 34.8 g/dL    RDW 14.5 (H) 11.8 - 14.4 %    Platelets 769 195 - 557 k/uL    MPV 11.9 8.1 - 13.5 fL    NRBC Automated 0.0 0.0 per 100 WBC    Seg Neutrophils 73 (H) 36 - 65 %    Lymphocytes 14 (L) 24 - 43 %    Monocytes 11 3 - 12 %    Eosinophils % 1 1 - 4 %    Basophils 1 0 - 2 %    Immature Granulocytes 0 0 %    Segs Absolute 6.68 1.50 - 8.10 k/uL    Absolute Lymph # 1.26 1.10 - 3.70 k/uL    Absolute Mono # 0.97 0.10 - 1.20 k/uL    Absolute Eos # 0.09 0.00 - 0.44 k/uL    Basophils Absolute 0.06 0.00 - 0.20 k/uL    Absolute Immature Granulocyte 0.03 0.00 - 0.30 k/uL    RBC Morphology ANISOCYTOSIS PRESENT    Basic Metabolic Panel   Result Value Ref Range    Glucose 106 (H) 70 - 99 mg/dL    BUN 44 (H) 6 - 20 mg/dL    Creatinine 2.44 (H) 0.70 - 1.20 mg/dL    Est, Glom Filt Rate 33 (L) >60 mL/min/1.73m2    Calcium 9.0 8.6 - 10.4 mg/dL    Sodium 134 (L) 135 - 144 mmol/L    Potassium 4.2 3.7 - 5.3 mmol/L    Chloride 100 98 - 107 mmol/L    CO2 18 (L) 20 - 31 mmol/L    Anion Gap 16 9 - 17 mmol/L   Brain Natriuretic Peptide   Result Value Ref Range    Pro- (H) <300 pg/mL   Troponin   Result Value Ref Range    Troponin, High Sensitivity 78 (HH) 0 - 22 ng/L   Lipase   Result Value Ref Range    Lipase 220 (H) 13 - 60 U/L   D-Dimer, Quantitative   Result Value Ref Range    D-Dimer, Quant 0.99 mg/L FEU   BLOOD GAS, VENOUS   Result Value Ref Range    pH, Khanh 7.415 7.320 - 7.420    pCO2, Khanh 30.2 (L) 39 - 55 mm Hg    pO2, Khanh 109.0 (H) 30 - 50 mm Hg    HCO3, Venous 19.0 (L) 24 - 30 mmol/L    Negative Base Excess, Khanh 4.2 (H) 0.0 - 2.0 mmol/L    O2 Sat, Khanh 96.9 (H) 60.0 - 85.0 %    Carboxyhemoglobin 1.7 0 - 5 %    Pt Temp 37.0     FIO2 Unknown    Salicylate   Result Value Ref Range    Salicylate Lvl <1 (L) 3 - 10 mg/dL   TSH with Reflex   Result Value Ref Range    TSH 1.27 0.30 - 5.00 uIU/mL   BLOOD GAS, VENOUS   Result Value Ref Range    pH, Khanh 7.380 7.320 - 7.420    pCO2, Khanh 32.4 (L) 39 - 55 mm Hg    pO2, Khanh 158.0 (H) 30 - 50 mm Hg    HCO3, Venous 18.7 (L) 24 - 30 mmol/L    Negative Base Excess, Khanh 5.2 (H) 0.0 - 2.0 mmol/L    O2 Sat, Khanh 98.9 (H) 60.0 - 85.0 %    Carboxyhemoglobin 1.1 0 - 5 %    Pt Temp 37.0     FIO2 Unknown    Urine Drug Screen   Result Value Ref Range    Amphetamine Screen, Ur NEGATIVE NEGATIVE    Barbiturate Screen, Ur NEGATIVE NEGATIVE    Benzodiazepine Screen, Urine NEGATIVE NEGATIVE    Cocaine Metabolite, Urine NEGATIVE NEGATIVE    Methadone Screen, Urine NEGATIVE NEGATIVE    Opiates, Urine NEGATIVE NEGATIVE    Phencyclidine, Urine NEGATIVE NEGATIVE    Cannabinoid Scrn, Ur NEGATIVE NEGATIVE    Oxycodone Screen, Ur NEGATIVE NEGATIVE    Fentanyl, Ur NEGATIVE NEGATIVE Test Information       Assay provides medical screening only. The absence of expected drug(s) and/or metabolite(s) may indicate diluted or adulterated urine, limitations of testing or timing of collection. Troponin   Result Value Ref Range    Troponin, High Sensitivity 80 (HH) 0 - 22 ng/L         RADIOLOGY:  CT ABDOMEN PELVIS WO CONTRAST Additional Contrast? None   Final Result   1. CHEST: No pulmonary embolus is identified. 2. Multifocal ground-glass opacities and few interlobular and septal   opacities. Trace pleural effusions are noted. Pulmonary edema versus   pneumonia. Atypical pneumonia is considered in the differential, such as   viral, for instance influenza. Interestingly, there was a similar, more   prominent presentation with ground-glass and alveolar opacities on 10/09/2021.   3. Cardiomegaly. Ectasia of the ascending thoracic aorta, measuring 44 mm in   diameter. 4. ABDOMEN PELVIS: No acute abdominopelvic abnormality. CT CHEST PULMONARY EMBOLISM W CONTRAST   Final Result   1. CHEST: No pulmonary embolus is identified. 2. Multifocal ground-glass opacities and few interlobular and septal   opacities. Trace pleural effusions are noted. Pulmonary edema versus   pneumonia. Atypical pneumonia is considered in the differential, such as   viral, for instance influenza. Interestingly, there was a similar, more   prominent presentation with ground-glass and alveolar opacities on 10/09/2021.   3. Cardiomegaly. Ectasia of the ascending thoracic aorta, measuring 44 mm in   diameter. 4. ABDOMEN PELVIS: No acute abdominopelvic abnormality.          XR CHEST PORTABLE   Final Result   Findings suggest congestive heart failure               EKG  EKG INTERPRETATION:    Interpreted by me    Rhythm: sinus tachycardia  Rate: 120  Axis: normal  Ectopy: none  Conduction: normal  Intervals: 310/438  ST Segments: nonspecific changes  T Waves: no acute change  Q Waves: none    EKG  Impression: non-specific EKG      All EKG's are interpreted by the Emergency Department Physician who either signs or Co-signs this chart in the absence of a cardiologist.    EMERGENCY DEPARTMENT COURSE:      ED Course as of 11/19/22 1503   Sat Nov 19, 2022   1502 Troponin, High Sensitivity(!!): 80  Repeat Trope of 80, not significantly increased from last, nonspecific EKG changes. [AK]      ED Course User Index  [AK] Esther Ferrell MD       No notes of [facility] Admission Criteria type on file. PROCEDURES:  None    CONSULTS:  IP CONSULT TO VASCULAR SURGERY  IP CONSULT TO CARDIOTHORACIC SURGERY  PHARMACY TO DOSE VANCOMYCIN  IP CONSULT TO CRITICAL CARE    CRITICAL CARE:        FINAL IMPRESSION      1. Hypertensive emergency          DISPOSITION / PLAN     DISPOSITION Decision To Transfer 11/19/2022 03:03:27 PM      PATIENT REFERRED TO:  No follow-up provider specified.     DISCHARGE MEDICATIONS:  New Prescriptions    No medications on file       Esther Ferrell MD  Emergency Medicine Resident    (Please note that portions of thisnote were completed with a voice recognition program.  Efforts were made to edit the dictations but occasionally words are mis-transcribed.)       Esther Ferrell MD  Resident  11/19/22 3552

## 2022-11-19 NOTE — CARE COORDINATION
11/19/22 1813   Service Assessment   Patient Orientation Alert and Oriented   Cognition Alert   History Provided By Patient   Primary Caregiver Self   Support Systems Family Members   PCP Verified by CM Yes   Last Visit to PCP Within last 3 months   Prior Functional Level Independent in ADLs/IADLs   Current Functional Level Independent in ADLs/IADLs   Can patient return to prior living arrangement Yes   Ability to make needs known: Good   Family able to assist with home care needs: Yes   Would you like for me to discuss the discharge plan with any other family members/significant others, and if so, who? No   Social/Functional History   Lives With Alone   Type of Home Apartment   Home Layout Two level   Home Access Stairs to enter with rails   Entrance Stairs - Number of Steps 12 SELIN   Entrance Stairs - Rails Right   Bathroom Shower/Tub Tub/Shower unit;Curtain   Bathroom Toilet Standard   Bathroom Accessibility Not accessible   Receives Help From Family   ADL Assistance Independent   Homemaking Assistance Independent   Homemaking Responsibilities Yes   Ambulation Assistance Independent   Transfer Assistance Independent   Active  No   Patient's  Info needs a vehicle, pt uses public transportation and cabs   Mode of Transportation Cab   Occupation Full time employment   Type of Occupation [employer]   Discharge [address]  (duplex, lives upper level)   Current Services Prior To Admission None   Potential Assistance Needed N/A   DME Ordered? No   Potential Assistance Purchasing Medications No  (fills [facility])   Patient expects to be discharged to: House   One/Two Story Residence One story   History of falls?  0   Services At/After Discharge   Services At/After Discharge None   Mode of Transport at Discharge   (cab or bus)   Condition of Participation: Discharge Planning   The Plan for Transition of Care is related to the following treatment goals: comfort, ease of breathing   The Patient and/or Patient Representative was provided with a Choice of Provider? (na)   Freedom of Choice list was provided with basic dialogue that supports the patient's individualized plan of care/goals, treatment preferences, and shares the quality data associated with the providers? No   Case Management Assessment  Initial Evaluation    Date/Time of Evaluation: 11/19/2022 6:18 PM  Assessment Completed by: Olga Scott RN    If patient is discharged prior to next notation, then this note serves as note for discharge by case management. Patient Name: Gianni Patel                   YOB: 1980  Diagnosis: Hypertensive emergency [I16.1]                   Date / Time: 11/19/2022 11:16 AM    Patient Admission Status: Inpatient   Readmission Risk (Low < 19, Mod (19-27), High > 27): Readmission Risk Score: 10.3    Current PCP: Teressa Chaves MD  PCP verified by CM? Yes    Chart Reviewed: Yes      History Provided by: Patient  Patient Orientation: Alert and Oriented    Patient Cognition: Alert    Hospitalization in the last 30 days (Readmission):  No    If yes, Readmission Assessment in CM Navigator will be completed.     Advance Directives:      Code Status: Prior   Patient's Primary Decision Maker is:        Discharge Planning:    Patient lives with:   Type of Home: (P) House (duplex, lives upper level)  Primary Care Giver: Self  Patient Support Systems include: Family Members   Current Financial resources:    Current community resources:    Current services prior to admission: (P) None            Current DME:              Type of Home Care services:       ADLS  Prior functional level: (P) Independent in ADLs/IADLs  Current functional level: (P) Independent in ADLs/IADLs    PT AM-PAC:   /24  OT AM-PAC:   /24    Family can provide assistance at DC: (P) Yes  Would you like Case Management to discuss the discharge plan with any other family members/significant others, and if so, who? (P) No  Plans to Return to Present Housing: (P) Yes  Other Identified Issues/Barriers to RETURNING to current housing: O2 needs  Potential Assistance needed at discharge: (P) N/A            Potential DME:    Patient expects to discharge to: (P) [facility] for transportation at discharge:      Financial    Payor: Chasity Kasper / Plan: [address] [place] [address] / Product Type: *No Product type* /     Does insurance require precert for SNF: Yes    Potential assistance Purchasing Medications: (P) No (fills [facility])  Meds-to-Beds request:        [address]  [address] [facility] [address]  Phone: [phone] Fax: [phone]    [address], [address] [place] [address] Ave [phone] - F [phone]  [address] [facility] [address]  Phone: [phone] Fax: [phone]    [address] [facility] [address], [address] [phone] - f [phone]  [address] [place] [address]  Phone: [phone] Fax: [phone]      Notes:    Factors facilitating achievement of predicted outcomes: Cooperative    Barriers to discharge: O2 needs    Additional Case Management Notes: will need to monitor for home O2 needs    The Plan for Transition of Care is related to the following treatment goals of Hypertensive emergency [U81.4]    IF APPLICABLE: The Patient and/or patient representative Gennaro Paniagua and his family were provided with a choice of provider and agrees with the discharge plan. Freedom of choice list with basic dialogue that supports the patient's individualized plan of care/goals and shares the quality data associated with the providers was provided to: (P)  (na)   Patient Representative Name:       The Patient and/or Patient Representative Agree with the Discharge Plan?       Jake Estevez RN  Case Management Department  Ph: [phone]

## 2022-11-19 NOTE — PROGRESS NOTES
[facility]     Emergency/Trauma Note    PATIENT NAME: Dulce Quijano    Shift date: 11/19/2022  Shift day: Saturday   Shift # 2    Room # 25/25   Name: Dulce Quijano            Age: 36 y.o. Gender: male          Yazidi: Seventh Day Victorino Alexfin of Taoism: Unknown    Trauma/Incident type:  ED Rounding  Admit Date & Time: 11/19/2022 11:16 AM  TRAUMA NAME: ED Rounding    ADVANCE DIRECTIVES IN CHART? No    NAME OF DECISION MAKER: Glen Taveras, [phone]     RELATIONSHIP OF DECISION MAKER TO PATIENT: Sister    PATIENT/EVENT DESCRIPTION:  Dulce Quijano is a 36 y.o. male who arrived via personal transport from home as a adult trauma. Patient is having difficulty breathing and is receiving breathing treatments and medicines to help ease his difficulties. Pt to be admitted to 25/25. SPIRITUAL ASSESSMENT-INTERVENTION-OUTCOME:  Patient was calm and was hoping that the breathing treatments will work. He is going to be admitted to the hospital for the treatments to continue. Patient was glad to connect to the .  prayed with the patient. Patient is looking forward to getting upstairs so he can drink something. PATIENT BELONGINGS:  No belongings noted    ANY BELONGINGS OF SIGNIFICANT VALUE NOTED:  None noted. REGISTRATION STAFF NOTIFIED? Yes      WHAT IS YOUR SPIRITUAL CARE PLAN FOR THIS PATIENT?:   Follow-up as needed. Electronically signed by Keenan Jimenez, on 11/19/2022 at 5:23 PM.  [facility]  [phone]     11/19/22 1720   Encounter Summary   Service Provided For: Patient   Referral/Consult From: [facility] Family members   Last Encounter  11/19/22   Complexity of Encounter Moderate   Begin Time 1715   End Time  1720   Total Time Calculated 5 min   Assessment/Intervention/Outcome   Assessment Calm; Concerns with suffering;Coping; Impaired resilience   Intervention Active listening;Discussed belief system/Adventist practices/sheeba;Discussed illness injury and its impact;Prayer (assurance of)/[place];Sustaining Presence/Ministry of presence   Outcome Connection/Belonging;Coping;Encouraged;Engaged in conversation;Expressed Gratitude

## 2022-11-19 NOTE — LETTER
[facility]- Stepdown  [address] [address]  Phone: [phone]    No name on file. November 23, 2022     Patient: Malia Casey   YOB: 1980   Date of Visit: 11/19/2022       To Whom It May Concern: It is my medical opinion that Malia Casey may return to work on November 28, 2022. If you have any questions or concerns, please don't hesitate to call. Sincerely,        Hudson Barnes.  Niles Saul   Resident Physician

## 2022-11-19 NOTE — ED NOTES
The following labs were labeled with appropriate pt sticker and tubed to lab:     [] Blue     [] Lavender   [] on ice  [] Green/yellow  [] Green/black [] on ice  [] Achilles [place]  [] on ice  [] Yellow  [] Red  [] Type/ Screen  [] ABG  [] VBG    [x] COVID-19 swab    [] Rapid  [] PCR  [] Flu swab  [] Peds Viral Panel     [] Urine Sample  [] Pelvic Cultures  [] Blood Cultures  [] X 2  [] STREP Cultures       Glen Dodson RN  11/19/22 8707

## 2022-11-19 NOTE — ED NOTES
Report received from [facility]. Writer assumed care. Pt maintained on BIPAP, awaiting ct results and consult from vascular/ct surgery. Medications being titrated for -160 and HR 60 or below.       Roxanna Carrizales, RN  11/19/22 6278

## 2022-11-19 NOTE — ACP (ADVANCE CARE PLANNING)
Advance Care Planning     Advance Care Planning Clinical Specialist  Conversation Note      Date of ACP Conversation: 11/19/2022    Conversation Conducted with: Patient with Decision Making Capacity    ACP Clinical Specialist: Christian Dickerson RN    Pt has DNR CCA paperwork on file from 2021, verified and pt would like to remain a DNR CCA status, Dr Erika Sheehan perfect served to complete paperwork    Is own decision maker    Healthcare Decision Maker:     Current Designated Healthcare Decision Maker:     Click here to [address] including section of the Healthcare Decision Maker Relationship (ie \"Primary\")  Today we documented Decision Maker(s) consistent with Legal Next of Kin hierarchy. Care Preferences    Hospitalization: \"If your health worsens and it becomes clear that your chance of recovery is unlikely, what would your preference be regarding hospitalization? \"    Choice:  [] The patient wants hospitalization. [] The patient prefers comfort-focused treatment without hospitalization. Ventilation: \"If you were in your present state of health and suddenly became very ill and were unable to breathe on your own, what would your preference be about the use of a ventilator (breathing machine) if it were available to you? \"      If the patient would desire the use of ventilator (breathing machine), answer \"yes\". If not, \"no\": no    \"If your health worsens and it becomes clear that your chance of recovery is unlikely, what would your preference be about the use of a ventilator (breathing machine) if it were available to you? \"     Would the patient desire the use of ventilator (breathing machine)?: No      Resuscitation  \"CPR works best to restart the heart when there is a sudden event, like a heart attack, in someone who is otherwise healthy. Unfortunately, CPR does not typically restart the heart for people who have serious health conditions or who are very sick. \"    \"In the event your heart stopped as a result of an underlying serious health condition, would you want attempts to be made to restart your heart (answer \"yes\" for attempt to resuscitate) or would you prefer a natural death (answer \"no\" for do not attempt to resuscitate)? \" no        Yes   [] No   Educated Patient / Glennette Cecilia regarding differences between Advance Directives and portable DNR orders.     Length of ACP Conversation in minutes:  5    Conversation Outcomes:  [x] ACP discussion completed  [] Existing advance directive reviewed with patient; no changes to patient's previously recorded wishes  [] New Advance Directive completed  [] Portable Do Not Rescitate prepared for Provider review and signature  [] POLST/POST/MOLST/MOST prepared for Provider review and signature      Follow-up plan:    [x] Schedule follow-up conversation to continue planning  [] Referred individual to Provider for additional questions/concerns   [] Advised patient/agent/surrogate to review completed ACP document and update if needed with changes in condition, patient preferences or care setting    [] This note routed to one or more involved healthcare providers

## 2022-11-19 NOTE — ED NOTES
Spoke with ICU resident. Ok to d/c BIPAP, pt removed from BIPAP. Esmolol drip discontinued. Pt is able to eat and drink, no surgical interventions planned. Keep blood pressure 901-196 systolic. Pt taking sips of water without complications.       Ferd Landau, RN  11/19/22 3456

## 2022-11-19 NOTE — ED NOTES
The following labs were labeled with appropriate pt sticker and tubed to lab:     [x] Blue     [x] Lavender   [] on ice  [x] Green/yellow  [] Green/black [] on ice  [] Achilles [place]  [] on ice  [] Yellow  [] Red  [] Type/ Screen  [] ABG  [x] VBG    [] COVID-19 swab    [] Rapid  [] PCR  [] Flu swab  [] Peds Viral Panel     [] Urine Sample  [] Pelvic Cultures  [] Blood Cultures  [] X 2  [] STREP Cultures         Tracey Biggs RN  11/19/22 2125

## 2022-11-19 NOTE — PROGRESS NOTES
[address] Pharmacokinetic Monitoring Service - Vancomycin     Fabiola Mariscal is a 36 y.o. male starting on vancomycin therapy for sepsis. Pharmacy consulted by Dr. Noreen Gomez for monitoring and adjustment. Target Concentration: Dosing based on anticipated concentration <15 mg/L due to renal impairment/insufficiency    Additional Antimicrobials: Piperacillin. tazobactam    Pertinent Laboratory Values: Wt Readings from Last 1 Encounters:   11/19/22 300 lb (136.1 kg)     Temp Readings from Last 1 Encounters:   11/19/22 96.8 °F (36 °C) (Oral)     Estimated Creatinine Clearance: 60 mL/min (A) (based on SCr of 2.44 mg/dL (H)). Recent Labs     11/19/22  1145   CREATININE 2.44*   WBC 9.1     Pertinent Cultures:  Culture Date Source Results   Pending     MRSA Nasal Swab: N/A. Non-respiratory infection. Scr at 2.44 which appears to be around baseline. Critically ill requiring BiPAP, possible pneumonia. Will dose by levels for now - has not received vanco before and unable to determine appropriate regimen with elevated baseline Scr.     Plan:  Concentration-guided dosing due to renal impairment/insufficiency  Start vancomycin 2000 mg x 1  Renal labs as indicated   Vancomycin concentration ordered for 11/20 @ AM labs   Pharmacy will continue to monitor patient and adjust therapy as indicated    Thank you for the consult,  EMANUEL Lomeli [facility]  11/19/2022 2:40 PM

## 2022-11-19 NOTE — ED NOTES
Urine sample collected from pt,  labeled and sent to lab for testing.       Celeste Richard RN  11/19/22 3960

## 2022-11-19 NOTE — ED NOTES
The following labs were labeled with appropriate pt sticker and tubed to lab:     [] Blue     [] Lavender   [] on ice  [] Green/yellow  [x] Green/black [] on ice  [] Scherrie Flattery  [] on ice  [] Yellow  [] Red  [] Type/ Screen  [] ABG  [] VBG    [] COVID-19 swab    [] Rapid  [] PCR  [] Flu swab  [] Peds Viral Panel     [] Urine Sample  [] Pelvic Cultures  [x] Blood Cultures  [x] X 2  [] STREP Cultures       Renny Carcamo RN  11/19/22 4911

## 2022-11-19 NOTE — ED NOTES
Pt asking if he is able to come off BIPAP and have a drink a water. Pt is alert, oriented, speaking in full, complete sentences. Dr. Samria López checking with ICU and will updated.       Az Corona RN  11/19/22 3652

## 2022-11-19 NOTE — H&P
Critical Care - History and Physical Examination    Patient's name:  Jaylin Wise  Medical Record Number: [id number]  Patient's account/billing number: [de-identified]  Patient's YOB: 1980  Age: 36 y.o. Date of Admission: 11/19/2022 11:16 AM  Date of History and Physical Examination: 11/19/2022    Primary Care Physician: Celestina Wade MD  Attending Physician: Sadia Hair MD     Code Status: Prior    Chief complaint:   Chief Complaint   Patient presents with    Shortness of Breath    Headache    Dysphagia       HISTORY OF PRESENT ILLNESS:   Jaylin Wise is a 36 y.o. male presented severe chest pain, shortness of breath, headache, difficulty swallowing. Initial vitals demonstrated severe hypertension with systolic 894. He is currently on norvasc 10 mg, carvedilol 25 mg, clonidine 0.1 mg, hydralazine 100mg. Patient was recently admitted for hypertensive urgency. He also describes 1-2 weeks of viral type symptoms. Chest pain is described as pressure, headache has been ongoing for about one month. CXR in the ER demonstrated widened mediastinum. Esmolol was initiated for concerns of aortic dissection in the setting of sudden chest pain and hypertension while awaiting CTA. Imaging demonstrated 4.4 cm dilation of the ascending thoracic aorta. D dimer was elevated, no pulmonary embolism. Multifocal ground glass opacities and few interlobular and septal opacities seen. Patient met SIRS criteria and vancomycin and zosyn started for broad coverage. Patient was also started on cardene and esmolol. CT surgery was consulted. Commend getting echocardiogram  Cardiology was also consulted for ST changes. No Concern for STEMI.     Past Medical History:        Diagnosis Date    DEVYN (acute kidney injury) ([facility] [address] 10/9/2021    Bipolar 1 disorder ([facility] [address] 2/26/2015    Bronchitis     Bronchitis     Hypertension     Moderate persistent asthma 2/26/2015    Obesity        Past Surgical History:  No past surgical history on file. Allergies:    No Known Allergies      Home Meds:   Prior to Admission medications    Medication Sig Start Date End Date Taking? Authorizing Provider   cloNIDine (CATAPRES) 0.1 MG tablet Take 1 tablet by mouth 3 times daily 11/10/22   Declan Pereira MD   doxazosin (CARDURA) 2 MG tablet Take 1 tablet by mouth at bedtime 11/10/22   Declan Pereira MD   hydrALAZINE (APRESOLINE) 100 MG tablet Take 1 tablet by mouth every 8 hours 11/10/22   Declan Pereira MD   spironolactone (ALDACTONE) 25 MG tablet Take 1 tablet by mouth in the morning and 1 tablet in the evening. 11/10/22   Pablo Runner, MD   carvedilol (COREG) 25 MG tablet Take 1 tablet by mouth 2 times daily (with meals) 10/26/22   Jenae Pike DO   amLODIPine (NORVASC) 10 MG tablet Take 1 tablet by mouth daily 10/26/22   Jenae Pike DO   Blood Pressure KIT 1 kit by Does not apply route daily 3/16/20   Amaury Delgado MD       Social History:   TOBACCO:   reports that he has never smoked. He has never used smokeless tobacco.  ETOH:   reports no history of alcohol use. DRUGS:  reports no history of drug use. OCCUPATION:      Family History:       Problem Relation Age of Onset    High Blood Pressure Mother     Diabetes Maternal Grandmother        REVIEW OF SYSTEMS (ROS):  Could not be evaluated    CONSTITUTIONAL: Denies recent fever, chills  EYES: No visual changes. NECK: No midline neck pain  RESPIRATORY: Denies shortness of breath. Denies dyspnea. CARDIAC:  + chest pain. The pain radiates to the back. GI: Denies abdominal pain Denies nausea,Denies vomiting. Denies Blood in the stool or black tarry stools. MUSCULOSKELETAL: Denies focal weakness. NEUROLOGICAL: denies headache or focal weakness. ENDOCRINE: Denies polyuria or polydipsia. Denies recent weight changes  SKIN:  Denies any rash.       Physical Exam:    Vitals: /89   Pulse 92   Temp 96.8 °F (36 °C) (Oral)   Resp 17   Ht 6' 5\" (1.956 m)   Wt 300 lb (136.1 kg)   SpO2 95%   BMI 35.57 kg/m²     Last Body weight:   Wt Readings from Last 3 Encounters:   11/19/22 300 lb (136.1 kg)   11/09/22 (!) 314 lb 2.5 oz (142.5 kg)   10/26/22 (!) 310 lb (140.6 kg)       Body Mass Index : Body mass index is 35.57 kg/m². PHYSICAL EXAMINATION :  Constitutional: Appears well, no distress  EENT: PERRLA, EOMI, sclera clear, anicteric, oropharynx clear, no lesions, neck supple with midline trachea. Neck: Supple, symmetrical, trachea midline, no adenopathy, thyroid symmetric, no jvd skin normal  Respiratory: clear to auscultation, no wheezes or rales and unlabored breathing. No intercostal tenderness  Cardiovascular: regular rate and rhythm, normal S1, S2, no murmur noted and 2+ pulses throughout  Abdomen: soft, nontender, nondistended, no masses or organomegaly  Extremities:  peripheral pulses normal, no pedal edema, no clubbing or cyanosis    MEDICATIONS:  Scheduled Meds:   sodium chloride  500 mL IntraVENous Once    piperacillin-tazobactam  3,375 mg IntraVENous Once    vancomycin (VANCOCIN) intermittent dosing (placeholder)   Other RX Placeholder    vancomycin  15 mg/kg IntraVENous Once       Continuous Infusions:   esmolol 300 mcg/kg/min (11/19/22 1403)    niCARdipine 7.5 mg/hr (11/19/22 1515)       PRN Meds:        SUPPORT DEVICES: [] Ventilator [] BIPAP  [] Nasal Cannula [] Room Air    VENT SETTINGS (Comprehensive) (if applicable):  none     Additional Respiratory Assessments  Heart Rate: 92  Resp: 17  SpO2: 95 %  Lab Results   Component Value Date/Time    MODE Bi-Level Ventilation 10/09/2021 08:26 PM       LABS:   Arterial Blood Gas result:  pH 7.38; pCO2 32.4; pO2 158; HCO3 18.7; BE 5.2; %O2 Sat 98.9.   No results found for: PH, PCO2, PO2, HCO3, O2SAT    CBC:   Recent Labs     11/19/22  1145   WBC 9.1   HGB 10.3*        BMP:    Recent Labs     11/19/22  1145   *   K 4.2      CO2 18*   BUN 44* CREATININE 2.44*   GLUCOSE 106*     S. Calcium:  Recent Labs     11/19/22  1145   CALCIUM 9.0     S. Ionized Calcium:No results for input(s): IONCA in the last 72 hours. S. Magnesium:No results for input(s): MG in the last 72 hours. S. Phosphorus:No results for input(s): PHOS in the last 72 hours. S. Glucose:No results for input(s): POCGLU in the last 72 hours. Glycosylated hemoglobin A1C: No results for input(s): LABA1C in the last 72 hours. INR: No results for input(s): INR in the last 72 hours. Hepatic functions: No results for input(s): ALKPHOS, ALT, AST, PROT, BILITOT, BILIDIR, LABALBU in the last 72 hours. Pancreatic functions:No results for input(s): LACTA, AMYLASE in the last 72 hours. S. Lactic Acid: No results for input(s): LACTA in the last 72 hours. Cardiac enzymes:No results for input(s): CKTOTAL, CKMB, CKMBINDEX, TROPONINI in the last 72 hours. BNP:No results for input(s): BNP in the last 72 hours. Lipid profile: No results for input(s): CHOL, TRIG, HDL, LDL, LDLCALC in the last 72 hours. Blood Gases: No results found for: PH, PCO2, PO2, HCO3, O2SAT  Thyroid functions:   Lab Results   Component Value Date/Time    TSH 1.27 11/19/2022 12:47 PM        Urinalysis:   No results for input(s): COLORU, PHUR, LABCAST, WBCUA, RBCUA, MUCUS, TRICHOMONAS, YEAST, BACTERIA, CLARITYU, SPECGRAV, LEUKOCYTESUR, UROBILINOGEN, Ebbie Rattler in the last 72 hours. Invalid input(s): NITRATE, [address]    Microbiology:  Urine Culture:  No components found for: CURINE  Blood Culture:  No components found for: CBLOOD, CFUNGUSBL    RADIOLOGY:  CT ABDOMEN PELVIS WO CONTRAST Additional Contrast? None   Final Result   1. CHEST: No pulmonary embolus is identified. 2. Multifocal ground-glass opacities and few interlobular and septal   opacities. Trace pleural effusions are noted. Pulmonary edema versus   pneumonia.   Atypical pneumonia is considered in the differential, such as   viral, for instance influenza. Interestingly, there was a similar, more   prominent presentation with ground-glass and alveolar opacities on 10/09/2021.   3. Cardiomegaly. Ectasia of the ascending thoracic aorta, measuring 44 mm in   diameter. 4. ABDOMEN PELVIS: No acute abdominopelvic abnormality. CT CHEST PULMONARY EMBOLISM W CONTRAST   Final Result   1. CHEST: No pulmonary embolus is identified. 2. Multifocal ground-glass opacities and few interlobular and septal   opacities. Trace pleural effusions are noted. Pulmonary edema versus   pneumonia. Atypical pneumonia is considered in the differential, such as   viral, for instance influenza. Interestingly, there was a similar, more   prominent presentation with ground-glass and alveolar opacities on 10/09/2021.   3. Cardiomegaly. Ectasia of the ascending thoracic aorta, measuring 44 mm in   diameter. 4. ABDOMEN PELVIS: No acute abdominopelvic abnormality. XR CHEST PORTABLE   Final Result   Findings suggest congestive heart failure             CARDIOLOGY:  Recent Cardiac Catheterization summary:   No results found for this or any previous visit. Electrocardiogram:   Sins tachycardia   ST elevation     Last Echocardiogram findings:   No results found for this or any previous visit. No results found for this or any previous visit. Assessment and Plan     Patient Active Problem List   Diagnosis    Moderate persistent asthma    Bipolar 1 disorder (HCC)    Essential hypertension    Obesity, morbid, BMI 40.0-49.9 ([address]    Need for prophylactic vaccination with combined diphtheria-tetanus-pertussis (DTP) vaccine    DEVYN (acute kidney injury) ([facility] [address]    Hypoxia    Troponin level elevated    Obesity (BMI 30-39. 9)    Sleep-related breathing disorder    Hypertensive emergency       PLAN/MEDICAL DECISION MAKING:    Hypertensive emergency  -Currently on Cardene and esmolol gtt.   -At-at home he takes Aldactone, Coreg 25 twice daily, Norvasc, clonidine and hydralazine  -Wean down on esmolol. Goal systolic 008 mmhg  -Need secondary work-up including duplex scan kidney,. Renin and angiotensin done in last admission     Elevated troponin  -Likely secondary to NSTEMI type II     History of diastolic heart failure  -Received 1 dose of Lasix  -Follow-up on echo    Community-acquired pneumonia  -We will start him on azithromycin and ceftriaxone  -CTA chest negative for PE. However have infiltrate in right lower and middle  -Received 1 dose of vancomycin            CODE STATUS: Prior    DISPOSITION:  [x] To remain ICU: yes  [] OK for out of ICU from [facility] MD Varsha  Internal Medicine Resident PGY [place]   11/19/2022, 5:28 PM        Attending Physician Statement  I have discussed the care of Sadaf Ambriz, including pertinent history and exam findings with the resident. I have reviewed the key elements of all parts of the encounter with the resident. I have seen and examined the patient with the resident. I agree with the assessment and plan and status of the problem list as documented. Patient was seen in the emergency room for evaluation for admission to medical ICU because of hypertensive emergency. Patient has history of difficult to control hypertension was recently admitted to [facility] had plasma renin activity done apparently outpatient renal duplex previous ultrasound of the kidneys were. Echocardiogram previously shows diastolic dysfunction/LVH patient is on multiple medication at least 5 medication to control blood pressure apparently no history of obstructive sleep apnea ever diagnosed does have obesity.   Presented to emergency room with chest pain shortness of breath headache found to have systolic blood pressure of 260 to 270 initial concern for aneurysm/dissection because of the chest pain and hypertension CTA chest was negative for dissection or pulm embolism showed 4.4 cm ascending aneurysm CTA abdomen was negative. Patient has history of chronic kidney disease with creatinine of around 2.5 to 2.6 usually his creatinine was 2.44. CT surgery was consulted and cardiology was consulted as there was concern of anterior ST elevation cardiology did not think that patient has ST elevation troponin was 80. Patient was started on esmolol drip and then Cardene was added systolic blood pressure dropped to 130 patient was also fatigued at that time decreased mentation slightly and then Cardene was slowly decreased. Patient later required BiPAP therapy as patient was tachypneic apparently not hypoxic though arterial blood gas shows normal pH 7.38 PCO2 of 32 PO2 was 150 bicarbonate of 19. CT head was reported  Chest x-ray was consistent with mild congestive changes. CT chest showed patchy areas of infiltrate opacity mostly in the right upper lobe and some in the right mid lower lung. Pulmonary edema versus atypical pneumonia likely pulmonary edema secondary to hypertensive emergency. Hypertensive emergency. Difficult to control hypertension. Acute respiratory failure. Obesity. Probable obstructive sleep apnea. Cardiology and CT surgery was consulted from the emergency room. Will wean off esmolol drip as long as his heart rate is less than 100. Will continue with Cardene drip and adjust to keep systolic blood pressure above 140. Resume his home medication for hypertension and try to wean off Cardene drip. Patient will need work-up for secondary hypertension and renal duplex. Continue with BiPAP at current setting. Will get flu and COVID rapid testing. Will get respiratory molecular panel. Rocephin and Zithromax. Urine for Legionella antigen. Monitor urine output and renal function. Patient recently had an echocardiogram done on 11/8/2022 which shows moderate LVH. Avoid IV fluid bolus. Discussed with ER nursing staff, treatment and plan discussed.   Discussed with ER resident    Total critical care time caring for this patient with life threatening, unstable organ failure, including direct patient contact, management of life support systems, review of data including imaging and labs, discussions with other team members and physicians at least 55 min so far today, excluding procedures. Please note that this chart was generated using voice recognition Dragon dictation software. Although every effort was made to ensure the accuracy of this automated transcription, some errors in transcription may have occurred.      Caron Primrose, MD  11/19/2022 7:51 PM

## 2022-11-19 NOTE — CONSULTS
Luis Wero / HISTORY AND PHYSICAL EXAMINATION            Date:   11/19/2022  Patient name:  [facility]  Date of admission:  11/19/2022 11:16 AM  MRN:   [id number]  Account:  [de-identified]  YOB: 1980  PCP:    Amaury Delgado MD  Room:   25/25  Code Status:    Prior    Physician Requesting Consult: Swathi Campa MD    Reason for Consult: 4.4 cm dilation of the ascending thoracic aorta. Chief Complaint:     Chief Complaint   Patient presents with    Shortness of Breath    Headache    Dysphagia       History Obtained From:     Patient    History of Present Illness:     [facility] is a 36 y.o. male with PMH significant for DEVYN, Bipolar 1 disorder, Bronchitis, Uncontrolled HTN, Asthma and Obesity presented to the ED today with severe chest pain, shortness of breath, headache and difficulty swallowing. Chest pain is described as pressure like in nature with no radiation. Patient denies any aggravating or relieving factors. Patient states that his headache has been ongoing for about one month. Initial vitals demonstrated severe hypertension with a systolic BP of 928. Patients home medications consist of Clonidine 0.1 mg TID, Doxazosin 2 mg qhs, Hydralazine 100 mg q 8 hrs, Spironolactone 25 mg BID, Carvedilol 25 mg BID and Amlodipine 10 mg QD Patient was recently admitted to [facility] for similar complaints, had a hypertensive urgency secondary to not taking his home medications. Patient admits that he does not normally take his BP at home. He also describes 1-2 weeks of viral type symptoms. CXR in the ER demonstrated a widened mediastinum. Esmolol was initiated for concerns of aortic dissection in the setting of sudden chest pain and hypertension while awaiting CTA. Patient was also started on Cardene and Esmolol drips for BP control. Of note, Cardiology was consulted for ST changes.  No concern for STEMI.    CTA Chest on 11/19/22 revealed:    Impression   1. CHEST: No pulmonary embolus is identified. 2. Multifocal ground-glass opacities and few interlobular and septal   opacities. Trace pleural effusions are noted. Pulmonary edema versus   pneumonia. Atypical pneumonia is considered in the differential, such as   viral, for instance influenza. Interestingly, there was a similar, more   prominent presentation with ground-glass and alveolar opacities on 10/09/2021.   3. Cardiomegaly. Ectasia of the ascending thoracic aorta, measuring 44 mm in   diameter. 4. ABDOMEN PELVIS: No acute abdominopelvic abnormality. TTE on 11/9/22 at [facility] revealed:     Summary  Contrast was utilized on this technically difficult study. Moderate left ventricular hypertrophy. Global left ventricular systolic  function is normal. Estimated ejection fraction is > 55%. Left atrium is mildly dilated. No significant valvular abnormalities noted. No pericardial effusion seen. IVC not well visualized. E/E' average = 22. Dr. Rodrigo Rueda from Cardiothoracic Surgery was consulted for CTA Chest finding of ectasis of the ascending thoracic aorta, measuring 4.4 cm in diameter. Past Medical History:     Past Medical History:   Diagnosis Date    DEVYN (acute kidney injury) ([facility] [address] 10/9/2021    Bipolar 1 disorder ([facility] [address] 2/26/2015    Bronchitis     Bronchitis     Hypertension     Moderate persistent asthma 2/26/2015    Obesity         Past Surgical History:     No past surgical history on file. Medications Prior to Admission:     Prior to Admission medications    Medication Sig Start Date End Date Taking?  Authorizing Provider   cloNIDine (CATAPRES) 0.1 MG tablet Take 1 tablet by mouth 3 times daily 11/10/22   Malia Pereira MD   doxazosin (CARDURA) 2 MG tablet Take 1 tablet by mouth at bedtime 11/10/22   Malia Pereira MD   hydrALAZINE (APRESOLINE) 100 MG tablet Take 1 tablet by mouth anxiety    Physical Exam:     BP (!) 153/103   Pulse (!) 105   Temp 96.8 °F (36 °C) (Oral)   Resp 20   Ht 6' 5\" (1.956 m)   Wt 300 lb (136.1 kg)   SpO2 100%   BMI 35.57 kg/m²   Temp (24hrs), Av.8 °F (36 °C), Min:96.8 °F (36 °C), Max:96.8 °F (36 °C)    No results for input(s): POCGLU in the last 72 hours. No intake or output data in the 24 hours ending 22 1359    General Appearance:  Obese, alert, well appearing, and in no acute distress  Mental status: oriented to person, place, and time with normal affect  Head:  normocephalic, atraumatic. Eye: no icterus, redness, pupils equal and reactive, extraocular eye movements intact, conjunctiva clear  Mouth: mucous membranes moist  Neck: supple, no carotid bruits, thyroid not palpable  Lungs: Bilateral equal air entry, clear to ausculation, no wheezing, rales or rhonchi, normal effort  Cardiovascular: normal rate, regular rhythm, no murmur, gallop, rub.   Abdomen: Soft, nontender, nondistended, normal bowel sounds, no hepatomegaly or splenomegaly  Neurologic: There are no new focal motor or sensory deficits, normal muscle tone and bulk, no abnormal sensation, normal speech, cranial nerves II through XII grossly intact  Skin: No gross lesions, rashes, bruising or bleeding on exposed skin area  Extremities:  peripheral pulses palpable, no pedal edema or calf pain with palpation  Psych: normal affect    Investigations:      Laboratory Testing:  Recent Results (from the past 24 hour(s))   CBC with Auto Differential    Collection Time: 22 11:45 AM   Result Value Ref Range    WBC 9.1 3.5 - 11.3 k/uL    RBC 4.01 (L) 4.21 - 5.77 m/uL    Hemoglobin 10.3 (L) 13.0 - 17.0 g/dL    Hematocrit 33.5 (L) 40.7 - 50.3 %    MCV 83.5 82.6 - 102.9 fL    MCH 25.7 25.2 - 33.5 pg    MCHC 30.7 28.4 - 34.8 g/dL    RDW 14.5 (H) 11.8 - 14.4 %    Platelets 498 863 - 228 k/uL    MPV 11.9 8.1 - 13.5 fL    NRBC Automated 0.0 0.0 per 100 WBC    Seg Neutrophils 73 (H) 36 - 65 % Lymphocytes 14 (L) 24 - 43 %    Monocytes 11 3 - 12 %    Eosinophils % 1 1 - 4 %    Basophils 1 0 - 2 %    Immature Granulocytes 0 0 %    Segs Absolute 6.68 1.50 - 8.10 k/uL    Absolute Lymph # 1.26 1.10 - 3.70 k/uL    Absolute Mono # 0.97 0.10 - 1.20 k/uL    Absolute Eos # 0.09 0.00 - 0.44 k/uL    Basophils Absolute 0.06 0.00 - 0.20 k/uL    Absolute Immature Granulocyte 0.03 0.00 - 0.30 k/uL    RBC Morphology ANISOCYTOSIS PRESENT    Basic Metabolic Panel    Collection Time: 11/19/22 11:45 AM   Result Value Ref Range    Glucose 106 (H) 70 - 99 mg/dL    BUN 44 (H) 6 - 20 mg/dL    Creatinine 2.44 (H) 0.70 - 1.20 mg/dL    Est, Glom Filt Rate 33 (L) >60 mL/min/1.73m2    Calcium 9.0 8.6 - 10.4 mg/dL    Sodium 134 (L) 135 - 144 mmol/L    Potassium 4.2 3.7 - 5.3 mmol/L    Chloride 100 98 - 107 mmol/L    CO2 18 (L) 20 - 31 mmol/L    Anion Gap 16 9 - 17 mmol/L   Brain Natriuretic Peptide    Collection Time: 11/19/22 11:45 AM   Result Value Ref Range    Pro- (H) <300 pg/mL   Troponin    Collection Time: 11/19/22 11:45 AM   Result Value Ref Range    Troponin, High Sensitivity 78 (HH) 0 - 22 ng/L   Lipase    Collection Time: 11/19/22 11:45 AM   Result Value Ref Range    Lipase 220 (H) 13 - 60 U/L   D-Dimer, Quantitative    Collection Time: 11/19/22 11:45 AM   Result Value Ref Range    D-Dimer, Quant 0.99 mg/L FEU   BLOOD GAS, VENOUS    Collection Time: 11/19/22 11:45 AM   Result Value Ref Range    pH, Khanh 7.415 7.320 - 7.420    pCO2, Khanh 30.2 (L) 39 - 55 mm Hg    pO2, Khanh 109.0 (H) 30 - 50 mm Hg    HCO3, Venous 19.0 (L) 24 - 30 mmol/L    Negative Base Excess, Khanh 4.2 (H) 0.0 - 2.0 mmol/L    O2 Sat, Khanh 96.9 (H) 60.0 - 85.0 %    Carboxyhemoglobin 1.7 0 - 5 %    Pt Temp 37.0     FIO2 Unknown    Salicylate    Collection Time: 11/19/22 12:47 PM   Result Value Ref Range    Salicylate Lvl <1 (L) 3 - 10 mg/dL   TSH with Reflex    Collection Time: 11/19/22 12:47 PM   Result Value Ref Range    TSH 1.27 0.30 - 5.00 uIU/mL   BLOOD GAS, VENOUS    Collection Time: 11/19/22  1:33 PM   Result Value Ref Range    pH, Khanh 7.380 7.320 - 7.420    pCO2, Khanh 32.4 (L) 39 - 55 mm Hg    pO2, Khanh 158.0 (H) 30 - 50 mm Hg    HCO3, Venous 18.7 (L) 24 - 30 mmol/L    Negative Base Excess, Khanh 5.2 (H) 0.0 - 2.0 mmol/L    O2 Sat, Khanh 98.9 (H) 60.0 - 85.0 %    Carboxyhemoglobin 1.1 0 - 5 %    Pt Temp 37.0     FIO2 Unknown        Imaging/Diagonstics:  CT ABDOMEN PELVIS WO CONTRAST Additional Contrast? None    Result Date: 11/19/2022  1. CHEST: No pulmonary embolus is identified. 2. Multifocal ground-glass opacities and few interlobular and septal opacities. Trace pleural effusions are noted. Pulmonary edema versus pneumonia. Atypical pneumonia is considered in the differential, such as viral, for instance influenza. Interestingly, there was a similar, more prominent presentation with ground-glass and alveolar opacities on 10/09/2021. 3. Cardiomegaly. Ectasia of the ascending thoracic aorta, measuring 44 mm in diameter. 4. ABDOMEN PELVIS: No acute abdominopelvic abnormality. XR CHEST PORTABLE    Result Date: 11/19/2022  Findings suggest congestive heart failure     CT CHEST PULMONARY EMBOLISM W CONTRAST    Result Date: 11/19/2022  1. CHEST: No pulmonary embolus is identified. 2. Multifocal ground-glass opacities and few interlobular and septal opacities. Trace pleural effusions are noted. Pulmonary edema versus pneumonia. Atypical pneumonia is considered in the differential, such as viral, for instance influenza. Interestingly, there was a similar, more prominent presentation with ground-glass and alveolar opacities on 10/09/2021. 3. Cardiomegaly. Ectasia of the ascending thoracic aorta, measuring 44 mm in diameter. 4. ABDOMEN PELVIS: No acute abdominopelvic abnormality. Assessment :      In summary, Mr. Martha Castellon is a 36y.o. year-old male with PMH significant for DEVYN, Bipolar 1 disorder, Bronchitis, Uncontrolled HTN, Asthma and Obesity presented to the ED today with severe chest pain, shortness of breath, headache and difficulty swallowing. Chest pain is described as pressure like in nature with no radiation. Patient denies any aggravating or relieving factors. Patient states that his headache has been ongoing for about one month. Initial vitals demonstrated severe hypertension with a systolic BP of 450. Patients home medications consist of Clonidine 0.1 mg TID, Doxazosin 2 mg qhs, Hydralazine 100 mg q 8 hrs, Spironolactone 25 mg BID, Carvedilol 25 mg BID and Amlodipine 10 mg QD Patient was recently admitted to [place] for similar complaints, had a hypertensive urgency secondary to not taking his home medications. Patient admits that he does not normally take his BP at home. He also describes 1-2 weeks of viral type symptoms. CXR in the ER demonstrated a widened mediastinum. Esmolol was initiated for concerns of aortic dissection in the setting of sudden chest pain and hypertension while awaiting CTA. Patient was also started on Cardene and Esmolol drips for BP control. Of note, Cardiology was consulted for ST changes. No concern for STEMI. CTA Chest on 11/19/22 revealed:    Impression   1. CHEST: No pulmonary embolus is identified. 2. Multifocal ground-glass opacities and few interlobular and septal   opacities. Trace pleural effusions are noted. Pulmonary edema versus   pneumonia. Atypical pneumonia is considered in the differential, such as   viral, for instance influenza. Interestingly, there was a similar, more   prominent presentation with ground-glass and alveolar opacities on 10/09/2021.   3. Cardiomegaly. Ectasia of the ascending thoracic aorta, measuring 44 mm in   diameter. 4. ABDOMEN PELVIS: No acute abdominopelvic abnormality. TTE on 11/9/22 at [facility] revealed:     Summary  Contrast was utilized on this technically difficult study. Moderate left ventricular hypertrophy.  Global left ventricular systolic  function is normal. Estimated ejection fraction is > 55%. Left atrium is mildly dilated. No significant valvular abnormalities noted. No pericardial effusion seen. IVC not well visualized. E/E' average = 22. Dr. Hanson Doctor from Cardiothoracic Surgery was consulted for CTA Chest finding of ectasis of the ascending thoracic aorta, measuring 4.4 cm in diameter. Plan:       Recommendation:  I believe Mr. Navi Cunha would benefit optimally from     Strict BP control  Monitor BP daily at home   Diet modification for weight loss  Follow-up with a Cardiologist for modification of anti-hypertensive medications to maintain strict BP control  Follow-up with Dr. Dennis Marion in the office in 3-6 months for a repeat CT Chest w/o contrast to assess the ascending thoracic aorta         Zoe Primrose, MBA, RAOUL    11/19/2022  1:59 PM    Attending Physician Statement:     I have discussed the care of this patient , including pertinent history and exam findings, with the mid-level and resident. I have seen and examined the patient and the key elements of all parts of the encounter have been performed by me. I agree with the assessment, plan and orders as documented by the Mid-level/resident, after I modified exam findings and plan of treatments, and the final version is my approved version of the assessment.

## 2022-11-19 NOTE — ED NOTES
Pt sitting up on cot, watching tv. Pt maintained on BIPAP, medication infusing. Pt tolerating well. Pt is alert, oriented, answering questions. Pt states breathing has improved.       Renny Carcamo RN  11/19/22 [id number]

## 2022-11-19 NOTE — ED NOTES
The following labs were labeled with appropriate pt sticker and tubed to lab:     [] Blue     [] Lavender   [] on ice  [] Green/yellow  [] Green/black [] on ice  [] Herlinda Rochelle  [] on ice  [] Yellow  [] Red  [] Type/ Screen  [] ABG  [] VBG    [x] COVID-19 swab    [x] Rapid  [] PCR  [x] Flu swab  [] Peds Viral Panel     [] Urine Sample  [] Pelvic Cultures  [] Blood Cultures  [] X 2  [] STREP Cultures       Sanam Tovar RN  11/19/22 3603

## 2022-11-19 NOTE — ED PROVIDER NOTES
[address] [facility]     Emergency Department     Faculty Note/ Attestation      Pt Name: Jean Claude Soliman                                       MRN: [id number]  [place] 1980  Date of evaluation: 11/19/2022    Patients PCP:    María Elena Awan MD      Attestation  I performed a history and physical examination of the patient and discussed management with the resident. I reviewed the residents note and agree with the documented findings and plan of care. Any areas of disagreement are noted on the chart. I was personally present for the key portions of any procedures. I have documented in the chart those procedures where I was not present during the key portions. I have reviewed the emergency nurses triage note. I agree with the chief complaint, past medical history, past surgical history, allergies, medications, social and family history as documented unless otherwise noted below. For Physician Assistant/ Nurse Practitioner cases/documentation I have personally evaluated this patient and have completed at least one if not all key elements of the E/M (history, physical exam, and MDM). Additional findings are as noted.       Initial Screens:             Vitals:    Vitals:    11/19/22 1105 11/19/22 1112   BP:  (!) 229/126   Pulse:  (!) 121   Resp:  16   Temp:  96.8 °F (36 °C)   TempSrc:  Oral   SpO2:  98%   Weight:  300 lb (136.1 kg)   Height: 6' 5\" (1.956 m) 6' 5\" (1.956 m)       CHIEF COMPLAINT       Chief Complaint   Patient presents with    Shortness of Breath    Headache    Dysphagia             DIAGNOSTIC RESULTS             RADIOLOGY:   No orders to display         LABS:  Labs Reviewed - No data to display      EMERGENCY DEPARTMENT COURSE:     -------------------------  BP: (!) 229/126, Temp: 96.8 °F (36 °C), Heart Rate: (!) 121, Resp: 16      Comments    Hx of HTN, here with /126 and   Recent admission for HTN emerg, was off meds  States he is taking his meds recently as directed  Midsternal CP and SOB    Is tachypneic as well in the 30s to 40s, he is uncomfortable, lung sounds are diminished. No obvious crackles, he has an EF of 55% from the eighth of this month. We will start him on BiPAP, nitroglycerin to control blood pressure, get a chest x-ray, cardiac labs, and reassess    X-ray shows widened mediastinum at the superior portion, given his hypertension and tachycardia as well as risk for distress Cardene and esmolol were initiated, IV placed in left hand, 18-gauge, by attending physician. Patient was continued on BiPAP and vasoactive drips and taken to CT for CTA chest as well as abdomen pelvis. CTA shows acing aorta with a diameter of 4.4 cm, cardiothoracic surgery consulted. Patient continued to have elevated blood pressure and heart rate, aggressively uptitrating drips, also giving small dose of Versed given his continued increased respiratory rate  . Broad work-up conducted for sympathetic surge or hypertensive emergency including thyroid, urine metanephrines, urine drug screen, sepsis, PE study. Because of his significant vital sign abnormalities was found, patient was given small IV bolus as he was having significant work of breathing and BNP was elevated above baseline. Critical care was consulted, CT surgery was consulted but felt the ascending aorta did not meet threshold for intervention. At time of transfer to ICU patient had a controlled blood pressure and heart rate below 100    CRITICAL CARE: There was a high probability of clinically significant/life threatening deterioration in this patient's condition which required my urgent intervention. Total critical care time was 180 minutes. This excludes any time for separately reportable procedures.        (Please note that portions of this note were completed with a voice recognition program.  Efforts were made to edit the dictations but occasionally words are mis-transcribed.)      Nakita De La Cruz, MD,, MD  Attending Emergency Physician         Wanda Beltran MD  11/19/22 2024

## 2022-11-19 NOTE — ED TRIAGE NOTES
Patient presented to the ED today with complaints of SOB, difficulty swallowing, and headache. Patient states symptoms presented 2 days ago.

## 2022-11-19 NOTE — ED TRIAGE NOTES
Patient arrived to ED with c/o SOB, chest pain, headache, and dysphagia that has been persistent for several days. Patient reports he was seen at [facility] the other day for same symptoms. Patient reports taking his BP medications, but \"they don't seem to work\". Patient presented tachypnic, hypertensive, and tachycardic.   Dr. Sharon Capellan is at [facility] for [facility].

## 2022-11-20 ENCOUNTER — APPOINTMENT (OUTPATIENT)
Dept: GENERAL RADIOLOGY | Age: 42
DRG: 280 | End: 2022-11-20
Payer: COMMERCIAL

## 2022-11-20 PROBLEM — N18.9 CKD (CHRONIC KIDNEY DISEASE): Status: ACTIVE | Noted: 2022-11-20

## 2022-11-20 PROBLEM — I71.21 ASCENDING AORTIC ANEURYSM (HCC): Status: ACTIVE | Noted: 2022-11-20

## 2022-11-20 PROBLEM — E87.1 HYPONATREMIA: Status: ACTIVE | Noted: 2022-11-20

## 2022-11-20 LAB
ABSOLUTE EOS #: 0.07 K/UL (ref 0–0.44)
ABSOLUTE IMMATURE GRANULOCYTE: <0.03 K/UL (ref 0–0.3)
ABSOLUTE LYMPH #: 1.08 K/UL (ref 1.1–3.7)
ABSOLUTE MONO #: 0.85 K/UL (ref 0.1–1.2)
ADENOVIRUS PCR: NOT DETECTED
ANION GAP SERPL CALCULATED.3IONS-SCNC: 12 MMOL/L (ref 9–17)
BASOPHILS # BLD: 1 % (ref 0–2)
BASOPHILS ABSOLUTE: 0.05 K/UL (ref 0–0.2)
BORDETELLA PARAPERTUSSIS: NOT DETECTED
BORDETELLA PERTUSSIS PCR: NOT DETECTED
BUN BLDV-MCNC: 36 MG/DL (ref 6–20)
CALCIUM SERPL-MCNC: 8.5 MG/DL (ref 8.6–10.4)
CHLAMYDIA PNEUMONIAE BY PCR: NOT DETECTED
CHLORIDE BLD-SCNC: 99 MMOL/L (ref 98–107)
CO2: 19 MMOL/L (ref 20–31)
CORONAVIRUS 229E PCR: NOT DETECTED
CORONAVIRUS HKU1 PCR: NOT DETECTED
CORONAVIRUS NL63 PCR: NOT DETECTED
CORONAVIRUS OC43 PCR: NOT DETECTED
CREAT SERPL-MCNC: 2.52 MG/DL (ref 0.7–1.2)
EKG ATRIAL RATE: 92 BPM
EKG P AXIS: 37 DEGREES
EKG P-R INTERVAL: 194 MS
EKG Q-T INTERVAL: 390 MS
EKG QRS DURATION: 90 MS
EKG QTC CALCULATION (BAZETT): 482 MS
EKG R AXIS: 39 DEGREES
EKG T AXIS: 123 DEGREES
EKG VENTRICULAR RATE: 92 BPM
EOSINOPHILS RELATIVE PERCENT: 1 % (ref 1–4)
GFR SERPL CREATININE-BSD FRML MDRD: 32 ML/MIN/1.73M2
GLUCOSE BLD-MCNC: 108 MG/DL (ref 70–99)
HCT VFR BLD CALC: 31.5 % (ref 40.7–50.3)
HEMOGLOBIN: 9.6 G/DL (ref 13–17)
HUMAN METAPNEUMOVIRUS PCR: NOT DETECTED
IMMATURE GRANULOCYTES: 0 %
INFLUENZA A BY PCR: NOT DETECTED
INFLUENZA B BY PCR: NOT DETECTED
LEGIONELLA PNEUMOPHILIA AG, URINE: NEGATIVE
LYMPHOCYTES # BLD: 13 % (ref 24–43)
MCH RBC QN AUTO: 25.3 PG (ref 25.2–33.5)
MCHC RBC AUTO-ENTMCNC: 30.5 G/DL (ref 28.4–34.8)
MCV RBC AUTO: 83.1 FL (ref 82.6–102.9)
MONOCYTES # BLD: 10 % (ref 3–12)
MRSA, DNA, NASAL: NEGATIVE
MYCOPLASMA PNEUMONIAE PCR: NOT DETECTED
NRBC AUTOMATED: 0 PER 100 WBC
PARAINFLUENZA 1 PCR: NOT DETECTED
PARAINFLUENZA 2 PCR: NOT DETECTED
PARAINFLUENZA 3 PCR: NOT DETECTED
PARAINFLUENZA 4 PCR: NOT DETECTED
PDW BLD-RTO: 14.8 % (ref 11.8–14.4)
PLATELET # BLD: 272 K/UL (ref 138–453)
PMV BLD AUTO: 11.6 FL (ref 8.1–13.5)
POTASSIUM SERPL-SCNC: 4 MMOL/L (ref 3.7–5.3)
RBC # BLD: 3.79 M/UL (ref 4.21–5.77)
RBC # BLD: ABNORMAL 10*6/UL
RESP SYNCYTIAL VIRUS PCR: NOT DETECTED
RHINO/ENTEROVIRUS PCR: NOT DETECTED
SARS-COV-2, PCR: NOT DETECTED
SEG NEUTROPHILS: 75 % (ref 36–65)
SEGMENTED NEUTROPHILS ABSOLUTE COUNT: 6.36 K/UL (ref 1.5–8.1)
SODIUM BLD-SCNC: 130 MMOL/L (ref 135–144)
SPECIMEN DESCRIPTION: NORMAL
SPECIMEN DESCRIPTION: NORMAL
TROPONIN, HIGH SENSITIVITY: 85 NG/L (ref 0–22)
TROPONIN, HIGH SENSITIVITY: 96 NG/L (ref 0–22)
VANCOMYCIN RANDOM: 9.7 UG/ML
WBC # BLD: 8.4 K/UL (ref 3.5–11.3)

## 2022-11-20 PROCEDURE — 2500000003 HC RX 250 WO HCPCS: Performed by: STUDENT IN AN ORGANIZED HEALTH CARE EDUCATION/TRAINING PROGRAM

## 2022-11-20 PROCEDURE — 2580000003 HC RX 258: Performed by: STUDENT IN AN ORGANIZED HEALTH CARE EDUCATION/TRAINING PROGRAM

## 2022-11-20 PROCEDURE — 2580000003 HC RX 258

## 2022-11-20 PROCEDURE — 2500000003 HC RX 250 WO HCPCS

## 2022-11-20 PROCEDURE — 80048 BASIC METABOLIC PNL TOTAL CA: CPT

## 2022-11-20 PROCEDURE — 71045 X-RAY EXAM CHEST 1 VIEW: CPT

## 2022-11-20 PROCEDURE — 85025 COMPLETE CBC W/AUTO DIFF WBC: CPT

## 2022-11-20 PROCEDURE — 6370000000 HC RX 637 (ALT 250 FOR IP): Performed by: STUDENT IN AN ORGANIZED HEALTH CARE EDUCATION/TRAINING PROGRAM

## 2022-11-20 PROCEDURE — 6370000000 HC RX 637 (ALT 250 FOR IP): Performed by: HEALTH CARE PROVIDER

## 2022-11-20 PROCEDURE — 80202 ASSAY OF VANCOMYCIN: CPT

## 2022-11-20 PROCEDURE — 36415 COLL VENOUS BLD VENIPUNCTURE: CPT

## 2022-11-20 PROCEDURE — 6360000002 HC RX W HCPCS: Performed by: STUDENT IN AN ORGANIZED HEALTH CARE EDUCATION/TRAINING PROGRAM

## 2022-11-20 PROCEDURE — 93010 ELECTROCARDIOGRAM REPORT: CPT | Performed by: INTERNAL MEDICINE

## 2022-11-20 PROCEDURE — 84484 ASSAY OF TROPONIN QUANT: CPT

## 2022-11-20 PROCEDURE — 94761 N-INVAS EAR/PLS OXIMETRY MLT: CPT

## 2022-11-20 PROCEDURE — 87449 NOS EACH ORGANISM AG IA: CPT

## 2022-11-20 PROCEDURE — 99291 CRITICAL CARE FIRST HOUR: CPT | Performed by: INTERNAL MEDICINE

## 2022-11-20 PROCEDURE — 2700000000 HC OXYGEN THERAPY PER DAY

## 2022-11-20 PROCEDURE — 2060000000 HC ICU INTERMEDIATE R&B

## 2022-11-20 RX ORDER — HYDRALAZINE HYDROCHLORIDE 50 MG/1
100 TABLET, FILM COATED ORAL EVERY 8 HOURS SCHEDULED
Status: DISCONTINUED | OUTPATIENT
Start: 2022-11-20 | End: 2022-11-20

## 2022-11-20 RX ORDER — AMLODIPINE BESYLATE 10 MG/1
10 TABLET ORAL DAILY
Status: DISCONTINUED | OUTPATIENT
Start: 2022-11-20 | End: 2022-11-23 | Stop reason: HOSPADM

## 2022-11-20 RX ORDER — CARVEDILOL 25 MG/1
25 TABLET ORAL 2 TIMES DAILY WITH MEALS
Status: DISCONTINUED | OUTPATIENT
Start: 2022-11-20 | End: 2022-11-23 | Stop reason: HOSPADM

## 2022-11-20 RX ORDER — CLONIDINE HYDROCHLORIDE 0.1 MG/1
0.1 TABLET ORAL 3 TIMES DAILY
Status: DISCONTINUED | OUTPATIENT
Start: 2022-11-20 | End: 2022-11-20

## 2022-11-20 RX ORDER — CLONIDINE HYDROCHLORIDE 0.1 MG/1
0.1 TABLET ORAL DAILY
Status: DISCONTINUED | OUTPATIENT
Start: 2022-11-21 | End: 2022-11-21

## 2022-11-20 RX ORDER — HYDRALAZINE HYDROCHLORIDE 50 MG/1
50 TABLET, FILM COATED ORAL EVERY 8 HOURS SCHEDULED
Status: DISCONTINUED | OUTPATIENT
Start: 2022-11-20 | End: 2022-11-21

## 2022-11-20 RX ORDER — DOXAZOSIN 2 MG/1
2 TABLET ORAL NIGHTLY
Status: DISCONTINUED | OUTPATIENT
Start: 2022-11-20 | End: 2022-11-23 | Stop reason: HOSPADM

## 2022-11-20 RX ORDER — SODIUM CHLORIDE 9 MG/ML
INJECTION, SOLUTION INTRAVENOUS CONTINUOUS
Status: DISCONTINUED | OUTPATIENT
Start: 2022-11-20 | End: 2022-11-20

## 2022-11-20 RX ADMIN — HYDRALAZINE HYDROCHLORIDE 50 MG: 50 TABLET, FILM COATED ORAL at 21:14

## 2022-11-20 RX ADMIN — CEFTRIAXONE SODIUM 1000 MG: 10 INJECTION, POWDER, FOR SOLUTION INTRAVENOUS at 17:57

## 2022-11-20 RX ADMIN — CARVEDILOL 25 MG: 25 TABLET, FILM COATED ORAL at 09:34

## 2022-11-20 RX ADMIN — Medication 500 MG: at 19:27

## 2022-11-20 RX ADMIN — SODIUM CHLORIDE 7.5 MG/HR: 9 INJECTION, SOLUTION INTRAVENOUS at 10:01

## 2022-11-20 RX ADMIN — Medication 5 ML: at 18:03

## 2022-11-20 RX ADMIN — SODIUM CHLORIDE, PRESERVATIVE FREE 10 ML: 5 INJECTION INTRAVENOUS at 19:36

## 2022-11-20 RX ADMIN — SODIUM CHLORIDE 5 MG/HR: 9 INJECTION, SOLUTION INTRAVENOUS at 00:30

## 2022-11-20 RX ADMIN — SODIUM CHLORIDE 5 MG/HR: 9 INJECTION, SOLUTION INTRAVENOUS at 05:18

## 2022-11-20 RX ADMIN — ENOXAPARIN SODIUM 30 MG: 100 INJECTION SUBCUTANEOUS at 19:38

## 2022-11-20 RX ADMIN — SODIUM CHLORIDE: 9 INJECTION, SOLUTION INTRAVENOUS at 01:48

## 2022-11-20 RX ADMIN — AMLODIPINE BESYLATE 10 MG: 10 TABLET ORAL at 09:34

## 2022-11-20 RX ADMIN — Medication 5 ML: at 12:55

## 2022-11-20 RX ADMIN — CARVEDILOL 25 MG: 25 TABLET, FILM COATED ORAL at 17:56

## 2022-11-20 RX ADMIN — ENOXAPARIN SODIUM 30 MG: 100 INJECTION SUBCUTANEOUS at 09:04

## 2022-11-20 RX ADMIN — CLONIDINE HYDROCHLORIDE 0.1 MG: 0.1 TABLET ORAL at 09:34

## 2022-11-20 ASSESSMENT — PAIN SCALES - GENERAL
PAINLEVEL_OUTOF10: 0

## 2022-11-20 NOTE — PROGRESS NOTES
Critical Care Team - Daily Progress Note      Date and time: 11/20/2022 7:04 AM  Patient's name:  Mary Jo Pelaez  Medical Record Number: [id number]  Patient's account/billing number: [de-identified]  Patient's YOB: 1980  Age: 36 y.o. Date of Admission: 11/19/2022 11:16 AM  Length of stay during current admission: 1      Primary Care Physician: Gilberto Fritz MD  ICU Attending Physician: Dr. Tutu Spence Status: DNR-CCA    Reason for ICU admission:   Chief Complaint   Patient presents with    Shortness of Breath    Headache    Dysphagia     Mary Jo Pelaez is a 36 y.o. male presented severe chest pain, shortness of breath, headache, difficulty swallowing. Initial vitals demonstrated severe hypertension with systolic 298. He is currently on norvasc 10 mg, carvedilol 25 mg, clonidine 0.1 mg, hydralazine 100mg. Patient was recently admitted for hypertensive urgency. He also describes 1-2 weeks of viral type symptoms. Chest pain is described as pressure, headache has been ongoing for about one month. CXR in the ER demonstrated widened mediastinum. Esmolol was initiated for concerns of aortic dissection in the setting of sudden chest pain and hypertension while awaiting CTA. Imaging demonstrated 4.4 cm dilation of the ascending thoracic aorta. D dimer was elevated, no pulmonary embolism. Multifocal ground glass opacities and few interlobular and septal opacities seen. Patient met SIRS criteria and vancomycin and zosyn started for broad coverage. Patient was also started on cardene and esmolol. CT surgery was consulted. Commend getting echocardiogram  Cardiology was also consulted for ST changes. No Concern for STEMI. SUBJECTIVE:     OVERNIGHT EVENTS:           No acute events overnight. Esmolol gtt was d/c'ed around 1800 yesterday. Remains on Cardene gtt at this time at 7.5mg/hr. Systolic BP in the 005T (304-774). -107 overnight,  since admission.   Remains afebrile with Tmax 98.2 (36. 8). On 4L NC with O2 saturation at 98%. UOP: 6L / 24HR, net -2L since admit.  (S/p 1 dose of Lasix at 1432 yesterday.)    Labs this AM:  Na 130 (134) / K 4.0 (4.2)  BUN 36 ( 44) / Cr 2.52 (2.44)  GFR 32  Trop 85 (80)    WBC 8.4 (9.1)  HgB 9.6 (10.3) / Hct 33.5 (31.5)  Plts 272 (321)    AWAKE & FOLLOWING COMMANDS:  [] No   [x] Yes    CURRENT VENTILATION STATUS:     [] Ventilator  [] BIPAP  [] Nasal Cannula [x] Room Air      IF INTUBATED, ET TUBE MARKING AT LOWER LIP:       cms    SECRETIONS Amount:  [] Small [] Moderate  [] Large  [] None  Color:     [] White [] Colored  [] Bloody    SEDATION:  RAAS Score:  [] Propofol gtt  [] Versed gtt  [] Ativan gtt   [] No Sedation    PARALYZED:  [x] No    [] Yes    DIARRHEA:                [x] No                [] Yes  (C. Difficile status: [] positive                                                                                                                       [] negative                                                                                                                     [] pending)    VASOPRESSORS:  [x] No    [] Yes    If yes -   [] Levophed       [] Dopamine     [] Vasopressin       [] Dobutamine  [] Phenylephrine         [] Epinephrine    CENTRAL LINES:     [x] No   [] Yes   (Date of Insertion:   )           If yes -     [] Right IJ     [] Left IJ [] Right Femoral [] Left Femoral                   [] Right Subclavian [] Left Subclavian       SCHAFER'S CATHETER:   [] No   [] Yes  (Date of Insertion:   )     URINE OUTPUT:            [] Good   [] Low              [] Anuric      OBJECTIVE:     VITAL SIGNS:  BP (!) 153/103   Pulse (!) 105   Temp 98.2 °F (36.8 °C) (Oral)   Resp 22   Ht 6' 5\" (1.956 m)   Wt (!) 316 lb 14.4 oz (143.7 kg)   SpO2 98%   BMI 37.58 kg/m²   Tmax over 24 hours:  Temp (24hrs), Av.7 °F (36.5 °C), Min:96.8 °F (36 °C), Max:98.2 °F (36.8 °C)      Patient Vitals for the past 8 hrs:   BP Temp Temp src Pulse Resp SpO2 Weight 11/20/22 0702 (!) 153/103 -- -- (!) 105 22 98 % --   11/20/22 0700 (!) 156/107 -- -- (!) 105 21 98 % --   11/20/22 0653 (!) 142/106 -- -- (!) 103 25 99 % --   11/20/22 0645 (!) 161/100 -- -- (!) 103 22 98 % --   11/20/22 0630 (!) 161/107 -- -- (!) 105 21 96 % --   11/20/22 0615 (!) 160/96 -- -- (!) 107 21 97 % --   11/20/22 0600 (!) 153/99 98.2 °F (36.8 °C) Oral (!) 107 19 97 % --   11/20/22 0545 (!) 146/99 -- -- (!) 107 21 96 % --   11/20/22 0530 (!) 150/100 -- -- (!) 108 20 95 % --   11/20/22 0515 (!) 159/109 -- -- (!) 109 20 90 % --   11/20/22 0508 -- -- -- (!) 110 -- 92 % --   11/20/22 0500 (!) 161/107 -- -- (!) 113 29 93 % --   11/20/22 0445 (!) 150/104 -- -- (!) 106 22 91 % --   11/20/22 0430 (!) 154/114 -- -- (!) 108 20 94 % --   11/20/22 0415 (!) 146/101 -- -- (!) 108 (!) 31 96 % --   11/20/22 0400 (!) 139/100 97.9 °F (36.6 °C) Oral (!) 106 29 96 % --   11/20/22 0351 -- -- -- -- -- -- (!) 316 lb 14.4 oz (143.7 kg)   11/20/22 0345 (!) 149/106 -- -- (!) 105 27 96 % --   11/20/22 0330 (!) 149/107 -- -- (!) 102 15 96 % --   11/20/22 0315 (!) 148/103 -- -- (!) 108 (!) 31 97 % --   11/20/22 0300 (!) 154/111 -- -- (!) 105 21 97 % --   11/20/22 0245 (!) 146/105 -- -- (!) 106 20 97 % --   11/20/22 0230 (!) 138/102 -- -- (!) 106 (!) 32 96 % --   11/20/22 0215 (!) 150/103 -- -- (!) 108 (!) 32 97 % --   11/20/22 0200 (!) 151/101 98 °F (36.7 °C) Oral (!) 107 (!) 31 97 % --   11/20/22 0145 (!) 156/103 -- -- (!) 106 30 97 % --   11/20/22 0130 (!) 147/106 -- -- (!) 107 22 96 % --   11/20/22 0115 (!) 138/97 -- -- (!) 107 (!) 32 97 % --   11/20/22 0100 (!) 135/98 -- -- (!) 105 29 97 % --   11/20/22 0045 (!) 135/93 -- -- (!) 106 27 97 % --   11/20/22 0030 (!) 135/97 -- -- (!) 107 22 98 % --   11/20/22 0015 (!) 135/94 -- -- (!) 108 22 99 % --   11/20/22 0000 (!) 135/96 97.3 °F (36.3 °C) Oral (!) 106 30 95 % --   11/19/22 2345 (!) 144/100 -- -- (!) 108 21 94 % --   11/19/22 2330 (!) 147/101 -- -- (!) 113 21 96 % --   11/19/22 2315 (!) 151/95 -- -- (!) 112 (!) 34 91 % --         Intake/Output Summary (Last 24 hours) at 11/20/2022 0704  Last data filed at 11/20/2022 0641  Gross per 24 hour   Intake 3986 ml   Output 6025 ml   Net -2039 ml     Date 11/20/22 0000 - 11/20/22 2359   Shift 7011-2550 8413-3147 0661-2354 24 Hour Total   INTAKE   I.V.(mL/kg) 560.3(3.9)   560.3(3.9)   Shift Total(mL/kg) 560.3(3.9)   560.3(3.9)   OUTPUT   Urine(mL/kg/hr) 3350   3350   Shift Total(mL/kg) 3350(23.3)   3350(23.3)   Weight (kg) 143.7 143.7 143.7 143. 7     Wt Readings from Last 3 Encounters:   11/20/22 (!) 316 lb 14.4 oz (143.7 kg)   11/09/22 (!) 314 lb 2.5 oz (142.5 kg)   10/26/22 (!) 310 lb (140.6 kg)     Body mass index is 37.58 kg/m². PHYSICAL EXAM:  Constitutional: Appears well, no distress  EENT: PERRLA, EOMI, sclera clear, anicteric, oropharynx clear, no lesions, neck supple with midline trachea. Neck: Supple, symmetrical, trachea midline, no adenopathy, thyroid symmetric, no jvd skin normal  Respiratory: clear to auscultation, no wheezes or rales and unlabored breathing. No intercostal tenderness  Cardiovascular: regular rate and rhythm, normal S1, S2, no murmur noted and 2+ pulses throughout  Abdomen: soft, nontender, nondistended, no masses or organomegaly  NEUROLOGIC: Awake, alert, oriented to name, place and time. Cranial nerves II-XII are grossly intact. Motor is 5 out of 5 bilaterally. Sensory is intact.   Extremities:  peripheral pulses normal, no pedal edema, no clubbing or cyanosis  SKIN: normal coloration and turgor    Any additional physical findings:      MEDICATIONS:  Scheduled Meds:   vancomycin (VANCOCIN) intermittent dosing (placeholder)   Other RX Placeholder    cefTRIAXone (ROCEPHIN) IV  1,000 mg IntraVENous Q24H    azithromycin  500 mg IntraVENous Q24H    sodium chloride flush  5-40 mL IntraVENous 2 times per day    enoxaparin  30 mg SubCUTAneous BID     Continuous Infusions:   sodium chloride 10 mL/hr at 11/20/22 1112 niCARdipine 7.5 mg/hr (11/20/22 0641)    esmolol Stopped (11/19/22 1848)    sodium chloride       PRN Meds:   sodium chloride flush, 5-40 mL, PRN  sodium chloride, , PRN  ondansetron, 4 mg, Q8H PRN   Or  ondansetron, 4 mg, Q6H PRN  polyethylene glycol, 17 g, Daily PRN  acetaminophen, 650 mg, Q6H PRN   Or  acetaminophen, 650 mg, Q6H PRN  albuterol, 2.5 mg, Q2H PRN        SUPPORT DEVICES: [] Ventilator [] BIPAP  [x] Nasal Cannula [] Room Air       Additional Respiratory Assessments  Heart Rate: (!) 105  Resp: 22  SpO2: 98 %    ABGs:   Lab Results   Component Value Date/Time    [id number] NOT REPORTED 10/09/2021 08:26 PM    FIO2 Unknown 11/19/2022 01:33 PM     Lactic Acid: No results found for: LACTA      DATA:  Complete Blood Count:   Recent Labs     11/19/22  1145 11/20/22  0346   WBC 9.1 8.4   HGB 10.3* 9.6*   MCV 83.5 83.1    272   RBC 4.01* 3.79*   HCT 33.5* 31.5*   MCH 25.7 25.3   MCHC 30.7 30.5   RDW 14.5* 14.8*   MPV 11.9 11.6        PT/INR:    Lab Results   Component Value Date/Time    PROTIME 12.4 11/08/2022 11:16 AM    INR 0.9 11/08/2022 11:16 AM     PTT:    Lab Results   Component Value Date/Time    APTT 24.1 10/09/2021 09:37 AM       Basal Metabolic Profile:   Recent Labs     11/19/22  1145 11/20/22  0346   * 130*   K 4.2 4.0   BUN 44* 36*   CREATININE 2.44* 2.52*    99   CO2 18* 19*      Magnesium:   Lab Results   Component Value Date/Time    MG 2.1 11/08/2022 11:16 AM    MG 2.3 10/13/2021 06:33 AM    MG 2.1 10/12/2021 08:48 AM     Phosphorus:   Lab Results   Component Value Date/Time    PHOS 3.9 10/13/2021 06:33 AM    PHOS 3.4 10/12/2021 08:48 AM     S. Calcium:  Recent Labs     11/20/22  0346   CALCIUM 8.5*     S. Ionized Calcium:No results for input(s): IONCA in the last 72 hours.       Urinalysis:   Lab Results   Component Value Date/Time    NITRU NEGATIVE 11/19/2022 02:04 PM    COLORU Yellow 11/19/2022 02:04 PM    PHUR 5.5 11/19/2022 02:04 PM    WBCUA 0 TO 2 11/19/2022 02:04 PM RBCUA 0 TO 2 11/19/2022 02:04 PM    MUCUS NOT REPORTED 10/09/2021 11:55 AM    TRICHOMONAS NOT REPORTED 10/09/2021 11:55 AM    YEAST NOT REPORTED 10/09/2021 11:55 AM    BACTERIA FEW 11/19/2022 02:04 PM    SPECGRAV 1.017 11/19/2022 02:04 PM    LEUKOCYTESUR NEGATIVE 11/19/2022 02:04 PM    UROBILINOGEN Normal 11/19/2022 02:04 PM    BILIRUBINUR NEGATIVE 11/19/2022 02:04 PM    GLUCOSEU NEGATIVE 11/19/2022 02:04 PM    KETUA NEGATIVE 11/19/2022 02:04 PM    AMORPHOUS 1+ 11/19/2022 02:04 PM       CARDIAC ENZYMES: No results for input(s): CKMB, CKMBINDEX, TROPONINI in the last 72 hours. Invalid input(s): CKTOTAL;3  BNP: No results for input(s): BNP in the last 72 hours. LFTS  Recent Labs     11/19/22  1401   ALKPHOS 69   ALT 37   AST 32   BILITOT 0.3   BILIDIR 0.1   LABALBU 3.4*       AMYLASE/LIPASE/AMMONIA  Recent Labs     11/19/22  1145   LIPASE 220*       Last 3 Blood Glucose:   Recent Labs     11/19/22  1145 11/20/22  0346   GLUCOSE 106* 108*      HgBA1c:    Lab Results   Component Value Date/Time    LABA1C 5.9 03/16/2020 10:44 AM         TSH:    Lab Results   Component Value Date/Time    TSH 1.27 11/19/2022 12:47 PM     ANEMIA STUDIES  No results for input(s): LABIRON, TIBC, FERRITIN, [id number], FOLATE, OCCULTBLD in the last 72 hours. Cultures during this admission:     Blood cultures:                 [] None drawn      [] Negative             []  Positive (Details:  )  Urine Culture:                   [] None drawn      [] Negative             []  Positive (Details:  )  Sputum Culture:               [] None drawn       [] Negative             []  Positive (Details:  )   Endotracheal aspirate:     [] None drawn       [] Negative             []  Positive (Details:  )             Chest Xray (11/20/2022):    ASSESSMENT:     Hypertensive Emergency  2. Elevated troponins  3.    Community-acquired PNA    PLAN:     Neuro  - AAOx3, GCS 15  - Otherwise asymptomatic    CV  - -107 overnight,  since admission.  - Systolic BP in the 433D (076-531). - Hypertensive Emergency   - On Cardene gtt  - Elevated troponins   - Likely 2/2 type II MI  - Cont home medications   - Coreg 25 twice daily, Norvasc 10mg daily, clonidine 0.1mg TID and hydralazine 100mg TID  - F/u ECHO  - Cardiology following    Resp  - CTA chest negative for PE  - On 4L NC with O2 saturation at 98%. - Community-acquired PNA   - On azithromycin and CTX    GI  - Regular diet      - Na 130 (134) / K 4.0 (4.2)  - BUN 36 ( 44) / Cr 2.52 (2.44)  - No arnold    Heme  - HgB 9.6 (10.3) / Hct 33.5 (31.5)  - Plts 272 (321)    ID  - afebrile  - WBC 8.4 (9.1)  - Community-acquired PNA   - On azithromycin and CTX    CODE STATUS: DNR-CCA, no intubation, ok for chest compressions  DVT Ppx: Lovenox  Diet: Regular    Lino Baxter DO, M.D. Department of Internal Medicine/ Critical care  [facility] [place]             11/20/2022, 7:04 AM       Attending Physician Statement  I have discussed the care of Jaylin Wise, including pertinent history and exam findings with the resident. I have reviewed the key elements of all parts of the encounter with the resident. I have seen and examined the patient with the resident. I agree with the assessment and plan and status of the problem list as documented. Overnight events noted chart reviewed. Patient was on BiPAP from the emergency room and after he was transferred to MICU he was taken off BiPAP did not use BiPAP overnight. He had been on Cardene drip and also on esmolol which was discontinued and he is continued on Cardene drip. This morning he was started on his oral medication according to chart is on multiple medications including clonidine, Coreg, Norvasc, Cardura and according to patient he was using all the medications but presented to emergency room with systolic blood pressure of 270. Sodium is 130 today.   Creatinine is 2.52 urine output is good he apparently almost had 6 L out since admission to ER where he received Lasix 20 mg 1 dose  According to patient he had not been followed by her primary care physician a year or more was not taking medication until recently he was admitted to [facility] was resumed medication. Urine metanephrine is pending. Renal duplex was ordered. According patient he does have history of chronic cough he was told that he had bronchitis he use albuterol as needed denies concerns of asthma. Does have history of snoring at night during the daytime sleep study. Discussed with patient about will advised to use BiPAP at night     Will wean off Cardene drip. Will use pressure medication and will need to be adjusted. Follow-up on respiratory molecular panel. Continue Rocephin and Zithromax for pneumonia. Will transfer patient to stepdown/medicine will follow as pulmonary. Discussed with nursing staff, treatment and plan discussed. Discussed with respiratory therapist.    Total critical care time caring for this patient with life threatening, unstable organ failure, including direct patient contact, management of life support systems, review of data including imaging and labs, discussions with other team members and physicians at least 35 min so far today, excluding procedures. Please note that this chart was generated using voice recognition Dragon dictation software. Although every effort was made to ensure the accuracy of this automated transcription, some errors in transcription may have occurred.      Lorena Arroyo MD  11/20/2022 11:02 AM

## 2022-11-20 NOTE — ED NOTES
Pt. o2 sat noted at 88% on RA. Pt placed on 2L nc with improvement to  91%. Will continue to monitor.      Martin Mike RN  11/19/22 2011

## 2022-11-20 NOTE — PLAN OF CARE
I had a long discussion with the patient , in presence of the RN taking care of the patient. Patient's current clinical condition, laboratory and radiographic findings were discussed with the patient in detail in simple English. All questions and concerns  were addressed. Patient wants to continue the ongoing treatment but in case patient's heart stops (cardiac arrest) or the patient stops breathing (respiratory arrest), he wants chest compressions, but NO insertion of a breathing tube down patient's throat for respiratory support or other similar  resuscitative measures to be performed on the patient. He requested that if such a condition arises, the patient should be made comfortable and nature should be allowed to take its course. Patient's code status should be changed to [facility] (no intubation), and signed the [facility] order form in my presence, which was witnessed by RN, Tiffanie Marcus. Will honor Patient's wishes, and will change patient's code status to [facility] (no intubation).       Ant Huff MD,   Critical care services,  [facility])             11/19/2022, 10:06 PM

## 2022-11-20 NOTE — ED NOTES
Patient's condition reported to Butch hugo RN. All concerns answered.      Fany Adrian RN  11/19/22 9911

## 2022-11-20 NOTE — PLAN OF CARE
Problem: Discharge Planning  Goal: Discharge to home or other facility with appropriate resources  Outcome: Progressing  Flowsheets (Taken 11/20/2022 0800)  Discharge to home or other facility with appropriate resources: Identify barriers to discharge with patient and caregiver     Problem: Pain  Goal: Verbalizes/displays adequate comfort level or baseline comfort level  Outcome: Progressing     Problem: Safety - Adult  Goal: Free from fall injury  Outcome: Progressing

## 2022-11-20 NOTE — PROGRESS NOTES
Critical care team - Resident sign-out to medicine service      Date and time: 11/20/2022 11:30 AM  Patient's name:  Jean Claude Soliman  Medical Record Number: [id number]  Patient's account/billing number: [de-identified]  Patient's YOB: 1980  Age: 36 y.o. Date of Admission: 11/19/2022 11:16 AM  Length of stay during current admission: 1    Primary Care Physician: María Elena Awan MD    Code Status: DNR-CCA -- LIMITED CODE, ok for compressions, no intubation    Mode of physician to physician communication:        [] Via telephone   [] In person     Date and time of sign-out: 11/20/2022 11:30 AM    Accepting Internal Medicine resident: Dr. Jacob Padron    Accepting Medicine team: IM Team 2    Accepting team's attending: Dr. Nora Adam    Patient's current ICU Bed:  7336     Patient's assigned bed on floor:  2005        [] Med-Surg Monitored [x] Step-down       [] Psychiatry ICU       [] Psych floor     Reason for ICU admission:     Jean Claude Soliman is a 36 y.o. male presented severe chest pain, shortness of breath, headache, difficulty swallowing. Initial vitals demonstrated severe hypertension with systolic 164. He is currently on norvasc 10 mg, carvedilol 25 mg, clonidine 0.1 mg, hydralazine 100mg. Patient was recently admitted for hypertensive urgency. He also describes 1-2 weeks of viral type symptoms. Chest pain is described as pressure, headache has been ongoing for about one month. CXR in the ER demonstrated widened mediastinum. Esmolol was initiated for concerns of aortic dissection in the setting of sudden chest pain and hypertension while awaiting CTA. Imaging demonstrated 4.4 cm dilation of the ascending thoracic aorta. D dimer was elevated, no pulmonary embolism. Multifocal ground glass opacities and few interlobular and septal opacities seen. Patient met SIRS criteria and vancomycin and zosyn started for broad coverage. Patient was also started on cardene and esmolol. CT surgery was consulted. Recommend getting echocardiogram  Cardiology was also consulted for ST changes. No Concern for STEMI. ICU course summary:     11/20: Home meds resumed. Cardene gtt titrated off. Procedures during patient's ICU stay:     N/A    Current Vitals:     /89   Pulse (!) 101   Temp 98.2 °F (36.8 °C) (Oral)   Resp (!) 34   Ht 6' 5\" (1.956 m)   Wt (!) 316 lb 14.4 oz (143.7 kg)   SpO2 94%   BMI 37.58 kg/m²       Cultures:     Blood cultures:                 [] None drawn      [] Negative             []  Positive (Details: Drawn 11/19 NGTD )  Urine Culture:                   [x] None drawn      [] Negative             []  Positive (Details:  )  Sputum Culture:               [x] None drawn       [] Negative             []  Positive (Details:  )   Endotracheal aspirate:     [x] None drawn       [] Negative             []  Positive (Details:  )       Consults:     1. Cardiology    Assessment:     Patient Active Problem List    Diagnosis Date Noted    Hypertensive emergency 11/08/2022    Sleep-related breathing disorder 10/12/2021    DEVYN (acute kidney injury) ([facility] [address] 10/09/2021    Hypoxia 10/09/2021    Troponin level elevated 10/09/2021    Obesity (BMI 30-39.9) 10/09/2021    Moderate persistent asthma 02/26/2015    Bipolar 1 disorder ([facility] [address] 02/26/2015    Essential hypertension 02/26/2015    Obesity, morbid, BMI 40.0-49.9 ([facility] [address] 02/26/2015    Need for prophylactic vaccination with combined diphtheria-tetanus-pertussis (DTP) vaccine 02/26/2015       Additional assessment:    Hypertensive Emergency  Community-acquired PNA    Recommended Follow-up:     Continue to monitor BP  On Amlodipine 10mg QD, Carvedilol 25mg BID, Catapres 0.1mg TID, Hydralazine 100mg TID at home  Hydralazine decreased to 50mg TID  Catapres 0.1mg decreased to once daily  Home Cardura on hold  ? Noncompliance -- may need to continue to deescalate therapy based on BP response  CAP -- on CTX and Azithromycin  ECHO pending  Renal Arterial Duplex Pending for secondary HTN workup    Above mentioned assessment and plan was discussed by me with the admitting medicine resident. The medicine team assigned to the patient by medicine admitting resident will be following up the patient from now onwards on the floor. Dilia Carlson DO, D.O.    Critical care resident  Department of Internal Medicine/ Critical care  [address] [place] [address])             11/20/2022, 11:30 AM

## 2022-11-20 NOTE — ED NOTES
No admit orders received at this time.    Report given to Zoey WINSLOW  Report given to Virgilio Meza RN  11/19/22 1914

## 2022-11-20 NOTE — CONSULTS
[facility]   Consult Note                 Date:   11/20/2022  Patient name: Pat Schmidt  Date of admission:  11/19/2022 11:16 AM  MRN:   [id number]  YOB: 1980    Reason for Consult:      CHIEF COMPLAINT:      History Obtained From:  Patient     HISTORY OF PRESENT ILLNESS:    The patient is a 36 y.o. male with significant medical history of poorly controlled hypertension, Asthma, bronchitis, bipolar disorder, morbid obesity presented for chest pain , pressure like , sudden onset, non radiating with associated headache. Patient evaluated in the ED and was noted to have blood pressure  256/170 mmHg, patient was initially started on esmolol due to concern with aortic dissection, CXR showed widened mediastinum, further imaging showed  4.4 cm dilation of the ascending thoracic aorta. D dimer was elevated, no pulmonary embolism. Patient was also started on cardene infusion as well. CTS consulted as well. Patient was transferred to ICU, esmolol later discontinued. Patient has had multiple admissions in the past with HTN urgency and emergency. Past Medical History:   has a past medical history of DEVYN (acute kidney injury) ([facility] [address] Bipolar 1 disorder ([facility] [address] Bronchitis, Bronchitis, Hypertension, Moderate persistent asthma, and Obesity. Past Surgical History:   has no past surgical history on file. Home Medications:    Prior to Admission medications    Medication Sig Start Date End Date Taking? Authorizing Provider   cloNIDine (CATAPRES) 0.1 MG tablet Take 1 tablet by mouth 3 times daily 11/10/22   William Pereira MD   doxazosin (CARDURA) 2 MG tablet Take 1 tablet by mouth at bedtime 11/10/22   William Pereira MD   hydrALAZINE (APRESOLINE) 100 MG tablet Take 1 tablet by mouth every 8 hours 11/10/22   William Pereira MD   spironolactone (ALDACTONE) 25 MG tablet Take 1 tablet by mouth in the morning and 1 tablet in the evening.   Patient not taking: Reported on 11/19/2022 11/10/22   Nicole Pereira MD   carvedilol (COREG) 25 MG tablet Take 1 tablet by mouth 2 times daily (with meals) 10/26/22   Merrill Push, DO   amLODIPine (NORVASC) 10 MG tablet Take 1 tablet by mouth daily 10/26/22   Merrill Push, DO   Blood Pressure KIT 1 kit by Does not apply route daily 3/16/20   Sana Keller MD       Allergies:  Patient has no known allergies. Social History:   reports that he has never smoked. He has never used smokeless tobacco. He reports that he does not drink alcohol and does not use drugs. Family History: family history includes Diabetes in his maternal grandmother; High Blood Pressure in his mother. No for premature CAD. No for h/o sudden cardiac death    REVIEW OF SYSTEMS:    Constitutional: there has been no unanticipated weight loss. There's been No change in activity level. Eyes: No visual changes or diplopia. No scleral icterus. ENT: Yes Headaches, hearing loss or vertigo. Cardiovascular: Yes chest pain, Yes dyspnea on exertion, No palpitations or No loss of consciousness. Respiratory: No cough or wheezing, no sputum production. No hematemesis. No pleuritic chest pain   Gastrointestinal: No abdominal pain, blood in stools. Genitourinary: No dysuria, trouble voiding, or hematuria. Musculoskeletal:  No gait disturbance, No weakness or joint complaints. Integumentary: No rash or pruritis. Neurological: No headache, diplopia, change in muscle strength, numbness or tingling. Psychiatric: No anxiety, or depression. Endocrine: No temperature intolerance. No excessive thirst, fluid intake, or urination. Hematologic/Lymphatic: No abnormal bruising or bleeding, blood clots or swollen lymph nodes. Allergic/Immunologic: No nasal congestion or hives.       PHYSICAL EXAM:    Physical Examination:    BP (!) 159/99   Pulse (!) 110   Temp 98.2 °F (36.8 °C) (Oral)   Resp 23   Ht 6' 5\" (1.956 m)   Wt (!) 316 lb 14.4 oz (143.7 kg)   SpO2 94%   BMI 37.58 kg/m²    Constitutional and General Appearance: alert, cooperative, in no apparent resp distress HEENT: PERRL, no cervical lymphadenopathy  Respiratory:  Good respiratory effort. No for increased work of breathing. On auscultation: clear to auscultation bilaterally, no basilar rales, no wheezing   Cardiovascular:  regular S1 and S2. No murmur audible   No Jugular venous distention, no hepatojugular reflex  Peripheral pulses are symmetrical and full   Abdomen:  No masses or tenderness  Bowel sounds present  Extremities:   No Cyanosis or Clubbing   Lower extremity edema: No   Skin: Warm and dry  Neurological:  Not done       DATA:    Diagnostics:      EKG  11/19/22  Sinus tachycardia, normal axis, no ST-T changes      ECHO  11/9/22  Contrast was utilized on this technically difficult study. Moderate left ventricular hypertrophy. Global left ventricular systolic  function is normal. Estimated ejection fraction is > 55%. Left atrium is mildly dilated. No significant valvular abnormalities noted. No pericardial effusion seen. IVC not well visualized. E/E' average = 22. Labs:     CBC:   Recent Labs     11/19/22  1145 11/20/22  0346   WBC 9.1 8.4   HGB 10.3* 9.6*   HCT 33.5* 31.5*    272     BMP:   Recent Labs     11/19/22  1145 11/20/22  0346   * 130*   K 4.2 4.0   CO2 18* 19*   BUN 44* 36*   CREATININE 2.44* 2.52*   LABGLOM 33* 32*   GLUCOSE 106* 108*     BNP: No results for input(s): BNP in the last 72 hours. PT/INR: No results for input(s): PROTIME, INR in the last 72 hours. APTT:No results for input(s): APTT in the last 72 hours. CARDIAC ENZYMES:No results for input(s): CKTOTAL, CKMB, CKMBINDEX, TROPONINI in the last 72 hours.   FASTING LIPID PANEL:  Lab Results   Component Value Date/Time    HDL 73 03/18/2022 06:46 AM    TRIG 54 03/18/2022 06:46 AM     LIVER PROFILE:  Recent Labs     11/19/22  1401   AST 32   ALT 37   LABALBU 3.4*         IMPRESSION: Hypertensive emergency with elevated troponin and DEVYN  Poorly controlled HTN (Patient on home clonidine 01, mg TID, coreg 25 bid, amlodipine 10 mg, aldactone 25 mg bid, hydralazine 100 mg tid)  AAA 4.4 cm, no dissection  Asthma  Bipolar disorder  Morbid obesity      RECOMMENDATIONS:  Continue with cardene infusion for target /80 today and goal for more optimal BP control tomorrow. Follow up with CTS recommendations for AAA  Maintain K >4 and Mg >2  Educated on importance of ambulatory bp monitoring  Rest of care as per primary    Discussed with patient and nursing. Zenon Ye MD MD  Fellow, Cardiovascular Diseases   [facility]   Pager - [phone]    I performed a history and physical examination of the patient and discussed management with the resident. I reviewed the residents note and agree with the documented findings and plan of care. Any areas of disagreement are noted on the chart. I was personally present for the key portions of any procedures. I have documented in the chart those procedures where I was not present during the key portions. I have personally evaluated this patient and have completed at least one if not all key elements of the E/M (history, physical exam, and MDM). Additional findings are as noted. Patient is not taking his medications. Compliance emphasized. Recommend secondary HTN workup. BP normal at present on nicardipine drip. Resume PO medications. Recent echo showed preserved LV systolic function. No chest pain after BP improved. HS trop elevation due to HTN emergency.     Davina Carey MD

## 2022-11-20 NOTE — PROGRESS NOTES
[address] [place] [address]     Department of Internal Medicine - Staff Internal Medicine Service   ICU PATIENT TRANSFER NOTE        Patient:  Malia Casey  YOB: 1980  MRN: [id number]     Acct: [de-identified]     Admit date: 11/19/2022    Code Status:-  Full code     Reason for ICU Admission:-       SUPPORT DEVICES: [] Ventilator [] BIPAP  [] Nasal Cannula [] Room Air    Consultations:- [x] Cardiology [] Nephrology  [] Hemo onco  [] GI                               [] ID [] ENT  [] Rheum [] Endo   []Physiotherapy                                 Others:-     NUTRITION:  [] NPO [] Tube Feeding (Specify: ) [] TPN  [] PO    Central Lines:- [] No   [] Yes           If yes - Days/Date of Insertion. Pt seen,examined and Chart reviewed. ICU COURSE:    44-year-old male presented to the ER with severe chest pain, shortness of breath, and headache. His vitals demonstrated severe hypertension with systolic of 223. Patient has been noncompliant with medications. Chest x-ray in the ER demonstrated widened mediastinum, esmolol was initiated for concerns of aortic dissection in the setting of sudden chest pain and hypertension while awaiting CTA. CTA demonstrated 4.4 cm dilation of ascending thoracic aorta, D-dimer was elevated, no PE. Multifocal groundglass opacities and interlobular septal opacities were seen and the patient was started on vancomycin and Zosyn. The patient was transferred to ICU. In the ICU the patient was started on Cardene and esmolol. CT surgery was consulted, recommended strict BP control and follow-up as outpatient in 3 to 6 months. Cardiology was also consulted for chest pain and elevated troponin, recommended troponin elevation likely due to hypertensive emergency, secondary hypertension work-up. His respiratory panel was negative. His antibiotics were changed to azithromycin and Rocephin. Patient was weaned off of Cardene and his home medications were resumed.   Patient also changed his CODE STATUS to DNR CCA. He was transferred out of the ICU once his blood pressure and symptoms were controlled. Labs show sodium 130, potassium 4.0, creatinine 2.52, troponin 96, WBC 8.4, H&H of 9.6 and 31.5. Physical Exam:   Vitals: /89   Pulse (!) 103   Temp 98.4 °F (36.9 °C) (Oral)   Resp 28   Ht 6' 5\" (1.956 m)   Wt (!) 316 lb 14.4 oz (143.7 kg)   SpO2 93%   BMI 37.58 kg/m²   24 hour intake/output:  Intake/Output Summary (Last 24 hours) at 11/20/2022 1923  Last data filed at 11/20/2022 1803  Gross per 24 hour   Intake 4732.1 ml   Output 6150 ml   Net -1417.9 ml     Last 3 weights:   Wt Readings from Last 3 Encounters:   11/20/22 (!) 316 lb 14.4 oz (143.7 kg)   11/09/22 (!) 314 lb 2.5 oz (142.5 kg)   10/26/22 (!) 310 lb (140.6 kg)       General appearance - alert, in no distress  Mental status - alert, oriented to person, place, and time  Eyes - pupils equal and reactive, extraocular eye movements intact  Mouth - mucous membranes moist, pharynx normal without lesions  Neck - supple, no significant adenopathy  Chest - clear to auscultation, no wheezes  Heart - normal rate, regular rhythm, normal S1, S2  Abdomen - soft, nontender, nondistended  Neurological - alert, oriented, normal speech, no focal deficits   Extremities - peripheral pulses normal, no pedal edema  Skin - normal coloration and turgor, no rashes      Medications:Current Inpatient  Scheduled Meds:   amLODIPine  10 mg Oral Daily    carvedilol  25 mg Oral BID WC    [Held by provider] doxazosin  2 mg Oral Nightly    azithromycin  500 mg IntraVENous Q24H    cefTRIAXone (ROCEPHIN) IV  1,000 mg IntraVENous Q24H    [START ON 11/21/2022] cloNIDine  0.1 mg Oral Daily    hydrALAZINE  50 mg Oral 3 times per day    sodium chloride flush  5-40 mL IntraVENous 2 times per day    enoxaparin  30 mg SubCUTAneous BID     Continuous Infusions:   niCARdipine Stopped (11/20/22 1133)    sodium chloride       PRN Meds:sodium chloride flush, sodium chloride, ondansetron **OR** ondansetron, polyethylene glycol, acetaminophen **OR** acetaminophen, albuterol    Objective:    CBC:   Recent Labs     11/19/22  1145 11/20/22  0346   WBC 9.1 8.4   HGB 10.3* 9.6*    272     BMP:    Recent Labs     11/19/22  1145 11/20/22  0346   * 130*   K 4.2 4.0    99   CO2 18* 19*   BUN 44* 36*   CREATININE 2.44* 2.52*   GLUCOSE 106* 108*     Calcium:  Recent Labs     11/20/22  0346   CALCIUM 8.5*     Ionized Calcium:No results for input(s): IONCA in the last 72 hours. Magnesium:No results for input(s): MG in the last 72 hours. Phosphorus:No results for input(s): PHOS in the last 72 hours. BNP:No results for input(s): BNP in the last 72 hours. Glucose:No results for input(s): POCGLU in the last 72 hours. HgbA1C: No results for input(s): LABA1C in the last 72 hours. INR: No results for input(s): INR in the last 72 hours. Hepatic:   Recent Labs     11/19/22  1401   ALKPHOS 69   ALT 37   AST 32   PROT 6.8   BILITOT 0.3   BILIDIR 0.1   LABALBU 3.4*     Amylase and Lipase:No results for input(s): LACTA, AMYLASE in the last 72 hours. Lactic Acid: No results for input(s): LACTA in the last 72 hours. CARDIAC ENZYMES:No results for input(s): CKTOTAL, CKMB, CKMBINDEX, TROPONINI in the last 72 hours. BNP: No results for input(s): BNP in the last 72 hours. Lipids: No results for input(s): CHOL, TRIG, HDL, LDL, LDLCALC in the last 72 hours.   ABGs: No results found for: PH, PCO2, PO2, HCO3, O2SAT  Thyroid:   Lab Results   Component Value Date/Time    TSH 1.27 11/19/2022 12:47 PM        Urinalysis:   Recent Labs     11/19/22  1404   BACTERIA FEW*   COLORU Yellow   PHUR 5.5   PROTEINU 2+*   RBCUA 0 TO 2   SPECGRAV 1.017   BILIRUBINUR NEGATIVE   NITRU NEGATIVE   WBCUA 0 TO 2   LEUKOCYTESUR NEGATIVE   GLUCOSEU NEGATIVE           Assessment:  Principal Problem:    Hypertensive emergency  Active Problems:    Hyponatremia    CKD (chronic kidney disease) Ascending aortic aneurysm  Resolved Problems:    * No resolved hospital problems. *          Plan:    Hypertensive emergency:  in the ED. Weaned off of Cardene drip. Currently on Norvasc 10, Coreg 25, clonidine 0.1, hydralazine 50 3 times daily. Echo on 11/09 showed EF of more than 55% with moderate left ventricular hypertrophy. Cardiology recommending secondary hypertension work-up. Follow-up on renal arterial duplex scan. Community-acquired pneumonia: Currently maintaining saturation on 2 L via nasal cannula. CT chest showing multifocal groundglass opacities. Respiratory panel negative. Continue ceftriaxone and azithromycin. Ascending thoracic aortic aneurysm: CT PE showing ascending thoracic aorta 4.4 cm in diameter, CT surgery was consulted, recommended strict blood pressure control and follow-up as outpatient in 3 to 6 months. CKD: Creatinine 2.52 today. Baseline around 2.1-2.4. Continue to monitor. Hyponatremia: Sodium 130. Continue to monitor.         Xenia Martell MD             Department of Internal Medicine  [address] [place] [address]           11/20/2022, 7:23 PM

## 2022-11-20 NOTE — ED NOTES
\"Feeling a little better now. \" as verbalized by the patient.   (+) mild headache, (-)dyspnea     Gerardo Lafleur RN  11/19/22 2031

## 2022-11-21 PROBLEM — J81.0 ACUTE PULMONARY EDEMA (HCC): Status: ACTIVE | Noted: 2022-11-21

## 2022-11-21 PROBLEM — J18.9 PNEUMONIA OF RIGHT LUNG DUE TO INFECTIOUS ORGANISM: Status: ACTIVE | Noted: 2022-11-21

## 2022-11-21 LAB
ANION GAP SERPL CALCULATED.3IONS-SCNC: 14 MMOL/L (ref 9–17)
BUN BLDV-MCNC: 35 MG/DL (ref 6–20)
CALCIUM SERPL-MCNC: 8.6 MG/DL (ref 8.6–10.4)
CHLORIDE BLD-SCNC: 101 MMOL/L (ref 98–107)
CO2: 17 MMOL/L (ref 20–31)
CREAT SERPL-MCNC: 2.47 MG/DL (ref 0.7–1.2)
EKG ATRIAL RATE: 122 BPM
EKG P AXIS: 50 DEGREES
EKG P-R INTERVAL: 170 MS
EKG Q-T INTERVAL: 290 MS
EKG QRS DURATION: 88 MS
EKG QTC CALCULATION (BAZETT): 413 MS
EKG R AXIS: 49 DEGREES
EKG T AXIS: 119 DEGREES
EKG VENTRICULAR RATE: 122 BPM
GFR SERPL CREATININE-BSD FRML MDRD: 32 ML/MIN/1.73M2
GLUCOSE BLD-MCNC: 106 MG/DL (ref 70–99)
POTASSIUM SERPL-SCNC: 4.4 MMOL/L (ref 3.7–5.3)
PROCALCITONIN: 0.11 NG/ML
SODIUM BLD-SCNC: 132 MMOL/L (ref 135–144)

## 2022-11-21 PROCEDURE — 6370000000 HC RX 637 (ALT 250 FOR IP): Performed by: STUDENT IN AN ORGANIZED HEALTH CARE EDUCATION/TRAINING PROGRAM

## 2022-11-21 PROCEDURE — 94660 CPAP INITIATION&MGMT: CPT

## 2022-11-21 PROCEDURE — 99232 SBSQ HOSP IP/OBS MODERATE 35: CPT | Performed by: INTERNAL MEDICINE

## 2022-11-21 PROCEDURE — 6360000002 HC RX W HCPCS: Performed by: STUDENT IN AN ORGANIZED HEALTH CARE EDUCATION/TRAINING PROGRAM

## 2022-11-21 PROCEDURE — 80048 BASIC METABOLIC PNL TOTAL CA: CPT

## 2022-11-21 PROCEDURE — 6370000000 HC RX 637 (ALT 250 FOR IP)

## 2022-11-21 PROCEDURE — 93975 VASCULAR STUDY: CPT

## 2022-11-21 PROCEDURE — 36415 COLL VENOUS BLD VENIPUNCTURE: CPT

## 2022-11-21 PROCEDURE — 2060000000 HC ICU INTERMEDIATE R&B

## 2022-11-21 PROCEDURE — 84145 PROCALCITONIN (PCT): CPT

## 2022-11-21 PROCEDURE — 99233 SBSQ HOSP IP/OBS HIGH 50: CPT | Performed by: INTERNAL MEDICINE

## 2022-11-21 PROCEDURE — 2580000003 HC RX 258: Performed by: STUDENT IN AN ORGANIZED HEALTH CARE EDUCATION/TRAINING PROGRAM

## 2022-11-21 PROCEDURE — 2500000003 HC RX 250 WO HCPCS: Performed by: STUDENT IN AN ORGANIZED HEALTH CARE EDUCATION/TRAINING PROGRAM

## 2022-11-21 PROCEDURE — 6370000000 HC RX 637 (ALT 250 FOR IP): Performed by: HEALTH CARE PROVIDER

## 2022-11-21 RX ORDER — HYDRALAZINE HYDROCHLORIDE 20 MG/ML
5 INJECTION INTRAMUSCULAR; INTRAVENOUS EVERY 6 HOURS PRN
Status: DISCONTINUED | OUTPATIENT
Start: 2022-11-21 | End: 2022-11-23 | Stop reason: HOSPADM

## 2022-11-21 RX ORDER — SPIRONOLACTONE 25 MG/1
25 TABLET ORAL 2 TIMES DAILY
Status: DISCONTINUED | OUTPATIENT
Start: 2022-11-21 | End: 2022-11-23 | Stop reason: HOSPADM

## 2022-11-21 RX ORDER — HYDRALAZINE HYDROCHLORIDE 50 MG/1
100 TABLET, FILM COATED ORAL EVERY 8 HOURS SCHEDULED
Status: DISCONTINUED | OUTPATIENT
Start: 2022-11-21 | End: 2022-11-23 | Stop reason: HOSPADM

## 2022-11-21 RX ORDER — CLONIDINE HYDROCHLORIDE 0.1 MG/1
0.1 TABLET ORAL 3 TIMES DAILY
Status: DISCONTINUED | OUTPATIENT
Start: 2022-11-21 | End: 2022-11-21

## 2022-11-21 RX ORDER — METOPROLOL TARTRATE 5 MG/5ML
2.5 INJECTION INTRAVENOUS EVERY 6 HOURS PRN
Status: DISCONTINUED | OUTPATIENT
Start: 2022-11-21 | End: 2022-11-23 | Stop reason: HOSPADM

## 2022-11-21 RX ORDER — CLONIDINE 0.1 MG/24H
1 PATCH, EXTENDED RELEASE TRANSDERMAL WEEKLY
Status: DISCONTINUED | OUTPATIENT
Start: 2022-11-21 | End: 2022-11-23

## 2022-11-21 RX ADMIN — HYDRALAZINE HYDROCHLORIDE 100 MG: 50 TABLET, FILM COATED ORAL at 15:53

## 2022-11-21 RX ADMIN — HYDRALAZINE HYDROCHLORIDE 50 MG: 50 TABLET, FILM COATED ORAL at 06:11

## 2022-11-21 RX ADMIN — CARVEDILOL 25 MG: 25 TABLET, FILM COATED ORAL at 08:46

## 2022-11-21 RX ADMIN — ENOXAPARIN SODIUM 30 MG: 100 INJECTION SUBCUTANEOUS at 20:30

## 2022-11-21 RX ADMIN — METOPROLOL TARTRATE 50 MG: 25 TABLET, FILM COATED ORAL at 04:36

## 2022-11-21 RX ADMIN — HYDRALAZINE HYDROCHLORIDE 100 MG: 50 TABLET, FILM COATED ORAL at 20:30

## 2022-11-21 RX ADMIN — ENOXAPARIN SODIUM 30 MG: 100 INJECTION SUBCUTANEOUS at 08:46

## 2022-11-21 RX ADMIN — SODIUM CHLORIDE, PRESERVATIVE FREE 10 ML: 5 INJECTION INTRAVENOUS at 20:30

## 2022-11-21 RX ADMIN — SPIRONOLACTONE 25 MG: 25 TABLET ORAL at 09:14

## 2022-11-21 RX ADMIN — SPIRONOLACTONE 25 MG: 25 TABLET ORAL at 18:26

## 2022-11-21 RX ADMIN — SODIUM CHLORIDE: 9 INJECTION, SOLUTION INTRAVENOUS at 20:29

## 2022-11-21 RX ADMIN — CEFTRIAXONE SODIUM 1000 MG: 10 INJECTION, POWDER, FOR SOLUTION INTRAVENOUS at 18:26

## 2022-11-21 RX ADMIN — Medication 500 MG: at 20:30

## 2022-11-21 RX ADMIN — CARVEDILOL 25 MG: 25 TABLET, FILM COATED ORAL at 18:27

## 2022-11-21 RX ADMIN — AMLODIPINE BESYLATE 10 MG: 10 TABLET ORAL at 08:46

## 2022-11-21 RX ADMIN — CLONIDINE HYDROCHLORIDE 0.1 MG: 0.1 TABLET ORAL at 08:46

## 2022-11-21 RX ADMIN — SODIUM CHLORIDE, PRESERVATIVE FREE 10 ML: 5 INJECTION INTRAVENOUS at 08:30

## 2022-11-21 ASSESSMENT — ENCOUNTER SYMPTOMS
WHEEZING: 0
EYES NEGATIVE: 1
GASTROINTESTINAL NEGATIVE: 1
ALLERGIC/IMMUNOLOGIC NEGATIVE: 1
SHORTNESS OF BREATH: 0
VOICE CHANGE: 0
COUGH: 1
TROUBLE SWALLOWING: 0
SORE THROAT: 0

## 2022-11-21 NOTE — PROGRESS NOTES
PULMONARY & CRITICAL CARE MEDICINE PROGRESS  NOTE     Patient:  Sunday Osler  MRN: [id number]  Admit date: 11/19/2022  Primary Care Physician: Kathia Naik MD  Consulting Physician: Cookie Grace MD  CODE Status: DNR-CCA  LOS: 2    SUBJECTIVE     I personally interviewed/examined the patient, reviewed interval history and interpreted all available radiographic, laboratory data at the time of service. Chief Compliant/Reason for Initial Consult:   Acute respiratory failure/pneumonia/hypertensive emergency/pulm edema    Brief Hospital Course: The patient is a 36 y.o. male Patient was seen initially in the emergency room for evaluation for admission to medical ICU because of hypertensive emergency. Patient has history of difficult to control hypertension was recently admitted to [facility] had plasma renin activity done apparently outpatient renal duplex previous ultrasound of the kidneys were. Echocardiogram previously shows diastolic dysfunction/LVH patient is on multiple medication at least 5 medication to control blood pressure apparently no history of obstructive sleep apnea ever diagnosed does have obesity. Presented to emergency room with chest pain shortness of breath headache found to have systolic blood pressure of 260 to 270 initial concern for aneurysm/dissection because of the chest pain and hypertension CTA chest was negative for dissection or pulm embolism showed 4.4 cm ascending aneurysm CTA abdomen was negative. Patient has history of chronic kidney disease with creatinine of around 2.5 to 2.6 usually his creatinine was 2.44. CT surgery was consulted and cardiology was consulted as there was concern of anterior ST elevation cardiology did not think that patient has ST elevation troponin was 80.   Patient was started on esmolol drip and then Cardene was added systolic blood pressure dropped to 130 patient was also fatigued at that time decreased mentation slightly and then Cardene was slowly decreased. Patient later required BiPAP therapy as patient was tachypneic apparently not hypoxic though arterial blood gas shows normal pH 7.38 PCO2 of 32 PO2 was 150 bicarbonate of 19. CT head was reported  Chest x-ray was consistent with mild congestive changes. CT chest showed patchy areas of infiltrate opacity mostly in the right upper lobe and some in the right mid lower lung. His blood pressure stabilized in ICU was taken off BiPAP in ICU Cardene drip was weaned off and then he was transferred to medical floor    Interval History:  11/21/22  Patient was evaluated this morning. His shortness of breath is better does not complain of shortness of breath at rest.  Patient has chronic cough denies much change denies sputum production does not complain of cough currently. Denies chest pain headache dizziness. Patient had been on 2 L nasal cannula yesterday currently he is off on room air and maintaining saturation 94% to 95%. He was supposed to be on BiPAP according patient he use it for short time at night. Review of Systems:  Review of Systems   Constitutional:  Negative for appetite change, fever and unexpected weight change. HENT:  Negative for postnasal drip, sore throat, trouble swallowing and voice change. Eyes: Negative. Respiratory:  Positive for cough. Negative for shortness of breath and wheezing. Cardiovascular:  Negative for chest pain, palpitations and leg swelling. Gastrointestinal: Negative. Endocrine: Negative. Genitourinary:  Negative for dysuria, flank pain, frequency and hematuria. Musculoskeletal: Negative. Skin: Negative. Allergic/Immunologic: Negative. Neurological:  Negative for dizziness, seizures, speech difficulty and headaches. Hematological:  Negative for adenopathy. Does not bruise/bleed easily. Psychiatric/Behavioral: Negative.        OBJECTIVE     VITAL SIGNS: LAST-  BP (!) 163/123 Comment: medicine resident notified  Pulse 88   Temp 98.4 °F (36.9 °C) (Oral)   Resp 20   Ht 6' 5\" (1.956 m)   Wt (!) 317 lb 3.2 oz (143.9 kg)   SpO2 95%   BMI 37.61 kg/m²   8-24 HR RANGE-  TEMP Temp  Av.2 °F (36.8 °C)  Min: 98 °F (36.7 °C)  Max: 98.6 °F (37 °C)   BP Systolic (95XQG), LORI:995 , Min:108 , ZLU:517      Diastolic (56OBZ), PAYTON, Min:70, Max:123     PULSE Pulse  Av.5  Min: 88  Max: 105   RR Resp  Av  Min: 20  Max: 20   O2 SAT SpO2  Av %  Min: 95 %  Max: 95 %   OXYGEN DELIVERY No data recorded     Systemic Examination:   Physical Exam  General appearance - looks comfortable and in no acute distress  Mental status - alert, oriented to person, place, and time  Eyes - pupils equal and reactive, extraocular eye movements intact  Mouth - mucous membranes moist, pharynx normal without lesions. Large tongue Mallampati 2  Neck - supple, no significant adenopathy. Short thick neck  Chest - Chest was symmetrical without dullness to percussion. Breath sounds bilaterally were clear to auscultation. There were no wheezes, rhonchi or rales.   There is no intercostal recession or use of accessory muscles  Heart - normal rate, regular rhythm, normal S1, S2, no murmurs, rubs, clicks or gallops  Abdomen - soft, nontender, nondistended, no masses or organomegaly  Neurological - alert, oriented, normal speech, no focal findings or movement disorder noted  Extremities - peripheral pulses normal, mild pedal edema, no clubbing or cyanosis  Skin - normal coloration and turgor, no rashes, no suspicious skin lesions noted     DATA REVIEW     Medications:  Scheduled Meds:   hydrALAZINE  100 mg Oral 3 times per day    spironolactone  25 mg Oral BID    cloNIDine  0.1 mg Oral TID    amLODIPine  10 mg Oral Daily    carvedilol  25 mg Oral BID WC    [Held by provider] doxazosin  2 mg Oral Nightly    azithromycin  500 mg IntraVENous Q24H    cefTRIAXone (ROCEPHIN) IV  1,000 mg IntraVENous Q24H    sodium chloride flush  5-40 mL IntraVENous 2 times per day    enoxaparin  30 mg SubCUTAneous BID     Continuous Infusions:   sodium chloride       LABS:-  ABG:   No results for input(s): POCPH, POCPCO2, POCPO2, POCHCO3, QVOW8STS in the last 72 hours. CBC:   Recent Labs     11/19/22  1145 11/20/22  0346   WBC 9.1 8.4   HGB 10.3* 9.6*   HCT 33.5* 31.5*   MCV 83.5 83.1    272   LYMPHOPCT 14* 13*   RBC 4.01* 3.79*   MCH 25.7 25.3   MCHC 30.7 30.5   RDW 14.5* 14.8*     BMP:   Recent Labs     11/19/22  1145 11/20/22  0346 11/21/22  0545   * 130* 132*   K 4.2 4.0 4.4    99 101   CO2 18* 19* 17*   BUN 44* 36* 35*   CREATININE 2.44* 2.52* 2.47*   GLUCOSE 106* 108* 106*     Liver Function Test:   Recent Labs     11/19/22  1401   PROT 6.8   LABALBU 3.4*   ALT 37   AST 32   ALKPHOS 69   BILITOT 0.3     Amylase/Lipase:  Recent Labs     11/19/22  1145   LIPASE 220*     Coagulation Profile:   No results for input(s): INR, PROTIME, APTT in the last 72 hours. Cardiac Enzymes:  No results for input(s): CKTOTAL, CKMB, CKMBINDEX, TROPONINI in the last 72 hours. Lactic Acid:  No results found for: LACTA  BNP:   No results found for: BNP  D-Dimer:  Lab Results   Component Value Date    DDIMER 0.99 11/19/2022     Others:   Lab Results   Component Value Date    TSH 1.27 11/19/2022     Lab Results   Component Value Date    CRP 7.7 (H) 10/09/2021     No results found for: [place]   Lab Results   Component Value Date    IRON 44 (L) 03/18/2022    TIBC 280 03/18/2022    FERRITIN 119 03/18/2022     No results found for: SPEP, UPEP  Lab Results   Component Value Date/Time    PSA 0.80 09/28/2021 09:33 AM       Input/Output:    Intake/Output Summary (Last 24 hours) at 11/21/2022 1235  Last data filed at 11/20/2022 1803  Gross per 24 hour   Intake 970 ml   Output 550 ml   Net 420 ml       Microbiology:  Recent Labs     11/19/22  1455 11/19/22  1535 11/19/22  2332   SPECDESC . BLOOD   < > . NASAL SWAB SPECIAL RAC, 10ML  --   --    CULTURE NO GROWTH 1 DAY  --   --     < > = values in this interval not displayed. Pathology:    Radiology reports:  XR CHEST PORTABLE   Final Result   Findings suggest congestive heart failure, not significantly changed         CT ABDOMEN PELVIS WO CONTRAST Additional Contrast? None   Final Result   1. CHEST: No pulmonary embolus is identified. 2. Multifocal ground-glass opacities and few interlobular and septal   opacities. Trace pleural effusions are noted. Pulmonary edema versus   pneumonia. Atypical pneumonia is considered in the differential, such as   viral, for instance influenza. Interestingly, there was a similar, more   prominent presentation with ground-glass and alveolar opacities on 10/09/2021.   3. Cardiomegaly. Ectasia of the ascending thoracic aorta, measuring 44 mm in   diameter. 4. ABDOMEN PELVIS: No acute abdominopelvic abnormality. CT CHEST PULMONARY EMBOLISM W CONTRAST   Final Result   1. CHEST: No pulmonary embolus is identified. 2. Multifocal ground-glass opacities and few interlobular and septal   opacities. Trace pleural effusions are noted. Pulmonary edema versus   pneumonia. Atypical pneumonia is considered in the differential, such as   viral, for instance influenza. Interestingly, there was a similar, more   prominent presentation with ground-glass and alveolar opacities on 10/09/2021.   3. Cardiomegaly. Ectasia of the ascending thoracic aorta, measuring 44 mm in   diameter. 4. ABDOMEN PELVIS: No acute abdominopelvic abnormality. XR CHEST PORTABLE   Final Result   Findings suggest congestive heart failure         VL RENAL ARTERIAL DUPLEX COMPLETE    (Results Pending)   CT CHEST WO CONTRAST    (Results Pending)       Echocardiogram:   No results found for this or any previous visit. Cardiac Catheterization:   No results found for this or any previous visit.       ASSESSMENT AND PLAN     Assessment:    Pulmonary edema versus atypical pneumonia likely pulmonary edema secondary to hypertensive emergency. Hypertensive emergency. Difficult to control hypertension. Acute respiratory failure with hypoxia resolved. Obesity. Likely obstructive sleep apnea. History of chronic cough/cough variant asthma    Plan:    Patient remains hemodynamically stable and is currently saturating well on nasal cannula/room air. Patient is currently off oxygen and on room air. Advised to use BiPAP at night and as needed. Patient did not use BiPAP much at night. Albuterol to be used as needed. Blood pressure control to continue for primary service. Continue with Zithromax and Rocephin. If no contraindication at the time of discharge she can be switched to levofloxacin for 7 days. Supplemental oxygen if needed to keep oxygen saturation greater than 92%  Continue monitor intake and output and renal function  Continue  IV Solu-Medrol at current dose/prednisone taper  DVT prophylaxis on Lovenox  Physical/occupational/speech therapy; increase activity as tolerated    I updated the patient regarding the current clinical condition, provisional diagnosis and management plan. I addressed concerns and answered all questions to the best of my abilities. It was my pleasure to evaluate Queen Bean today. We will continue to follow. I would like to thank you for allowing me to participate in the care of this patient. Please feel free to call with any further questions or concerns. Jose David Murillo MD   Pulmonary and Critical Care Medicine           11/21/2022, 12:35 PM    Please note that this chart was generated using voice recognition Dragon dictation software. Although every effort was made to ensure the accuracy of this automated transcription, some errors in transcription may have occurred.

## 2022-11-21 NOTE — PROGRESS NOTES
Physical Therapy        Physical Therapy Cancel Note      DATE: 2022    NAME: Leigh Baeza  MRN: [id number]   : 1980      Patient not seen this date for Physical Therapy due to:    Patient independent with functional mobility. Pt seated independently EOB upon writer's arrival. Pt educated on purpose of PT eval, POC, and importance of mobility. Pt verbalizes no concerns in regards upon discharge. Will defer PT evaluation at this time. Please reorder PT if future needs arise.        Electronically signed by Wilian Willis PT on 2022 at 8:56 AM

## 2022-11-21 NOTE — PLAN OF CARE
Problem: Discharge Planning  Goal: Discharge to home or other facility with appropriate resources  11/21/2022 0246 by Cliff Zuleta RN  Outcome: Progressing  11/20/2022 1719 by Ira Marion RN  Outcome: Progressing  Flowsheets (Taken 11/20/2022 0800)  Discharge to home or other facility with appropriate resources: Identify barriers to discharge with patient and caregiver     Problem: Pain  Goal: Verbalizes/displays adequate comfort level or baseline comfort level  11/21/2022 0246 by Cliff Zuleta RN  Outcome: Progressing  11/20/2022 1719 by Ira Marion RN  Outcome: Progressing     Problem: Safety - Adult  Goal: Free from fall injury  11/21/2022 0246 by Cliff Zuleta RN  Outcome: Progressing  11/20/2022 1719 by Ira Marion RN  Outcome: Progressing

## 2022-11-21 NOTE — PLAN OF CARE
Appointment being made to have patient follow up in 6 months with repeat CT chest. No plan for intervention.  Follow up with Dr Lisset Bell

## 2022-11-21 NOTE — PLAN OF CARE
Problem: Discharge Planning  Goal: Discharge to home or other facility with appropriate resources  11/21/2022 0441 by Pancho Garcia RN  Outcome: Progressing  11/21/2022 0246 by Yanira Shields RN  Outcome: Progressing  11/20/2022 1719 by Yessi Malin RN  Outcome: Progressing  Flowsheets (Taken 11/20/2022 0800)  Discharge to home or other facility with appropriate resources: Identify barriers to discharge with patient and caregiver     Problem: Pain  Goal: Verbalizes/displays adequate comfort level or baseline comfort level  11/21/2022 0441 by Pancho Garcia RN  Outcome: Progressing  11/21/2022 0246 by Yanira Shields RN  Outcome: Progressing  11/20/2022 1719 by Yessi Malin RN  Outcome: Progressing     Problem: Safety - Adult  Goal: Free from fall injury  11/21/2022 0441 by Pancho Garcia RN  Outcome: Progressing  11/21/2022 0246 by Yanira Shields RN  Outcome: Progressing  11/20/2022 1719 by Yessi Malin RN  Outcome: Progressing

## 2022-11-21 NOTE — PROGRESS NOTES
[facility])  Occupational Therapy Not Seen Note    DATE: 2022    NAME: Dulce Quijano  MRN: [id number]   : 1980      Patient not seen this date for Occupational Therapy due to:    Patient independent with ADLs and functional tasks with no acute OT needs. Will defer OT evaluation at this time. Please reorder OT if future needs arise.          Electronically signed by KATELYNN Saini on 2022 at 1:09 PM

## 2022-11-21 NOTE — PROGRESS NOTES
[facility]  Internal Medicine Teaching Residency Program  Inpatient Daily Progress Note  ______________________________________________________________________________    Patient: Leihg Baeza  YOB: 1980   EKY:[id number]    Acct: [de-identified]     Room: 2005/2005-01  Admit date: 11/19/2022  Today's date: 11/21/22  Number of days in the hospital: 2    SUBJECTIVE   Admitting Diagnosis: Hypertensive emergency  CC: Hypertensive emergency  Pt examined at bedside. Chart & results reviewed. Overnight the blood pressure increased to 163/123, Lopressor 50 mg tablet was given. Vital signs stable with blood pressure 158/110, heart rate 88, maintaining saturation on room air. Denies any chest pain, headache, nausea, vomiting, abdominal pain, cough, shortness of breath. Blood pressure high today, will resume home medications as indicated. Follow-up renal arterial duplex. ROS:  Constitutional:  negative for chills, fevers, sweats  Respiratory:  negative for cough, dyspnea on exertion, hemoptysis, shortness of breath, wheezing  Cardiovascular:  negative for chest pain, chest pressure/discomfort, lower extremity edema, palpitations  Gastrointestinal:  negative for abdominal pain, constipation, diarrhea, nausea, vomiting  Neurological:  negative for dizziness, headache  BRIEF HISTORY     70-year-old male presented to the ER with severe chest pain, shortness of breath, and headache. His vitals demonstrated severe hypertension with systolic of 771. Patient has been noncompliant with medications. Chest x-ray in the ER demonstrated widened mediastinum, esmolol was initiated for concerns of aortic dissection in the setting of sudden chest pain and hypertension while awaiting CTA. CTA demonstrated 4.4 cm dilation of ascending thoracic aorta, D-dimer was elevated, no PE.   Multifocal groundglass opacities and interlobular septal opacities were seen and the patient was started on vancomycin and Zosyn. The patient was transferred to ICU. In the ICU the patient was started on Cardene and esmolol. CT surgery was consulted, recommended strict BP control and follow-up as outpatient in 3 to 6 months. Cardiology was also consulted for chest pain and elevated troponin, recommended troponin elevation likely due to hypertensive emergency, secondary hypertension work-up. His respiratory panel was negative. His antibiotics were changed to azithromycin and Rocephin. Patient was weaned off of Cardene and his home medications were resumed. Patient also changed his CODE STATUS to DNR CCA. He was transferred out of the ICU once his blood pressure and symptoms were controlled. Labs show sodium 130, potassium 4.0, creatinine 2.52, troponin 96, WBC 8.4, H&H of 9.6 and 31.5. OBJECTIVE     Vital Signs:  BP (!) 163/123 Comment: medicine resident notified  Pulse 88   Temp 98.4 °F (36.9 °C) (Oral)   Resp 20   Ht 6' 5\" (1.956 m)   Wt (!) 316 lb 14.4 oz (143.7 kg)   SpO2 95%   BMI 37.58 kg/m²     Temp (24hrs), Av.2 °F (36.8 °C), Min:97.9 °F (36.6 °C), Max:98.6 °F (37 °C)    In: 5212.1   Out: 6150 [Urine:6150]    Physical Exam:  Constitutional: This is a well developed, well nourished, 35-39.9 - Obesity Grade II 36y.o. year old male who is alert, oriented, cooperative and in no apparent distress. Head:normocephalic and atraumatic. EENT:  PERRLA. No conjunctival injections. Septum was midline, mucosa was without erythema, exudates or cobblestoning. No thrush was noted. Neck: Supple without thyromegaly. No elevated JVP. Trachea was midline. Respiratory: Chest was symmetrical without dullness to percussion. Breath sounds bilaterally were clear to auscultation. There were no wheezes, rhonchi or rales. There is no intercostal retraction or use of accessory muscles. No egophony noted. Cardiovascular: Regular without murmur, clicks, gallops or rubs.    Abdomen: Slightly rounded and soft without organomegaly. No rebound, rigidity or guarding was appreciated. Lymphatic: No lymphadenopathy. Musculoskeletal: Normal curvature of the spine. No gross muscle weakness. Extremities:  No lower extremity edema, ulcerations, tenderness, varicosities or erythema. Muscle size, tone and strength are normal.  No involuntary movements are noted. Skin:  Warm and dry. Good color, turgor and pigmentation. No lesions or scars. No cyanosis or clubbing  Neurological/Psychiatric: The patient's general behavior, level of consciousness, thought content and emotional status is normal.        Medications:  Scheduled Medications:    amLODIPine  10 mg Oral Daily    carvedilol  25 mg Oral BID WC    [Held by provider] doxazosin  2 mg Oral Nightly    azithromycin  500 mg IntraVENous Q24H    cefTRIAXone (ROCEPHIN) IV  1,000 mg IntraVENous Q24H    cloNIDine  0.1 mg Oral Daily    hydrALAZINE  50 mg Oral 3 times per day    sodium chloride flush  5-40 mL IntraVENous 2 times per day    enoxaparin  30 mg SubCUTAneous BID     Continuous Infusions:    sodium chloride       PRN Medicationssodium chloride flush, 5-40 mL, PRN  sodium chloride, , PRN  ondansetron, 4 mg, Q8H PRN   Or  ondansetron, 4 mg, Q6H PRN  polyethylene glycol, 17 g, Daily PRN  acetaminophen, 650 mg, Q6H PRN   Or  acetaminophen, 650 mg, Q6H PRN  albuterol, 2.5 mg, Q2H PRN        Diagnostic Labs:  CBC:   Recent Labs     11/19/22  1145 11/20/22  0346   WBC 9.1 8.4   RBC 4.01* 3.79*   HGB 10.3* 9.6*   HCT 33.5* 31.5*   MCV 83.5 83.1   RDW 14.5* 14.8*    272     BMP:   Recent Labs     11/19/22  1145 11/20/22  0346   * 130*   K 4.2 4.0    99   CO2 18* 19*   BUN 44* 36*   CREATININE 2.44* 2.52*     BNP: No results for input(s): BNP in the last 72 hours. PT/INR: No results for input(s): PROTIME, INR in the last 72 hours. APTT: No results for input(s): APTT in the last 72 hours.   CARDIAC ENZYMES: No results for input(s): CKMB, Raulito Pino in the last 72 hours. Invalid input(s): CKTOTAL;3  FASTING LIPID PANEL:  Lab Results   Component Value Date    CHOL 184 03/18/2022    HDL 73 03/18/2022    TRIG 54 03/18/2022     LIVER PROFILE:   Recent Labs     11/19/22  1401   AST 32   ALT 37   BILIDIR 0.1   BILITOT 0.3   ALKPHOS 69      MICROBIOLOGY:   Lab Results   Component Value Date/Time    CULTURE NO GROWTH 1 DAY 11/19/2022 02:55 PM       Imaging:    CT ABDOMEN PELVIS WO CONTRAST Additional Contrast? None    Result Date: 11/19/2022  1. CHEST: No pulmonary embolus is identified. 2. Multifocal ground-glass opacities and few interlobular and septal opacities. Trace pleural effusions are noted. Pulmonary edema versus pneumonia. Atypical pneumonia is considered in the differential, such as viral, for instance influenza. Interestingly, there was a similar, more prominent presentation with ground-glass and alveolar opacities on 10/09/2021. 3. Cardiomegaly. Ectasia of the ascending thoracic aorta, measuring 44 mm in diameter. 4. ABDOMEN PELVIS: No acute abdominopelvic abnormality. XR CHEST PORTABLE    Result Date: 11/20/2022  Findings suggest congestive heart failure, not significantly changed     XR CHEST PORTABLE    Result Date: 11/19/2022  Findings suggest congestive heart failure     CT CHEST PULMONARY EMBOLISM W CONTRAST    Result Date: 11/19/2022  1. CHEST: No pulmonary embolus is identified. 2. Multifocal ground-glass opacities and few interlobular and septal opacities. Trace pleural effusions are noted. Pulmonary edema versus pneumonia. Atypical pneumonia is considered in the differential, such as viral, for instance influenza. Interestingly, there was a similar, more prominent presentation with ground-glass and alveolar opacities on 10/09/2021. 3. Cardiomegaly. Ectasia of the ascending thoracic aorta, measuring 44 mm in diameter. 4. ABDOMEN PELVIS: No acute abdominopelvic abnormality.        ASSESSMENT & PLAN ASSESSMENT / PLAN:   Principal Problem:    Hypertensive emergency  Active Problems:    Hyponatremia    CKD (chronic kidney disease)    Ascending aortic aneurysm  Resolved Problems:    * No resolved hospital problems. *    Hypertensive emergency:  in the ED. Weaned off of Cardene drip. Currently on Norvasc 10, Coreg 25, clonidine 0.1, hydralazine 50 3 times daily. Echo on 11/09 showed EF of more than 55% with moderate left ventricular hypertrophy. Resume home medications as indicated. Cardiology recommending secondary hypertension work-up. Follow-up on renal arterial duplex scan. Community-acquired pneumonia: Currently maintaining saturation on 2 L via nasal cannula. CT chest showing multifocal groundglass opacities. Respiratory panel negative. Continue ceftriaxone and azithromycin. Ascending thoracic aortic aneurysm: CT PE showing ascending thoracic aorta 4.4 cm in diameter, CT surgery was consulted, recommended strict blood pressure control and follow-up as outpatient in 3 to 6 months. CKD: Creatinine 2.52 today. Baseline around 2.1-2.4. Continue to monitor. Hyponatremia: Sodium 130. Continue to monitor. DVT ppx : Lovenox  GI ppx: Not indicated    PT/OT: Consulted  Discharge Planning / SW: In progress    Jessica Hurst MD  Internal Medicine Resident, PGY-1  [facility];  [place], [place]  11/21/2022, 5:45 AM

## 2022-11-21 NOTE — PROGRESS NOTES
[facility]  Progress Note                   Date:   11/21/2022  Patient name: Patel Jones  Date of admission:  11/19/2022 11:16 AM  MRN:   [id number]  YOB: 1980  PCP: Cara Mccormack MD    Reason for Admission: Hypertensive emergency [I16.1]    Subjective:       Clinical Changes /Abnormalities:Patient seen and examined. Denies chest pain or shortness of breath. Tele/vitals/labs reviewed . Discussed case with RN. Review of Systems    Medications:   Scheduled Meds:   hydrALAZINE  100 mg Oral 3 times per day    spironolactone  25 mg Oral BID    amLODIPine  10 mg Oral Daily    carvedilol  25 mg Oral BID WC    [Held by provider] doxazosin  2 mg Oral Nightly    azithromycin  500 mg IntraVENous Q24H    cefTRIAXone (ROCEPHIN) IV  1,000 mg IntraVENous Q24H    cloNIDine  0.1 mg Oral Daily    sodium chloride flush  5-40 mL IntraVENous 2 times per day    enoxaparin  30 mg SubCUTAneous BID     Continuous Infusions:   sodium chloride       CBC:   Recent Labs     11/19/22  1145 11/20/22  0346   WBC 9.1 8.4   HGB 10.3* 9.6*    272     BMP:    Recent Labs     11/19/22  1145 11/20/22  0346 11/21/22  0545   * 130* 132*   K 4.2 4.0 4.4    99 101   CO2 18* 19* 17*   BUN 44* 36* 35*   CREATININE 2.44* 2.52* 2.47*   GLUCOSE 106* 108* 106*     Hepatic:  Recent Labs     11/19/22  1401   AST 32   ALT 37   BILITOT 0.3   ALKPHOS 69     Troponin:   Recent Labs     11/19/22  1401 11/20/22  0346 11/20/22  0811   TROPHS 80* 85* 96*     BNP: No results for input(s): BNP in the last 72 hours. Lipids: No results for input(s): CHOL, HDL in the last 72 hours. Invalid input(s): LDLCALCU  INR: No results for input(s): INR in the last 72 hours. DATA:    Diagnostics:       EKG  11/19/22  Sinus tachycardia, normal axis, no ST-T changes        ECHO  11/9/22  Contrast was utilized on this technically difficult study. Moderate left ventricular hypertrophy.  Global left ventricular systolic  function is normal. Estimated ejection fraction is > 55%. Left atrium is mildly dilated. No significant valvular abnormalities noted. No pericardial effusion seen. IVC not well visualized. E/E' average = 22.    Objective:   Vitals: BP (!) 163/123 Comment: medicine resident notified  Pulse 88   Temp 98.4 °F (36.9 °C) (Oral)   Resp 20   Ht 6' 5\" (1.956 m)   Wt (!) 317 lb 3.2 oz (143.9 kg)   SpO2 95%   BMI 37.61 kg/m²   General appearance: alert and cooperative with exam  HEENT: Head: Normocephalic, no lesions, without obvious abnormality. Neck:no JVD, trachea midline, no adenopathy  Lungs: Clear to auscultation  Heart: Regular rate and rhythm, s1/s2 auscultated, no murmurs  Abdomen: soft, non-tender, bowel sounds active  Extremities: no edema  Neurologic: not done        Assessment / Acute Cardiac Problems:    Hypertensive emergency with elevated troponin and DEVYN  Poorly controlled HTN - questionable compliance with medication (Patient on home clonidine 0.1, mg TID, coreg 25 bid, amlodipine 10 mg, aldactone 25 mg bid, hydralazine 100 mg tid)-   AAA 4.4 cm, no dissection  Asthma  Bipolar disorder  Morbid obesity  CKD 3B most likely secondary to hypertensive nephropathy, baseline serum creatinine is 2.0 to 2.2 mg/dL- had recent work up with nephrology       Patient Active Problem List:     Moderate persistent asthma     Bipolar 1 disorder ([facility] [address]     Essential hypertension     Obesity, morbid, BMI 40.0-49.9 ([facility] [address]     Need for prophylactic vaccination with combined diphtheria-tetanus-pertussis (DTP) vaccine     DEVYN (acute kidney injury) ([facility] [address]     Hypoxia     Troponin level elevated     Obesity (BMI 30-39. 9)     Sleep-related breathing disorder     Hypertensive emergency     Hyponatremia     CKD (chronic kidney disease)     Ascending aortic aneurysm      Plan of Treatment:   HTN urgency - blood pressure improved but still on high side , will increase clonidine to TID  , continue coreg , Norvasc aldactone and hydralazine   ECHO reviewed as above   Will follow     Electronically signed by BHAVYA Powell NP on 11/21/2022 at [address] [place] [address]  [phone]

## 2022-11-22 ENCOUNTER — APPOINTMENT (OUTPATIENT)
Dept: GENERAL RADIOLOGY | Age: 42
DRG: 280 | End: 2022-11-22
Payer: COMMERCIAL

## 2022-11-22 LAB
ANION GAP SERPL CALCULATED.3IONS-SCNC: 12 MMOL/L (ref 9–17)
ANION GAP SERPL CALCULATED.3IONS-SCNC: 13 MMOL/L (ref 9–17)
BUN BLDV-MCNC: 40 MG/DL (ref 6–20)
BUN BLDV-MCNC: 41 MG/DL (ref 6–20)
CALCIUM SERPL-MCNC: 8.6 MG/DL (ref 8.6–10.4)
CALCIUM SERPL-MCNC: 9.1 MG/DL (ref 8.6–10.4)
CHLORIDE BLD-SCNC: 102 MMOL/L (ref 98–107)
CHLORIDE BLD-SCNC: 105 MMOL/L (ref 98–107)
CO2: 18 MMOL/L (ref 20–31)
CO2: 21 MMOL/L (ref 20–31)
CREAT SERPL-MCNC: 2.4 MG/DL (ref 0.7–1.2)
CREAT SERPL-MCNC: 2.69 MG/DL (ref 0.7–1.2)
GFR SERPL CREATININE-BSD FRML MDRD: 29 ML/MIN/1.73M2
GFR SERPL CREATININE-BSD FRML MDRD: 33 ML/MIN/1.73M2
GLUCOSE BLD-MCNC: 145 MG/DL (ref 75–110)
GLUCOSE BLD-MCNC: 85 MG/DL (ref 70–99)
GLUCOSE BLD-MCNC: 91 MG/DL (ref 70–99)
POTASSIUM SERPL-SCNC: 4.6 MMOL/L (ref 3.7–5.3)
POTASSIUM SERPL-SCNC: 5.5 MMOL/L (ref 3.7–5.3)
REASON FOR REJECTION: NORMAL
SODIUM BLD-SCNC: 135 MMOL/L (ref 135–144)
SODIUM BLD-SCNC: 136 MMOL/L (ref 135–144)
ZZ NTE CLEAN UP: ORDERED TEST: NORMAL
ZZ NTE WITH NAME CLEAN UP: SPECIMEN SOURCE: NORMAL

## 2022-11-22 PROCEDURE — 2500000003 HC RX 250 WO HCPCS: Performed by: STUDENT IN AN ORGANIZED HEALTH CARE EDUCATION/TRAINING PROGRAM

## 2022-11-22 PROCEDURE — 6370000000 HC RX 637 (ALT 250 FOR IP): Performed by: STUDENT IN AN ORGANIZED HEALTH CARE EDUCATION/TRAINING PROGRAM

## 2022-11-22 PROCEDURE — 80048 BASIC METABOLIC PNL TOTAL CA: CPT

## 2022-11-22 PROCEDURE — 2580000003 HC RX 258: Performed by: STUDENT IN AN ORGANIZED HEALTH CARE EDUCATION/TRAINING PROGRAM

## 2022-11-22 PROCEDURE — 6360000002 HC RX W HCPCS: Performed by: STUDENT IN AN ORGANIZED HEALTH CARE EDUCATION/TRAINING PROGRAM

## 2022-11-22 PROCEDURE — 99232 SBSQ HOSP IP/OBS MODERATE 35: CPT | Performed by: INTERNAL MEDICINE

## 2022-11-22 PROCEDURE — 36415 COLL VENOUS BLD VENIPUNCTURE: CPT

## 2022-11-22 PROCEDURE — 94660 CPAP INITIATION&MGMT: CPT

## 2022-11-22 PROCEDURE — 2060000000 HC ICU INTERMEDIATE R&B

## 2022-11-22 PROCEDURE — 82947 ASSAY GLUCOSE BLOOD QUANT: CPT

## 2022-11-22 PROCEDURE — 99233 SBSQ HOSP IP/OBS HIGH 50: CPT | Performed by: INTERNAL MEDICINE

## 2022-11-22 PROCEDURE — 6370000000 HC RX 637 (ALT 250 FOR IP): Performed by: HEALTH CARE PROVIDER

## 2022-11-22 PROCEDURE — 71045 X-RAY EXAM CHEST 1 VIEW: CPT

## 2022-11-22 RX ORDER — DEXTROSE MONOHYDRATE 100 MG/ML
INJECTION, SOLUTION INTRAVENOUS CONTINUOUS PRN
Status: DISCONTINUED | OUTPATIENT
Start: 2022-11-22 | End: 2022-11-23 | Stop reason: HOSPADM

## 2022-11-22 RX ORDER — SODIUM BICARBONATE 650 MG/1
650 TABLET ORAL 2 TIMES DAILY
Status: DISCONTINUED | OUTPATIENT
Start: 2022-11-22 | End: 2022-11-23 | Stop reason: HOSPADM

## 2022-11-22 RX ORDER — SODIUM CHLORIDE 9 MG/ML
INJECTION, SOLUTION INTRAVENOUS CONTINUOUS
Status: DISCONTINUED | OUTPATIENT
Start: 2022-11-22 | End: 2022-11-23 | Stop reason: HOSPADM

## 2022-11-22 RX ADMIN — SODIUM CHLORIDE: 9 INJECTION, SOLUTION INTRAVENOUS at 08:33

## 2022-11-22 RX ADMIN — HYDRALAZINE HYDROCHLORIDE 100 MG: 50 TABLET, FILM COATED ORAL at 14:46

## 2022-11-22 RX ADMIN — ENOXAPARIN SODIUM 30 MG: 100 INJECTION SUBCUTANEOUS at 19:34

## 2022-11-22 RX ADMIN — HYDRALAZINE HYDROCHLORIDE 100 MG: 50 TABLET, FILM COATED ORAL at 04:28

## 2022-11-22 RX ADMIN — SODIUM BICARBONATE 650 MG: 648 TABLET ORAL at 09:45

## 2022-11-22 RX ADMIN — DOXAZOSIN 2 MG: 2 TABLET ORAL at 19:35

## 2022-11-22 RX ADMIN — CEFTRIAXONE SODIUM 1000 MG: 10 INJECTION, POWDER, FOR SOLUTION INTRAVENOUS at 18:13

## 2022-11-22 RX ADMIN — SODIUM CHLORIDE: 9 INJECTION, SOLUTION INTRAVENOUS at 19:44

## 2022-11-22 RX ADMIN — DEXTROSE MONOHYDRATE 250 ML: 100 INJECTION, SOLUTION INTRAVENOUS at 09:48

## 2022-11-22 RX ADMIN — SODIUM CHLORIDE, PRESERVATIVE FREE 10 ML: 5 INJECTION INTRAVENOUS at 08:33

## 2022-11-22 RX ADMIN — ENOXAPARIN SODIUM 30 MG: 100 INJECTION SUBCUTANEOUS at 08:28

## 2022-11-22 RX ADMIN — INSULIN HUMAN 10 UNITS: 100 INJECTION, SOLUTION PARENTERAL at 10:04

## 2022-11-22 RX ADMIN — CARVEDILOL 25 MG: 25 TABLET, FILM COATED ORAL at 16:26

## 2022-11-22 RX ADMIN — Medication 500 MG: at 19:44

## 2022-11-22 RX ADMIN — CARVEDILOL 25 MG: 25 TABLET, FILM COATED ORAL at 08:28

## 2022-11-22 RX ADMIN — SODIUM CHLORIDE: 9 INJECTION, SOLUTION INTRAVENOUS at 21:16

## 2022-11-22 RX ADMIN — AMLODIPINE BESYLATE 10 MG: 10 TABLET ORAL at 08:28

## 2022-11-22 RX ADMIN — SODIUM BICARBONATE 650 MG: 648 TABLET ORAL at 19:34

## 2022-11-22 RX ADMIN — HYDRALAZINE HYDROCHLORIDE 100 MG: 50 TABLET, FILM COATED ORAL at 21:17

## 2022-11-22 ASSESSMENT — ENCOUNTER SYMPTOMS
EYES NEGATIVE: 1
SORE THROAT: 0
TROUBLE SWALLOWING: 0
COUGH: 1
ALLERGIC/IMMUNOLOGIC NEGATIVE: 1
SHORTNESS OF BREATH: 0
GASTROINTESTINAL NEGATIVE: 1
VOICE CHANGE: 0
WHEEZING: 0

## 2022-11-22 NOTE — PLAN OF CARE
Problem: Discharge Planning  Goal: Discharge to home or other facility with appropriate resources  11/22/2022 1129 by Karo Monterroso RN  Outcome: Progressing  Flowsheets (Taken 11/22/2022 0900)  Discharge to home or other facility with appropriate resources:   Arrange for needed discharge resources and transportation as appropriate   Identify barriers to discharge with patient and caregiver   Identify discharge learning needs (meds, wound care, etc)  11/22/2022 0051 by Bird Shah RN  Outcome: Progressing     Problem: Pain  Goal: Verbalizes/displays adequate comfort level or baseline comfort level  11/22/2022 1129 by Karo Monterroso RN  Outcome: Progressing  11/22/2022 0051 by Bird Shah RN  Outcome: Progressing     Problem: Safety - Adult  Goal: Free from fall injury  11/22/2022 1129 by Karo Monterroso RN  Outcome: Progressing  11/22/2022 0051 by Bird Shah RN  Outcome: Progressing

## 2022-11-22 NOTE — PROGRESS NOTES
PULMONARY & CRITICAL CARE MEDICINE PROGRESS  NOTE     Patient:  Samuel Hutchison  MRN: [id number]  Admit date: 11/19/2022  Primary Care Physician: Ziyad Gilbert MD  Consulting Physician: Madyson Iverson MD  CODE Status: DNR-CCA  LOS: 3    SUBJECTIVE     I personally interviewed/examined the patient, reviewed interval history and interpreted all available radiographic, laboratory data at the time of service. Chief Compliant/Reason for Initial Consult:   Acute respiratory failure/pneumonia/hypertensive emergency/pulm edema    Brief Hospital Course: The patient is a 36 y.o. male Patient was seen initially in the emergency room for evaluation for admission to medical ICU because of hypertensive emergency. Patient has history of difficult to control hypertension was recently admitted to [facility] had plasma renin activity done apparently outpatient renal duplex previous ultrasound of the kidneys were. Echocardiogram previously shows diastolic dysfunction/LVH patient is on multiple medication at least 5 medication to control blood pressure apparently no history of obstructive sleep apnea ever diagnosed does have obesity. Presented to emergency room with chest pain shortness of breath headache found to have systolic blood pressure of 260 to 270 initial concern for aneurysm/dissection because of the chest pain and hypertension CTA chest was negative for dissection or pulm embolism showed 4.4 cm ascending aneurysm CTA abdomen was negative. Patient has history of chronic kidney disease with creatinine of around 2.5 to 2.6 usually his creatinine was 2.44. CT surgery was consulted and cardiology was consulted as there was concern of anterior ST elevation cardiology did not think that patient has ST elevation troponin was 80.   Patient was started on esmolol drip and then Cardene was added systolic blood pressure dropped to 130 patient was also fatigued at that time decreased mentation slightly and then Cardene was slowly decreased. Patient later required BiPAP therapy as patient was tachypneic apparently not hypoxic though arterial blood gas shows normal pH 7.38 PCO2 of 32 PO2 was 150 bicarbonate of 19. CT head was reported  Chest x-ray was consistent with mild congestive changes. CT chest showed patchy areas of infiltrate opacity mostly in the right upper lobe and some in the right mid lower lung. His blood pressure stabilized in ICU was taken off BiPAP in ICU Cardene drip was weaned off and then he was transferred to medical floor    Interval History:  22  Patient was evaluated   He is on nasal cannula comfortable no work of breathing no sputum. Review of Systems:  Review of Systems   Constitutional:  Negative for appetite change, fever and unexpected weight change. HENT:  Negative for postnasal drip, sore throat, trouble swallowing and voice change. Eyes: Negative. Respiratory:  Positive for cough. Negative for shortness of breath and wheezing. Cardiovascular:  Negative for chest pain, palpitations and leg swelling. Gastrointestinal: Negative. Endocrine: Negative. Genitourinary:  Negative for dysuria, flank pain, frequency and hematuria. Musculoskeletal: Negative. Skin: Negative. Allergic/Immunologic: Negative. Neurological:  Negative for dizziness, seizures, speech difficulty and headaches. Hematological:  Negative for adenopathy. Does not bruise/bleed easily. Psychiatric/Behavioral: Negative.        OBJECTIVE     VITAL SIGNS:   LAST-  BP (!) 159/109   Pulse 92   Temp 98.8 °F (37.1 °C) (Oral)   Resp 20   Ht 6' 5\" (1.956 m)   Wt (!) 317 lb 3.2 oz (143.9 kg)   SpO2 99%   BMI 37.61 kg/m²   8-24 HR RANGE-  TEMP Temp  Av.5 °F (36.9 °C)  Min: 97.9 °F (36.6 °C)  Max: 99 °F (58.2 °C)   BP Systolic (45WWW), QLO:987 , Min:130 , QMO:102      Diastolic (54NJZ), NVL:708, Min:85, Max:117     PULSE Pulse  Avg: 95.6  Min: 90  Max: 101   RR Resp  Av  Min: 20  Max: 20   O2 SAT SpO2  Av.5 %  Min: 98 %  Max: 99 %   OXYGEN DELIVERY No data recorded     Systemic Examination:   Physical Exam  Head and neck atraumatic, normocephalic    Lymph nodes-no cervical, supraclavicular lymphadenopathy    Neck-no JVP elevation    Lungs - Ventilating all lobes without any rales, rhonchi, wheezes or dullness. No bronchial breath sounds. Chest expansion equal bilaterally    CVS- S1, S2 regular. No S3 no S4, no murmurs    Abdomen-nontender, nondistended. Bowel sounds are present. No organomegaly    Lower extremity-no edema    Upper extremity-no edema    Neurological-grossly normal cranial nerves. No overt motor deficit     DATA REVIEW     Medications:  Scheduled Meds:   insulin regular  10 Units IntraVENous Once    sodium bicarbonate  650 mg Oral BID    hydrALAZINE  100 mg Oral 3 times per day    [Held by provider] spironolactone  25 mg Oral BID    cloNIDine  1 patch TransDERmal Weekly    amLODIPine  10 mg Oral Daily    carvedilol  25 mg Oral BID WC    doxazosin  2 mg Oral Nightly    azithromycin  500 mg IntraVENous Q24H    cefTRIAXone (ROCEPHIN) IV  1,000 mg IntraVENous Q24H    sodium chloride flush  5-40 mL IntraVENous 2 times per day    enoxaparin  30 mg SubCUTAneous BID     Continuous Infusions:   sodium chloride 75 mL/hr at 22 0833    dextrose      sodium chloride 25 mL/hr at 22     LABS:-  ABG:   No results for input(s): POCPH, POCPCO2, POCPO2, POCHCO3, PCJL4CTI in the last 72 hours.   CBC:   Recent Labs     22  0346   WBC 8.4   HGB 9.6*   HCT 31.5*   MCV 83.1      LYMPHOPCT 13*   RBC 3.79*   MCH 25.3   MCHC 30.5   RDW 14.8*       BMP:   Recent Labs     22  0346 22  0545 22  0717   * 132* 136   K 4.0 4.4 5.5*   CL 99 101 105   CO2 19* 17* 18*   BUN 36* 35* 41*   CREATININE 2.52* 2.47* 2.69*   GLUCOSE 108* 106* 91       Liver Function Test:   Recent Labs     22  1401 PROT 6.8   LABALBU 3.4*   ALT 37   AST 32   ALKPHOS 69   BILITOT 0.3       Amylase/Lipase:  No results for input(s): AMYLASE, LIPASE in the last 72 hours. Coagulation Profile:   No results for input(s): INR, PROTIME, APTT in the last 72 hours. Cardiac Enzymes:  No results for input(s): CKTOTAL, CKMB, CKMBINDEX, TROPONINI in the last 72 hours. Lactic Acid:  No results found for: LACTA  BNP:   No results found for: BNP  D-Dimer:  Lab Results   Component Value Date    DDIMER 0.99 11/19/2022     Others:   Lab Results   Component Value Date    TSH 1.27 11/19/2022     Lab Results   Component Value Date    CRP 7.7 (H) 10/09/2021     No results found for: Li Duff  Lab Results   Component Value Date    IRON 44 (L) 03/18/2022    TIBC 280 03/18/2022    FERRITIN 119 03/18/2022     No results found for: SPEP, UPEP  Lab Results   Component Value Date/Time    PSA 0.80 09/28/2021 09:33 AM       Input/Output:  No intake or output data in the 24 hours ending 11/22/22 1225      Microbiology:  Recent Labs     11/19/22  1455 11/19/22  1535 11/19/22  2332   SPECDESC . BLOOD   < > . NASAL SWAB   SPECIAL RAC, 10ML  --   --    CULTURE NO GROWTH 2 DAYS  --   --     < > = values in this interval not displayed. Pathology:    Radiology reports:  VL RENAL ARTERIAL DUPLEX COMPLETE   Final Result      XR CHEST PORTABLE   Final Result   Findings suggest congestive heart failure, not significantly changed         CT ABDOMEN PELVIS WO CONTRAST Additional Contrast? None   Final Result   1. CHEST: No pulmonary embolus is identified. 2. Multifocal ground-glass opacities and few interlobular and septal   opacities. Trace pleural effusions are noted. Pulmonary edema versus   pneumonia. Atypical pneumonia is considered in the differential, such as   viral, for instance influenza. Interestingly, there was a similar, more   prominent presentation with ground-glass and alveolar opacities on 10/09/2021.   3. Cardiomegaly.   Ectasia of the ascending thoracic aorta, measuring 44 mm in   diameter. 4. ABDOMEN PELVIS: No acute abdominopelvic abnormality. CT CHEST PULMONARY EMBOLISM W CONTRAST   Final Result   1. CHEST: No pulmonary embolus is identified. 2. Multifocal ground-glass opacities and few interlobular and septal   opacities. Trace pleural effusions are noted. Pulmonary edema versus   pneumonia. Atypical pneumonia is considered in the differential, such as   viral, for instance influenza. Interestingly, there was a similar, more   prominent presentation with ground-glass and alveolar opacities on 10/09/2021.   3. Cardiomegaly. Ectasia of the ascending thoracic aorta, measuring 44 mm in   diameter. 4. ABDOMEN PELVIS: No acute abdominopelvic abnormality. XR CHEST PORTABLE   Final Result   Findings suggest congestive heart failure         CT CHEST WO CONTRAST    (Results Pending)       Echocardiogram:   No results found for this or any previous visit. Cardiac Catheterization:   No results found for this or any previous visit. ASSESSMENT AND PLAN     Assessment:    Pulmonary edema> atypical pneumonia likely pulmonary edema secondary to hypertensive emergency. Hypertensive emergency.-Blood pressure controlled  Difficult to control hypertension. Acute respiratory failure with hypoxia resolved. Obesity. Likely obstructive sleep apnea. History of chronic cough/cough variant asthma  Diastolic congestive heart failure  Plan:  Complete 5 days of ceftriaxone. Complete 5 days of azithromycin-concern for atypical pneumonia. Will repeat chest x-ray. More likely to be pulmonary edema than atypical pneumonia due to diastolic heart failure with increasing afterload leading to acute pulmonary edema. If x-ray is improving then complete 5 days of azithromycin.       Doron Solitario MD   Pulmonary and Critical Care Medicine           11/22/2022, 12:25 PM    Please note that this chart was generated using voice recognition Dragon dictation software. Although every effort was made to ensure the accuracy of this automated transcription, some errors in transcription may have occurred.

## 2022-11-22 NOTE — PROGRESS NOTES
[facility]  Progress Note                   Date:   11/22/2022  Patient name: Pat Schmidt  Date of admission:  11/19/2022 11:16 AM  MRN:   [id number]  YOB: 1980  PCP: Brody Tong MD    Reason for Admission: Hypertensive emergency [I16.1]    Subjective:       Clinical Changes /Abnormalities:Patient seen and examined. Denies chest pain or shortness of breath. Tele/vitals/labs reviewed . Discussed case with RN. Review of Systems    Medications:   Scheduled Meds:   insulin regular  10 Units IntraVENous Once    sodium bicarbonate  650 mg Oral BID    hydrALAZINE  100 mg Oral 3 times per day    [Held by provider] spironolactone  25 mg Oral BID    cloNIDine  1 patch TransDERmal Weekly    amLODIPine  10 mg Oral Daily    carvedilol  25 mg Oral BID WC    doxazosin  2 mg Oral Nightly    azithromycin  500 mg IntraVENous Q24H    cefTRIAXone (ROCEPHIN) IV  1,000 mg IntraVENous Q24H    sodium chloride flush  5-40 mL IntraVENous 2 times per day    enoxaparin  30 mg SubCUTAneous BID     Continuous Infusions:   sodium chloride 75 mL/hr at 11/22/22 0833    dextrose      sodium chloride 25 mL/hr at 11/21/22 2029     CBC:   Recent Labs     11/19/22  1145 11/20/22  0346   WBC 9.1 8.4   HGB 10.3* 9.6*    272       BMP:    Recent Labs     11/20/22  0346 11/21/22  0545 11/22/22  0717   * 132* 136   K 4.0 4.4 5.5*   CL 99 101 105   CO2 19* 17* 18*   BUN 36* 35* 41*   CREATININE 2.52* 2.47* 2.69*   GLUCOSE 108* 106* 91       Hepatic:  Recent Labs     11/19/22  1401   AST 32   ALT 37   BILITOT 0.3   ALKPHOS 69       Troponin:   Recent Labs     11/19/22  1401 11/20/22  0346 11/20/22  0811   TROPHS 80* 85* 96*       BNP: No results for input(s): BNP in the last 72 hours. Lipids: No results for input(s): CHOL, HDL in the last 72 hours. Invalid input(s): LDLCALCU  INR: No results for input(s): INR in the last 72 hours.       DATA:    Diagnostics:       EKG  11/19/22  Sinus tachycardia, normal axis, no ST-T changes        ECHO  11/9/22  Contrast was utilized on this technically difficult study. Moderate left ventricular hypertrophy. Global left ventricular systolic  function is normal. Estimated ejection fraction is > 55%. Left atrium is mildly dilated. No significant valvular abnormalities noted. No pericardial effusion seen. IVC not well visualized. E/E' average = 22.    Objective:   Vitals: BP (!) 159/109   Pulse 92   Temp 98.8 °F (37.1 °C) (Oral)   Resp 20   Ht 6' 5\" (1.956 m)   Wt (!) 317 lb 3.2 oz (143.9 kg)   SpO2 99%   BMI 37.61 kg/m²   General appearance: alert and cooperative with exam  HEENT: Head: Normocephalic, no lesions, without obvious abnormality. Neck:no JVD, trachea midline, no adenopathy  Lungs: Clear to auscultation  Heart: Regular rate and rhythm, s1/s2 auscultated, no murmurs  Abdomen: soft, non-tender, bowel sounds active  Extremities: no edema  Neurologic: not done        Assessment / Acute Cardiac Problems:    Hypertensive emergency with elevated troponin and DEVYN  Poorly controlled HTN - questionable compliance with medication (Patient on home clonidine 0.1, mg TID, coreg 25 bid, amlodipine 10 mg, aldactone 25 mg bid, hydralazine 100 mg tid)-   AAA 4.4 cm, no dissection  Asthma  Bipolar disorder  Morbid obesity  CKD 3B most likely secondary to hypertensive nephropathy, baseline serum creatinine is 2.0 to 2.2 mg/dL- had recent work up with nephrology       Patient Active Problem List:     Moderate persistent asthma     Bipolar 1 disorder ([address]     Essential hypertension     Obesity, morbid, BMI 40.0-49.9 ([address]     Need for prophylactic vaccination with combined diphtheria-tetanus-pertussis (DTP) vaccine     DEVYN (acute kidney injury) ([address]     Hypoxia     Troponin level elevated     Obesity (BMI 30-39. 9)     Sleep-related breathing disorder     Hypertensive emergency     Hyponatremia     CKD (chronic kidney disease)     Ascending aortic aneurysm      Plan of Treatment:   HTN urgency - blood pressure improved but still on high side , blood pressure medication were changed per primary  yesterday -continue  clonidine patch  , continue coreg , Norvasc  and hydralazine .  Aldactone on hold  due to hyperkalemia   ECHO reviewed as above   DEVYN/CKD- Aldactone on hold  due to hyperkalemia recommend consulting nephrology   Cardiology is signing off ,please call us back with any concerns     Electronically signed by BHAVYA Guevara NP on 11/22/2022 at 11:27 [facility].  [phone]

## 2022-11-22 NOTE — PROGRESS NOTES
[facility]  Internal Medicine Teaching Residency Program  Inpatient Daily Progress Note  ______________________________________________________________________________    Patient: Jaylin Wise  YOB: 1980   VBP:[id number]    Acct: [de-identified]     Room: 2005/2005-01  Admit date: 11/19/2022  Today's date: 11/22/22  Number of days in the hospital: 3    SUBJECTIVE   Admitting Diagnosis: Hypertensive emergency  CC: Hypertensive emergency  Pt examined at bedside. Chart & results reviewed. No acute events overnight. Vital signs stable with blood pressure 159/109, heart rate 82. Potassium was high 5.5, gave insulin and dextrose and held the Aldactone. Blood pressure on the higher side, resumed his Cardura. Continue to monitor. Denies any chest pain, cough, shortness of breath, nausea, vomiting, fever, chills. ROS:  Constitutional:  negative for chills, fevers, sweats  Respiratory:  negative for cough, dyspnea on exertion, hemoptysis, shortness of breath, wheezing  Cardiovascular:  negative for chest pain, chest pressure/discomfort, lower extremity edema, palpitations  Gastrointestinal:  negative for abdominal pain, constipation, diarrhea, nausea, vomiting  Neurological:  negative for dizziness, headache  BRIEF HISTORY     76-year-old male presented to the ER with severe chest pain, shortness of breath, and headache. His vitals demonstrated severe hypertension with systolic of 467. Patient has been noncompliant with medications. Chest x-ray in the ER demonstrated widened mediastinum, esmolol was initiated for concerns of aortic dissection in the setting of sudden chest pain and hypertension while awaiting CTA. CTA demonstrated 4.4 cm dilation of ascending thoracic aorta, D-dimer was elevated, no PE. Multifocal groundglass opacities and interlobular septal opacities were seen and the patient was started on vancomycin and Zosyn.   The patient was transferred to ICU. In the ICU the patient was started on Cardene and esmolol. CT surgery was consulted, recommended strict BP control and follow-up as outpatient in 3 to 6 months. Cardiology was also consulted for chest pain and elevated troponin, recommended troponin elevation likely due to hypertensive emergency, secondary hypertension work-up. His respiratory panel was negative. His antibiotics were changed to azithromycin and Rocephin. Patient was weaned off of Cardene and his home medications were resumed. Patient also changed his CODE STATUS to DNR CCA. He was transferred out of the ICU once his blood pressure and symptoms were controlled. Labs show sodium 130, potassium 4.0, creatinine 2.52, troponin 96, WBC 8.4, H&H of 9.6 and 31.5. OBJECTIVE     Vital Signs:  BP (!) 124/90   Pulse 92   Temp 98.1 °F (36.7 °C) (Oral)   Resp 18   Ht 6' 5\" (1.956 m)   Wt (!) 317 lb 3.2 oz (143.9 kg)   SpO2 98%   BMI 37.61 kg/m²     Temp (24hrs), Av.3 °F (36.8 °C), Min:97.9 °F (36.6 °C), Max:98.8 °F (37.1 °C)    In: 500   Out: -     Physical Exam:  Constitutional: This is a well developed, well nourished, 35-39.9 - Obesity Grade II 36y.o. year old male who is alert, oriented, cooperative and in no apparent distress. Head:normocephalic and atraumatic. EENT:  PERRLA. No conjunctival injections. Septum was midline, mucosa was without erythema, exudates or cobblestoning. No thrush was noted. Neck: Supple without thyromegaly. No elevated JVP. Trachea was midline. Respiratory: Chest was symmetrical without dullness to percussion. Breath sounds bilaterally were clear to auscultation. There were no wheezes, rhonchi or rales. There is no intercostal retraction or use of accessory muscles. No egophony noted. Cardiovascular: Regular without murmur, clicks, gallops or rubs. Abdomen: Slightly rounded and soft without organomegaly. No rebound, rigidity or guarding was appreciated.     Lymphatic: No lymphadenopathy. Musculoskeletal: Normal curvature of the spine. No gross muscle weakness. Extremities:  No lower extremity edema, ulcerations, tenderness, varicosities or erythema. Muscle size, tone and strength are normal.  No involuntary movements are noted. Skin:  Warm and dry. Good color, turgor and pigmentation. No lesions or scars.   No cyanosis or clubbing  Neurological/Psychiatric: The patient's general behavior, level of consciousness, thought content and emotional status is normal.        Medications:  Scheduled Medications:    sodium bicarbonate  650 mg Oral BID    hydrALAZINE  100 mg Oral 3 times per day    [Held by provider] spironolactone  25 mg Oral BID    cloNIDine  1 patch TransDERmal Weekly    amLODIPine  10 mg Oral Daily    carvedilol  25 mg Oral BID WC    doxazosin  2 mg Oral Nightly    azithromycin  500 mg IntraVENous Q24H    cefTRIAXone (ROCEPHIN) IV  1,000 mg IntraVENous Q24H    sodium chloride flush  5-40 mL IntraVENous 2 times per day    enoxaparin  30 mg SubCUTAneous BID     Continuous Infusions:    sodium chloride 75 mL/hr at 11/22/22 0833    dextrose      sodium chloride 25 mL/hr at 11/21/22 2029     PRN Medicationsglucose, 4 tablet, PRN  dextrose bolus, 125 mL, PRN   Or  dextrose bolus, 250 mL, PRN  glucagon (rDNA), 1 mg, PRN  dextrose, , Continuous PRN  metoprolol, 2.5 mg, Q6H PRN  hydrALAZINE, 5 mg, Q6H PRN  sodium chloride flush, 5-40 mL, PRN  sodium chloride, , PRN  ondansetron, 4 mg, Q8H PRN   Or  ondansetron, 4 mg, Q6H PRN  polyethylene glycol, 17 g, Daily PRN  acetaminophen, 650 mg, Q6H PRN   Or  acetaminophen, 650 mg, Q6H PRN  albuterol, 2.5 mg, Q2H PRN        Diagnostic Labs:  CBC:   Recent Labs     11/20/22  0346   WBC 8.4   RBC 3.79*   HGB 9.6*   HCT 31.5*   MCV 83.1   RDW 14.8*        BMP:   Recent Labs     11/20/22  0346 11/21/22  0545 11/22/22  0717   * 132* 136   K 4.0 4.4 5.5*   CL 99 101 105   CO2 19* 17* 18*   BUN 36* 35* 41*   CREATININE 2.52* 2.47* 2.69*     BNP: No results for input(s): BNP in the last 72 hours. PT/INR: No results for input(s): PROTIME, INR in the last 72 hours. APTT: No results for input(s): APTT in the last 72 hours. CARDIAC ENZYMES: No results for input(s): CKMB, CKMBINDEX, TROPONINI in the last 72 hours. Invalid input(s): CKTOTAL;3  FASTING LIPID PANEL:  Lab Results   Component Value Date    CHOL 184 03/18/2022    HDL 73 03/18/2022    TRIG 54 03/18/2022     LIVER PROFILE:   Recent Labs     11/19/22  1401   AST 32   ALT 37   BILIDIR 0.1   BILITOT 0.3   ALKPHOS 69      MICROBIOLOGY:   Lab Results   Component Value Date/Time    CULTURE NO GROWTH 2 DAYS 11/19/2022 02:55 PM       Imaging:    CT ABDOMEN PELVIS WO CONTRAST Additional Contrast? None    Result Date: 11/19/2022  1. CHEST: No pulmonary embolus is identified. 2. Multifocal ground-glass opacities and few interlobular and septal opacities. Trace pleural effusions are noted. Pulmonary edema versus pneumonia. Atypical pneumonia is considered in the differential, such as viral, for instance influenza. Interestingly, there was a similar, more prominent presentation with ground-glass and alveolar opacities on 10/09/2021. 3. Cardiomegaly. Ectasia of the ascending thoracic aorta, measuring 44 mm in diameter. 4. ABDOMEN PELVIS: No acute abdominopelvic abnormality. XR CHEST PORTABLE    Result Date: 11/20/2022  Findings suggest congestive heart failure, not significantly changed     XR CHEST PORTABLE    Result Date: 11/19/2022  Findings suggest congestive heart failure     CT CHEST PULMONARY EMBOLISM W CONTRAST    Result Date: 11/19/2022  1. CHEST: No pulmonary embolus is identified. 2. Multifocal ground-glass opacities and few interlobular and septal opacities. Trace pleural effusions are noted. Pulmonary edema versus pneumonia. Atypical pneumonia is considered in the differential, such as viral, for instance influenza.   Interestingly, there was a similar, more prominent presentation with ground-glass and alveolar opacities on 10/09/2021. 3. Cardiomegaly. Ectasia of the ascending thoracic aorta, measuring 44 mm in diameter. 4. ABDOMEN PELVIS: No acute abdominopelvic abnormality. ASSESSMENT & PLAN     ASSESSMENT / PLAN:     Principal Problem:    Hypertensive emergency  Active Problems:    Hyponatremia    CKD (chronic kidney disease)    Ascending aortic aneurysm    Acute pulmonary edema (HCC)    Pneumonia of right lung due to infectious organism    Obesity (BMI 35.0-39.9 without comorbidity)  Resolved Problems:    * No resolved hospital problems. *    Hypertensive emergency: Off of Cardene. Renal arterial duplex negative for renal artery stenosis. Blood pressure on the higher side, will resume Cardura, hold Aldactone for hyperkalemia. Continue Norvasc 10, Coreg 25, clonidine patch 0.1, Cardura, hydralazine 100 3 times daily. Community-acquired pneumonia: Off of oxygen. Respiratory panel negative. Continue ceftriaxone and azithromycin. Ascending thoracic aortic aneurysm: CT PE showing ascending thoracic aorta 4.4 cm in diameter, CT surgery recommending to follow-up with Dr. Rosario Montana as outpatient in 6-months. CKD: Creatinine 2.69 today. Continue to monitor. Hyponatremia: Now resolved. Sodium 136. Hyperkalemia: Potassium 5.5. Given 1 dose of insulin dextrose. Aldactone on hold due to hyperkalemia. Continue to monitor potassium levels. DVT ppx : Lovenox  GI ppx: Not indicated    PT/OT: Consulted  Discharge Planning / SW: In progress    Tom Campa MD  Internal Medicine Resident, PGY-1  [facility];  [place], [place]  11/22/2022, 1:52 PM

## 2022-11-23 VITALS
HEART RATE: 94 BPM | RESPIRATION RATE: 17 BRPM | DIASTOLIC BLOOD PRESSURE: 103 MMHG | SYSTOLIC BLOOD PRESSURE: 148 MMHG | WEIGHT: 315 LBS | OXYGEN SATURATION: 97 % | TEMPERATURE: 98.6 F | HEIGHT: 77 IN | BODY MASS INDEX: 37.19 KG/M2

## 2022-11-23 LAB
ABSOLUTE EOS #: 0.34 K/UL (ref 0–0.44)
ABSOLUTE IMMATURE GRANULOCYTE: <0.03 K/UL (ref 0–0.3)
ABSOLUTE LYMPH #: 1.36 K/UL (ref 1.1–3.7)
ABSOLUTE MONO #: 0.65 K/UL (ref 0.1–1.2)
ANION GAP SERPL CALCULATED.3IONS-SCNC: 14 MMOL/L (ref 9–17)
BASOPHILS # BLD: 2 % (ref 0–2)
BASOPHILS ABSOLUTE: 0.08 K/UL (ref 0–0.2)
BUN BLDV-MCNC: 39 MG/DL (ref 6–20)
CALCIUM SERPL-MCNC: 8.4 MG/DL (ref 8.6–10.4)
CHLORIDE BLD-SCNC: 104 MMOL/L (ref 98–107)
CO2: 19 MMOL/L (ref 20–31)
CREAT SERPL-MCNC: 2.43 MG/DL (ref 0.7–1.2)
EOSINOPHILS RELATIVE PERCENT: 6 % (ref 1–4)
GFR SERPL CREATININE-BSD FRML MDRD: 33 ML/MIN/1.73M2
GLUCOSE BLD-MCNC: 89 MG/DL (ref 75–110)
GLUCOSE BLD-MCNC: 95 MG/DL (ref 70–99)
GLUCOSE BLD-MCNC: 96 MG/DL (ref 75–110)
HCT VFR BLD CALC: 34.8 % (ref 40.7–50.3)
HEMOGLOBIN: 10 G/DL (ref 13–17)
IMMATURE GRANULOCYTES: 0 %
LYMPHOCYTES # BLD: 25 % (ref 24–43)
MCH RBC QN AUTO: 24.6 PG (ref 25.2–33.5)
MCHC RBC AUTO-ENTMCNC: 28.7 G/DL (ref 28.4–34.8)
MCV RBC AUTO: 85.7 FL (ref 82.6–102.9)
MONOCYTES # BLD: 12 % (ref 3–12)
NRBC AUTOMATED: 0 PER 100 WBC
PDW BLD-RTO: 14.6 % (ref 11.8–14.4)
PLATELET # BLD: 315 K/UL (ref 138–453)
PMV BLD AUTO: 11.8 FL (ref 8.1–13.5)
POTASSIUM SERPL-SCNC: 4.3 MMOL/L (ref 3.7–5.3)
RBC # BLD: 4.06 M/UL (ref 4.21–5.77)
RBC # BLD: ABNORMAL 10*6/UL
SEG NEUTROPHILS: 55 % (ref 36–65)
SEGMENTED NEUTROPHILS ABSOLUTE COUNT: 2.95 K/UL (ref 1.5–8.1)
SODIUM BLD-SCNC: 137 MMOL/L (ref 135–144)
WBC # BLD: 5.4 K/UL (ref 3.5–11.3)

## 2022-11-23 PROCEDURE — 2580000003 HC RX 258: Performed by: STUDENT IN AN ORGANIZED HEALTH CARE EDUCATION/TRAINING PROGRAM

## 2022-11-23 PROCEDURE — 6370000000 HC RX 637 (ALT 250 FOR IP): Performed by: HEALTH CARE PROVIDER

## 2022-11-23 PROCEDURE — 6360000002 HC RX W HCPCS: Performed by: STUDENT IN AN ORGANIZED HEALTH CARE EDUCATION/TRAINING PROGRAM

## 2022-11-23 PROCEDURE — 82947 ASSAY GLUCOSE BLOOD QUANT: CPT

## 2022-11-23 PROCEDURE — 99232 SBSQ HOSP IP/OBS MODERATE 35: CPT | Performed by: INTERNAL MEDICINE

## 2022-11-23 PROCEDURE — 6370000000 HC RX 637 (ALT 250 FOR IP)

## 2022-11-23 PROCEDURE — 80048 BASIC METABOLIC PNL TOTAL CA: CPT

## 2022-11-23 PROCEDURE — 6370000000 HC RX 637 (ALT 250 FOR IP): Performed by: STUDENT IN AN ORGANIZED HEALTH CARE EDUCATION/TRAINING PROGRAM

## 2022-11-23 PROCEDURE — 36415 COLL VENOUS BLD VENIPUNCTURE: CPT

## 2022-11-23 PROCEDURE — 85025 COMPLETE CBC W/AUTO DIFF WBC: CPT

## 2022-11-23 RX ORDER — DOCUSATE SODIUM 100 MG/1
100 CAPSULE, LIQUID FILLED ORAL DAILY
Status: DISCONTINUED | OUTPATIENT
Start: 2022-11-23 | End: 2022-11-23 | Stop reason: HOSPADM

## 2022-11-23 RX ORDER — CLONIDINE 0.2 MG/24H
1 PATCH, EXTENDED RELEASE TRANSDERMAL WEEKLY
Status: DISCONTINUED | OUTPATIENT
Start: 2022-11-23 | End: 2022-11-23 | Stop reason: HOSPADM

## 2022-11-23 RX ORDER — AZITHROMYCIN 500 MG/1
500 TABLET, FILM COATED ORAL DAILY
Qty: 3 TABLET | Refills: 0 | Status: SHIPPED | OUTPATIENT
Start: 2022-11-23 | End: 2022-11-26

## 2022-11-23 RX ORDER — CLONIDINE 0.2 MG/24H
1 PATCH, EXTENDED RELEASE TRANSDERMAL WEEKLY
Qty: 4 PATCH | Refills: 2 | Status: SHIPPED | OUTPATIENT
Start: 2022-11-23

## 2022-11-23 RX ADMIN — HYDRALAZINE HYDROCHLORIDE 100 MG: 50 TABLET, FILM COATED ORAL at 14:01

## 2022-11-23 RX ADMIN — AMLODIPINE BESYLATE 10 MG: 10 TABLET ORAL at 09:45

## 2022-11-23 RX ADMIN — CARVEDILOL 25 MG: 25 TABLET, FILM COATED ORAL at 19:02

## 2022-11-23 RX ADMIN — HYDRALAZINE HYDROCHLORIDE 100 MG: 50 TABLET, FILM COATED ORAL at 06:22

## 2022-11-23 RX ADMIN — POLYETHYLENE GLYCOL 3350 17 G: 17 POWDER, FOR SOLUTION ORAL at 08:41

## 2022-11-23 RX ADMIN — ENOXAPARIN SODIUM 30 MG: 100 INJECTION SUBCUTANEOUS at 09:46

## 2022-11-23 RX ADMIN — SODIUM CHLORIDE, PRESERVATIVE FREE 10 ML: 5 INJECTION INTRAVENOUS at 09:46

## 2022-11-23 RX ADMIN — CARVEDILOL 25 MG: 25 TABLET, FILM COATED ORAL at 09:48

## 2022-11-23 RX ADMIN — SODIUM BICARBONATE 650 MG: 648 TABLET ORAL at 09:45

## 2022-11-23 ASSESSMENT — ENCOUNTER SYMPTOMS
TROUBLE SWALLOWING: 0
GASTROINTESTINAL NEGATIVE: 1
COUGH: 1
SORE THROAT: 0
SHORTNESS OF BREATH: 0
ALLERGIC/IMMUNOLOGIC NEGATIVE: 1
VOICE CHANGE: 0
WHEEZING: 0
EYES NEGATIVE: 1

## 2022-11-23 ASSESSMENT — PAIN SCALES - GENERAL: PAINLEVEL_OUTOF10: 0

## 2022-11-23 NOTE — PROGRESS NOTES
Discharge instructions reviewed with pt, verbalized understanding. No further needs at this time. Medications delivered at bedside, pt to go home in a cab. Pt unable to get a ride home.  Electronically signed by Aleksey Stokes RN on 11/23/2022 at 3:20 PM

## 2022-11-23 NOTE — CARE COORDINATION
Met with patient to discuss transitional planning. He is returning home independently. He denies needs. He will call for transportation    [phone] patient unable to arrange transportation home. Cab arranged with Jeremy Holt [phone].  Trip #[id number]    Discharge Report    [facility]  Clinical Case Management Department  Written by: Tyson Davis RN    Patient Name: Praveena Ibrahim  Attending Provider: Russ Crawford MD  Admit Date: 2022 11:16 AM  MRN: [id number]  Account: [de-identified]                     : 1980  Discharge Date: 2022      Disposition: home    Tyson Davis RN

## 2022-11-23 NOTE — PROGRESS NOTES
[facility]  Internal Medicine Teaching Residency Program  Inpatient Daily Progress Note  ______________________________________________________________________________    Patient: Jackie Arboleda  YOB: 1980   UHP:[id number]    Acct: [de-identified]     Room: 2005/2005-01  Admit date: 11/19/2022  Today's date: 11/23/22  Number of days in the hospital: 4    SUBJECTIVE   CC: Shortness of Breath, Headache, and Dysphagia    Pt examined at bedside. Chart & results reviewed. - VSS, pt is saturating well on room air  -Patient is constipated, abdomen soft. We will add Colace on top of GlycoLax.  -Blood pressure better controlled than yesterday. Will increase clonidine patch. - Potassium back within normal limits. - labs reviewed   -Repeat chest x-ray shows mild pulmonary vascular congestion, improved than before. - no acute events overnight.   - Patient denies headache, vision problems, nausea, vomiting, chest pain, cough, abdominal pain, changes in bowel or urinary habits, and swelling.       ROS:  General ROS: Completed and except as mentioned above were negative   HEENT ROS: Completed and except as mentioned above were negative   Allergy and Immunology ROS:  Completed and except as mentioned above were negative  Hematological and Lymphatic ROS:  Completed and except as mentioned above were negative  Respiratory ROS:  Completed and except as mentioned above were negative  Cardiovascular ROS:  Completed and except as mentioned above were negative  Gastrointestinal ROS: Completed and except as mentioned above were negative  Genito-Urinary ROS:  Completed and except as mentioned above were negative  Musculoskeletal ROS:  Completed and except as mentioned above were negative  Neurological ROS:  Completed and except as mentioned above were negative  Skin & Dermatological ROS:  Completed and except as mentioned above were negative  Psychological ROS:  Completed and except as mentioned above were negative  BRIEF HISTORY     26-year-old male presented to the ER with severe chest pain, shortness of breath, and headache. His vitals demonstrated severe hypertension with systolic of 039. Patient has been noncompliant with medications. Chest x-ray in the ER demonstrated widened mediastinum, esmolol was initiated for concerns of aortic dissection in the setting of sudden chest pain and hypertension while awaiting CTA. CTA demonstrated 4.4 cm dilation of ascending thoracic aorta, D-dimer was elevated, no PE. Multifocal groundglass opacities and interlobular septal opacities were seen and the patient was started on vancomycin and Zosyn. The patient was transferred to ICU. In the ICU the patient was started on Cardene and esmolol. CT surgery was consulted, recommended strict BP control and follow-up as outpatient in 3 to 6 months. Cardiology was also consulted for chest pain and elevated troponin, recommended troponin elevation likely due to hypertensive emergency, secondary hypertension work-up. His respiratory panel was negative. His antibiotics were changed to azithromycin and Rocephin. Patient was weaned off of Cardene and his home medications were resumed. Patient also changed his CODE STATUS to DNR CCA. He was transferred out of the ICU once his blood pressure and symptoms were controlled. Labs show sodium 130, potassium 4.0, creatinine 2.52, troponin 96, WBC 8.4, H&H of 9.6 and 31.5.     OBJECTIVE     Vital Signs:  BP (!) 154/116   Pulse 99   Temp 98.6 °F (37 °C) (Oral)   Resp 17   Ht 6' 5\" (1.956 m)   Wt (!) 317 lb 3.2 oz (143.9 kg)   SpO2 97%   BMI 37.61 kg/m²     Temp (24hrs), Av.2 °F (36.8 °C), Min:97.9 °F (36.6 °C), Max:98.6 °F (37 °C)    In: 360   Out: 1850 [Urine:1850]    Physical Exam:  Constitutional: This is a well developed, well nourished, 35-39.9 - Obesity Grade II 36y.o. year old male who is alert, oriented, cooperative and in no apparent distress. Head:normocephalic and atraumatic. EENT:  PERRLA. No conjunctival injections. Neck: Supple without thyromegaly. No elevated JVP. Trachea was midline. Respiratory: Chest was symmetrical without dullness to percussion. Breath sounds bilaterally were clear to auscultation. There were no wheezes, rhonchi or rales. Cardiovascular: Regular without murmur, clicks, gallops or rubs. Abdomen: Slightly rounded and soft. No rebound, rigidity or guarding was appreciated. Lymphatic: No lymphadenopathy. Musculoskeletal: Normal curvature of the spine. No gross muscle weakness. Extremities:  No lower extremity edema, ulcerations, tenderness, varicosities or erythema. Skin:  Warm and dry. Good color, turgor and pigmentation.    Neurological/Psychiatric: The patient's general behavior, level of consciousness, thought content and emotional status is normal.        Medications:  Scheduled Medications:    docusate sodium  100 mg Oral Daily    sodium bicarbonate  650 mg Oral BID    hydrALAZINE  100 mg Oral 3 times per day    [Held by provider] spironolactone  25 mg Oral BID    cloNIDine  1 patch TransDERmal Weekly    amLODIPine  10 mg Oral Daily    carvedilol  25 mg Oral BID WC    doxazosin  2 mg Oral Nightly    azithromycin  500 mg IntraVENous Q24H    cefTRIAXone (ROCEPHIN) IV  1,000 mg IntraVENous Q24H    sodium chloride flush  5-40 mL IntraVENous 2 times per day    enoxaparin  30 mg SubCUTAneous BID     Continuous Infusions:    sodium chloride 75 mL/hr at 11/22/22 2116    dextrose      sodium chloride 25 mL/hr at 11/22/22 1944     PRN Medicationsglucose, 4 tablet, PRN  dextrose bolus, 125 mL, PRN   Or  dextrose bolus, 250 mL, PRN  glucagon (rDNA), 1 mg, PRN  dextrose, , Continuous PRN  metoprolol, 2.5 mg, Q6H PRN  hydrALAZINE, 5 mg, Q6H PRN  sodium chloride flush, 5-40 mL, PRN  sodium chloride, , PRN  ondansetron, 4 mg, Q8H PRN   Or  ondansetron, 4 mg, Q6H PRN  polyethylene glycol, 17 g, Daily PRN  acetaminophen, 650 mg, Q6H PRN   Or  acetaminophen, 650 mg, Q6H PRN  albuterol, 2.5 mg, Q2H PRN        Diagnostic Labs:  CBC:   Recent Labs     11/23/22  0548   WBC 5.4   RBC 4.06*   HGB 10.0*   HCT 34.8*   MCV 85.7   RDW 14.6*        BMP:   Recent Labs     11/22/22  0717 11/22/22  1643 11/23/22  0548    135 137   K 5.5* 4.6 4.3    102 104   CO2 18* 21 19*   BUN 41* 40* 39*   CREATININE 2.69* 2.40* 2.43*     BNP: No results for input(s): BNP in the last 72 hours. PT/INR: No results for input(s): PROTIME, INR in the last 72 hours. APTT: No results for input(s): APTT in the last 72 hours. CARDIAC ENZYMES: No results for input(s): CKMB, CKMBINDEX, TROPONINI in the last 72 hours. Invalid input(s): CKTOTAL;3  FASTING LIPID PANEL:  Lab Results   Component Value Date    CHOL 184 03/18/2022    HDL 73 03/18/2022    TRIG 54 03/18/2022     LIVER PROFILE: No results for input(s): AST, ALT, ALB, BILIDIR, BILITOT, ALKPHOS in the last 72 hours. MICROBIOLOGY:   Lab Results   Component Value Date/Time    CULTURE NO GROWTH 3 DAYS 11/19/2022 02:55 PM       Imaging:    CT ABDOMEN PELVIS WO CONTRAST Additional Contrast? None    Result Date: 11/19/2022  1. CHEST: No pulmonary embolus is identified. 2. Multifocal ground-glass opacities and few interlobular and septal opacities. Trace pleural effusions are noted. Pulmonary edema versus pneumonia. Atypical pneumonia is considered in the differential, such as viral, for instance influenza. Interestingly, there was a similar, more prominent presentation with ground-glass and alveolar opacities on 10/09/2021. 3. Cardiomegaly. Ectasia of the ascending thoracic aorta, measuring 44 mm in diameter. 4. ABDOMEN PELVIS: No acute abdominopelvic abnormality. XR CHEST (SINGLE VIEW FRONTAL)    Result Date: 11/22/2022  Mild pulmonary vascular congestion.      XR CHEST PORTABLE    Result Date: 11/20/2022  Findings suggest congestive heart failure, not significantly changed     XR CHEST PORTABLE    Result Date: 11/19/2022  Findings suggest congestive heart failure     CT CHEST PULMONARY EMBOLISM W CONTRAST    Result Date: 11/19/2022  1. CHEST: No pulmonary embolus is identified. 2. Multifocal ground-glass opacities and few interlobular and septal opacities. Trace pleural effusions are noted. Pulmonary edema versus pneumonia. Atypical pneumonia is considered in the differential, such as viral, for instance influenza. Interestingly, there was a similar, more prominent presentation with ground-glass and alveolar opacities on 10/09/2021. 3. Cardiomegaly. Ectasia of the ascending thoracic aorta, measuring 44 mm in diameter. 4. ABDOMEN PELVIS: No acute abdominopelvic abnormality. ASSESSMENT & PLAN     Principal Problem:    Hypertensive emergency  Active Problems:    Hyponatremia    CKD (chronic kidney disease)    Ascending aortic aneurysm    Acute pulmonary edema (HCC)    Pneumonia of right lung due to infectious organism    Obesity (BMI 35.0-39.9 without comorbidity)  Resolved Problems:    * No resolved hospital problems. *    Hypertensive emergency likely secondary to noncompliance to medications at home Resolved  -Continue Norvasc 10, Coreg 25, Cardura, hydralazine. Will increase clonidine patch to 0.2. We will resume Aldactone as potassium is back to normal.  Advised on weight loss, compliance to medications and compliance to diet. Concerns of pneumonia. Resolved  -Continue ceftriaxone and azithromycin for now. Will discharge on azithromycin for total of 5 days. CKD likely secondary to uncontrolled hypertension.  -Continue to monitor creatinine levels with daily BMPs. Patient needs to follow-up with a nephrologist, follow-up placed in the discharge paperwork. Discussed with the patient as well. Hyponatremia. resolved. Hyperkalemia. Resolved. -Resumed Aldactone. Continue to monitor.   BMP in a week, and outpatient follow-up with PCP and nephrologist.  Incidental finding of ascending thoracic aortic aneurysm.  -Outpatient follow-up with Dr. Pepper Sánchez in 3 to 6 months. Follow-up placed in discharge paperwork.     DVT Ppx: Lovenox   GI Ppx: Not Indicated  Diet: Regular and low-salt diet  PT/OT: Consulted  Case Management: Consulted    Disposition: Discharge today    Leann Sebastian MD  PGY-2, Internal Medicine Resident  [facility]         11/23/2022, 11:12 AM

## 2022-11-23 NOTE — DISCHARGE INSTRUCTIONS
-Complete the course of your antibiotics. -Take all of your medications as prescribed and do not miss any of the antihypertensive medications as it will increase the blood pressure very high.   -Please follow- up with kidney doctor, heart doctor.   -Please follow- up with CT surgery for the aneurysm. -Please limit your salt intake.   -Please take medications as prescribed and complete the full course. -Please come to ED if your symptoms worsen or call 911.   -Please make an appointment with your PCP for post- hospital follow up.

## 2022-11-23 NOTE — PROGRESS NOTES
PULMONARY & CRITICAL CARE MEDICINE PROGRESS  NOTE     Patient:  Fred López  MRN: [id number]  Admit date: 11/19/2022  Primary Care Physician: Kiana Lyn MD  Consulting Physician: Chase Lopez MD  CODE Status: DNR-CCA  LOS: 4    SUBJECTIVE     I personally interviewed/examined the patient, reviewed interval history and interpreted all available radiographic, laboratory data at the time of service. Chief Compliant/Reason for Initial Consult:   Acute respiratory failure/pneumonia/hypertensive emergency/pulm edema    Brief Hospital Course: The patient is a 36 y.o. male Patient was seen initially in the emergency room for evaluation for admission to medical ICU because of hypertensive emergency. Patient has history of difficult to control hypertension was recently admitted to [facility] had plasma renin activity done apparently outpatient renal duplex previous ultrasound of the kidneys were. Echocardiogram previously shows diastolic dysfunction/LVH patient is on multiple medication at least 5 medication to control blood pressure apparently no history of obstructive sleep apnea ever diagnosed does have obesity. Presented to emergency room with chest pain shortness of breath headache found to have systolic blood pressure of 260 to 270 initial concern for aneurysm/dissection because of the chest pain and hypertension CTA chest was negative for dissection or pulm embolism showed 4.4 cm ascending aneurysm CTA abdomen was negative. Patient has history of chronic kidney disease with creatinine of around 2.5 to 2.6 usually his creatinine was 2.44. CT surgery was consulted and cardiology was consulted as there was concern of anterior ST elevation cardiology did not think that patient has ST elevation troponin was 80.   Patient was started on esmolol drip and then Cardene was added systolic blood pressure dropped to 130 patient was also fatigued at that time decreased mentation slightly and then Cardene was slowly decreased. Patient later required BiPAP therapy as patient was tachypneic apparently not hypoxic though arterial blood gas shows normal pH 7.38 PCO2 of 32 PO2 was 150 bicarbonate of 19. CT head was reported  Chest x-ray was consistent with mild congestive changes. CT chest showed patchy areas of infiltrate opacity mostly in the right upper lobe and some in the right mid lower lung. His blood pressure stabilized in ICU was taken off BiPAP in ICU Cardene drip was weaned off and then he was transferred to medical floor    Interval History:  11/23/22  Patient was evaluated this morning. Overnight events noted. Patient does not complain of shortness of breath cough is better only occasional sputum production does not complain of wheezing denies chest pain or hemoptysis. He is on room air off nasal cannula maintaining saturation between 95% and 98%. According patient is able to ambulate. He was not able to use BiPAP again last night BiPAP setting was 12/8/1930 percent. Review of Systems:  Review of Systems   Constitutional:  Negative for appetite change, fever and unexpected weight change. HENT:  Negative for postnasal drip, sore throat, trouble swallowing and voice change. Eyes: Negative. Respiratory:  Positive for cough. Negative for shortness of breath and wheezing. Cardiovascular:  Negative for chest pain, palpitations and leg swelling. Gastrointestinal: Negative. Endocrine: Negative. Genitourinary:  Negative for dysuria, flank pain, frequency and hematuria. Musculoskeletal: Negative. Skin: Negative. Allergic/Immunologic: Negative. Neurological:  Negative for dizziness, seizures, speech difficulty and headaches. Hematological:  Negative for adenopathy. Does not bruise/bleed easily. Psychiatric/Behavioral: Negative.        OBJECTIVE     VITAL SIGNS:   LAST-  BP (!) 154/116   Pulse 99 Temp 98.6 °F (37 °C) (Oral)   Resp 17   Ht 6' 5\" (1.956 m)   Wt (!) 317 lb 3.2 oz (143.9 kg)   SpO2 97%   BMI 37.61 kg/m²   8-24 HR RANGE-  TEMP Temp  Av.3 °F (36.8 °C)  Min: 97.9 °F (36.6 °C)  Max: 98.6 °F (37 °C)   BP Systolic (58DIL), IDM:368 , Min:130 , ISR:099      Diastolic (01QDM), WKF:226, Min:85, Max:116     PULSE Pulse  Av  Min: 87  Max: 99   RR Resp  Av  Min: 17  Max: 17   O2 SAT SpO2  Av %  Min: 97 %  Max: 97 %   OXYGEN DELIVERY No data recorded     Systemic Examination:   Physical Exam  General appearance - looks comfortable and in no acute distress  Mental status - alert, oriented to person, place, and time  Eyes - pupils equal and reactive, extraocular eye movements intact  Mouth - mucous membranes moist, pharynx normal without lesions. Large tongue Mallampati 2  Neck - supple, no significant adenopathy. Short thick neck  Chest - Chest was symmetrical without dullness to percussion. Breath sounds bilaterally were clear to auscultation. There were no wheezes, rhonchi or rales.   There is no intercostal recession or use of accessory muscles  Heart - normal rate, regular rhythm, normal S1, S2, no murmurs, rubs, clicks or gallops  Abdomen - soft, nontender, nondistended, no masses or organomegaly  Neurological - alert, oriented, normal speech, no focal findings or movement disorder noted  Extremities - peripheral pulses normal, mild pedal edema, no clubbing or cyanosis  Skin - normal coloration and turgor, no rashes, no suspicious skin lesions noted     DATA REVIEW     Medications:  Scheduled Meds:   docusate sodium  100 mg Oral Daily    cloNIDine  1 patch TransDERmal Weekly    sodium bicarbonate  650 mg Oral BID    hydrALAZINE  100 mg Oral 3 times per day    spironolactone  25 mg Oral BID    amLODIPine  10 mg Oral Daily    carvedilol  25 mg Oral BID WC    doxazosin  2 mg Oral Nightly    azithromycin  500 mg IntraVENous Q24H    cefTRIAXone (ROCEPHIN) IV  1,000 mg IntraVENous Q24H    sodium chloride flush  5-40 mL IntraVENous 2 times per day    enoxaparin  30 mg SubCUTAneous BID     Continuous Infusions:   sodium chloride 75 mL/hr at 11/22/22 2116    dextrose      sodium chloride 25 mL/hr at 11/22/22 1944     LABS:-  ABG:   No results for input(s): POCPH, POCPCO2, POCPO2, POCHCO3, IQRN4WIP in the last 72 hours. CBC:   Recent Labs     11/23/22  0548   WBC 5.4   HGB 10.0*   HCT 34.8*   MCV 85.7      LYMPHOPCT 25   RBC 4.06*   MCH 24.6*   MCHC 28.7   RDW 14.6*       BMP:   Recent Labs     11/22/22  0717 11/22/22  1643 11/23/22  0548    135 137   K 5.5* 4.6 4.3    102 104   CO2 18* 21 19*   BUN 41* 40* 39*   CREATININE 2.69* 2.40* 2.43*   GLUCOSE 91 85 95       Liver Function Test:   No results for input(s): PROT, LABALBU, ALT, AST, GGT, ALKPHOS, BILITOT in the last 72 hours. Amylase/Lipase:  No results for input(s): AMYLASE, LIPASE in the last 72 hours. Coagulation Profile:   No results for input(s): INR, PROTIME, APTT in the last 72 hours. Cardiac Enzymes:  No results for input(s): CKTOTAL, CKMB, CKMBINDEX, TROPONINI in the last 72 hours.   Lactic Acid:  No results found for: LACTA  BNP:   No results found for: BNP  D-Dimer:  Lab Results   Component Value Date    DDIMER 0.99 11/19/2022     Others:   Lab Results   Component Value Date    TSH 1.27 11/19/2022     Lab Results   Component Value Date    CRP 7.7 (H) 10/09/2021     No results found for: Thor Boyd  Lab Results   Component Value Date    IRON 44 (L) 03/18/2022    TIBC 280 03/18/2022    FERRITIN 119 03/18/2022     No results found for: SPEP, UPEP  Lab Results   Component Value Date/Time    PSA 0.80 09/28/2021 09:33 AM       Input/Output:    Intake/Output Summary (Last 24 hours) at 11/23/2022 1229  Last data filed at 11/23/2022 0930  Gross per 24 hour   Intake 360 ml   Output 1350 ml   Net -990 ml         Microbiology:  No results for input(s): SPECDESC, SPECDESC, SPECIAL, CULTURE, CULTURE, STATUS, ORG, CDIFFTOXPCR, CAMPYLOBPCR, SALMONELLAPC, SHIGAPCR, SHIGELLAPCR, MPNEUG, MPNEUM, LACTOQL in the last 72 hours. Pathology:    Radiology reports:  XR CHEST (SINGLE VIEW FRONTAL)   Final Result   Mild pulmonary vascular congestion. VL RENAL ARTERIAL DUPLEX COMPLETE   Final Result      XR CHEST PORTABLE   Final Result   Findings suggest congestive heart failure, not significantly changed         CT ABDOMEN PELVIS WO CONTRAST Additional Contrast? None   Final Result   1. CHEST: No pulmonary embolus is identified. 2. Multifocal ground-glass opacities and few interlobular and septal   opacities. Trace pleural effusions are noted. Pulmonary edema versus   pneumonia. Atypical pneumonia is considered in the differential, such as   viral, for instance influenza. Interestingly, there was a similar, more   prominent presentation with ground-glass and alveolar opacities on 10/09/2021.   3. Cardiomegaly. Ectasia of the ascending thoracic aorta, measuring 44 mm in   diameter. 4. ABDOMEN PELVIS: No acute abdominopelvic abnormality. CT CHEST PULMONARY EMBOLISM W CONTRAST   Final Result   1. CHEST: No pulmonary embolus is identified. 2. Multifocal ground-glass opacities and few interlobular and septal   opacities. Trace pleural effusions are noted. Pulmonary edema versus   pneumonia. Atypical pneumonia is considered in the differential, such as   viral, for instance influenza. Interestingly, there was a similar, more   prominent presentation with ground-glass and alveolar opacities on 10/09/2021.   3. Cardiomegaly. Ectasia of the ascending thoracic aorta, measuring 44 mm in   diameter. 4. ABDOMEN PELVIS: No acute abdominopelvic abnormality. XR CHEST PORTABLE   Final Result   Findings suggest congestive heart failure         CT CHEST WO CONTRAST    (Results Pending)       Echocardiogram:   No results found for this or any previous visit.       Cardiac Catheterization:   No results found for this or any previous visit. ASSESSMENT AND PLAN     Assessment:    Pulmonary edema versus atypical pneumonia likely pulmonary edema secondary to hypertensive emergency. Hypertensive emergency. Difficult to control hypertension. Acute respiratory failure with hypoxia resolved. Obesity. Likely obstructive sleep apnea. History of chronic cough/cough variant asthma    Plan:    Patient remains hemodynamically stable and is currently saturating well on room air. Advised to use BiPAP at night and as needed. Patient did not use BiPAP last night. Discussed with patient about difficult to control blood pressure and benefit of treatment of sleep apnea. Recommend outpatient sleep study. Albuterol to be used as needed. Blood pressure control to continue for primary service. Continue with Zithromax and Rocephin. If no contraindication at the time of discharge she can be switched to levofloxacin for total 7 days of antibiotic  Supplemental oxygen if needed to keep oxygen saturation greater than 92%  Continue monitor intake and output and renal function  DVT prophylaxis on Lovenox  Physical/occupational/speech therapy; increase activity as tolerated    Pulm service will sign off please call us if needed. I updated the patient regarding the current clinical condition, provisional diagnosis and management plan. I addressed concerns and answered all questions to the best of my abilities. It was my pleasure to evaluate Jean Claude Bastrop today. We will continue to follow. I would like to thank you for allowing me to participate in the care of this patient. Please feel free to call with any further questions or concerns. Kristin Lopez MD   Pulmonary and Critical Care Medicine           11/23/2022, 12:29 PM    Please note that this chart was generated using voice recognition Dragon dictation software.   Although every effort was made to ensure the accuracy of this automated transcription, some errors in transcription may have occurred.

## 2022-11-24 LAB
CREATININE URINE /24 HR: ABNORMAL MG/D (ref 1000–2500)
CREATININE URINE /VOLUME: 81 MG/DL
CULTURE: NORMAL
CULTURE: NORMAL
HOURS COLLECTED: ABNORMAL
Lab: NORMAL
Lab: NORMAL
METANEPHRINE UF INTERPRETATION: ABNORMAL
METANEPHRINE UG/G CRE: 189 UG/G CRT (ref 0–300)
METANEPHRINES 24 HOUR URINE: ABNORMAL UG/D (ref 55–320)
METANEPHRINES, URINE (UMOL/L): 153 UG/L
NORMETANEPHRINE, (G/CRT): 579 UG/G CRT (ref 0–400)
NORMETANEPHRINE, (NMOL/DAY): ABNORMAL UG/D (ref 114–865)
NORMETANEPHRINES, NMOL/L: 469 UG/L
SPECIMEN DESCRIPTION: NORMAL
SPECIMEN DESCRIPTION: NORMAL
URINE VOLUME: ABNORMAL

## 2022-11-24 NOTE — PROGRESS NOTES
Transport called stated was cancelled. Called Sebastian Rascon they set up transport for pt via lyft.

## 2022-11-25 NOTE — PROGRESS NOTES
CLINICAL PHARMACY NOTE: MEDS TO BEDS    Total # of Prescriptions Filled: 2   The following medications were delivered to the patient:  Azithromycin  Clonidine patches    Additional Documentation:

## 2022-11-26 NOTE — DISCHARGE SUMMARY
[address]     Department of Internal Medicine - Staff Internal Medicine Teaching Service    INPATIENT DISCHARGE SUMMARY      Patient Identification:  Yomi Bailey is a 36 y.o. male. :  1980  MRN: [id number]     Acct: [de-identified]   PCP: Claire Martinez MD  Admit Date:  2022  Discharge date and time: 2022  9:37 PM   Attending Provider: No att. providers found                                     [address] Problem Lists:  Principal Problem:    Hypertensive emergency  Active Problems:    Hyponatremia    CKD (chronic kidney disease)    Ascending aortic aneurysm    Acute pulmonary edema (HCC)    Pneumonia of right lung due to infectious organism    Obesity (BMI 35.0-39.9 without comorbidity)  Resolved Problems:    * No resolved hospital problems. *      HOSPITAL STAY     Brief Inpatient course:   Yomi Bailey is a 36 y.o. male who was admitted for the management of Hypertensive emergency, presented to the emergency department with severe chest pain, shortness of breath and headache. Patient has past medical history of hypertension, noncompliant with the medications. In the ED patient was found to have systolic blood pressure of 270. Chest x-ray demonstrated widened mediastinum, esmolol was started because of concerns of aortic dissection. CTA demonstrated 4.4 cm dilation of ascending thoracic aorta. CT surgery was consulted, recommended strict BP control and follow-up as outpatient. Multifocal groundglass opacities and interlobular septal opacities were also seen, patient was started on vancomycin and Zosyn and transferred to ICU. In the ICU the patient was started on Cardene drip. Cardiology was consulted for chest pain and elevated troponin, recommended troponin elevation due to hypertensive emergency and secondary work-up was done. Patient was weaned off of Cardene and transferred out of the ICU.   His antibiotics were switched to azithromycin and Rocephin. Renal Dopplers were negative for renal artery stenosis. His clonidine was switched to clonidine patch. The patient was then discharged home once his hypertension was controlled. significant therapeutic interventions done at the hospital:   Hypertensive emergency likely secondary to noncompliance to medications at home Resolved  -Continue Norvasc 10, Coreg 25, Cardura, hydralazine. Will increase clonidine patch to 0.2. We will resume Aldactone as potassium is back to normal.  Advised on weight loss, compliance to medications and compliance to diet. Concerns of pneumonia. Resolved  -Continue ceftriaxone and azithromycin for now. Will discharge on azithromycin for total of 5 days. CKD likely secondary to uncontrolled hypertension.  -Continue to monitor creatinine levels with daily BMPs. Patient needs to follow-up with a nephrologist, follow-up placed in the discharge paperwork. Discussed with the patient as well. Hyponatremia. resolved. Hyperkalemia. Resolved. -Resumed Aldactone. Continue to monitor. BMP in a week, and outpatient follow-up with PCP and nephrologist.    Incidental finding of ascending thoracic aortic aneurysm.  -Outpatient follow-up with Dr. Taryn Mccullough in 3 to 6 months.   Follow-up placed in discharge paperwork      Procedures/ Significant Interventions:      CTA suggestive of 4.4 cm dilatation of ascending thoracic aorta    Consults:     Consults:     Final Specialist Recommendations/Findings:   IP CONSULT TO CARDIOTHORACIC SURGERY  PHARMACY TO DOSE VANCOMYCIN  IP CONSULT TO CRITICAL CARE  IP CONSULT TO CARDIOLOGY  IP CONSULT TO CASE MANAGEMENT      Any Hospital Acquired Infections: none    Discharge Functional Status:  stable    DISCHARGE PLAN     Disposition: home    Patient Instructions:   Discharge Medication List as of 11/23/2022  1:43 PM        START taking these medications    Details   cloNIDine (CATAPRES) 0.2 MG/24HR PTWK Place 1 patch onto the skin once a week, Disp-4 patch, R-2Normal      azithromycin (ZITHROMAX) 500 MG tablet Take 1 tablet by mouth daily for 3 days, Disp-3 tablet, R-0Normal           CONTINUE these medications which have NOT CHANGED    Details   doxazosin (CARDURA) 2 MG tablet Take 1 tablet by mouth at bedtime, Disp-30 tablet, R-3Normal      hydrALAZINE (APRESOLINE) 100 MG tablet Take 1 tablet by mouth every 8 hours, Disp-90 tablet, R-3Normal      spironolactone (ALDACTONE) 25 MG tablet Take 1 tablet by mouth in the morning and 1 tablet in the evening., Disp-30 tablet, R-3Normal      carvedilol (COREG) 25 MG tablet Take 1 tablet by mouth 2 times daily (with meals), Disp-60 tablet, R-0Print      amLODIPine (NORVASC) 10 MG tablet Take 1 tablet by mouth daily, Disp-30 tablet, R-0Print      Blood Pressure KIT DAILY Starting Mon 3/16/2020, Disp-1 kit, R-0, Normal           STOP taking these medications       cloNIDine (CATAPRES) 0.1 MG tablet Comments:   Reason for Stopping:               Activity: activity as tolerated    Diet: regular diet    Follow-up:    Polly Flores MD  [address] [place] [address]  [facility]    Schedule an appointment as soon as possible for a visit in 6 month(s)  please follow up in 6 months on scheduled date to evaluate with Dr Wade Hernandez. please call to confirm office visit.     Radha Lawrence MD  [facility], [address] [phone]    Schedule an appointment as soon as possible for a visit in 1 week(s)  post-hospital follow-up, establish care, hypertension medications    [facility]  [address] [facility] [address]  [phone]  Go to  As needed, If symptoms worsen    Terence Matos, [address] [place] [address]  [phone]    Schedule an appointment as soon as possible for a visit in 2 day(s)  post-hospital follow-up, establish care, needs readjustment of antihypertensive medications and patient needs to establish care with a nephrologist.    [facility]  [address] [place] [id number]  [phone]  Schedule an appointment as soon as possible for a visit in 4 week(s)  post- hospital f/u, resistant htn, re-adjustment of medications      Patient Instructions:   -Complete the course of your antibiotics. -Take all of your medications as prescribed and do not miss any of the antihypertensive medications as it will increase the blood pressure very high.   -Please follow- up with kidney doctor, heart doctor.   -Please follow- up with CT surgery for the aneurysm. -Please limit your salt intake.   -Please take medications as prescribed and complete the full course. -Please come to ED if your symptoms worsen or call 911.   -Please make an appointment with your PCP for post- hospital follow up. Trevon Keenan MD, MD  Internal Medicine Resident, PGY-1  [facility];  [place], [place]  11/26/2022, 11:42 AM

## 2022-11-28 NOTE — DISCHARGE SUMMARY
[address]    Discharge Summary     Patient ID: Morgan Current  :  1980   MRN: [id number]     ACCOUNT:  [de-identified]   Patient's PCP: Tam Harden MD  Admit Date: 2022   Discharge Date: 2022     Length of Stay: 2  Code Status:  Prior  Admitting Physician: Charles Izaguirre MD  Discharge Physician: Rossana Valdez MD     Active Discharge Diagnoses:       Primary Problem  Hypertensive emergency      [place] Problems    Diagnosis Date Noted    Hypertensive emergency [I16.1] 2022     Priority: Medium       Admission Condition: Fair     Discharged Condition: good    Hospital Stay:       Hospital Course:      65-year-old male past medical history of HTN, asthma, bipolar, presented to the ED with shortness of breath and possible hemoptysis and was found to have elevated blood pressure at 246/170. Patient also had a severe headache and feeling of his heart racing. Patient was admitted for this episode due to noncompliance with his hypertension medications, he had not been refilling his medication and only refilled it on the day of admission, and medications were still sealed. His medications include hydralazine for high blood pressure. The patient over the course of this admission has been a Cardene drip, and restarted Coreg.   He was also started on doxazosin 2 mg, and Aldactone for discharge      Significant therapeutic interventions: Antihypertensives, antihypertensive drip, IV fluids    Significant Diagnostic Studies:   Labs / Micro:  CBC:   Lab Results   Component Value Date/Time    WBC 5.4 2022 05:48 AM    RBC 4.06 2022 05:48 AM    HGB 10.0 2022 05:48 AM    HCT 34.8 2022 05:48 AM    MCV 85.7 2022 05:48 AM    MCH 24.6 2022 05:48 AM    MCHC 28.7 2022 05:48 AM    RDW 14.6 2022 05:48 AM     2022 05:48 AM     BMP:    Lab Results normal in caliber. Mediastinum: Ascending thoracic aorta is 44 mm in diameter. There is moderate cardiomegaly, including left ventricular hypertrophy. No mediastinal or hilar adenopathy is identified. Lungs/pleura: There are multifocal ground-glass opacities, primarily and peribronchovascular distribution, most prominent in the right lung, especially upper lobe. There are few smooth interlobular septal opacities in the lower lobes. There are trace, dependent pleural effusions in the upper hemithoraces. No pleural effusion or pneumothorax is identified. Soft Tissues/Bones: No acute osseous abnormality. Chest wall is unremarkable. --- CT abdomen and pelvis- Organs: There are several hepatic hypodensities measuring up to 6 mm, likely benign; no follow-up recommended. The gallbladder, pancreas, spleen and adrenal glands are unremarkable. The kidneys are symmetrical, visualized in the mid excretory phase unremarkable. GI/Bowel: The stomach, small bowel and colon are unremarkable. Appendix is normal. Pelvis: There is dependent density in the urinary bladder, consisting with contrast admixing with unopacified urine. Prostate gland is unremarkable. No masses are seen. Peritoneum/Retroperitoneum: Aortoiliac vessels are normal in caliber. No adenopathy. Bones/Soft Tissues: Multilevel degenerative disease. No acute osseous abnormality. Abdominal wall is unremarkable. 1. CHEST: No pulmonary embolus is identified. 2. Multifocal ground-glass opacities and few interlobular and septal opacities. Trace pleural effusions are noted. Pulmonary edema versus pneumonia. Atypical pneumonia is considered in the differential, such as viral, for instance influenza. Interestingly, there was a similar, more prominent presentation with ground-glass and alveolar opacities on 10/09/2021. 3. Cardiomegaly. Ectasia of the ascending thoracic aorta, measuring 44 mm in diameter.  4. ABDOMEN PELVIS: No acute abdominopelvic abnormality. XR CHEST (SINGLE VIEW FRONTAL)    Result Date: 11/22/2022  EXAMINATION: ONE XRAY VIEW OF THE CHEST 11/22/2022 5:50 pm COMPARISON: 11/20/2022 HISTORY: ORDERING SYSTEM PROVIDED HISTORY: chf TECHNOLOGIST PROVIDED HISTORY: chf FINDINGS: Mild pulmonary vascular congestion. There is no effusion or pneumothorax. The cardiomediastinal silhouette is without acute process. The osseous structures are without acute process. Mild pulmonary vascular congestion. NM LUNG SCAN PERFUSION ONLY    Result Date: 11/8/2022  EXAMINATION: NUCLEAR MEDICINE PERFUSION SCAN. 11/8/2022 TECHNIQUE: Ventilation not performed as part of COVID-19 safety precautions. 8.2 millicuries of Tc 73W MAA was administered intravenously prior to planar imaging of the lungs in multiple projections. COMPARISON: Chest radiograph 11/08/2022. HISTORY: ORDERING SYSTEM PROVIDED HISTORY: r/o pe TECHNOLOGIST PROVIDED HISTORY: r/o pe Reason for Exam: r/o PE Additional signs and symptoms: SOB, Cough with blood FINDINGS: PERFUSION: Mildly heterogeneous distribution of radiotracer. No segmental perfusion defects. CHEST RADIOGRAPH: No focal areas of consolidation or significant effusions on recent chest radiograph. Very low probability for pulmonary embolism. XR CHEST PORTABLE    Result Date: 11/20/2022  EXAMINATION: ONE XRAY VIEW OF THE CHEST 11/20/2022 7:19 am COMPARISON: 11/19/2022 HISTORY: ORDERING SYSTEM PROVIDED HISTORY: F/u CHF/PNA TECHNOLOGIST PROVIDED HISTORY: F/u CHF/PNA FINDINGS: Persistent cardiomegaly. Pulmonary vascular congestion. Pulmonary edema. No focal pulmonary consolidation. No pneumothorax.      Findings suggest congestive heart failure, not significantly changed     XR CHEST PORTABLE    Result Date: 11/19/2022  EXAMINATION: ONE XRAY VIEW OF THE CHEST 11/19/2022 12:25 pm COMPARISON: 11/08/2022 HISTORY: ORDERING SYSTEM PROVIDED HISTORY: cp, sob, possible chf TECHNOLOGIST PROVIDED HISTORY: cp, sob, possible chf Reason for Exam: chest pain/ shortness of breath, / AP erect/ port. FINDINGS: Cardiomegaly. Pulmonary vascular congestion. Pulmonary edema. No focal airspace disease. No pneumothorax. Findings suggest congestive heart failure     XR CHEST PORTABLE    Result Date: 11/8/2022  EXAMINATION: ONE XRAY VIEW OF THE CHEST 11/8/2022 7:06 am COMPARISON: 10/11/2021 HISTORY: ORDERING SYSTEM PROVIDED HISTORY: cough TECHNOLOGIST PROVIDED HISTORY: cough Reason for Exam: cough, sob FINDINGS: Cardial pericardial silhouette is enlarged but stable. No acute infiltrate identified. No pneumothorax. No free air. No acute bony abnormality. No acute abnormality identified. CT CHEST PULMONARY EMBOLISM W CONTRAST    Result Date: 11/19/2022  EXAMINATION: CTA OF THE CHEST; CT OF THE ABDOMEN AND PELVIS WITHOUT CONTRAST 11/19/2022 12:38 pm TECHNIQUE: CTA of the chest was performed after the administration of intravenous contrast.  Multiplanar reformatted images are provided for review. MIP images are provided for review. Automated exposure control, iterative reconstruction, and/or weight based adjustment of the mA/kV was utilized to reduce the radiation dose to as low as reasonably achievable.; CT of the abdomen and pelvis was performed without the administration of intravenous contrast. Multiplanar reformatted images are provided for review. Automated exposure control, iterative reconstruction, and/or weight based adjustment of the mA/kV was utilized to reduce the radiation dose to as low as reasonably achievable.  COMPARISON: CTA PA, 10/09/2021 HISTORY: ORDERING SYSTEM PROVIDED HISTORY: chest pain, respiratory distress, d dimer 0.99 TECHNOLOGIST PROVIDED HISTORY: chest pain, respiratory distress, d dimer 0.99 Decision Support Exception - unselect if not a suspected or confirmed emergency medical condition->Emergency Medical Condition (MA) Reason for Exam: chest pain, respiratory distress, d dimer 0.99 FINDINGS: Chest- Pulmonary Arteries: Pulmonary arteries are adequately opacified for evaluation. No evidence of intraluminal filling defect to suggest pulmonary embolism. Main pulmonary artery is normal in caliber. Mediastinum: Ascending thoracic aorta is 44 mm in diameter. There is moderate cardiomegaly, including left ventricular hypertrophy. No mediastinal or hilar adenopathy is identified. Lungs/pleura: There are multifocal ground-glass opacities, primarily and peribronchovascular distribution, most prominent in the right lung, especially upper lobe. There are few smooth interlobular septal opacities in the lower lobes. There are trace, dependent pleural effusions in the upper hemithoraces. No pleural effusion or pneumothorax is identified. Soft Tissues/Bones: No acute osseous abnormality. Chest wall is unremarkable. --- CT abdomen and pelvis- Organs: There are several hepatic hypodensities measuring up to 6 mm, likely benign; no follow-up recommended. The gallbladder, pancreas, spleen and adrenal glands are unremarkable. The kidneys are symmetrical, visualized in the mid excretory phase unremarkable. GI/Bowel: The stomach, small bowel and colon are unremarkable. Appendix is normal. Pelvis: There is dependent density in the urinary bladder, consisting with contrast admixing with unopacified urine. Prostate gland is unremarkable. No masses are seen. Peritoneum/Retroperitoneum: Aortoiliac vessels are normal in caliber. No adenopathy. Bones/Soft Tissues: Multilevel degenerative disease. No acute osseous abnormality. Abdominal wall is unremarkable. 1. CHEST: No pulmonary embolus is identified. 2. Multifocal ground-glass opacities and few interlobular and septal opacities. Trace pleural effusions are noted. Pulmonary edema versus pneumonia. Atypical pneumonia is considered in the differential, such as viral, for instance influenza.   Interestingly, there was a similar, more prominent presentation with ground-glass and alveolar opacities on 10/09/2021. 3. Cardiomegaly. Ectasia of the ascending thoracic aorta, measuring 44 mm in diameter. 4. ABDOMEN PELVIS: No acute abdominopelvic abnormality. VL RENAL ARTERIAL DUPLEX COMPLETE    Result Date: 11/21/2022    [facility]  Vascular Renal Procedure   Patient Name Tyra Claros      Date of Study           11/21/2022               CLAIRE   Date of      1980  Gender                  Male  Birth   Age          36 year(s)  Race                    Black   Room Number  2005   Corporate ID [id number]    Weight:                 316 pounds, 143.3 kg  #   Patient Frances [de-identified]  #   MR #         [id number]     Sonographer             Tacos Castellanos, CRYSTAL   Accession #  [id number]  Interpreting Physician  Judith Patel   Referring                Referring Physician     Tiffanie Bird  Nurse  Practitioner  Procedure Type of Study:   Abdominal: Renal, Renal Artery Scan Bilateral.  Indications for Study:Hypertension. Patient Status: In Patient. Technical Quality:Poor visualization. Limitation reason:body habitus, depth of vessels, limited study! .  Conclusions   Summary   Simultaneous real time imaging utilizing B-Mode, color doppler and  spectral waveform analysis was performed on the abdominal aorta and  bilateral renal arteries for renal artery stenosis, study demonstrates:   Right:  No evidence of renal artery stenosis. Left:  No evidence of renal artery stenosis. Difficult study due to body habitus.    Signature   ----------------------------------------------------------------  Electronically signed by Anneliese Murrieta RVT(Sonographer)  on 11/21/2022 09:12 AM  ----------------------------------------------------------------   ----------------------------------------------------------------  Electronically signed by Yg HatfieldInterpreting  physician) on 11/21/2022 08:12 PM  ----------------------------------------------------------------  Findings:   Right Impression: Left Impression:  Renal / Aortic     Again, extremely difficult visualization due to body  Ratio within       habitus. Could not visualize the left renal artery but  normal limits      found audible dopplers from the origin through mid left  (<3.5mc/s). renal artery. Could not visualize or obtain the distal  Renal vein appears left renal artery. patent. Fair color flow    Renal / Aortic Ratio within normal limits (<3.5cm/s). renal parenchyma. Renal vein appears patent. Kidney length is   Kidney length is normal.  normal.            Fair to possibly diminished color flow renal                     parenchyma. Risk Factors History +--------------------------+---------------------+-------------------------+ ! Diagnosis                 ! Date                 ! Comments                 ! +--------------------------+---------------------+-------------------------+ ! CHF                       ! !                         ! +--------------------------+---------------------+-------------------------+   - The patient's risk factor(s) include: obesity and arterial hypertension. Velocities are measured in cm/s ; Diameters are measured in cm Abdominal Aortic Flow +-------------------------------+----+----+-----------+--------------------+ ! Location                       ! PSV ! EDV ! AP Diam    !Trans Diam          ! +-------------------------------+----+----+-----------+--------------------+ ! Aorta Supra Renal              !76. 3!51  !           !                    ! +-------------------------------+----+----+-----------+--------------------+ ! Aorta Juxta Renal              !75.2!14.8!           !                    ! +-------------------------------+----+----+-----------+--------------------+ Renal Duplex Measurements +-------------------------------++-----+----+----+----++----+----+----+----+ ! Renal Artery A                 !!Right! ! Left!    !!    !    !    !    ! +-------------------------------++-----+----+----+----++----+----+----+----+ ! Location                       ! !PSV  ! EDV ! RI  !RAR !!PSV ! EDV ! RI  !RAR ! +-------------------------------++-----+----+----+----++----+----+----+----+ ! Ostial Renal                   !!110  !20.6!0.81!1.46! !56.3!6.37!0.89!0.75! +-------------------------------++-----+----+----+----++----+----+----+----+ ! Prox Renal                     !!64.7 !3.44!0.95!0.86! !54.4!4.41!0.92!0.72! +-------------------------------++-----+----+----+----++----+----+----+----+ ! Mid Renal                      !!71.8 !25.6!0.64!0.95! !52.9!7.64!0.86!0.7 ! +-------------------------------++-----+----+----+----++----+----+----+----+ ! Dist Renal                     !!64.4 !18.7!0.71!0.86!!    !    !    !    ! +-------------------------------++-----+----+----+----++----+----+----+----+ Right Miscellaneous Measurements   - The average kidney length is 10.19 cm. Left Miscellaneous Measurements   - The average kidney length is 11.32 cm. Consultations:    Consults:     Final Specialist Recommendations/Findings:   IP CONSULT TO INTERNAL MEDICINE  IP CONSULT TO PULMONOLOGY  IP CONSULT TO SOCIAL WORK  IP CONSULT TO NEPHROLOGY      The patient was seen and examined on day of discharge and this discharge summary is in conjunction with any daily progress note from day of discharge.     Discharge plan:       Disposition: Home    Physician Follow Up:     Taty Choe MD  [address] [place] [address]  [phone]    Schedule an appointment as soon as possible for a visit in 4 day(s)      Salbador Hashimoto, MD  [facility] [address] [phone]    Schedule an appointment as soon as possible for a visit in 4 day(s)      Jeannie Rapp MD  [facility] [address]  [address] [facility]  [facility]  [phone]           Requiring Further Evaluation/Follow Up POST HOSPITALIZATION/Incidental Findings:     Diet: regular diet    Activity: As tolerated    Instructions to Patient:     Discharge Medications:      Medication List        START taking these medications      doxazosin 2 MG tablet  Commonly known as: CARDURA  Take 1 tablet by mouth at bedtime     spironolactone 25 MG tablet  Commonly known as: ALDACTONE  Take 1 tablet by mouth in the morning and 1 tablet in the evening. CHANGE how you take these medications      hydrALAZINE 100 MG tablet  Commonly known as: APRESOLINE  Take 1 tablet by mouth every 8 hours  What changed:   medication strength  how much to take            CONTINUE taking these medications      amLODIPine 10 MG tablet  Commonly known as: NORVASC  Take 1 tablet by mouth daily     Blood Pressure Kit  1 kit by Does not apply route daily     carvedilol 25 MG tablet  Commonly known as: COREG  Take 1 tablet by mouth 2 times daily (with meals)               Where to Get Your Medications        These medications were sent to [address] [place], [place] [address] [address], [address] [facility] [address]      Phone: [phone]   doxazosin 2 MG tablet  hydrALAZINE 100 MG tablet  spironolactone 25 MG tablet         Time Spent on discharge is  31 mins in patient examination, evaluation, counseling as well as medication reconciliation, prescriptions for required medications, discharge plan and follow up. Electronically signed by   La Reese MD  11/28/2022  5:35 PM      Thank you Dr. Kathia Naik MD for the opportunity to be involved in this patient's care.

## 2022-11-30 RX ORDER — HYDRALAZINE HYDROCHLORIDE 50 MG/1
TABLET, FILM COATED ORAL
Qty: 90 TABLET | OUTPATIENT
Start: 2022-11-30

## 2022-12-07 ENCOUNTER — SOCIAL WORK (OUTPATIENT)
Dept: EMERGENCY DEPT | Age: 42
End: 2022-12-07

## 2022-12-07 NOTE — PROGRESS NOTES
Patient presented to ED lobby requesting assistance completing short term disability paperwork. Writer spoke with Lindsay of the [facility] ([phone]) who stated the Attending Physician who got patient admitted to the hospital is the required physician to complete the paperwork. Lindsay stated once the second side of the form is completed writer may fax it to [phone]. Writer waiting for contact with Admitting Attendings from 11/19/22 ED visit to be reached with writer's request for form completion.

## 2022-12-07 NOTE — PROGRESS NOTES
Dr Marcus Spain is currently filling out patient's STD paperwork for 11/19/232 -11/23/22 inpatient admission. Writer to fax paper & provide completed form to patient.

## 2022-12-21 NOTE — PROGRESS NOTES
Physician Progress Note      PATIENT:               Tomás Christian  CSN #:                  [id number]  :                       1980  ADMIT DATE:       2022 11:16 AM  DISCH DATE:        2022 9:37 PM  RESPONDING  PROVIDER #:        Zamzam Garciaant          QUERY TEXT:    Patient admitted with HTN emergency. Noted documentation of  and NSTEMI type   2 in H&P on , but Cardiology PN of  - troponin elevation due to HTN   emergency as chest pain resolved with BP control. If possible, please further clarify the diagnosis of NSTEMI type 2 from any of   the following: The medical record reflects the following:  Risk Factors: multiple admission for HTN urgency and emergency, acute   respiratory failure, chronic diastolic CHF, hyponatremia  Clinical Indicators: BP-- 256/170 in EF with initiation of Esmolol gtt as   concern for aortic dissection. Cardiology consulted for ST changes with   secondary work up. With Cardene infusion as well. chest pain noted as   pressure with headache.  cardiology PN -  noted no chest pain with   improvement of BP and on   - pt denies further chest pain. No concern   for STEMI. PN crit care on  noted as Elevated troponins Likely 2/2 type   II MI.    CTA demonstrated 4.4 cm dilation of ascending thoracic aorta,   D-dimer was elevated, no PE.  troponin - > 78, 80,  >85  Treatment: cardiology consult, imaging, PO- Aldactone, Norvasc, Hydralazine. Clonidine patch. Thank you,  Leopoldo Flurry RN, [email or web address]  [facility] [phone]  [facility]-[id number]  Options provided:  -- NSTEMI type 2 confirmed  -- NSTEMI type 2 ruled out  -- Other - I will add my own diagnosis  -- Disagree - Not applicable / Not valid  -- Disagree - Clinically unable to determine / Unknown  -- Refer to Clinical Documentation Reviewer    PROVIDER RESPONSE TEXT:    After study, NSTEMI type 2 ruled out.     Query created by: Joana Armendariz on 12/21/2022 11:55 AM      Electronically signed by:  Angy Nj 12/21/2022 12:10 PM

## 2023-03-29 ENCOUNTER — APPOINTMENT (OUTPATIENT)
Dept: GENERAL RADIOLOGY | Age: 43
DRG: 280 | End: 2023-03-29
Payer: COMMERCIAL

## 2023-03-29 ENCOUNTER — APPOINTMENT (OUTPATIENT)
Dept: CT IMAGING | Age: 43
DRG: 280 | End: 2023-03-29
Payer: COMMERCIAL

## 2023-03-29 ENCOUNTER — HOSPITAL ENCOUNTER (INPATIENT)
Age: 43
LOS: 7 days | Discharge: HOME | DRG: 280 | End: 2023-04-05
Attending: EMERGENCY MEDICINE | Admitting: INTERNAL MEDICINE
Payer: COMMERCIAL

## 2023-03-29 DIAGNOSIS — I16.1 HYPERTENSIVE EMERGENCY: Primary | ICD-10-CM

## 2023-03-29 DIAGNOSIS — I10 ESSENTIAL HYPERTENSION: ICD-10-CM

## 2023-03-29 LAB
ABSOLUTE EOS #: 0.19 K/UL (ref 0–0.4)
ABSOLUTE LYMPH #: 1.12 K/UL (ref 1–4.8)
ABSOLUTE MONO #: 0.56 K/UL (ref 0.1–1.3)
ALBUMIN SERPL-MCNC: 3.4 G/DL (ref 3.5–5.2)
ALP SERPL-CCNC: 71 U/L (ref 40–129)
ALT SERPL-CCNC: 18 U/L (ref 5–41)
ANION GAP SERPL CALCULATED.3IONS-SCNC: 12 MMOL/L (ref 9–17)
AST SERPL-CCNC: 24 U/L
BACTERIA: NORMAL
BASOPHILS # BLD: 1 % (ref 0–2)
BASOPHILS ABSOLUTE: 0.06 K/UL (ref 0–0.2)
BILIRUB SERPL-MCNC: 0.2 MG/DL (ref 0.3–1.2)
BILIRUBIN URINE: NEGATIVE
BNP SERPL-MCNC: 1913 PG/ML
BUN SERPL-MCNC: 49 MG/DL (ref 6–20)
CALCIUM SERPL-MCNC: 8.4 MG/DL (ref 8.6–10.4)
CASTS UA: NORMAL /LPF
CHLORIDE SERPL-SCNC: 103 MMOL/L (ref 98–107)
CO2 SERPL-SCNC: 23 MMOL/L (ref 20–31)
COLOR: YELLOW
CREAT SERPL-MCNC: 3.12 MG/DL (ref 0.7–1.2)
EOSINOPHILS RELATIVE PERCENT: 3 % (ref 0–4)
EPITHELIAL CELLS UA: NORMAL /HPF
GFR SERPL CREATININE-BSD FRML MDRD: 24 ML/MIN/1.73M2
GLUCOSE SERPL-MCNC: 115 MG/DL (ref 70–99)
GLUCOSE UR STRIP.AUTO-MCNC: NEGATIVE MG/DL
HCT VFR BLD AUTO: 28.1 % (ref 41–53)
HGB BLD-MCNC: 9.1 G/DL (ref 13.5–17.5)
INR PPP: 1
KETONES UR STRIP.AUTO-MCNC: NEGATIVE MG/DL
LEUKOCYTE ESTERASE UR QL STRIP.AUTO: ABNORMAL
LIPASE SERPL-CCNC: 48 U/L (ref 13–60)
LYMPHOCYTES # BLD: 18 % (ref 24–44)
MAGNESIUM SERPL-MCNC: 2.2 MG/DL (ref 1.6–2.6)
MCH RBC QN AUTO: 24.9 PG (ref 26–34)
MCHC RBC AUTO-ENTMCNC: 32.5 G/DL (ref 31–37)
MCV RBC AUTO: 76.6 FL (ref 80–100)
MONOCYTES # BLD: 9 % (ref 1–7)
MORPHOLOGY: ABNORMAL
NITRITE UR QL STRIP.AUTO: NEGATIVE
PDW BLD-RTO: 16.5 % (ref 11.5–14.9)
PLATELET # BLD AUTO: 272 K/UL (ref 150–450)
PMV BLD AUTO: 9 FL (ref 6–12)
POTASSIUM SERPL-SCNC: 4.8 MMOL/L (ref 3.7–5.3)
PROT SERPL-MCNC: 7.3 G/DL (ref 6.4–8.3)
PROT UR STRIP.AUTO-MCNC: 6.5 MG/DL (ref 5–8)
PROT UR STRIP.AUTO-MCNC: ABNORMAL MG/DL
PROTHROMBIN TIME: 13.9 SEC (ref 11.8–14.6)
RBC # BLD: 3.66 M/UL (ref 4.5–5.9)
RBC CLUMPS #/AREA URNS AUTO: NORMAL /HPF
SEG NEUTROPHILS: 69 % (ref 36–66)
SEGMENTED NEUTROPHILS ABSOLUTE COUNT: 4.27 K/UL (ref 1.3–9.1)
SODIUM SERPL-SCNC: 138 MMOL/L (ref 135–144)
SPECIFIC GRAVITY UA: 1.02 (ref 1–1.03)
TROPONIN I SERPL DL<=0.01 NG/ML-MCNC: 77 NG/L (ref 0–22)
TROPONIN I SERPL DL<=0.01 NG/ML-MCNC: 78 NG/L (ref 0–22)
TURBIDITY: CLEAR
URINE HGB: ABNORMAL
UROBILINOGEN, URINE: NORMAL
WBC # BLD AUTO: 6.2 K/UL (ref 3.5–11)
WBC UA: NORMAL /HPF

## 2023-03-29 PROCEDURE — 87491 CHLMYD TRACH DNA AMP PROBE: CPT

## 2023-03-29 PROCEDURE — 99223 1ST HOSP IP/OBS HIGH 75: CPT | Performed by: INTERNAL MEDICINE

## 2023-03-29 PROCEDURE — 87591 N.GONORRHOEAE DNA AMP PROB: CPT

## 2023-03-29 PROCEDURE — 2500000003 HC RX 250 WO HCPCS: Performed by: EMERGENCY MEDICINE

## 2023-03-29 PROCEDURE — 81001 URINALYSIS AUTO W/SCOPE: CPT

## 2023-03-29 PROCEDURE — 2500000003 HC RX 250 WO HCPCS: Performed by: NURSE PRACTITIONER

## 2023-03-29 PROCEDURE — 85025 COMPLETE CBC W/AUTO DIFF WBC: CPT

## 2023-03-29 PROCEDURE — 85610 PROTHROMBIN TIME: CPT

## 2023-03-29 PROCEDURE — 83880 ASSAY OF NATRIURETIC PEPTIDE: CPT

## 2023-03-29 PROCEDURE — 6370000000 HC RX 637 (ALT 250 FOR IP)

## 2023-03-29 PROCEDURE — 80053 COMPREHEN METABOLIC PANEL: CPT

## 2023-03-29 PROCEDURE — 71045 X-RAY EXAM CHEST 1 VIEW: CPT

## 2023-03-29 PROCEDURE — 36415 COLL VENOUS BLD VENIPUNCTURE: CPT

## 2023-03-29 PROCEDURE — 6370000000 HC RX 637 (ALT 250 FOR IP): Performed by: NURSE PRACTITIONER

## 2023-03-29 PROCEDURE — 83690 ASSAY OF LIPASE: CPT

## 2023-03-29 PROCEDURE — 2000000000 HC ICU R&B

## 2023-03-29 PROCEDURE — 99285 EMERGENCY DEPT VISIT HI MDM: CPT

## 2023-03-29 PROCEDURE — 70450 CT HEAD/BRAIN W/O DYE: CPT

## 2023-03-29 PROCEDURE — 93005 ELECTROCARDIOGRAM TRACING: CPT | Performed by: EMERGENCY MEDICINE

## 2023-03-29 PROCEDURE — 6360000002 HC RX W HCPCS

## 2023-03-29 PROCEDURE — 6360000002 HC RX W HCPCS: Performed by: EMERGENCY MEDICINE

## 2023-03-29 PROCEDURE — 84484 ASSAY OF TROPONIN QUANT: CPT

## 2023-03-29 PROCEDURE — 83735 ASSAY OF MAGNESIUM: CPT

## 2023-03-29 PROCEDURE — 6370000000 HC RX 637 (ALT 250 FOR IP): Performed by: INTERNAL MEDICINE

## 2023-03-29 RX ORDER — SODIUM CHLORIDE 9 MG/ML
INJECTION, SOLUTION INTRAVENOUS PRN
Status: DISCONTINUED | OUTPATIENT
Start: 2023-03-29 | End: 2023-04-05 | Stop reason: HOSPADM

## 2023-03-29 RX ORDER — FUROSEMIDE 10 MG/ML
40 INJECTION INTRAMUSCULAR; INTRAVENOUS ONCE
Status: COMPLETED | OUTPATIENT
Start: 2023-03-29 | End: 2023-03-29

## 2023-03-29 RX ORDER — FUROSEMIDE 10 MG/ML
20 INJECTION INTRAMUSCULAR; INTRAVENOUS DAILY
Status: DISCONTINUED | OUTPATIENT
Start: 2023-03-30 | End: 2023-04-05 | Stop reason: HOSPADM

## 2023-03-29 RX ORDER — DOXAZOSIN MESYLATE 1 MG/1
2 TABLET ORAL NIGHTLY
Status: DISCONTINUED | OUTPATIENT
Start: 2023-03-29 | End: 2023-03-29 | Stop reason: SDUPTHER

## 2023-03-29 RX ORDER — HEPARIN SODIUM 5000 [USP'U]/ML
5000 INJECTION, SOLUTION INTRAVENOUS; SUBCUTANEOUS EVERY 8 HOURS SCHEDULED
Status: DISCONTINUED | OUTPATIENT
Start: 2023-03-29 | End: 2023-04-05 | Stop reason: HOSPADM

## 2023-03-29 RX ORDER — ONDANSETRON 2 MG/ML
4 INJECTION INTRAMUSCULAR; INTRAVENOUS EVERY 6 HOURS PRN
Status: DISCONTINUED | OUTPATIENT
Start: 2023-03-29 | End: 2023-04-05 | Stop reason: HOSPADM

## 2023-03-29 RX ORDER — DOXAZOSIN MESYLATE 1 MG/1
2 TABLET ORAL EVERY EVENING
Status: DISCONTINUED | OUTPATIENT
Start: 2023-03-29 | End: 2023-04-05 | Stop reason: HOSPADM

## 2023-03-29 RX ORDER — CLONIDINE 0.2 MG/24H
1 PATCH, EXTENDED RELEASE TRANSDERMAL WEEKLY
Status: DISCONTINUED | OUTPATIENT
Start: 2023-03-29 | End: 2023-03-29 | Stop reason: SDUPTHER

## 2023-03-29 RX ORDER — NITROGLYCERIN 20 MG/100ML
5-200 INJECTION INTRAVENOUS CONTINUOUS
Status: DISCONTINUED | OUTPATIENT
Start: 2023-03-29 | End: 2023-03-30

## 2023-03-29 RX ORDER — HYDRALAZINE HYDROCHLORIDE 50 MG/1
100 TABLET, FILM COATED ORAL EVERY 8 HOURS SCHEDULED
Status: DISCONTINUED | OUTPATIENT
Start: 2023-03-29 | End: 2023-04-05 | Stop reason: HOSPADM

## 2023-03-29 RX ORDER — NIFEDIPINE 60 MG/1
120 TABLET, FILM COATED, EXTENDED RELEASE ORAL DAILY
Status: DISCONTINUED | OUTPATIENT
Start: 2023-03-30 | End: 2023-04-05 | Stop reason: HOSPADM

## 2023-03-29 RX ORDER — CLONIDINE 0.2 MG/24H
1 PATCH, EXTENDED RELEASE TRANSDERMAL WEEKLY
Status: DISCONTINUED | OUTPATIENT
Start: 2023-03-29 | End: 2023-04-05 | Stop reason: HOSPADM

## 2023-03-29 RX ORDER — NIFEDIPINE 10 MG/1
120 CAPSULE ORAL DAILY
Status: DISCONTINUED | OUTPATIENT
Start: 2023-03-30 | End: 2023-03-29 | Stop reason: CLARIF

## 2023-03-29 RX ORDER — NIFEDIPINE 60 MG/1
120 TABLET, FILM COATED, EXTENDED RELEASE ORAL ONCE
Status: COMPLETED | OUTPATIENT
Start: 2023-03-29 | End: 2023-03-29

## 2023-03-29 RX ORDER — AMLODIPINE BESYLATE 10 MG/1
10 TABLET ORAL DAILY
Status: DISCONTINUED | OUTPATIENT
Start: 2023-03-30 | End: 2023-03-30

## 2023-03-29 RX ORDER — ALPRAZOLAM 0.5 MG/1
0.5 TABLET ORAL ONCE
Status: COMPLETED | OUTPATIENT
Start: 2023-03-29 | End: 2023-03-29

## 2023-03-29 RX ORDER — HYDRALAZINE HYDROCHLORIDE 50 MG/1
100 TABLET, FILM COATED ORAL EVERY 8 HOURS SCHEDULED
Status: DISCONTINUED | OUTPATIENT
Start: 2023-03-29 | End: 2023-03-29 | Stop reason: SDUPTHER

## 2023-03-29 RX ORDER — CARVEDILOL 25 MG/1
25 TABLET ORAL ONCE
Status: COMPLETED | OUTPATIENT
Start: 2023-03-29 | End: 2023-03-29

## 2023-03-29 RX ORDER — ACETAMINOPHEN 325 MG/1
650 TABLET ORAL EVERY 6 HOURS PRN
Status: DISCONTINUED | OUTPATIENT
Start: 2023-03-29 | End: 2023-03-30

## 2023-03-29 RX ORDER — POLYETHYLENE GLYCOL 3350 17 G/17G
17 POWDER, FOR SOLUTION ORAL DAILY PRN
Status: DISCONTINUED | OUTPATIENT
Start: 2023-03-29 | End: 2023-04-05 | Stop reason: HOSPADM

## 2023-03-29 RX ORDER — AMLODIPINE BESYLATE 10 MG/1
10 TABLET ORAL DAILY
Status: DISCONTINUED | OUTPATIENT
Start: 2023-03-29 | End: 2023-03-29 | Stop reason: SDUPTHER

## 2023-03-29 RX ORDER — ACETAMINOPHEN 650 MG/1
650 SUPPOSITORY RECTAL EVERY 6 HOURS PRN
Status: DISCONTINUED | OUTPATIENT
Start: 2023-03-29 | End: 2023-03-30

## 2023-03-29 RX ORDER — METOPROLOL TARTRATE 5 MG/5ML
5 INJECTION INTRAVENOUS ONCE
Status: COMPLETED | OUTPATIENT
Start: 2023-03-29 | End: 2023-03-29

## 2023-03-29 RX ORDER — ONDANSETRON 4 MG/1
4 TABLET, ORALLY DISINTEGRATING ORAL EVERY 8 HOURS PRN
Status: DISCONTINUED | OUTPATIENT
Start: 2023-03-29 | End: 2023-04-05 | Stop reason: HOSPADM

## 2023-03-29 RX ORDER — SPIRONOLACTONE 25 MG/1
25 TABLET ORAL 2 TIMES DAILY
Status: DISCONTINUED | OUTPATIENT
Start: 2023-03-29 | End: 2023-03-29 | Stop reason: SDUPTHER

## 2023-03-29 RX ORDER — SPIRONOLACTONE 25 MG/1
25 TABLET ORAL 2 TIMES DAILY
Status: DISCONTINUED | OUTPATIENT
Start: 2023-03-29 | End: 2023-04-04

## 2023-03-29 RX ORDER — CARVEDILOL 25 MG/1
25 TABLET ORAL 2 TIMES DAILY WITH MEALS
Status: DISCONTINUED | OUTPATIENT
Start: 2023-03-29 | End: 2023-03-29 | Stop reason: SDUPTHER

## 2023-03-29 RX ORDER — TRAMADOL HYDROCHLORIDE 50 MG/1
50 TABLET ORAL EVERY 6 HOURS PRN
Status: DISCONTINUED | OUTPATIENT
Start: 2023-03-29 | End: 2023-04-05 | Stop reason: HOSPADM

## 2023-03-29 RX ORDER — SODIUM CHLORIDE 0.9 % (FLUSH) 0.9 %
5-40 SYRINGE (ML) INJECTION EVERY 12 HOURS SCHEDULED
Status: DISCONTINUED | OUTPATIENT
Start: 2023-03-29 | End: 2023-04-05 | Stop reason: HOSPADM

## 2023-03-29 RX ORDER — SODIUM CHLORIDE 0.9 % (FLUSH) 0.9 %
5-40 SYRINGE (ML) INJECTION PRN
Status: DISCONTINUED | OUTPATIENT
Start: 2023-03-29 | End: 2023-04-05 | Stop reason: HOSPADM

## 2023-03-29 RX ORDER — CARVEDILOL 25 MG/1
25 TABLET ORAL 2 TIMES DAILY WITH MEALS
Status: DISCONTINUED | OUTPATIENT
Start: 2023-03-30 | End: 2023-04-01

## 2023-03-29 RX ORDER — NIFEDIPINE 10 MG/1
120 CAPSULE ORAL ONCE
Status: DISCONTINUED | OUTPATIENT
Start: 2023-03-29 | End: 2023-03-29 | Stop reason: CLARIF

## 2023-03-29 RX ADMIN — SPIRONOLACTONE 25 MG: 25 TABLET ORAL at 21:05

## 2023-03-29 RX ADMIN — FUROSEMIDE 40 MG: 10 INJECTION, SOLUTION INTRAMUSCULAR; INTRAVENOUS at 15:47

## 2023-03-29 RX ADMIN — ACETAMINOPHEN 650 MG: 325 TABLET ORAL at 21:08

## 2023-03-29 RX ADMIN — ALPRAZOLAM 0.5 MG: 0.5 TABLET ORAL at 22:57

## 2023-03-29 RX ADMIN — NITROGLYCERIN 5 MCG/MIN: 20 INJECTION INTRAVENOUS at 15:12

## 2023-03-29 RX ADMIN — TRAMADOL HYDROCHLORIDE 50 MG: 50 TABLET, COATED ORAL at 21:35

## 2023-03-29 RX ADMIN — METOPROLOL TARTRATE 5 MG: 5 INJECTION INTRAVENOUS at 19:37

## 2023-03-29 RX ADMIN — CARVEDILOL 25 MG: 25 TABLET, FILM COATED ORAL at 22:09

## 2023-03-29 RX ADMIN — HYDRALAZINE HYDROCHLORIDE 100 MG: 50 TABLET, FILM COATED ORAL at 21:04

## 2023-03-29 RX ADMIN — NIFEDIPINE 120 MG: 60 TABLET, EXTENDED RELEASE ORAL at 22:57

## 2023-03-29 RX ADMIN — DOXAZOSIN 2 MG: 1 TABLET ORAL at 21:05

## 2023-03-29 RX ADMIN — HEPARIN SODIUM 5000 UNITS: 5000 INJECTION INTRAVENOUS; SUBCUTANEOUS at 21:35

## 2023-03-29 ASSESSMENT — PAIN DESCRIPTION - DESCRIPTORS
DESCRIPTORS: THROBBING
DESCRIPTORS: THROBBING
DESCRIPTORS: ACHING

## 2023-03-29 ASSESSMENT — ENCOUNTER SYMPTOMS
WHEEZING: 0
COUGH: 0
CHEST TIGHTNESS: 0
GASTROINTESTINAL NEGATIVE: 1
EYE PAIN: 0
ABDOMINAL PAIN: 0
BACK PAIN: 0
COLOR CHANGE: 0
SHORTNESS OF BREATH: 1

## 2023-03-29 ASSESSMENT — PAIN SCALES - GENERAL
PAINLEVEL_OUTOF10: 8
PAINLEVEL_OUTOF10: 8
PAINLEVEL_OUTOF10: 5
PAINLEVEL_OUTOF10: 8

## 2023-03-29 ASSESSMENT — PAIN DESCRIPTION - LOCATION
LOCATION: HEAD

## 2023-03-29 ASSESSMENT — LIFESTYLE VARIABLES
HOW MANY STANDARD DRINKS CONTAINING ALCOHOL DO YOU HAVE ON A TYPICAL DAY: PATIENT DOES NOT DRINK
HOW OFTEN DO YOU HAVE A DRINK CONTAINING ALCOHOL: NEVER

## 2023-03-29 NOTE — LETTER
[facility] [address] [address]  Phone: [phone]             April 5, 2023    Patient: Billye Gilford   YOB: 1980   Date of Visit: 3/29/2023       To Whom It May Concern:    Dulce Quijano was seen and treated in our facility  beginning 3/29/2023 until 04/05/2023.       Sincerely,       Melina Terry RN         Signature:__________________________________

## 2023-03-29 NOTE — ED PROVIDER NOTES
limits   BRAIN NATRIURETIC PEPTIDE - Abnormal; Notable for the following components:    Pro-BNP 1,913 (*)     All other components within normal limits   URINALYSIS WITH REFLEX TO CULTURE - Abnormal; Notable for the following components:    Urine Hgb TRACE (*)     Protein, UA 3+ (*)     Leukocyte Esterase, Urine TRACE (*)     All other components within normal limits   C.TRACHOMATIS N.GONORRHOEAE DNA, URINE   LIPASE   MAGNESIUM   PROTIME-INR   MICROSCOPIC URINALYSIS   BASIC METABOLIC PANEL W/ REFLEX TO MG FOR LOW K   CBC WITH AUTO DIFFERENTIAL   TROPONIN   METANEPHRINES PLASMA FREE       Vitals Reviewed:    Vitals:    03/29/23 2215 03/29/23 2230 03/29/23 2245 03/29/23 2300   BP: (!) 205/136 (!) 220/120 (!) 192/126 (!) 188/118   Pulse: (!) 108 (!) 110 (!) 111 (!) 108   Resp: 26 25 23 26   Temp:    98 °F (36.7 °C)   TempSrc:    Oral   SpO2: 97% 97%  97%   Weight:       Height:         MEDICATIONS GIVEN TO PATIENT THIS ENCOUNTER:  Orders Placed This Encounter   Medications    nitroGLYCERIN 200 mcg/ml in dextrose 5%     Order Specific Question:   Titrate Infusion? Answer:   Yes     Order Specific Question:   Initial Infusion Dose: Answer:   5 mcg/min     Order Specific Question:   Goal of Therapy is: Answer:    Other     Order Specific Question:   Other Goal:     Answer:   Map goal 136     Order Specific Question:   Contact Provider if:     Answer:   SBP less than 90 mmHg    sodium chloride flush 0.9 % injection 5-40 mL    sodium chloride flush 0.9 % injection 5-40 mL    0.9 % sodium chloride infusion    OR Linked Order Group     ondansetron (ZOFRAN-ODT) disintegrating tablet 4 mg     ondansetron (ZOFRAN) injection 4 mg    polyethylene glycol (GLYCOLAX) packet 17 g    OR Linked Order Group     acetaminophen (TYLENOL) tablet 650 mg     acetaminophen (TYLENOL) suppository 650 mg    heparin (porcine) injection 5,000 Units    furosemide (LASIX) injection 40 mg    DISCONTD: spironolactone (ALDACTONE) tablet 25 mg

## 2023-03-29 NOTE — H&P
baseline. CT head unremarkable. Chest x-ray remarkable for cardiomegaly and mild pulmonary vascular congestion. Patient was started on nitro drip, and will be admitted to intermittent ICU for management of hypertensive emergency and DEVYN. Past Medical History:     Past Medical History:   Diagnosis Date    DEVYN (acute kidney injury) ([facility] 10/9/2021    Bipolar 1 disorder ([facility] 2/26/2015    Bronchitis     Bronchitis     Hypertension     Moderate persistent asthma 2/26/2015    Obesity         Past SurgicalHistory:     No past surgical history on file. Medications Prior to Admission:        Prior to Admission medications    Medication Sig Start Date End Date Taking? Authorizing Provider   cloNIDine (CATAPRES) 0.2 MG/24HR PTWK Place 1 patch onto the skin once a week 11/23/22   Chapito Delcid MD   doxazosin (CARDURA) 2 MG tablet Take 1 tablet by mouth at bedtime 11/10/22   Nicole Pereira MD   hydrALAZINE (APRESOLINE) 100 MG tablet Take 1 tablet by mouth every 8 hours 11/10/22   Nicole Pereira MD   spironolactone (ALDACTONE) 25 MG tablet Take 1 tablet by mouth in the morning and 1 tablet in the evening. 11/10/22   Liliya Hercules MD   carvedilol (COREG) 25 MG tablet Take 1 tablet by mouth 2 times daily (with meals) 10/26/22   Merrill Push, DO   amLODIPine (NORVASC) 10 MG tablet Take 1 tablet by mouth daily  Patient not taking: Reported on 3/29/2023 10/26/22   Taliaferro Push, DO   Blood Pressure KIT 1 kit by Does not apply route daily 3/16/20   Sana Keller MD        Allergies:     Patient has no known allergies. Social History:     Tobacco:    reports that he has never smoked. He has never used smokeless tobacco.  Alcohol:      reports no history of alcohol use. Drug Use:  reports no history of drug use.     Family History:     Family History   Problem Relation Age of Onset    High Blood Pressure Mother     Diabetes Maternal Grandmother        Review of

## 2023-03-29 NOTE — ED NOTES
Pt attached to cardiac monitor, blood pressure, and continuous pulse oximetry.       Danette Mejía RN  03/29/23 [id number]

## 2023-03-30 ENCOUNTER — APPOINTMENT (OUTPATIENT)
Dept: NON INVASIVE DIAGNOSTICS | Age: 43
DRG: 280 | End: 2023-03-30
Payer: COMMERCIAL

## 2023-03-30 LAB
ABSOLUTE EOS #: 0 K/UL (ref 0–0.4)
ABSOLUTE LYMPH #: 0.6 K/UL (ref 1–4.8)
ABSOLUTE MONO #: 0.4 K/UL (ref 0.1–1.3)
ANION GAP SERPL CALCULATED.3IONS-SCNC: 10 MMOL/L (ref 9–17)
BASOPHILS # BLD: 1 % (ref 0–2)
BASOPHILS ABSOLUTE: 0.1 K/UL (ref 0–0.2)
BUN SERPL-MCNC: 48 MG/DL (ref 6–20)
CALCIUM SERPL-MCNC: 8.8 MG/DL (ref 8.6–10.4)
CHLAMYDIA DNA UR QL NAA+PROBE: NEGATIVE
CHLORIDE SERPL-SCNC: 100 MMOL/L (ref 98–107)
CO2 SERPL-SCNC: 23 MMOL/L (ref 20–31)
CREAT SERPL-MCNC: 3.59 MG/DL (ref 0.7–1.2)
CREATININE URINE: 213.6 MG/DL (ref 39–259)
CREATININE URINE: 215.5 MG/DL (ref 39–259)
EKG ATRIAL RATE: 109 BPM
EKG P AXIS: 30 DEGREES
EKG P-R INTERVAL: 164 MS
EKG Q-T INTERVAL: 348 MS
EKG QRS DURATION: 86 MS
EKG QTC CALCULATION (BAZETT): 468 MS
EKG R AXIS: 12 DEGREES
EKG T AXIS: 81 DEGREES
EKG VENTRICULAR RATE: 109 BPM
EOSINOPHILS RELATIVE PERCENT: 1 % (ref 0–4)
GFR SERPL CREATININE-BSD FRML MDRD: 21 ML/MIN/1.73M2
GLUCOSE SERPL-MCNC: 130 MG/DL (ref 70–99)
HCT VFR BLD AUTO: 27.8 % (ref 41–53)
HGB BLD-MCNC: 9.1 G/DL (ref 13.5–17.5)
LV EF: 55 %
LVEF MODALITY: NORMAL
LYMPHOCYTES # BLD: 11 % (ref 24–44)
MCH RBC QN AUTO: 24.8 PG (ref 26–34)
MCHC RBC AUTO-ENTMCNC: 32.6 G/DL (ref 31–37)
MCV RBC AUTO: 75.9 FL (ref 80–100)
MONOCYTES # BLD: 6 % (ref 1–7)
N GONORRHOEA DNA UR QL NAA+PROBE: NEGATIVE
PDW BLD-RTO: 16.6 % (ref 11.5–14.9)
PLATELET # BLD AUTO: 242 K/UL (ref 150–450)
PMV BLD AUTO: 9 FL (ref 6–12)
POTASSIUM SERPL-SCNC: 4.9 MMOL/L (ref 3.7–5.3)
RBC # BLD: 3.66 M/UL (ref 4.5–5.9)
SEG NEUTROPHILS: 81 % (ref 36–66)
SEGMENTED NEUTROPHILS ABSOLUTE COUNT: 4.7 K/UL (ref 1.3–9.1)
SODIUM SERPL-SCNC: 133 MMOL/L (ref 135–144)
SODIUM,UR: 38 MMOL/L
SPECIMEN DESCRIPTION: NORMAL
TOTAL PROTEIN, URINE: 156 MG/DL
TROPONIN I SERPL DL<=0.01 NG/ML-MCNC: 73 NG/L (ref 0–22)
URINE TOTAL PROTEIN CREATININE RATIO: 0.73 (ref 0–0.2)
WBC # BLD AUTO: 5.8 K/UL (ref 3.5–11)

## 2023-03-30 PROCEDURE — 6370000000 HC RX 637 (ALT 250 FOR IP): Performed by: INTERNAL MEDICINE

## 2023-03-30 PROCEDURE — 84156 ASSAY OF PROTEIN URINE: CPT

## 2023-03-30 PROCEDURE — 86160 COMPLEMENT ANTIGEN: CPT

## 2023-03-30 PROCEDURE — 82570 ASSAY OF URINE CREATININE: CPT

## 2023-03-30 PROCEDURE — 99233 SBSQ HOSP IP/OBS HIGH 50: CPT | Performed by: INTERNAL MEDICINE

## 2023-03-30 PROCEDURE — 93010 ELECTROCARDIOGRAM REPORT: CPT | Performed by: INTERNAL MEDICINE

## 2023-03-30 PROCEDURE — 2580000003 HC RX 258

## 2023-03-30 PROCEDURE — 6370000000 HC RX 637 (ALT 250 FOR IP): Performed by: NURSE PRACTITIONER

## 2023-03-30 PROCEDURE — 2000000000 HC ICU R&B

## 2023-03-30 PROCEDURE — 84484 ASSAY OF TROPONIN QUANT: CPT

## 2023-03-30 PROCEDURE — 86225 DNA ANTIBODY NATIVE: CPT

## 2023-03-30 PROCEDURE — 6360000002 HC RX W HCPCS

## 2023-03-30 PROCEDURE — 85025 COMPLETE CBC W/AUTO DIFF WBC: CPT

## 2023-03-30 PROCEDURE — 93306 TTE W/DOPPLER COMPLETE: CPT

## 2023-03-30 PROCEDURE — 86038 ANTINUCLEAR ANTIBODIES: CPT

## 2023-03-30 PROCEDURE — 36415 COLL VENOUS BLD VENIPUNCTURE: CPT

## 2023-03-30 PROCEDURE — 84300 ASSAY OF URINE SODIUM: CPT

## 2023-03-30 PROCEDURE — 2060000000 HC ICU INTERMEDIATE R&B

## 2023-03-30 PROCEDURE — 83835 ASSAY OF METANEPHRINES: CPT

## 2023-03-30 PROCEDURE — 80048 BASIC METABOLIC PNL TOTAL CA: CPT

## 2023-03-30 RX ORDER — ACETAMINOPHEN 650 MG/1
650 SUPPOSITORY RECTAL EVERY 6 HOURS PRN
Status: DISCONTINUED | OUTPATIENT
Start: 2023-03-30 | End: 2023-04-05 | Stop reason: HOSPADM

## 2023-03-30 RX ORDER — ACETAMINOPHEN 325 MG/1
650 TABLET ORAL EVERY 4 HOURS PRN
Status: DISCONTINUED | OUTPATIENT
Start: 2023-03-30 | End: 2023-04-05 | Stop reason: HOSPADM

## 2023-03-30 RX ORDER — SODIUM CHLORIDE 9 MG/ML
INJECTION, SOLUTION INTRAVENOUS CONTINUOUS
Status: DISCONTINUED | OUTPATIENT
Start: 2023-03-30 | End: 2023-03-30

## 2023-03-30 RX ORDER — HYDRALAZINE HYDROCHLORIDE 20 MG/ML
10 INJECTION INTRAMUSCULAR; INTRAVENOUS EVERY 4 HOURS PRN
Status: DISCONTINUED | OUTPATIENT
Start: 2023-03-30 | End: 2023-04-05 | Stop reason: HOSPADM

## 2023-03-30 RX ADMIN — NIFEDIPINE 120 MG: 60 TABLET, EXTENDED RELEASE ORAL at 08:13

## 2023-03-30 RX ADMIN — CARVEDILOL 25 MG: 25 TABLET, FILM COATED ORAL at 08:13

## 2023-03-30 RX ADMIN — ACETAMINOPHEN 650 MG: 325 TABLET ORAL at 02:09

## 2023-03-30 RX ADMIN — HEPARIN SODIUM 5000 UNITS: 5000 INJECTION INTRAVENOUS; SUBCUTANEOUS at 20:41

## 2023-03-30 RX ADMIN — SPIRONOLACTONE 25 MG: 25 TABLET ORAL at 12:34

## 2023-03-30 RX ADMIN — SODIUM CHLORIDE, PRESERVATIVE FREE 10 ML: 5 INJECTION INTRAVENOUS at 20:42

## 2023-03-30 RX ADMIN — HEPARIN SODIUM 5000 UNITS: 5000 INJECTION INTRAVENOUS; SUBCUTANEOUS at 05:44

## 2023-03-30 RX ADMIN — TRAMADOL HYDROCHLORIDE 50 MG: 50 TABLET, COATED ORAL at 03:31

## 2023-03-30 ASSESSMENT — PAIN SCALES - GENERAL
PAINLEVEL_OUTOF10: 5
PAINLEVEL_OUTOF10: 3
PAINLEVEL_OUTOF10: 5

## 2023-03-30 ASSESSMENT — PAIN DESCRIPTION - LOCATION
LOCATION: HEAD

## 2023-03-30 ASSESSMENT — PAIN DESCRIPTION - DESCRIPTORS
DESCRIPTORS: ACHING
DESCRIPTORS: ACHING

## 2023-03-30 NOTE — CONSULTS
STATUS I told him he is very young and he has no terminal illness. I asked him why he wanted to be a DNR CCA and he said \"because he is afraid\". On further discussion, he decided to change his mind and now wishes to be a full code. ALLERGIES:  No Known Allergies    HOME MEDICATIONS:  Medications Prior to Admission: cloNIDine (CATAPRES) 0.2 MG/24HR PTWK, Place 1 patch onto the skin once a week  doxazosin (CARDURA) 2 MG tablet, Take 1 tablet by mouth at bedtime  hydrALAZINE (APRESOLINE) 100 MG tablet, Take 1 tablet by mouth every 8 hours  spironolactone (ALDACTONE) 25 MG tablet, Take 1 tablet by mouth in the morning and 1 tablet in the evening. carvedilol (COREG) 25 MG tablet, Take 1 tablet by mouth 2 times daily (with meals)  amLODIPine (NORVASC) 10 MG tablet, Take 1 tablet by mouth daily (Patient not taking: No sig reported)  Blood Pressure KIT, 1 kit by Does not apply route daily      PAST MEDICAL HISTORY:  Past Medical History:   Diagnosis Date    DEVYN (acute kidney injury) ([facility] 10/9/2021    Bipolar 1 disorder ([facility] 2/26/2015    Bronchitis     Bronchitis     Hypertension     Moderate persistent asthma 2/26/2015    Obesity        PAST SURGICAL HISTORY:  No past surgical history on file.        SOCIAL HISTORY:  Social History     Socioeconomic History    Marital status: Single     Spouse name: Not on file    Number of children: Not on file    Years of education: Not on file    Highest education level: Not on file   Occupational History    Not on file   Tobacco Use    Smoking status: Never    Smokeless tobacco: Never   Substance and Sexual Activity    Alcohol use: No    Drug use: No    Sexual activity: Not on file   Other Topics Concern    Not on file   Social History Narrative    Not on file     Social Determinants of Health     Financial Resource Strain: Not on file   Food Insecurity: Not on file   Transportation Needs: Not on file   Physical Activity: Not on file   Stress: Not on file   Social
numbness, abnormal movements or seizure like activity. Skin:   No rashes or itching. :   No hematuria, pyuria, dysuria or flank pain. Extremities:  admits swelling no joint pains. Past Medical History:   Diagnosis Date    DEVYN (acute kidney injury) ([facility] 10/9/2021    Bipolar 1 disorder ([facility] 2/26/2015    Bronchitis     Bronchitis     Hypertension     Moderate persistent asthma 2/26/2015    Obesity        No past surgical history on file. Prior to Admission medications    Medication Sig Start Date End Date Taking? Authorizing Provider   cloNIDine (CATAPRES) 0.2 MG/24HR PTWK Place 1 patch onto the skin once a week 11/23/22   Jacque Matias MD   doxazosin (CARDURA) 2 MG tablet Take 1 tablet by mouth at bedtime 11/10/22   Deysi Pereira MD   hydrALAZINE (APRESOLINE) 100 MG tablet Take 1 tablet by mouth every 8 hours 11/10/22   Deysi Pereira MD   spironolactone (ALDACTONE) 25 MG tablet Take 1 tablet by mouth in the morning and 1 tablet in the evening.  11/10/22   Connie Martin MD   carvedilol (COREG) 25 MG tablet Take 1 tablet by mouth 2 times daily (with meals) 10/26/22   Meme Elias DO   amLODIPine (NORVASC) 10 MG tablet Take 1 tablet by mouth daily  Patient not taking: No sig reported 10/26/22   Meme Elias DO   Blood Pressure KIT 1 kit by Does not apply route daily 3/16/20   Mary Rosario MD       Scheduled Meds:   sodium chloride flush  5-40 mL IntraVENous 2 times per day    heparin (porcine)  5,000 Units SubCUTAneous 3 times per day    [Held by provider] furosemide  20 mg IntraVENous Daily    cloNIDine  1 patch TransDERmal Weekly    doxazosin  2 mg Oral QPM    hydrALAZINE  100 mg Oral 3 times per day    carvedilol  25 mg Oral BID WC    spironolactone  25 mg Oral BID    NIFEdipine  120 mg Oral Daily     Continuous Infusions:   sodium chloride       PRN Meds:acetaminophen **OR** acetaminophen, perflutren lipid microspheres, sodium chloride
breathing disorder     Hypertensive emergency     Hyponatremia     CKD (chronic kidney disease)     Ascending aortic aneurysm     Acute pulmonary edema (HCC)     Pneumonia of right lung due to infectious organism      Plan of Treatment:   Medications reviewed  Continue current dose of nifedipine  20 mg, carvedilol 25 mg twice daily, hydralazine 100 mg 3 times a day, clonidine patch, Cardura  Resume Aldactone  Okay to hold diuretics  Patient's needs iron profile, B12 folate  Avoid NSAID      Electronically signed by Kandy Yanez MD on 3/30/2023 at 9:50 AM

## 2023-03-30 NOTE — ED NOTES
Report given to Monica Carrero RN from ICU. Report method in person   The following was reviewed with receiving RN:   Current vital signs:  BP (!) 195/142   Pulse (!) 107   Temp 98.8 °F (37.1 °C) (Oral)   Resp 18   Ht 6' 5\" (1.956 m)   Wt 290 lb (131.5 kg)   SpO2 97%   BMI 34.39 kg/m²                MEWS Score: 4     Any medication or safety alerts were reviewed. Any pending diagnostics and notifications were also reviewed, as well as any safety concerns or issues, abnormal labs, abnormal imaging, and abnormal assessment findings. Questions were answered.             Josie Flores RN  03/29/23 2001

## 2023-03-30 NOTE — PLAN OF CARE
Problem: Discharge Planning  Goal: Discharge to home or other facility with appropriate resources  Outcome: Progressing  Flowsheets (Taken 3/30/2023 1600)  Discharge to home or other facility with appropriate resources:   Identify barriers to discharge with patient and caregiver   Arrange for interpreters to assist at discharge as needed     Problem: ABCDS Injury Assessment  Goal: Absence of physical injury  3/30/2023 1600 by Brendan Cranker, RN  Outcome: Progressing  Flowsheets (Taken 3/30/2023 1600)  Absence of Physical Injury: Implement safety measures based on patient assessment     Problem: Safety - Adult  Goal: Free from fall injury  Outcome: Progressing  Flowsheets (Taken 3/30/2023 1600)  Free From Fall Injury: Instruct family/caregiver on patient safety     Problem: Pain  Goal: Verbalizes/displays adequate comfort level or baseline comfort level  Outcome: Progressing  Flowsheets (Taken 3/30/2023 1600)  Verbalizes/displays adequate comfort level or baseline comfort level:   Encourage patient to monitor pain and request assistance   Assess pain using appropriate pain scale   Implement non-pharmacological measures as appropriate and evaluate response

## 2023-03-30 NOTE — PLAN OF CARE
Problem: ABCDS Injury Assessment  Goal: Absence of physical injury  Note: Safety maintained this shift, pt used call light appropriately

## 2023-03-30 NOTE — CARE COORDINATION
Case Management Assessment  Initial Evaluation    Date/Time of Evaluation: 3/30/2023 3:29 PM  Assessment Completed by: Julian Rios RN    If patient is discharged prior to next notation, then this note serves as note for discharge by case management. Patient Name: Ban Thomason                   YOB: 1980  Diagnosis: Hypertensive emergency [I16.1]                   Date / Time: 3/29/2023 12:58 PM    Patient Admission Status: Inpatient   Readmission Risk (Low < 19, Mod (19-27), High > 27): Readmission Risk Score: 17.9    Current PCP: No primary care provider on file. PCP verified by CM? Chart Reviewed: Yes      History Provided by: Patient  Patient Orientation: Alert and Oriented    Patient Cognition: Alert    Hospitalization in the last 30 days (Readmission):  No    If yes, Readmission Assessment in  Navigator will be completed. Advance Directives:      Code Status: Full Code   Patient's Primary Decision Maker is:        Discharge Planning:    Patient lives with: Alone Type of Home: House  Primary Care Giver: Self  Patient Support Systems include: Family Members   Current Financial resources: None  Current community resources: None  Current services prior to admission: Durable Medical Equipment            Current DME: Other (Comment) (BP cuff)            Type of Home Care services:  None    ADLS  Prior functional level: Independent in ADLs/IADLs  Current functional level: Independent in ADLs/IADLs    PT AM-PAC:   /24  OT AM-PAC:   /24    Family can provide assistance at DC: Yes  Would you like Case Management to discuss the discharge plan with any other family members/significant others, and if so, who?  Yes  Plans to Return to Present Housing: Yes  Other Identified Issues/Barriers to RETURNING to current housing: none  Potential Assistance needed at discharge: N/A            Potential DME:    Patient expects to discharge to: [address] [place] [address] for transportation at discharge:

## 2023-03-31 ENCOUNTER — APPOINTMENT (OUTPATIENT)
Dept: ULTRASOUND IMAGING | Age: 43
DRG: 280 | End: 2023-03-31
Payer: COMMERCIAL

## 2023-03-31 LAB
ABSOLUTE EOS #: 0.1 K/UL (ref 0–0.4)
ABSOLUTE LYMPH #: 0.9 K/UL (ref 1–4.8)
ABSOLUTE MONO #: 0.5 K/UL (ref 0.1–1.3)
ANA SER QL IA: NEGATIVE
ANION GAP SERPL CALCULATED.3IONS-SCNC: 11 MMOL/L (ref 9–17)
BASOPHILS # BLD: 2 % (ref 0–2)
BASOPHILS ABSOLUTE: 0.1 K/UL (ref 0–0.2)
BUN SERPL-MCNC: 45 MG/DL (ref 6–20)
CALCIUM SERPL-MCNC: 8.5 MG/DL (ref 8.6–10.4)
CHLORIDE SERPL-SCNC: 100 MMOL/L (ref 98–107)
CO2 SERPL-SCNC: 23 MMOL/L (ref 20–31)
COMPLEMENT C3: 153 MG/DL (ref 90–180)
COMPLEMENT C4: 38 MG/DL (ref 10–40)
CREAT SERPL-MCNC: 3.66 MG/DL (ref 0.7–1.2)
DSDNA IGG SER QL IA: 1.5 IU/ML
EOSINOPHILS RELATIVE PERCENT: 2 % (ref 0–4)
FERRITIN SERPL-MCNC: 121 NG/ML (ref 30–400)
GFR SERPL CREATININE-BSD FRML MDRD: 20 ML/MIN/1.73M2
GLUCOSE SERPL-MCNC: 118 MG/DL (ref 70–99)
HCT VFR BLD AUTO: 27.1 % (ref 41–53)
HGB BLD-MCNC: 8.9 G/DL (ref 13.5–17.5)
IRON SATURATION: 12 % (ref 20–55)
IRON SERPL-MCNC: 32 UG/DL (ref 59–158)
LYMPHOCYTES # BLD: 17 % (ref 24–44)
MCH RBC QN AUTO: 24.7 PG (ref 26–34)
MCHC RBC AUTO-ENTMCNC: 32.9 G/DL (ref 31–37)
MCV RBC AUTO: 75.2 FL (ref 80–100)
MONOCYTES # BLD: 9 % (ref 1–7)
NUCLEAR IGG SER IA-RTO: 0.4 U/ML
PDW BLD-RTO: 16.8 % (ref 11.5–14.9)
PLATELET # BLD AUTO: 248 K/UL (ref 150–450)
PMV BLD AUTO: 8.6 FL (ref 6–12)
POTASSIUM SERPL-SCNC: 4.6 MMOL/L (ref 3.7–5.3)
RBC # BLD: 3.61 M/UL (ref 4.5–5.9)
SEG NEUTROPHILS: 70 % (ref 36–66)
SEGMENTED NEUTROPHILS ABSOLUTE COUNT: 3.7 K/UL (ref 1.3–9.1)
SODIUM SERPL-SCNC: 134 MMOL/L (ref 135–144)
TIBC SERPL-MCNC: 269 UG/DL (ref 250–450)
UNSATURATED IRON BINDING CAPACITY: 237 UG/DL (ref 112–347)
VIT B12 SERPL-MCNC: 393 PG/ML (ref 232–1245)
WBC # BLD AUTO: 5.2 K/UL (ref 3.5–11)

## 2023-03-31 PROCEDURE — 6370000000 HC RX 637 (ALT 250 FOR IP): Performed by: INTERNAL MEDICINE

## 2023-03-31 PROCEDURE — 6360000002 HC RX W HCPCS

## 2023-03-31 PROCEDURE — 82728 ASSAY OF FERRITIN: CPT

## 2023-03-31 PROCEDURE — 99233 SBSQ HOSP IP/OBS HIGH 50: CPT | Performed by: INTERNAL MEDICINE

## 2023-03-31 PROCEDURE — 80048 BASIC METABOLIC PNL TOTAL CA: CPT

## 2023-03-31 PROCEDURE — 2060000000 HC ICU INTERMEDIATE R&B

## 2023-03-31 PROCEDURE — 76770 US EXAM ABDO BACK WALL COMP: CPT

## 2023-03-31 PROCEDURE — 2580000003 HC RX 258

## 2023-03-31 PROCEDURE — 83540 ASSAY OF IRON: CPT

## 2023-03-31 PROCEDURE — 83550 IRON BINDING TEST: CPT

## 2023-03-31 PROCEDURE — 82607 VITAMIN B-12: CPT

## 2023-03-31 PROCEDURE — 85025 COMPLETE CBC W/AUTO DIFF WBC: CPT

## 2023-03-31 PROCEDURE — 97161 PT EVAL LOW COMPLEX 20 MIN: CPT

## 2023-03-31 PROCEDURE — 97165 OT EVAL LOW COMPLEX 30 MIN: CPT

## 2023-03-31 PROCEDURE — 36415 COLL VENOUS BLD VENIPUNCTURE: CPT

## 2023-03-31 RX ORDER — CARVEDILOL 12.5 MG/1
12.5 TABLET ORAL ONCE
Status: COMPLETED | OUTPATIENT
Start: 2023-03-31 | End: 2023-03-31

## 2023-03-31 RX ORDER — CARVEDILOL 12.5 MG/1
12.5 TABLET ORAL 2 TIMES DAILY
Status: DISCONTINUED | OUTPATIENT
Start: 2023-03-31 | End: 2023-04-05 | Stop reason: HOSPADM

## 2023-03-31 RX ADMIN — HYDRALAZINE HYDROCHLORIDE 10 MG: 20 INJECTION INTRAMUSCULAR; INTRAVENOUS at 06:04

## 2023-03-31 RX ADMIN — HEPARIN SODIUM 5000 UNITS: 5000 INJECTION INTRAVENOUS; SUBCUTANEOUS at 20:25

## 2023-03-31 RX ADMIN — SPIRONOLACTONE 25 MG: 25 TABLET ORAL at 17:24

## 2023-03-31 RX ADMIN — SODIUM CHLORIDE, PRESERVATIVE FREE 10 ML: 5 INJECTION INTRAVENOUS at 20:26

## 2023-03-31 RX ADMIN — HEPARIN SODIUM 5000 UNITS: 5000 INJECTION INTRAVENOUS; SUBCUTANEOUS at 15:21

## 2023-03-31 RX ADMIN — HEPARIN SODIUM 5000 UNITS: 5000 INJECTION INTRAVENOUS; SUBCUTANEOUS at 06:05

## 2023-03-31 RX ADMIN — CARVEDILOL 12.5 MG: 12.5 TABLET, FILM COATED ORAL at 20:25

## 2023-03-31 RX ADMIN — HYDRALAZINE HYDROCHLORIDE 10 MG: 20 INJECTION INTRAMUSCULAR; INTRAVENOUS at 00:11

## 2023-03-31 RX ADMIN — SPIRONOLACTONE 25 MG: 25 TABLET ORAL at 08:59

## 2023-03-31 RX ADMIN — NIFEDIPINE 120 MG: 60 TABLET, EXTENDED RELEASE ORAL at 08:59

## 2023-03-31 RX ADMIN — CARVEDILOL 12.5 MG: 12.5 TABLET, FILM COATED ORAL at 15:21

## 2023-03-31 ASSESSMENT — PAIN SCALES - GENERAL: PAINLEVEL_OUTOF10: 0

## 2023-03-31 NOTE — CARE COORDINATION
ONGOING DISCHARGE PLAN:    Patient is alert and oriented x4. Spoke with patient regarding discharge plan and patient confirms that plan is still to return home. Patient is a full-time employee at [employer], however does not drive; patient utilizes Lyft or bus for transportation. Will need a cab for discharge home. DME: BP cuff. VNS: Denies. Nephrology consulted and ultrasound ordered. Will continue to follow for additional discharge needs. If patient is discharged prior to next notation, then this note serves as note for discharge by case management.     Electronically signed by Sharon Brooke RN on 3/31/2023 at 11:46 AM

## 2023-03-31 NOTE — PLAN OF CARE
Problem: Discharge Planning  Goal: Discharge to home or other facility with appropriate resources  3/31/2023 0459 by Marissa Irving RN  Outcome: Progressing  Flowsheets (Taken 3/31/2023 4227)  Discharge to home or other facility with appropriate resources: Identify barriers to discharge with patient and caregiver  Note: Social service consulted to assist pt with medications     Problem: ABCDS Injury Assessment  Goal: Absence of physical injury  3/31/2023 1089 by Marissa Irving RN  Outcome: Adequate for Discharge  Flowsheets (Taken 3/30/2023 1600 by Masood Mullinsr, RN)  Absence of Physical Injury: Implement safety measures based on patient assessment

## 2023-04-01 LAB
ABSOLUTE EOS #: 0.2 K/UL (ref 0–0.4)
ABSOLUTE LYMPH #: 0.9 K/UL (ref 1–4.8)
ABSOLUTE MONO #: 0.5 K/UL (ref 0.1–1.3)
ANION GAP SERPL CALCULATED.3IONS-SCNC: 10 MMOL/L (ref 9–17)
BASOPHILS # BLD: 0 % (ref 0–2)
BASOPHILS ABSOLUTE: 0 K/UL (ref 0–0.2)
BILIRUBIN URINE: NEGATIVE
BUN SERPL-MCNC: 46 MG/DL (ref 6–20)
CALCIUM SERPL-MCNC: 9 MG/DL (ref 8.6–10.4)
CASTS UA: ABNORMAL /LPF
CHLORIDE SERPL-SCNC: 97 MMOL/L (ref 98–107)
CO2 SERPL-SCNC: 23 MMOL/L (ref 20–31)
COLOR: YELLOW
CREAT SERPL-MCNC: 3.76 MG/DL (ref 0.7–1.2)
CREATININE URINE: 215.2 MG/DL (ref 39–259)
EOSINOPHIL,URINE: NORMAL
EOSINOPHILS RELATIVE PERCENT: 3 % (ref 0–4)
GFR SERPL CREATININE-BSD FRML MDRD: 19 ML/MIN/1.73M2
GLUCOSE SERPL-MCNC: 103 MG/DL (ref 70–99)
GLUCOSE UR STRIP.AUTO-MCNC: NEGATIVE MG/DL
HCT VFR BLD AUTO: 29.9 % (ref 41–53)
HGB BLD-MCNC: 9.1 G/DL (ref 13.5–17.5)
KETONES UR STRIP.AUTO-MCNC: ABNORMAL MG/DL
LEUKOCYTE ESTERASE UR QL STRIP.AUTO: NEGATIVE
LYMPHOCYTES # BLD: 17 % (ref 24–44)
MCH RBC QN AUTO: 23.4 PG (ref 26–34)
MCHC RBC AUTO-ENTMCNC: 30.5 G/DL (ref 31–37)
MCV RBC AUTO: 76.6 FL (ref 80–100)
MICROALBUMIN/CREAT 24H UR: 693 MG/L
MICROALBUMIN/CREAT UR-RTO: 322 MCG/MG CREAT
MONOCYTES # BLD: 10 % (ref 1–7)
NITRITE UR QL STRIP.AUTO: NEGATIVE
OSMOLALITY URINE: 499 MOSM/KG (ref 80–1300)
PDW BLD-RTO: 16.9 % (ref 11.5–14.9)
PLATELET # BLD AUTO: 286 K/UL (ref 150–450)
PMV BLD AUTO: 9.4 FL (ref 6–12)
POTASSIUM SERPL-SCNC: 4.7 MMOL/L (ref 3.7–5.3)
PROT UR STRIP.AUTO-MCNC: 6.5 MG/DL (ref 5–8)
PROT UR STRIP.AUTO-MCNC: ABNORMAL MG/DL
RBC # BLD: 3.9 M/UL (ref 4.5–5.9)
RBC CLUMPS #/AREA URNS AUTO: ABNORMAL /HPF
SEG NEUTROPHILS: 70 % (ref 36–66)
SEGMENTED NEUTROPHILS ABSOLUTE COUNT: 3.4 K/UL (ref 1.3–9.1)
SERUM OSMOLALITY: 304 MOSM/KG (ref 275–295)
SODIUM SERPL-SCNC: 130 MMOL/L (ref 135–144)
SODIUM,UR: 32 MMOL/L
SPECIFIC GRAVITY UA: 1.02 (ref 1–1.03)
TURBIDITY: CLEAR
URINE HGB: NEGATIVE
UROBILINOGEN, URINE: NORMAL
WBC # BLD AUTO: 5 K/UL (ref 3.5–11)
WBC UA: ABNORMAL /HPF

## 2023-04-01 PROCEDURE — 6370000000 HC RX 637 (ALT 250 FOR IP): Performed by: INTERNAL MEDICINE

## 2023-04-01 PROCEDURE — 36415 COLL VENOUS BLD VENIPUNCTURE: CPT

## 2023-04-01 PROCEDURE — 85025 COMPLETE CBC W/AUTO DIFF WBC: CPT

## 2023-04-01 PROCEDURE — 99233 SBSQ HOSP IP/OBS HIGH 50: CPT | Performed by: INTERNAL MEDICINE

## 2023-04-01 PROCEDURE — 83935 ASSAY OF URINE OSMOLALITY: CPT

## 2023-04-01 PROCEDURE — 83930 ASSAY OF BLOOD OSMOLALITY: CPT

## 2023-04-01 PROCEDURE — 84300 ASSAY OF URINE SODIUM: CPT

## 2023-04-01 PROCEDURE — 80048 BASIC METABOLIC PNL TOTAL CA: CPT

## 2023-04-01 PROCEDURE — 6360000002 HC RX W HCPCS

## 2023-04-01 PROCEDURE — 2580000003 HC RX 258

## 2023-04-01 PROCEDURE — 87205 SMEAR GRAM STAIN: CPT

## 2023-04-01 PROCEDURE — 82043 UR ALBUMIN QUANTITATIVE: CPT

## 2023-04-01 PROCEDURE — 82570 ASSAY OF URINE CREATININE: CPT

## 2023-04-01 PROCEDURE — 2060000000 HC ICU INTERMEDIATE R&B

## 2023-04-01 PROCEDURE — 81001 URINALYSIS AUTO W/SCOPE: CPT

## 2023-04-01 RX ADMIN — SPIRONOLACTONE 25 MG: 25 TABLET ORAL at 17:57

## 2023-04-01 RX ADMIN — CARVEDILOL 12.5 MG: 12.5 TABLET, FILM COATED ORAL at 08:33

## 2023-04-01 RX ADMIN — IRON SUCROSE 300 MG: 20 INJECTION, SOLUTION INTRAVENOUS at 13:08

## 2023-04-01 RX ADMIN — SODIUM CHLORIDE, PRESERVATIVE FREE 10 ML: 5 INJECTION INTRAVENOUS at 15:38

## 2023-04-01 RX ADMIN — HEPARIN SODIUM 5000 UNITS: 5000 INJECTION INTRAVENOUS; SUBCUTANEOUS at 22:28

## 2023-04-01 RX ADMIN — HEPARIN SODIUM 5000 UNITS: 5000 INJECTION INTRAVENOUS; SUBCUTANEOUS at 05:33

## 2023-04-01 RX ADMIN — NIFEDIPINE 120 MG: 60 TABLET, EXTENDED RELEASE ORAL at 08:33

## 2023-04-01 RX ADMIN — SODIUM CHLORIDE, PRESERVATIVE FREE 10 ML: 5 INJECTION INTRAVENOUS at 22:28

## 2023-04-01 RX ADMIN — SPIRONOLACTONE 25 MG: 25 TABLET ORAL at 08:33

## 2023-04-01 RX ADMIN — CARVEDILOL 12.5 MG: 12.5 TABLET, FILM COATED ORAL at 22:28

## 2023-04-01 RX ADMIN — HEPARIN SODIUM 5000 UNITS: 5000 INJECTION INTRAVENOUS; SUBCUTANEOUS at 15:37

## 2023-04-01 NOTE — PLAN OF CARE
Problem: Discharge Planning  Goal: Discharge to home or other facility with appropriate resources  Outcome: Progressing     Problem: ABCDS Injury Assessment  Goal: Absence of physical injury  Outcome: Progressing     Problem: Safety - Adult  Goal: Free from fall injury  Outcome: Progressing   Pt educated on safety. Pt up independently. Problem: Pain  Goal: Verbalizes/displays adequate comfort level or baseline comfort level  Outcome: Progressing   Pt able to verbalize pain medication needs and rate pain on a 0-10 pain scale.

## 2023-04-02 LAB
ABSOLUTE EOS #: 0.2 K/UL (ref 0–0.4)
ABSOLUTE LYMPH #: 0.7 K/UL (ref 1–4.8)
ABSOLUTE MONO #: 0.5 K/UL (ref 0.1–1.3)
ANION GAP SERPL CALCULATED.3IONS-SCNC: 9 MMOL/L (ref 9–17)
BASOPHILS # BLD: 1 % (ref 0–2)
BASOPHILS ABSOLUTE: 0.1 K/UL (ref 0–0.2)
BUN SERPL-MCNC: 48 MG/DL (ref 6–20)
CALCIUM SERPL-MCNC: 8.9 MG/DL (ref 8.6–10.4)
CHLORIDE SERPL-SCNC: 99 MMOL/L (ref 98–107)
CO2 SERPL-SCNC: 24 MMOL/L (ref 20–31)
CREAT SERPL-MCNC: 3.54 MG/DL (ref 0.7–1.2)
EOSINOPHILS RELATIVE PERCENT: 4 % (ref 0–4)
GFR SERPL CREATININE-BSD FRML MDRD: 21 ML/MIN/1.73M2
GLUCOSE SERPL-MCNC: 99 MG/DL (ref 70–99)
HCT VFR BLD AUTO: 29 % (ref 41–53)
HGB BLD-MCNC: 9.1 G/DL (ref 13.5–17.5)
LYMPHOCYTES # BLD: 12 % (ref 24–44)
MCH RBC QN AUTO: 23.9 PG (ref 26–34)
MCHC RBC AUTO-ENTMCNC: 31.4 G/DL (ref 31–37)
MCV RBC AUTO: 76.1 FL (ref 80–100)
MONOCYTES # BLD: 9 % (ref 1–7)
PDW BLD-RTO: 16.9 % (ref 11.5–14.9)
PLATELET # BLD AUTO: 312 K/UL (ref 150–450)
PMV BLD AUTO: 9.7 FL (ref 6–12)
POTASSIUM SERPL-SCNC: 5.3 MMOL/L (ref 3.7–5.3)
RBC # BLD: 3.81 M/UL (ref 4.5–5.9)
SEG NEUTROPHILS: 74 % (ref 36–66)
SEGMENTED NEUTROPHILS ABSOLUTE COUNT: 4 K/UL (ref 1.3–9.1)
SODIUM SERPL-SCNC: 132 MMOL/L (ref 135–144)
WBC # BLD AUTO: 5.5 K/UL (ref 3.5–11)

## 2023-04-02 PROCEDURE — 2580000003 HC RX 258

## 2023-04-02 PROCEDURE — 80048 BASIC METABOLIC PNL TOTAL CA: CPT

## 2023-04-02 PROCEDURE — 6360000002 HC RX W HCPCS

## 2023-04-02 PROCEDURE — 85025 COMPLETE CBC W/AUTO DIFF WBC: CPT

## 2023-04-02 PROCEDURE — 2060000000 HC ICU INTERMEDIATE R&B

## 2023-04-02 PROCEDURE — 6370000000 HC RX 637 (ALT 250 FOR IP): Performed by: INTERNAL MEDICINE

## 2023-04-02 PROCEDURE — 99233 SBSQ HOSP IP/OBS HIGH 50: CPT | Performed by: INTERNAL MEDICINE

## 2023-04-02 PROCEDURE — 36415 COLL VENOUS BLD VENIPUNCTURE: CPT

## 2023-04-02 RX ADMIN — NIFEDIPINE 120 MG: 60 TABLET, EXTENDED RELEASE ORAL at 08:50

## 2023-04-02 RX ADMIN — HEPARIN SODIUM 5000 UNITS: 5000 INJECTION INTRAVENOUS; SUBCUTANEOUS at 13:41

## 2023-04-02 RX ADMIN — HEPARIN SODIUM 5000 UNITS: 5000 INJECTION INTRAVENOUS; SUBCUTANEOUS at 05:55

## 2023-04-02 RX ADMIN — SPIRONOLACTONE 25 MG: 25 TABLET ORAL at 08:50

## 2023-04-02 RX ADMIN — IRON SUCROSE 300 MG: 20 INJECTION, SOLUTION INTRAVENOUS at 08:57

## 2023-04-02 RX ADMIN — HEPARIN SODIUM 5000 UNITS: 5000 INJECTION INTRAVENOUS; SUBCUTANEOUS at 20:00

## 2023-04-02 RX ADMIN — CARVEDILOL 12.5 MG: 12.5 TABLET, FILM COATED ORAL at 08:50

## 2023-04-02 RX ADMIN — SPIRONOLACTONE 25 MG: 25 TABLET ORAL at 17:11

## 2023-04-02 RX ADMIN — CARVEDILOL 12.5 MG: 12.5 TABLET, FILM COATED ORAL at 19:59

## 2023-04-02 RX ADMIN — SODIUM CHLORIDE, PRESERVATIVE FREE 10 ML: 5 INJECTION INTRAVENOUS at 20:00

## 2023-04-02 RX ADMIN — SODIUM CHLORIDE, PRESERVATIVE FREE 10 ML: 5 INJECTION INTRAVENOUS at 08:51

## 2023-04-02 NOTE — PLAN OF CARE
Problem: Discharge Planning  Goal: Discharge to home or other facility with appropriate resources  4/2/2023 1519 by Bonnie Abdullahi RN  Outcome: Progressing     Problem: ABCDS Injury Assessment  Goal: Absence of physical injury  4/2/2023 1519 by Bonnie Abdullahi RN  Outcome: Progressing     Problem: Safety - Adult  Goal: Free from fall injury  4/2/2023 1519 by Bonnie Abdullahi RN  Outcome: Progressing     Problem: Pain  Goal: Verbalizes/displays adequate comfort level or baseline comfort level  4/2/2023 1519 by Bonnie Abdullahi RN  Outcome: Progressing

## 2023-04-02 NOTE — PLAN OF CARE
Problem: Discharge Planning  Goal: Discharge to home or other facility with appropriate resources  4/2/2023 0433 by Mikhail Casarez RN  Outcome: Progressing     Problem: ABCDS Injury Assessment  Goal: Absence of physical injury  4/2/2023 0433 by Mikhail Casarez RN  Outcome: Progressing     Problem: Safety - Adult  Goal: Free from fall injury  4/2/2023 0433 by Mikhail Casarez RN  Outcome: Progressing     Problem: Pain  Goal: Verbalizes/displays adequate comfort level or baseline comfort level  4/2/2023 0433 by Mikhail Casarez RN  Outcome: Progressing

## 2023-04-02 NOTE — CARE COORDINATION
DISCHARGE PLANNING NOTE:    Plan remains for patient to be discharged home without needs. Declined VNS. Will need a cab ride. Cardiology following and adjusting medications. Will continue to follow for additional discharge needs.     Electronically signed by Skyler Lugo RN on 4/2/2023 at 10:09 AM

## 2023-04-03 LAB
ABSOLUTE EOS #: 0.3 K/UL (ref 0–0.4)
ABSOLUTE LYMPH #: 1 K/UL (ref 1–4.8)
ABSOLUTE MONO #: 0.5 K/UL (ref 0.1–1.3)
ANION GAP SERPL CALCULATED.3IONS-SCNC: 8 MMOL/L (ref 9–17)
BASOPHILS # BLD: 2 % (ref 0–2)
BASOPHILS ABSOLUTE: 0.1 K/UL (ref 0–0.2)
BUN SERPL-MCNC: 49 MG/DL (ref 6–20)
CALCIUM SERPL-MCNC: 9.2 MG/DL (ref 8.6–10.4)
CHLORIDE SERPL-SCNC: 101 MMOL/L (ref 98–107)
CO2 SERPL-SCNC: 24 MMOL/L (ref 20–31)
CREAT SERPL-MCNC: 3.83 MG/DL (ref 0.7–1.2)
EOSINOPHILS RELATIVE PERCENT: 6 % (ref 0–4)
GFR SERPL CREATININE-BSD FRML MDRD: 19 ML/MIN/1.73M2
GLUCOSE SERPL-MCNC: 96 MG/DL (ref 70–99)
HCT VFR BLD AUTO: 29.5 % (ref 41–53)
HGB BLD-MCNC: 9.3 G/DL (ref 13.5–17.5)
LYMPHOCYTES # BLD: 23 % (ref 24–44)
MCH RBC QN AUTO: 24.1 PG (ref 26–34)
MCHC RBC AUTO-ENTMCNC: 31.5 G/DL (ref 31–37)
MCV RBC AUTO: 76.3 FL (ref 80–100)
METANEPH/PLASMA INTERP: ABNORMAL
METANEPHRINE: 0.57 NMOL/L (ref 0–0.49)
MONOCYTES # BLD: 11 % (ref 1–7)
NORMETANEPHRINE PLASMA: 2 NMOL/L (ref 0–0.89)
PDW BLD-RTO: 16.8 % (ref 11.5–14.9)
PLATELET # BLD AUTO: 311 K/UL (ref 150–450)
PMV BLD AUTO: 9.6 FL (ref 6–12)
POTASSIUM SERPL-SCNC: 5.2 MMOL/L (ref 3.7–5.3)
POTASSIUM SERPL-SCNC: 5.4 MMOL/L (ref 3.7–5.3)
RBC # BLD: 3.87 M/UL (ref 4.5–5.9)
SEG NEUTROPHILS: 58 % (ref 36–66)
SEGMENTED NEUTROPHILS ABSOLUTE COUNT: 2.7 K/UL (ref 1.3–9.1)
SODIUM SERPL-SCNC: 133 MMOL/L (ref 135–144)
WBC # BLD AUTO: 4.6 K/UL (ref 3.5–11)

## 2023-04-03 PROCEDURE — 6370000000 HC RX 637 (ALT 250 FOR IP): Performed by: INTERNAL MEDICINE

## 2023-04-03 PROCEDURE — 84132 ASSAY OF SERUM POTASSIUM: CPT

## 2023-04-03 PROCEDURE — 2580000003 HC RX 258

## 2023-04-03 PROCEDURE — 36415 COLL VENOUS BLD VENIPUNCTURE: CPT

## 2023-04-03 PROCEDURE — 97110 THERAPEUTIC EXERCISES: CPT

## 2023-04-03 PROCEDURE — 80048 BASIC METABOLIC PNL TOTAL CA: CPT

## 2023-04-03 PROCEDURE — 6360000002 HC RX W HCPCS

## 2023-04-03 PROCEDURE — 2060000000 HC ICU INTERMEDIATE R&B

## 2023-04-03 PROCEDURE — 97116 GAIT TRAINING THERAPY: CPT

## 2023-04-03 PROCEDURE — 99233 SBSQ HOSP IP/OBS HIGH 50: CPT | Performed by: INTERNAL MEDICINE

## 2023-04-03 PROCEDURE — 85025 COMPLETE CBC W/AUTO DIFF WBC: CPT

## 2023-04-03 RX ADMIN — CARVEDILOL 12.5 MG: 12.5 TABLET, FILM COATED ORAL at 08:08

## 2023-04-03 RX ADMIN — SODIUM CHLORIDE, PRESERVATIVE FREE 10 ML: 5 INJECTION INTRAVENOUS at 10:48

## 2023-04-03 RX ADMIN — CARVEDILOL 12.5 MG: 12.5 TABLET, FILM COATED ORAL at 20:21

## 2023-04-03 RX ADMIN — SPIRONOLACTONE 25 MG: 25 TABLET ORAL at 08:08

## 2023-04-03 RX ADMIN — HEPARIN SODIUM 5000 UNITS: 5000 INJECTION INTRAVENOUS; SUBCUTANEOUS at 15:24

## 2023-04-03 RX ADMIN — SODIUM ZIRCONIUM CYCLOSILICATE 10 G: 5 POWDER, FOR SUSPENSION ORAL at 08:09

## 2023-04-03 RX ADMIN — SODIUM CHLORIDE, PRESERVATIVE FREE 10 ML: 5 INJECTION INTRAVENOUS at 20:21

## 2023-04-03 RX ADMIN — HEPARIN SODIUM 5000 UNITS: 5000 INJECTION INTRAVENOUS; SUBCUTANEOUS at 20:21

## 2023-04-03 RX ADMIN — NIFEDIPINE 120 MG: 60 TABLET, EXTENDED RELEASE ORAL at 08:08

## 2023-04-03 RX ADMIN — IRON SUCROSE 300 MG: 20 INJECTION, SOLUTION INTRAVENOUS at 10:51

## 2023-04-03 RX ADMIN — HEPARIN SODIUM 5000 UNITS: 5000 INJECTION INTRAVENOUS; SUBCUTANEOUS at 06:22

## 2023-04-03 NOTE — CARE COORDINATION
ONGOING DISCHARGE PLAN:    Patient is alert and oriented x4. Spoke with patient regarding discharge plan and patient confirms that plan is still to return to home. Denies VNS. Will need Cab ride home. Elevated BP, Improved after meds. Cr. Today 3.83 from 3.54.     K+ 5.4. HGB today 9.3 Last Dose of IV FE today. ?Med C today. Will continue to follow for additional discharge needs. If patient is discharged prior to next notation, then this note serves as note for discharge by case management.     Electronically signed by Tommie Caruso RN on 4/3/2023 at 1:40 PM

## 2023-04-04 ENCOUNTER — APPOINTMENT (OUTPATIENT)
Dept: CT IMAGING | Age: 43
DRG: 280 | End: 2023-04-04
Payer: COMMERCIAL

## 2023-04-04 LAB
ABSOLUTE EOS #: 0.2 K/UL (ref 0–0.4)
ABSOLUTE LYMPH #: 1.1 K/UL (ref 1–4.8)
ABSOLUTE MONO #: 0.5 K/UL (ref 0.1–1.3)
ANION GAP SERPL CALCULATED.3IONS-SCNC: 11 MMOL/L (ref 9–17)
ANION GAP SERPL CALCULATED.3IONS-SCNC: 9 MMOL/L (ref 9–17)
BASOPHILS # BLD: 2 % (ref 0–2)
BASOPHILS ABSOLUTE: 0.1 K/UL (ref 0–0.2)
BUN SERPL-MCNC: 51 MG/DL (ref 6–20)
BUN SERPL-MCNC: 55 MG/DL (ref 6–20)
CALCIUM SERPL-MCNC: 8.7 MG/DL (ref 8.6–10.4)
CALCIUM SERPL-MCNC: 8.8 MG/DL (ref 8.6–10.4)
CHLORIDE SERPL-SCNC: 104 MMOL/L (ref 98–107)
CHLORIDE SERPL-SCNC: 98 MMOL/L (ref 98–107)
CO2 SERPL-SCNC: 22 MMOL/L (ref 20–31)
CO2 SERPL-SCNC: 24 MMOL/L (ref 20–31)
CREAT SERPL-MCNC: 3.99 MG/DL (ref 0.7–1.2)
CREAT SERPL-MCNC: 4.09 MG/DL (ref 0.7–1.2)
EOSINOPHILS RELATIVE PERCENT: 4 % (ref 0–4)
GFR SERPL CREATININE-BSD FRML MDRD: 18 ML/MIN/1.73M2
GFR SERPL CREATININE-BSD FRML MDRD: 18 ML/MIN/1.73M2
GLUCOSE BLD-MCNC: 110 MG/DL (ref 75–110)
GLUCOSE BLD-MCNC: 113 MG/DL (ref 75–110)
GLUCOSE BLD-MCNC: 56 MG/DL (ref 75–110)
GLUCOSE BLD-MCNC: 80 MG/DL (ref 75–110)
GLUCOSE BLD-MCNC: 89 MG/DL (ref 75–110)
GLUCOSE SERPL-MCNC: 118 MG/DL (ref 70–99)
GLUCOSE SERPL-MCNC: 94 MG/DL (ref 70–99)
HCT VFR BLD AUTO: 28.2 % (ref 41–53)
HGB BLD-MCNC: 9.1 G/DL (ref 13.5–17.5)
LYMPHOCYTES # BLD: 20 % (ref 24–44)
MCH RBC QN AUTO: 24.8 PG (ref 26–34)
MCHC RBC AUTO-ENTMCNC: 32.4 G/DL (ref 31–37)
MCV RBC AUTO: 76.7 FL (ref 80–100)
MONOCYTES # BLD: 10 % (ref 1–7)
PDW BLD-RTO: 16.8 % (ref 11.5–14.9)
PLATELET # BLD AUTO: 306 K/UL (ref 150–450)
PMV BLD AUTO: 9.6 FL (ref 6–12)
POTASSIUM SERPL-SCNC: 5.1 MMOL/L (ref 3.7–5.3)
POTASSIUM SERPL-SCNC: 5.4 MMOL/L (ref 3.7–5.3)
POTASSIUM SERPL-SCNC: 5.6 MMOL/L (ref 3.7–5.3)
RBC # BLD: 3.67 M/UL (ref 4.5–5.9)
SEG NEUTROPHILS: 64 % (ref 36–66)
SEGMENTED NEUTROPHILS ABSOLUTE COUNT: 3.4 K/UL (ref 1.3–9.1)
SODIUM SERPL-SCNC: 131 MMOL/L (ref 135–144)
SODIUM SERPL-SCNC: 137 MMOL/L (ref 135–144)
WBC # BLD AUTO: 5.2 K/UL (ref 3.5–11)

## 2023-04-04 PROCEDURE — 6360000002 HC RX W HCPCS

## 2023-04-04 PROCEDURE — 74150 CT ABDOMEN W/O CONTRAST: CPT

## 2023-04-04 PROCEDURE — 36415 COLL VENOUS BLD VENIPUNCTURE: CPT

## 2023-04-04 PROCEDURE — 97116 GAIT TRAINING THERAPY: CPT

## 2023-04-04 PROCEDURE — 82947 ASSAY GLUCOSE BLOOD QUANT: CPT

## 2023-04-04 PROCEDURE — 97110 THERAPEUTIC EXERCISES: CPT

## 2023-04-04 PROCEDURE — 84132 ASSAY OF SERUM POTASSIUM: CPT

## 2023-04-04 PROCEDURE — 6370000000 HC RX 637 (ALT 250 FOR IP)

## 2023-04-04 PROCEDURE — 99233 SBSQ HOSP IP/OBS HIGH 50: CPT | Performed by: INTERNAL MEDICINE

## 2023-04-04 PROCEDURE — 2580000003 HC RX 258

## 2023-04-04 PROCEDURE — 2060000000 HC ICU INTERMEDIATE R&B

## 2023-04-04 PROCEDURE — 6370000000 HC RX 637 (ALT 250 FOR IP): Performed by: INTERNAL MEDICINE

## 2023-04-04 PROCEDURE — 85025 COMPLETE CBC W/AUTO DIFF WBC: CPT

## 2023-04-04 PROCEDURE — 80048 BASIC METABOLIC PNL TOTAL CA: CPT

## 2023-04-04 RX ORDER — DEXTROSE MONOHYDRATE 100 MG/ML
INJECTION, SOLUTION INTRAVENOUS CONTINUOUS PRN
Status: DISCONTINUED | OUTPATIENT
Start: 2023-04-04 | End: 2023-04-05 | Stop reason: HOSPADM

## 2023-04-04 RX ADMIN — SODIUM ZIRCONIUM CYCLOSILICATE 5 G: 5 POWDER, FOR SUSPENSION ORAL at 13:26

## 2023-04-04 RX ADMIN — SODIUM CHLORIDE, PRESERVATIVE FREE 10 ML: 5 INJECTION INTRAVENOUS at 07:49

## 2023-04-04 RX ADMIN — CARVEDILOL 12.5 MG: 12.5 TABLET, FILM COATED ORAL at 07:49

## 2023-04-04 RX ADMIN — HEPARIN SODIUM 5000 UNITS: 5000 INJECTION INTRAVENOUS; SUBCUTANEOUS at 13:26

## 2023-04-04 RX ADMIN — INSULIN HUMAN 10 UNITS: 100 INJECTION, SOLUTION PARENTERAL at 09:41

## 2023-04-04 RX ADMIN — DEXTROSE MONOHYDRATE 250 ML: 100 INJECTION, SOLUTION INTRAVENOUS at 09:44

## 2023-04-04 RX ADMIN — SODIUM CHLORIDE, PRESERVATIVE FREE 10 ML: 5 INJECTION INTRAVENOUS at 21:46

## 2023-04-04 RX ADMIN — HEPARIN SODIUM 5000 UNITS: 5000 INJECTION INTRAVENOUS; SUBCUTANEOUS at 06:14

## 2023-04-04 RX ADMIN — HEPARIN SODIUM 5000 UNITS: 5000 INJECTION INTRAVENOUS; SUBCUTANEOUS at 21:43

## 2023-04-04 RX ADMIN — Medication 16 G: at 11:07

## 2023-04-04 RX ADMIN — CARVEDILOL 12.5 MG: 12.5 TABLET, FILM COATED ORAL at 21:41

## 2023-04-04 RX ADMIN — NIFEDIPINE 120 MG: 60 TABLET, EXTENDED RELEASE ORAL at 07:49

## 2023-04-04 RX ADMIN — SODIUM ZIRCONIUM CYCLOSILICATE 5 G: 5 POWDER, FOR SUSPENSION ORAL at 21:42

## 2023-04-04 NOTE — CARE COORDINATION
ONGOING DISCHARGE PLAN:    Patient is currently sound asleep in chair. Spoke with patient, yesterday, regarding discharge plan and he confirmed that plan was to return to home. VNS, was Denied. Will need Cab ride home. Elevated BP, Improved after meds. Cr today 3.99 from 3.83. ? Dialysis. HGB 9.1. Received Last Dose of IV FE yesterday. Will continue to follow for additional discharge needs. If patient is discharged prior to next notation, then this note serves as note for discharge by case management.     Electronically signed by Tammy Wilkinson RN on 4/4/2023 at 2:19 PM

## 2023-04-04 NOTE — PLAN OF CARE
Problem: Discharge Planning  Goal: Discharge to home or other facility with appropriate resources  4/4/2023 0548 by Janel Mendez RN  Outcome: Progressing     Problem: ABCDS Injury Assessment  Goal: Absence of physical injury  4/4/2023 0548 by Janel Mendez RN  Outcome: Progressing     Problem: Safety - Adult  Goal: Free from fall injury  4/4/2023 0548 by Janel Mendez RN  Outcome: Progressing     Problem: Pain  Goal: Verbalizes/displays adequate comfort level or baseline comfort level  4/4/2023 0548 by Janel Mendez RN  Outcome: Progressing

## 2023-04-05 VITALS
BODY MASS INDEX: 33.84 KG/M2 | OXYGEN SATURATION: 100 % | HEIGHT: 77 IN | RESPIRATION RATE: 16 BRPM | SYSTOLIC BLOOD PRESSURE: 111 MMHG | WEIGHT: 286.6 LBS | TEMPERATURE: 99 F | HEART RATE: 83 BPM | DIASTOLIC BLOOD PRESSURE: 72 MMHG

## 2023-04-05 LAB
ABSOLUTE EOS #: 0.1 K/UL (ref 0–0.4)
ABSOLUTE LYMPH #: 1 K/UL (ref 1–4.8)
ABSOLUTE MONO #: 0.6 K/UL (ref 0.1–1.3)
ANION GAP SERPL CALCULATED.3IONS-SCNC: 10 MMOL/L (ref 9–17)
BASOPHILS # BLD: 2 % (ref 0–2)
BASOPHILS ABSOLUTE: 0.1 K/UL (ref 0–0.2)
BUN SERPL-MCNC: 53 MG/DL (ref 6–20)
CALCIUM SERPL-MCNC: 8.4 MG/DL (ref 8.6–10.4)
CHLORIDE SERPL-SCNC: 103 MMOL/L (ref 98–107)
CO2 SERPL-SCNC: 24 MMOL/L (ref 20–31)
CREAT SERPL-MCNC: 3.68 MG/DL (ref 0.7–1.2)
EOSINOPHILS RELATIVE PERCENT: 3 % (ref 0–4)
GFR SERPL CREATININE-BSD FRML MDRD: 20 ML/MIN/1.73M2
GLUCOSE SERPL-MCNC: 109 MG/DL (ref 70–99)
HCT VFR BLD AUTO: 27.7 % (ref 41–53)
HGB BLD-MCNC: 9 G/DL (ref 13.5–17.5)
LYMPHOCYTES # BLD: 19 % (ref 24–44)
MCH RBC QN AUTO: 25 PG (ref 26–34)
MCHC RBC AUTO-ENTMCNC: 32.3 G/DL (ref 31–37)
MCV RBC AUTO: 77.1 FL (ref 80–100)
MONOCYTES # BLD: 11 % (ref 1–7)
PDW BLD-RTO: 16.8 % (ref 11.5–14.9)
PLATELET # BLD AUTO: 297 K/UL (ref 150–450)
PMV BLD AUTO: 8.7 FL (ref 6–12)
POTASSIUM SERPL-SCNC: 4.9 MMOL/L (ref 3.7–5.3)
RBC # BLD: 3.59 M/UL (ref 4.5–5.9)
SEG NEUTROPHILS: 65 % (ref 36–66)
SEGMENTED NEUTROPHILS ABSOLUTE COUNT: 3.6 K/UL (ref 1.3–9.1)
SODIUM SERPL-SCNC: 137 MMOL/L (ref 135–144)
WBC # BLD AUTO: 5.5 K/UL (ref 3.5–11)

## 2023-04-05 PROCEDURE — 6370000000 HC RX 637 (ALT 250 FOR IP): Performed by: INTERNAL MEDICINE

## 2023-04-05 PROCEDURE — 99239 HOSP IP/OBS DSCHRG MGMT >30: CPT | Performed by: INTERNAL MEDICINE

## 2023-04-05 PROCEDURE — 2580000003 HC RX 258

## 2023-04-05 PROCEDURE — 6370000000 HC RX 637 (ALT 250 FOR IP)

## 2023-04-05 PROCEDURE — 80048 BASIC METABOLIC PNL TOTAL CA: CPT

## 2023-04-05 PROCEDURE — 97116 GAIT TRAINING THERAPY: CPT

## 2023-04-05 PROCEDURE — 6360000002 HC RX W HCPCS

## 2023-04-05 PROCEDURE — 85025 COMPLETE CBC W/AUTO DIFF WBC: CPT

## 2023-04-05 PROCEDURE — 36415 COLL VENOUS BLD VENIPUNCTURE: CPT

## 2023-04-05 PROCEDURE — 97110 THERAPEUTIC EXERCISES: CPT

## 2023-04-05 RX ORDER — ISOSORBIDE MONONITRATE 30 MG/1
30 TABLET, EXTENDED RELEASE ORAL DAILY
Status: ON HOLD | COMMUNITY
Start: 2022-04-07 | End: 2023-04-05 | Stop reason: SDUPTHER

## 2023-04-05 RX ORDER — NIFEDIPINE 60 MG/1
120 TABLET, FILM COATED, EXTENDED RELEASE ORAL DAILY
Qty: 30 TABLET | Refills: 3 | Status: SHIPPED | OUTPATIENT
Start: 2023-04-06 | End: 2023-04-05 | Stop reason: SDUPTHER

## 2023-04-05 RX ORDER — NIFEDIPINE 60 MG/1
120 TABLET, FILM COATED, EXTENDED RELEASE ORAL DAILY
Qty: 30 TABLET | Refills: 2 | Status: SHIPPED | OUTPATIENT
Start: 2023-04-06

## 2023-04-05 RX ORDER — CARVEDILOL 12.5 MG/1
12.5 TABLET ORAL 2 TIMES DAILY
Qty: 60 TABLET | Refills: 3 | Status: SHIPPED | OUTPATIENT
Start: 2023-04-05

## 2023-04-05 RX ORDER — CLONIDINE 0.2 MG/24H
1 PATCH, EXTENDED RELEASE TRANSDERMAL WEEKLY
Qty: 4 PATCH | Refills: 3 | Status: SHIPPED | OUTPATIENT
Start: 2023-04-05

## 2023-04-05 RX ORDER — ISOSORBIDE MONONITRATE 30 MG/1
30 TABLET, EXTENDED RELEASE ORAL DAILY
Qty: 30 TABLET | Refills: 4 | Status: SHIPPED | OUTPATIENT
Start: 2023-04-05

## 2023-04-05 RX ORDER — BUMETANIDE 2 MG/1
2 TABLET ORAL DAILY
Status: ON HOLD | COMMUNITY
Start: 2022-04-06 | End: 2023-04-05 | Stop reason: HOSPADM

## 2023-04-05 RX ADMIN — SODIUM CHLORIDE, PRESERVATIVE FREE 10 ML: 5 INJECTION INTRAVENOUS at 09:41

## 2023-04-05 RX ADMIN — HEPARIN SODIUM 5000 UNITS: 5000 INJECTION INTRAVENOUS; SUBCUTANEOUS at 05:55

## 2023-04-05 RX ADMIN — HEPARIN SODIUM 5000 UNITS: 5000 INJECTION INTRAVENOUS; SUBCUTANEOUS at 15:07

## 2023-04-05 RX ADMIN — SODIUM ZIRCONIUM CYCLOSILICATE 5 G: 5 POWDER, FOR SUSPENSION ORAL at 09:34

## 2023-04-05 RX ADMIN — SODIUM ZIRCONIUM CYCLOSILICATE 5 G: 5 POWDER, FOR SUSPENSION ORAL at 15:07

## 2023-04-05 RX ADMIN — NIFEDIPINE 120 MG: 60 TABLET, EXTENDED RELEASE ORAL at 09:37

## 2023-04-05 RX ADMIN — CARVEDILOL 12.5 MG: 12.5 TABLET, FILM COATED ORAL at 09:36

## 2023-04-05 ASSESSMENT — ENCOUNTER SYMPTOMS
ABDOMINAL DISTENTION: 0
SHORTNESS OF BREATH: 1
CHEST TIGHTNESS: 0
DIARRHEA: 0
ABDOMINAL PAIN: 0
WHEEZING: 0
COUGH: 0
VOMITING: 0
NAUSEA: 0
CONSTIPATION: 0
RHINORRHEA: 0

## 2023-04-05 ASSESSMENT — PAIN DESCRIPTION - LOCATION: LOCATION: WRIST

## 2023-04-05 ASSESSMENT — PAIN SCALES - GENERAL: PAINLEVEL_OUTOF10: 8

## 2023-04-05 ASSESSMENT — PAIN DESCRIPTION - ORIENTATION: ORIENTATION: RIGHT

## 2023-04-05 NOTE — PLAN OF CARE
Problem: Discharge Planning  Goal: Discharge to home or other facility with appropriate resources  4/5/2023 1802 by Rodolfo Flores RN  Outcome: Adequate for Discharge  4/5/2023 0433 by Jerri Lenz RN  Outcome: Progressing  Flowsheets (Taken 4/4/2023 1955)  Discharge to home or other facility with appropriate resources: Identify barriers to discharge with patient and caregiver     Problem: ABCDS Injury Assessment  Goal: Absence of physical injury  4/5/2023 1802 by Rodolfo Flores RN  Outcome: Adequate for Discharge  4/5/2023 0433 by Jerri Lenz RN  Outcome: Progressing  Flowsheets (Taken 4/4/2023 1959)  Absence of Physical Injury: Implement safety measures based on patient assessment     Problem: Safety - Adult  Goal: Free from fall injury  4/5/2023 1802 by Rodolfo Flores RN  Outcome: Adequate for Discharge  4/5/2023 0433 by Jerri Lenz RN  Outcome: Progressing  Flowsheets (Taken 4/4/2023 1959)  Free From Fall Injury: Instruct family/caregiver on patient safety     Problem: Pain  Goal: Verbalizes/displays adequate comfort level or baseline comfort level  4/5/2023 1802 by Rodolfo Flores RN  Outcome: Adequate for Discharge  4/5/2023 0433 by Jerri Lenz RN  Outcome: Progressing     Problem: Nutrition Deficit:  Goal: Optimize nutritional status  4/5/2023 1802 by Rodolfo Flores RN  Outcome: Adequate for Discharge  4/5/2023 0433 by Jerri Lenz RN  Outcome: Progressing

## 2023-04-05 NOTE — CARE COORDINATION
ONGOING DISCHARGE PLAN:    Patient is alert and oriented x4. Spoke with patient regarding discharge plan and patient confirms that plan is still to return to home alone. Denies VNS. Cr today 3.68, K+ 4.9. Labs have improved. Nephro following. BP improving today. Pt. Will need Cab Ride home when ready. Will continue to follow for additional discharge needs. If patient is discharged prior to next notation, then this note serves as note for discharge by case management.     Electronically signed by Bia Oliveira RN on 4/5/2023 at 1:20 PM

## 2023-04-05 NOTE — PROGRESS NOTES
..Discharge teaching and instructions for diagnosis of hypertensive crisis completed with patient using teachback method. AVS reviewed. Printed prescriptions given to patient. Patient voiced understanding regarding prescriptions, follow up appointments, and care of self at home. Discharged ambulatory to  home with support per  cab.
[address]   Occupational Therapy Evaluation  Date: 3/31/23  Patient Name: Jose Bianchi       Room: [facility]5423-  MRN: [id number]  Account: [de-identified]   : 1980  (39 y.o.) Gender: male     Discharge Recommendations: The patient's needs are being met with no further Occupational Therapy recommended at discharge. Referring Practitioner: Katheryn Storey MD  Diagnosis: Hypertensive emergency           Past Medical History:  has a past medical history of DEVYN (acute kidney injury) ([facility] [address] Bipolar 1 disorder ([facility] [address] Bronchitis, Bronchitis, Hypertension, Moderate persistent asthma, and Obesity. Past Surgical History:   has no past surgical history on file.     Restrictions  Restrictions/Precautions  Restrictions/Precautions: General Precautions (perpheral IV right forearm)  Required Braces or Orthoses?: No      Vitals  Vitals  Heart Rate: (!) 107  Heart Rate Source: Monitor  BP: (!) 142/95  BP Location: Left upper arm  BP Method: Automatic  Patient Position: Sitting  MAP (Calculated): 111  Resp: 22  SpO2: 100 %  O2 Device: None (Room air)     Subjective  Subjective: Pt was pleasant and agreeable to OT/PT eval  Comments: OK per RN for OT/PT eval  Subjective  Pain: denies      Social/Functional History  Social/Functional History  Lives With: Alone  Type of Home: Apartment (upper level duplex)  Home Layout: Two level  Home Access: Stairs to enter with rails  Entrance Stairs - Number of Steps: 15-20 steps to upper level duplex w/ right side  Entrance Stairs - Rails: Right  Bathroom Shower/Tub: Tub/Shower unit, Curtain  Bathroom Toilet: Standard  Bathroom Accessibility: Walker accessible  Home Equipment: None (no DME for ambulation)  Has the patient had two or more falls in the past year or any fall with injury in the past year?: No  ADL Assistance: [address] [place] [address] Avenue: Independent  Homemaking Responsibilities: Yes  Ambulation Assistance: Independent (no
Aylin Hicks, NP notified of pt leg cramps, potassium and magnesium levels WNL,Tylenol increased to every 4 hrs prn
Comprehensive Nutrition Assessment    Type and Reason for Visit:  Patient Education    Nutrition Recommendations/Plan:   Continue diet as ordered. Malnutrition Assessment:  Malnutrition Status:  No malnutrition (04/04/23 4808)    Context:  Acute Illness     Findings of the 6 clinical characteristics of malnutrition:  Energy Intake:  No significant decrease in energy intake  Weight Loss:  No significant weight loss     Body Fat Loss:  No significant body fat loss     Muscle Mass Loss:  No significant muscle mass loss    Fluid Accumulation:  Mild     Strength:  Not Performed    Nutrition Assessment:    Gave patient written information for low sodium, low potassium, low phosphorus with 1500 ml fluid restriction. Pt said he would review it since this is all knew for him. Nutrition Related Findings:    Edema: trace BLE's. Current Nutrition Intake & Therapies:          ADULT DIET; Regular; Low Sodium (2 gm); Low Potassium (Less than 3000 mg/day); Low Phosphorus (Less than 1000 mg); 1500 ml    Anthropometric Measures:  Height: 6' 5\" (195.6 cm)  Ideal Body Weight (IBW): 208 lbs (95 kg)    Admission Body Weight: 286 lb (129.7 kg)  Current Body Weight: 286 lb (129.7 kg), 137.5 % IBW. Weight Source: Bed Scale  Current BMI (kg/m2): 33.9                          BMI Categories: Obese Class 1 (BMI 30.0-34. 9)        Nutrition Diagnosis:   Altered nutrition-related lab values related to renal dysfunction, cardiac dysfunction as evidenced by lab values    Nutrition Interventions:   Food and/or Nutrient Delivery: Continue Current Diet  Nutrition Education/Counseling: Education initiated  Coordination of Nutrition Care: Continue to monitor while inpatient       Goals:     Goals: Teach back diet education       Nutrition Monitoring and Evaluation:      Food/Nutrient Intake Outcomes: Food and Nutrient Intake  Physical Signs/Symptoms Outcomes: Biochemical Data, Nutrition Focused Physical Findings, Skin,
Date:   4/1/2023  Patient name: Ronnie Field  Date of admission:  3/29/2023 12:58 PM  MRN:   [id number]  YOB: 1980  PCP: No primary care provider on file. Reason for Admission: Hypertensive emergency [I16.1]    Cardiology follow-up: Accelerated hypertension       Referring physician: Dr Rowena Eid     Accelerated hypertension/hypertensive emergency  CHF diastolic acute on chronic  Obesity  Chronic kidney disease stage IV  Renal arterial duplex 11/21/2022 no renal artery stenosis  Anemia, low MCV and a low lymphocyte count  Asthma  Bipolar disorder        Investigation work-up     ECG 3/29/2023  Sinus rhythm heart rate 109, prolonged QT interval, right atrial enlargement high voltage     Chest x-ray 3/29/2023  Moderate cardiomegaly, mild pulmonary vascular congestion without evidence of interstitial edema  No confluent airspace consolidation    2D echo 11/9/2022  Technically difficult study  Normal LV size, moderate left ventricular hypertrophy, ejection fraction more than 55%  No significant valvular abnormality noted  Ascending aorta 3.9 cm  2D echo 3/30/2023  Normal LV size, moderate to severe LVH septal wall thickness 1.62 cm, normal wall motion  Ejection fraction more than 655%  Severely dilated LA, dilated RA, moderate MR  Aortic root 3.9 cm  Small pericardial effusion    History of present illness  59-year-old male, single, lives alone who works at [employer], non-smoker with a past medical history of morbid obesity CKD, asthma, hypertension, bipolar disorder got hospitalized on 3/29/2023 with progressive shortness of breath and hypertensive emergency and acute on chronic renal insufficiency. He has been symptomatic for over the last 3 or 4 months with increasing dyspnea, orthopnea, he became very symptomatic on the day of admission. No chest pain. He also had a swelling over the legs. his blood pressure was elevated at 221/163, electronic recording.   Chest x-ray showed
Dr Abram Varner consulted via perfect serve and by phone.
Dr Harlan Hobson notified of pt's /136 with automatic cuff, /2120 manually, Hr 108, medications pt has received since admitting to ICU, pt c/o headache and has received Tylenol and tramadol for pain, troponins 78, 79, py without chest pain and repeat troponin ordered for AM, order received for procardia 185knB2 and xanax X1
Dr. Bobbi Vazquez on unit. Notified that potassium re-check was 5.4. Notified that resident team ordered JOHNNY CAMARENA [facility] one time. MD stated to check BMP at 1800. Orders placed.
Dr. Jaqueline Cushing notified of potassium and increasing creatinine.
Dr. Sukumar Hall notified of repeat potassium and creatinine levels.
Hydralazine given for elevated BP
Luis Carlos Roca notified of pt c/o headache unrelieved by Tylenol, nitro at 100 currently, instructed to wean Nitro due to ineffective, give home antihypertensives, Tramadol dose ordered prn headache
Medication History completed:    New medications: none    Medications discontinued: none    Medications flagged for review:  Amlodipine- reports no longer taking  Spironolactone- reports no longer taking    Changes to dosing: none    Stated allergies: as listed    Other pertinent information: medications confirmed with patient who has not had his medications today. Reports taking his 3 oral medications yesterday, took off last clonidine patch yesterday as well. Knows he only has 4 active medications. Also states he has a history of asthma and would like an inhaler at some point during his admission. Reports no other prescription medications, OTCs, supplements, or illicit drugs.      Lata Stanley- PharmD Candidate 0016
Ok with Dr Gustavo Mehta to transfer to PCU.
Patient stating that he felt like he was going to pass out. Patient diaphoretic at this time. Blood sugar 56. Dextrose tablets given due to loss of IV access. Patient becoming more alert.
Physical Therapy  Aleena Orta     Date: 23  Patient Name: Yesica Romero       Room: [facility]/6518-10  MRN: [id number]  Account: [de-identified]   : 1980  (39 y.o.) Gender: male   Patient Height Height: 6' 5\" (195.6 cm)  Patient Weight 286 lb 9.6 oz (130 kg)     The patient has successfully met all PT mobility goals and is independently mobilizing without assistance. PT to be discontinued at this time.     Electronically signed by Comfort Lafleur PT on 2023 at 9:13 AM
Physical Therapy  Facility/Department: [facility] PROGRESSIVE CARE  Daily Treatment Note  NAME: Daxa Mcgarry  : 1980  MRN: [id number]    Date of Service: 2023    Discharge Recommendations:  Continue to assess pending progress   PT Equipment Recommendations  Equipment Needed: No    Patient Diagnosis(es): The encounter diagnosis was Hypertensive emergency. Assessment   Activity Tolerance: Patient tolerated treatment well  Equipment Needed: No     Plan    Physcial Therapy Plan  General Plan:  (1-2 treatments/ 2 days)  Current Treatment Recommendations: Strengthening;Home exercise program;Safety education & training;Gait training;Balance training;Stair training;Functional mobility training; Endurance training; Therapeutic activities     Restrictions  Restrictions/Precautions  Restrictions/Precautions: General Precautions (perpheral IV right forearm, troponins 78 on 3-)  Required Braces or Orthoses?: No  Position Activity Restriction  Other position/activity restrictions: up w/ assist     Subjective    Subjective  Subjective: Pt was sitting edge of bed apon arrival. Pt was agreeable to therapy. RN [facility] approved therapy. Pain: 8/10 R wrist.  Orientation  Overall Orientation Status: Within Functional Limits  Orientation Level: Oriented X4;Oriented to place;Oriented to time;Oriented to situation;Oriented to person     Objective   Vitals     Balance  Sitting: Intact  Standing: Intact  Transfer Training  Transfer Training: Yes  Overall Level of Assistance: Independent  Sit to Stand: Independent  Stand to Sit: Independent  Gait Training  Gait Training: Yes  Gait  Overall Level of Assistance: Independent  Distance (ft): 400 Feet  Rail Use: Right  Stairs - Level of Assistance: Independent  Number of Stairs Trained: 30     PT Exercises  Dynamic Standing Balance Exercises: Standing B LE ther ex with B UE support. Pt guided in PF/DF,hamstring curls, marches,and Hip ext/flx/abd x20.              Goals  Short Term
Physical Therapy  Facility/Department: [facility] PROGRESSIVE CARE  Daily Treatment Note  NAME: Janice Antoine  : 1980  MRN: [id number]    Date of Service: 2023    Discharge Recommendations:  Continue to assess pending progress   PT Equipment Recommendations  Equipment Needed: No    Patient Diagnosis(es): The encounter diagnosis was Hypertensive emergency. Assessment   Activity Tolerance: Patient tolerated treatment well  Equipment Needed: No     Plan    Physcial Therapy Plan  General Plan:  (1-2 treatments/ 2 days)  Specific Instructions for Next Treatment: advance gait distance and progress to 15 - 20 steps using ria[employer]afasi Systemst rail, instruct in energy conservation  Current Treatment Recommendations: Strengthening;Home exercise program;Safety education & training;Gait training;Balance training;Stair training;Functional mobility training; Endurance training; Therapeutic activities     Restrictions  Restrictions/Precautions  Restrictions/Precautions: General Precautions (perpheral IV right forearm, troponins 78 on 3-)  Required Braces or Orthoses?: No  Position Activity Restriction  Other position/activity restrictions: up w/ assist     Subjective    Subjective  Subjective: Pt was sitting edge of bed apon arrival. Pt was agreeable to therapy. Pt showed lack of motivation. [facility] approved therapy. Pain: Pt denied pain.   Orientation  Overall Orientation Status: Within Functional Limits     Objective   Vitals     Balance  Sitting: Intact  Standing: With support  Transfer Training  Transfer Training: Yes  Overall Level of Assistance: Stand-by assistance  Sit to Stand: Stand-by assistance  Stand to Sit: Stand-by assistance  Gait Training  Gait Training: Yes  Gait  Overall Level of Assistance: Stand-by assistance  Gait Abnormalities: Path deviations  Distance (ft): 400 Feet  Rail Use: Right  Stairs - Level of Assistance: Stand-by assistance  Number of Stairs Trained: 20     PT Exercises  Dynamic Standing Balance
Physical Therapy  [address]  Acute Rehabilitation Physical Therapy Progress Note    Date: 4/3/23  Patient Name: Ban Thomason       Room: 0398/2881-19  MRN: [id number]   Account: [de-identified]   : 1980  (39 y.o.) Gender: male     Referring Practitioner: Shasha Stephenson MD  Diagnosis: Hypertensive emergency  Past Medical History:  has a past medical history of DEVYN (acute kidney injury) ([facility] [address] Bipolar 1 disorder ([facility] [address] Bronchitis, Bronchitis, Hypertension, Moderate persistent asthma, and Obesity. Past Surgical History:   has no past surgical history on file.        Overall Orientation Status: Within Functional Limits  Restrictions/Precautions  Restrictions/Precautions: General Precautions (perpheral IV right forearm, troponins 78 on 3-)  Required Braces or Orthoses?: No  Position Activity Restriction  Other position/activity restrictions: up w/ assist    Subjective: Pt sitting up in bedsdie chair upon arrival, agreeable to therapy  Comments: [facility] approved therapy    Vital Signs  BP: (!) 156/111  MAP (Calculated): 126  BP Location: Right upper arm  Pain Assessment: None - Denies Pain    Transfers:  Sit to Stand: Independent  Stand to Sit: Independent                 Ambulation  Surface: Level tile  Device: No Device  Assistance: Independent  Distance: 400+ft        Stairs/Curb  Stairs?: Yes  Stairs  # Steps : 20  Stairs Height: 8\"  Rails: Left ascending  Device: No Device  Assistance: Contact guard assistance;Stand by assistance  Comment: pt initially CGA progressing to SBA    EXERCISES    Other exercises?: Yes  Other exercises 1: seated B LE exercises x20 reps           Activity Tolerance: Patient tolerated treatment well  PT Equipment Recommendations  Equipment Needed: No    Current Treatment Recommendations: Strengthening, Home exercise program, Safety education & training, Gait training, Balance training, Stair training, Functional mobility training,
Pt admitted to ICU 2001. Cardiac monitor applied and vital signs obtained. Pt oriented to room and belongings in closet. Call light within reach and bed on lowest postion.
Pt transferred by wheelchair to 2108, belongings including pt cell phone and wallet with pt
Pt transferred to ICU room 2001 from ED. Pt placed on bedside monitor, vitals taken. Bed locked and in lowest position, call light within reach, bed alarm on.
Pt's sister calls in to check on pt, updated
Reason for Follow up: DEVYN and hypertensive urgency. HISTORY:    Feels better. Appetite is good. Review Of Systems:   Constitutional: No fever, chills, lethargy, weakness or wt loss. Cardiac:  No chest pain, dyspnea, orthopnea or PND. Pulmonary:  No cough, phlegm or wheezing. Abdomen:  No abdominal pain, nausea, vomiting or diarrhea. :   No hematuria, pyuria, dysuria or flank pain. Extremities:  No swelling or joint pains. Scheduled Meds:   iron sucrose  300 mg IntraVENous Once    carvedilol  12.5 mg Oral BID    sodium chloride flush  5-40 mL IntraVENous 2 times per day    heparin (porcine)  5,000 Units SubCUTAneous 3 times per day    [Held by provider] furosemide  20 mg IntraVENous Daily    cloNIDine  1 patch TransDERmal Weekly    [Held by provider] doxazosin  2 mg Oral QPM    [Held by provider] hydrALAZINE  100 mg Oral 3 times per day    spironolactone  25 mg Oral BID    NIFEdipine  120 mg Oral Daily   Continuous Infusions:   sodium chloride       PRN Meds:acetaminophen **OR** acetaminophen, perflutren lipid microspheres, hydrALAZINE, sodium chloride flush, sodium chloride, ondansetron **OR** ondansetron, polyethylene glycol, traMADol    No Known Allergies    Physical Exam:  Blood pressure (!) 158/130, pulse 94, temperature 98.3 °F (36.8 °C), temperature source Oral, resp. rate 16, height 6' 5\" (1.956 m), weight 282 lb 3 oz (128 kg), SpO2 100 %. In: 840 [P.O.:840]  Out: 450   In: 840   Out: 450 [Urine:450]    General:  Awake, alert, not in distress. Appears to be stated age. HEENT: Atraumatic, normocephalic. Anicteric sclera. Pink and moist oral mucosa. No carotid bruit. No JVD. Chest: Bilateral air entry, clear to auscultation, no wheezing, rhonchi or rales. Cardiovascular: RRR, S1S2, no murmur, rub or gallop. Has lower extremity edema. Abdomen: Soft, non tender to palpation. Active bowel sounds x 4 quadrants. Musculoskeletal: Active ROM x 4 extremities.  No cyanosis or
Reason for Follow up: DEVYN and hypertensive urgency. HISTORY:    Feels better. Review Of Systems:   Constitutional: No fever, chills, lethargy, weakness or wt loss. Cardiac:  No chest pain, dyspnea, orthopnea or PND. Pulmonary:  No cough, phlegm or wheezing. Abdomen:  No abdominal pain, nausea, vomiting or diarrhea. :   No hematuria, pyuria, dysuria or flank pain. Extremities:  No swelling or joint pains. Scheduled Meds:   iron sucrose  300 mg IntraVENous Once    carvedilol  12.5 mg Oral BID    sodium chloride flush  5-40 mL IntraVENous 2 times per day    heparin (porcine)  5,000 Units SubCUTAneous 3 times per day    [Held by provider] furosemide  20 mg IntraVENous Daily    cloNIDine  1 patch TransDERmal Weekly    [Held by provider] doxazosin  2 mg Oral QPM    [Held by provider] hydrALAZINE  100 mg Oral 3 times per day    spironolactone  25 mg Oral BID    NIFEdipine  120 mg Oral Daily   Continuous Infusions:   sodium chloride       PRN Meds:acetaminophen **OR** acetaminophen, perflutren lipid microspheres, hydrALAZINE, sodium chloride flush, sodium chloride, ondansetron **OR** ondansetron, polyethylene glycol, traMADol    No Known Allergies    Physical Exam:  Blood pressure (!) 165/129, pulse 93, temperature 98.5 °F (36.9 °C), temperature source Oral, resp. rate 18, height 6' 5\" (1.956 m), weight 282 lb 3 oz (128 kg), SpO2 99 %. In: 360 [P.O.:360]  Out: 1250   In: 360   Out: 5765 [FIUQD:[id number]]    General:  Awake, alert, not in distress. Appears to be stated age. HEENT: Atraumatic, normocephalic. Anicteric sclera. Pink and moist oral mucosa. No carotid bruit. No JVD. Chest: Bilateral air entry, clear to auscultation, no wheezing, rhonchi or rales. Cardiovascular: RRR, S1S2, no murmur, rub or gallop. Has lower extremity edema. Abdomen: Soft, non tender to palpation. Active bowel sounds x 4 quadrants. Musculoskeletal: Active ROM x 4 extremities. No cyanosis or clubbing.   Integumentary: Pink, warm
Spoke with Dr. Bert Hylton, mark grigsby.
Spoke with seb Castro with discharge. BMP on Monday, and f/u with nephrology asap.
URI symptoms, slurring speech, weakness or numbness in any of the extremities. In ED, vitals were significant for blood pressure of 221/163, tachycardia with heart rate of 110. Labs were remarkable for elevated creatinine of 3.12 (patient's baseline creatinine is 2.4), flat troponin of 77 and 78, elevated proBNP of 1913 (patient's baseline 700), low hemoglobin of 9.1 -which is close to patient's baseline. CT head unremarkable. Chest x-ray remarkable for cardiomegaly and mild pulmonary vascular congestion. Patient was started on nitro drip, and will be admitted to intermittent ICU for management of hypertensive emergency and DEVYN. Review of Systems:     Review of Systems  Review of Systems   Constitutional:  Negative for chills and fever. HENT: Negative. Respiratory:  Negative for cough, chest tightness and wheezing. Cardiovascular:  Negative for chest pain, palpitations and leg swelling. Gastrointestinal: Negative. Genitourinary: Negative. Musculoskeletal: Negative. Neurological: Negative. Psychiatric/Behavioral: Negative. Medications:      Allergies:  No Known Allergies    Current Meds:   Scheduled Meds:    carvedilol  12.5 mg Oral BID    sodium chloride flush  5-40 mL IntraVENous 2 times per day    heparin (porcine)  5,000 Units SubCUTAneous 3 times per day    [Held by provider] furosemide  20 mg IntraVENous Daily    cloNIDine  1 patch TransDERmal Weekly    [Held by provider] doxazosin  2 mg Oral QPM    [Held by provider] hydrALAZINE  100 mg Oral 3 times per day    [Held by provider] carvedilol  25 mg Oral BID WC    spironolactone  25 mg Oral BID    NIFEdipine  120 mg Oral Daily     Continuous Infusions:    sodium chloride       PRN Meds: acetaminophen **OR** acetaminophen, perflutren lipid microspheres, hydrALAZINE, sodium chloride flush, sodium chloride, ondansetron **OR** ondansetron, polyethylene glycol, traMADol    Data:     Past Medical History:   has a past medical
pressure was elevated at 221/163, electronic recording. Chest x-ray showed cardiomegaly, mild pulmonary vascular congestion. Electrocardiogram showed sinus tachycardia, high voltage, prolonged QT interval, right atrial enlargement, no obvious ischemic changes. He was started on nitroglycerin infusion and he was on beta-blockers, calcium channel blocker, hydralazine. He had headache with a nitroglycerin infusion which was discontinued last night and he received Procardia 120 mg. Significant improvement in the headache and blood pressure after the discontinuation of nitroglycerin and commencement of Procardia  mg.     Labs on admission  Sodium 138, potassium 4.8, CO2 23, BUN 49, creatinine 3.12, glucose 114  proBNP 1913, high-sensitivity troponin 78 and 73  Hemoglobin 9.1, WBC 6.2, MCV 76.6, platelets 004     Current evaluation  Patient seen and examined   He was resting on chair  He had a physiotherapy he experienced mild shortness of breath  No chest pain no palpitation no dizziness  He is eating reasonably well no abdominal pain  Neck veins were normal, lungs were clear on auscultation  He still has bilateral lower extremities edema  ECG monitor sinus rhythm    Blood pressure at 9:48 /111, repeat blood pressure 120/86, oxygen saturation 100%    Hemoglobin 9.3, platelets 351, WBC 4.6  Sodium 133, potassium 5.4, BUN 49, creatinine 3.83    Medications:   Scheduled Meds:   carvedilol  12.5 mg Oral BID    sodium chloride flush  5-40 mL IntraVENous 2 times per day    heparin (porcine)  5,000 Units SubCUTAneous 3 times per day    [Held by provider] furosemide  20 mg IntraVENous Daily    cloNIDine  1 patch TransDERmal Weekly    [Held by provider] doxazosin  2 mg Oral QPM    [Held by provider] hydrALAZINE  100 mg Oral 3 times per day    [Held by provider] spironolactone  25 mg Oral BID    NIFEdipine  120 mg Oral Daily     Continuous Infusions:   sodium chloride       CBC:   Recent Labs     04/01/23  3012
training, Endurance training, Therapeutic activities  Safety Devices  Type of Devices:  (Pt left with transporter at end of session)     Restrictions  Restrictions/Precautions  Restrictions/Precautions: General Precautions (perpheral IV right forearm)  Required Braces or Orthoses?: No  Position Activity Restriction  Other position/activity restrictions: up w/ assist     Subjective   Pain: denies  General  Patient assessed for rehabilitation services?: Yes  Response To Previous Treatment: Not applicable  Family / Caregiver Present: No  Referring Practitioner: Dr. Sg Heart  Referral Date : 03/29/23  Diagnosis: hypertensive emergency  Follows Commands: Within Functional Limits  Other (Comment): OK per nurse Sarah Beth Vargas to proceed w/ PT evaluation  General Comment  Comments: CT scan of the head shows no acute intracranial abnormalities. Subjective  Subjective: pt admitted w/ C/O SOB x 3 months, swelling bilateral LEs and HTN. Pt had a BP of 221/163 on admit.          Social/Functional History  Social/Functional History  Lives With: Alone  Type of Home: Apartment (upper level duplex)  Home Layout: Two level  Home Access: Stairs to enter with rails  Entrance Stairs - Number of Steps: 15-20 steps to upper level duplex w/ right side  Entrance Stairs - Rails: Right  Bathroom Shower/Tub: Tub/Shower unit, Curtain  Bathroom Toilet: Standard  Bathroom Accessibility: Walker accessible  Home Equipment: None (no DME for ambulation)  Has the patient had two or more falls in the past year or any fall with injury in the past year?: No  ADL Assistance: [facility] [facility] [address]: Independent  Homemaking Responsibilities: Yes  Ambulation Assistance: Independent (no device)  Transfer Assistance: Independent  Active : No  Patient's  Info: uses [employer] or bus  Mode of Transportation: Bus, Cab, Walk  Occupation: Full time employment  Type of Occupation: janitorial/ sanitation work  IADL Comments: sleeps on a couch  Additional
04/02/23  0521   * 130* 132*   K 4.6 4.7 5.3    97* 99   CO2 23 23 24   BUN 45* 46* 48*   CREATININE 3.66* 3.76* 3.54*   GLUCOSE 118* 103* 99     Hepatic: No results for input(s): AST, ALT, ALB, BILITOT, ALKPHOS in the last 72 hours. Troponin: No results for input(s): TROPONINI in the last 72 hours. BNP: No results for input(s): BNP in the last 72 hours. Lipids: No results for input(s): CHOL, HDL in the last 72 hours. Invalid input(s): LDLCALCU  INR: No results for input(s): INR in the last 72 hours. Objective:   Vitals: BP (!) 134/104   Pulse 90   Temp 98.3 °F (36.8 °C) (Oral)   Resp 16   Ht 6' 5\" (1.956 m)   Wt 282 lb 3 oz (128 kg)   SpO2 100%   BMI 33.46 kg/m²   General appearance: alert and cooperative with exam  HEENT: Head: Normal, normocephalic, atraumatic. Neck: no JVD and supple, symmetrical, trachea midline  Lungs: diminished breath sounds bibasilar  Heart: regular rate and rhythm  Abdomen:  Obese soft bowel sounds present  Extremities: edema ++  Neurologic: Mental status: Alert, oriented, thought content appropriate    EKG: Sinus rhythm right atrial enlargement, voltage criteria for LVH, prolonged QT. ECHO: reviewed. Ejection fraction: 55%, moderate LVH, ascending aorta 3.9 cm, no obvious valvular abnormality  Stress Test: not obtained. Cardiac Angiography: not obtained.     Assessment / Acute Cardiac Problems:     Patient admitted with accelerated hypertension, significant improvement after treatment  Congestive heart failure diastolic acute on chronic  Ejection fraction more than 55%, normal LV wall motion, severely dilated LA  Moderate left ventricular hypertrophy  Chronic kidney disease acute on chronic renal insufficiency  Normal renal artery duplex  Anemia with low MCV     4/1/2023 slight improvement  serum creatinine noted    Patient Active Problem List:     Moderate persistent asthma     Bipolar 1 disorder ([facility] [address]     Essential hypertension     Obesity, morbid, BMI
04/03/23  0453 04/04/23  0403 04/05/23  0454   WBC 4.6 5.2 5.5   HGB 9.3* 9.1* 9.0*    306 297     BMP:    Recent Labs     04/04/23  0403 04/04/23  1203 04/04/23  1733 04/05/23  0454     --  131* 137   K 5.6* 5.4* 5.1 4.9     --  98 103   CO2 24  --  22 24   BUN 51*  --  55* 53*   CREATININE 3.99*  --  4.09* 3.68*   GLUCOSE 94  --  118* 109*     Hepatic: No results for input(s): AST, ALT, ALB, BILITOT, ALKPHOS in the last 72 hours. Troponin: No results for input(s): TROPONINI in the last 72 hours. BNP: No results for input(s): BNP in the last 72 hours. Lipids: No results for input(s): CHOL, HDL in the last 72 hours. Invalid input(s): LDLCALCU  INR: No results for input(s): INR in the last 72 hours. Objective:   Vitals: /72   Pulse 83   Temp 99 °F (37.2 °C) (Oral)   Resp 16   Ht 6' 5\" (1.956 m)   Wt 286 lb 9.6 oz (130 kg)   SpO2 100%   BMI 33.99 kg/m²   General appearance: alert and cooperative with exam  HEENT: Head: Normal, normocephalic, atraumatic. Neck: no carotid bruit, no JVD, and supple, symmetrical, trachea midline  Lungs: clear to auscultation bilaterally  Heart: regular rate and rhythm  Abdomen:  Obese bowel sounds present  Extremities: edema ++  Neurologic: Mental status: Alert, oriented, thought content appropriate    EKG: Sinus rhythm right atrial enlargement, voltage criteria for LVH, prolonged QT. ECHO: reviewed. Ejection fraction: 55%, moderate LVH, ascending aorta 3.9 cm, no obvious valvular abnormality  Stress Test: not obtained. Cardiac Angiography: not obtained.     Assessment / Acute Cardiac Problems:     Patient admitted with accelerated hypertension, significant improvement after treatment  Congestive heart failure diastolic acute on chronic  Ejection fraction more than 55%, normal LV wall motion, severely dilated LA  Moderate left ventricular hypertrophy  Chronic kidney disease acute on chronic renal insufficiency  Normal renal artery
Component Value Date    LABALBU 3.4 (L) 03/29/2023     Last 3 CK, CKMB, Troponin: @LABRCNT(CKTOTAL:3,CKMB:3,TROPONINI:3)       URINE:)No results found for: Stephanie Huff    Radiology:   Reviewed. Assessment:  Hypertension emergency with acute heart failure. Acute kidney injury, FeNa was 0.43%, in part this could be secondary to decreased renal perfusion caused by blood pressure correction. Cr is slightly better today. Chronic kidney disease stage 3B with proteinuria secondary to hypertensive nephrosclerosis. Hyponatremia. Possible sleep apnea. Proteinuria. Anemia. Moderate MR. Bipolar disorder. Plan:  Bilateral renal artery doppler study Nov 21 2022 was negative for any renal artery stenosis. Limited ultrasound kidney study was negative (left kidney could not be visualized due to bowel gas)  Secondary hypertension work-up including plasma Clay to renin ratio, metanephrines were negative in November 2022. Patient has mild hyperkalemia  No need to treat with dextrose and insulin  Low-dose Lokelma okay to give on as-needed basis    Patient does have progressive renal failure  No ACE inhibitors or ARB's or Aldactone at this stage  There is no added benefit to use Aldactone and there will be risk for hyperkalemia  Continue current antihypertensive medications    Goal systolic blood pressures less than 102 and diastolic blood pressure less than 100  [place] aggressive lowering of blood pressure not recommended. Patient compliance and outpatient follow-up will be main issue regarding the care of the patient    Electronically signed by Efraín Amezquita MD  on 4/5/2023 at 12:14 PM  [facility].   Ph: [phone]
IONCA  Magnesium:   Lab Results   Component Value Date    MG 2.2 03/29/2023     Albumin:   Lab Results   Component Value Date    LABALBU 3.4 (L) 03/29/2023     Last 3 CK, CKMB, Troponin: @LABRCNT(CKTOTAL:3,CKMB:3,TROPONINI:3)       URINE:)No results found for: Sunshine Jean    Radiology:   Reviewed. Assessment:  Hypertension emergency with acute heart failure. Acute kidney injury, FeNa was 0.43%, in part this could be secondary to decreased renal perfusion caused by blood pressure correction. Cr is slightly better today. Chronic kidney disease stage 3B with proteinuria secondary to hypertensive nephrosclerosis. Hyponatremia. Possible sleep apnea. Mild hyponatremia. Proteinuria. Anemia. Moderate MR. Bipolar disorder. Plan:    Bilateral renal artery doppler study Nov 21 2022 was negative for any renal artery stenosis. Limited ultrasound kidney study was negative (left kidney could not be visualized due to bowel gas)  Acute on chronic kidney disease. Secondary hypertension work-up including plasma Clay to renin ratio, metanephrines were negative in November 2022. Patient has mild hyperkalemia  No need to treat with dextrose and insulin  Low-dose Lokelma okay to give on as-needed basis    Patient does have progressive renal failure  No ACE inhibitors or ARB's or Aldactone at this stage  There is no added benefit to use Aldactone and there will be risk for hyperkalemia  Continue current antihypertensive medications    Goal systolic blood pressures less than 932 and diastolic blood pressure less than 100  [place] aggressive lowering of blood pressure not recommended    Patient compliance and outpatient follow-up will be main issue regarding the care of the patient    Electronically signed by Daniela Paz MD  on 4/4/2023 at 2:10 PM  [facility].   Ph: [phone]
Last 3 CK, CKMB, Troponin: @LABRCNT(CKTOTAL:3,CKMB:3,TROPONINI:3)       URINE:)No results found for: Terris Ray    Radiology:   Reviewed. Assessment:  Hypertension emergency with acute heart failure. Acute kidney injury, FeNa was 0.43%, in part this could be secondary to decreased renal perfusion caused by blood pressure correction. Cr is slightly better today. Chronic kidney disease stage 3B with proteinuria secondary to hypertensive nephrosclerosis. Hyponatremia. Possible sleep apnea. Mild hyponatremia. Proteinuria. Anemia. Moderate MR. Bipolar disorder. Plan:  Continue clonidine patch, nifedipine, Coreg. Holding hydralazine and Cardura. SBP improving appropriately. Avoid overcorrection. Hydralazine as needed for systolic blood pressure greater than 160s. Holding spironolactone due to hyperkalemia. JULIA and complement levels were normal.  Low Na diet with 1500 ml oral fluid restriction. Sodium level improving. Change to low K, phos diet. Dietician education before discharge. Educated the patient about the importance of following up with nephrology. Please schedule appt with out office before discharge. Avoid hypotension, nephrotoxic drugs, Lovenox, Fleets enema and IV contrast exposure. Follow up labs ordered for AM.     Please do not hesitate to call with questions. We will follow with you. Pt seen in collaboration with Dr. Kayla Campbell. Electronically signed by BHAVYA Alba CNP  on 4/3/2023 at 4:25 PM  [facility].   Ph: [phone]
proteinuria secondary to hypertensive nephrosclerosis. Possible sleep apnea. Mild hyponatremia. Proteinuria. Anemia. Plan:  Continue clonidine patch, nifedipine, and spironolactone. We will hold carvedilol, hydralazine and Cardura. Avoid overcorrection of blood pressure to ensure adequate renal perfusion. Systolic blood pressure goal for today will be in the 150s. Will use hydralazine as needed for systolic blood pressure greater than 160s. Checking JULIA profile. Complement levels normal.  Low Na diet. Educated the patient about the importance of following up with nephrology. We will try to set up a follow-up appointment and provide information to the patient before discharge. Avoid hypotension, nephrotoxic drugs, Lovenox, Fleets enema and IV contrast exposure. Follow up labs ordered for AM.     Please do not hesitate to call with questions. We will follow with you. Electronically signed by Lila Medrano MD  on 3/31/2023 at 11:40 AM  [facility].   Ph: [phone]
provider] hydrALAZINE (APRESOLINE) tablet 100 mg, 100 mg, Oral, 3 times per day, Telly Moore MD, 100 mg at 03/29/23 2104    [Held by provider] carvedilol (COREG) tablet 25 mg, 25 mg, Oral, BID WC, Telly Moore MD, 25 mg at 03/30/23 4941    spironolactone (ALDACTONE) tablet 25 mg, 25 mg, Oral, BID, Telly Moore MD, 25 mg at 03/30/23 1234    traMADol (ULTRAM) tablet 50 mg, 50 mg, Oral, Q6H PRN, BHAVYA Sullivan - JERAD, 50 mg at 03/30/23 0331    NIFEdipine (ADALAT CC) extended release tablet 120 mg, 120 mg, Oral, Daily, Dereck Pryor MD, 120 mg at 03/30/23 0813    Lab Results:     Lab Results   Component Value Date    WBC 5.2 03/31/2023    HGB 8.9 (L) 03/31/2023    HCT 27.1 (L) 03/31/2023    MCV 75.2 (L) 03/31/2023     03/31/2023     Lab Results   Component Value Date    CALCIUM 8.5 (L) 03/31/2023     (L) 03/31/2023    K 4.6 03/31/2023    CO2 23 03/31/2023     03/31/2023    BUN 45 (H) 03/31/2023    CREATININE 3.66 (H) 03/31/2023     Lab Results   Component Value Date    INR 1.0 03/29/2023    PROTIME 13.9 03/29/2023       Radiology:   No recent chest imaging    ASSESSMENT:       Suspect starr  Mild intermittent asthma - no recent PFT  Htn urgency - resolved  Oscar/ckd - cr 3.66  Obesity  Nonsmoker  Covid vaccinated  Full code  PLAN:   Albuterol prn for now  Will need PFT after discharge for further eval of asthma  Suspect would benefit from sleep eval after discharge  Monitor for any desaturations while asleep  Renal function is not improving, nephrology following, cont to monitor  Pulmonary wise he is stable  Would recommend follow up in 4 weeks with us, [phone]  Nothing further for us to add at this time, please call if any new pulmonary concerns      Electronically signed by BHAVYA Russell CNP on 03/31/23     This progress note was completed using a voice transcription system. Every effort was made to ensure accuracy.  However, inadvertent computerized transcription errors
[facility])     Need for prophylactic vaccination with combined diphtheria-tetanus-pertussis (DTP) vaccine     DEVYN (acute kidney injury) ([facility] [address]     Hypoxia     Troponin level elevated     Obesity (BMI 35.0-39.9 without comorbidity)     Sleep-related breathing disorder     Hypertensive emergency     Hyponatremia     CKD (chronic kidney disease)     Ascending aortic aneurysm     Acute pulmonary edema (HCC)     Pneumonia of right lung due to infectious organism      Plan of Treatment:   Medications reviewed  Resume carvedilol 12.5 mg twice a day  Continue current dose of nifedipine, Aldactone and clonidine  Nephrology notes reviewed  BMP tomorrow    Electronically signed by Arthur Pink MD on 3/31/2023 at 2:50 PM
DEVYN (acute kidney injury) ([facility] [address]     Hypoxia     Troponin level elevated     Obesity (BMI 35.0-39.9 without comorbidity)     Sleep-related breathing disorder     Hypertensive emergency     Hyponatremia     CKD (chronic kidney disease)     Ascending aortic aneurysm (HCC)     Acute pulmonary edema (HCC)     Pneumonia of right lung due to infectious organism      Plan of Treatment:   Medication reviewed  Nephrology notes reviewed  Agree with current medication Lokelma, Coreg, clonidine,, Nifedipine  Aldactone discontinued, no ACE or ARB's    Electronically signed by Cleve Stephenson MD on 4/4/2023 at 8:58 PM
abnormal extra-axial fluid collection. The gray-white differentiation is maintained without evidence of an acute infarct. There is no evidence of hydrocephalus. ORBITS: The visualized portion of the orbits demonstrate no acute abnormality. SINUSES: The visualized paranasal sinuses and mastoid air cells demonstrate no acute abnormality. SOFT TISSUES/SKULL:  No acute abnormality of the visualized skull or soft tissues. No acute intracranial findings. XR CHEST PORTABLE    Result Date: 3/29/2023  EXAMINATION: ONE XRAY VIEW OF THE CHEST 3/29/2023 12:28 pm COMPARISON: 11/22/2022. HISTORY: ORDERING SYSTEM PROVIDED HISTORY: sob TECHNOLOGIST PROVIDED HISTORY: sob Reason for Exam: Pt. states SOB, hypertension. Pt. states history of asthma FINDINGS: The lungs and costophrenic angles are clear. There is moderate cardiomegaly and mild vascular congestion. There is no septal thickening. 1. Moderate cardiomegaly and mild pulmonary vascular congestion without evidence of interstitial edema. 2. No confluent airspace consolidation. Physical Examination:        Physical Exam  Physical Exam  Vitals reviewed. Constitutional:       Appearance: Normal appearance. HENT:      Mouth/Throat:      Mouth: Mucous membranes are moist.      Pharynx: Oropharynx is clear. Eyes:      Extraocular Movements: Extraocular movements intact. Conjunctiva/sclera: Conjunctivae normal.   Cardiovascular:      Rate and Rhythm: Regular rhythm. Heart sounds: No murmur heard. No gallop. Pulmonary:      Effort: Pulmonary effort is normal. No respiratory distress. Breath sounds: No wheezing/rales. Abdominal:      General: Bowel sounds are normal. There is no distension. Tenderness: There is no abdominal tenderness. There is no guarding. Musculoskeletal:   No lower extremity edema present  Neurological:      General: No focal deficit present.       Mental Status: He is alert and oriented to person, place, and
fluid collection. The gray-white differentiation is maintained without evidence of an acute infarct. There is no evidence of hydrocephalus. ORBITS: The visualized portion of the orbits demonstrate no acute abnormality. SINUSES: The visualized paranasal sinuses and mastoid air cells demonstrate no acute abnormality. SOFT TISSUES/SKULL:  No acute abnormality of the visualized skull or soft tissues. No acute intracranial findings. XR CHEST PORTABLE    Result Date: 3/29/2023  EXAMINATION: ONE XRAY VIEW OF THE CHEST 3/29/2023 12:28 pm COMPARISON: 11/22/2022. HISTORY: ORDERING SYSTEM PROVIDED HISTORY: sob TECHNOLOGIST PROVIDED HISTORY: sob Reason for Exam: Pt. states SOB, hypertension. Pt. states history of asthma FINDINGS: The lungs and costophrenic angles are clear. There is moderate cardiomegaly and mild vascular congestion. There is no septal thickening. 1. Moderate cardiomegaly and mild pulmonary vascular congestion without evidence of interstitial edema. 2. No confluent airspace consolidation. Physical Examination:        Physical Exam  Physical Exam  Vitals reviewed. Constitutional:       Appearance: Normal appearance. HENT:      Mouth/Throat:      Mouth: Mucous membranes are moist.      Pharynx: Oropharynx is clear. Eyes:      Extraocular Movements: Extraocular movements intact. Conjunctiva/sclera: Conjunctivae normal.   Cardiovascular:      Rate and Rhythm: Regular rhythm. Heart sounds: No murmur heard. No gallop. Pulmonary:      Effort: Pulmonary effort is normal. No respiratory distress. Breath sounds: No wheezing/rales. Abdominal:      General: Bowel sounds are normal. There is no distension. Tenderness: There is no abdominal tenderness. There is no guarding. Musculoskeletal:   No lower extremity edema present  Neurological:      General: No focal deficit present. Mental Status: He is alert and oriented to person, place, and time.       Sensory:
hydrocephalus. ORBITS: The visualized portion of the orbits demonstrate no acute abnormality. SINUSES: The visualized paranasal sinuses and mastoid air cells demonstrate no acute abnormality. SOFT TISSUES/SKULL:  No acute abnormality of the visualized skull or soft tissues. No acute intracranial findings. XR CHEST PORTABLE    Result Date: 3/29/2023  EXAMINATION: ONE XRAY VIEW OF THE CHEST 3/29/2023 12:28 pm COMPARISON: 11/22/2022. HISTORY: ORDERING SYSTEM PROVIDED HISTORY: sob TECHNOLOGIST PROVIDED HISTORY: sob Reason for Exam: Pt. states SOB, hypertension. Pt. states history of asthma FINDINGS: The lungs and costophrenic angles are clear. There is moderate cardiomegaly and mild vascular congestion. There is no septal thickening. 1. Moderate cardiomegaly and mild pulmonary vascular congestion without evidence of interstitial edema. 2. No confluent airspace consolidation. Physical Examination:        Physical Exam  Physical Exam  Vitals reviewed. Constitutional:       Appearance: Normal appearance. HENT:      Mouth/Throat:      Mouth: Mucous membranes are moist.      Pharynx: Oropharynx is clear. Eyes:      Extraocular Movements: Extraocular movements intact. Conjunctiva/sclera: Conjunctivae normal.   Cardiovascular:      Rate and Rhythm: Regular rhythm. Heart sounds: No murmur heard. No gallop. Pulmonary:      Effort: Pulmonary effort is normal. No respiratory distress. Breath sounds: Rales present. No wheezing. Abdominal:      General: Bowel sounds are normal. There is no distension. Tenderness: There is no abdominal tenderness. There is no guarding. Musculoskeletal:      Right lower leg: Edema present. Left lower leg: Edema present. Comments: +1 bilateral pitting edema   Neurological:      General: No focal deficit present. Mental Status: He is alert and oriented to person, place, and time. Sensory: No sensory deficit.       Motor: No
Hospital Problems    Diagnosis Date Noted    CKD (chronic kidney disease) [N18.9] 11/20/2022     Priority: Medium    Hypertensive emergency [I16.1] 11/08/2022     Priority: Medium    Moderate persistent asthma [J45.40] 02/26/2015    Bipolar 1 disorder ([facility] [F31.9] 02/26/2015    Essential hypertension [I10] 02/26/2015    Obesity, morbid, BMI 40.0-49.9 ([facility] [E66.01] 02/26/2015       Plan:          Hypertensive emergency with endorgan failure  -Blood pressure currently 138/90  -Patient off nitro drip  -Resumed patient's home antihypertensives including clonidine patch, doxazosin 2 mg once daily, hydralazine 100 mg every 8, Coreg 25 twice daily, Norvasc 10 daily   -Lasix and Aldactone on hold for now  -CT head unremarkable  -Neurovascular checks every shift  -Monitor for hypertensive encephalopathy  -Repeat echo pending  -Previous echo done on 11/8/2022: Moderate LVH, normal ventricular systolic function with ejection fraction above 55%.   -Cardiology consulted, will appreciate recommendations  -Pulmonology consulted, will appreciate recommendations     DEVYN on CKD  -Patient's creatinine on admission 3.1 >> 3.59 (patient baseline creatinine 2.4)  -We will continue to monitor kidney functions  -Avoid nephrotoxic medication     Elevated troponin most likely type II MI  -Flat troponin 78 >>77 >>73  -EKG sinus tachycardia ,unremarkable  -Repeat echo Echo pending  -Cardiology consulted, will appreciate recommendations  -We will continue to monitor     Moderate persistent asthma  -Patient not on any inhalers  Bipolar type I  -Patient not on any medications     DVT prophylaxis: Heparin  GI prophylaxis: None  Diet: Low-sodium diet  CODE STATUS: DNR-CCA     PT OT social work consulted    James Mcdaniel MD  3/30/2023  7:14 AM      Attending Physician Statement  I have discussed the care of Andrebam Eb and I have examined the patient myselft and taken ros and hpi , including pertinent history and exam findings,  with the
Problem  Hypertensive emergency    Active Hospital Problems    Diagnosis Date Noted    CKD (chronic kidney disease) [N18.9] 11/20/2022     Priority: Medium    Hypertensive emergency [I16.1] 11/08/2022     Priority: Medium    Moderate persistent asthma [J45.40] 02/26/2015    Bipolar 1 disorder ([facility] [F31.9] 02/26/2015    Essential hypertension [I10] 02/26/2015    Obesity, morbid, BMI 40.0-49.9 ([facility] [E66.01] 02/26/2015       Plan:        Hypertensive emergency with endorgan failure (Resolved)  -Patient was initially on nitro drip   -Patient currently on metoprolol 12.5 twice daily, nifedipine extended release 120 daily, Aldactone 25 twice daily, clonidine patch  -Patient is doxazosin, Lasix and scheduled hydralazine on hold  -Cardiology, nephrology following  -CT head unremarkable on admission  -Previous echo done on 11/8/2022: Moderate LVH, normal ventricular systolic function with ejection fraction above 55%.   -Serum Aldactone to renin ratio unremarkable <20, Renal ultrasound Doppler negative    DEVYN on CKD, worsening  -Creatinine trending down to 3.1>> 3.66>>3.76 >>3.54 >>3.83 (patient baseline creatinine 2.4)  -We will continue to monitor kidney functions  -Avoid hypotension, nephrotoxic drugs, Lovenox, fleets enema and IV contrast exposure  -Nephrology on board    Electrolyte disturbances in setting of DEVYN on CKD  -Hyponatremia with urine osmolarity of 499, urine sodium of 32, serum osmolarity of 304   -Hyperkalemia with potassium of 5.4  -Patient started on Lokelma 10 g once, per nephrology  -1500 fluid restriction  -We will continue to monitor    Microcytic anemia  -Hemoglobin stable at 9.1  -Iron low at 32, iron saturation low at 12, ferritin within normal limit 121, TIBC within normal limit to 69  -IV Venofer 300 mg x 3 completed    Moderate persistent asthma  -Patient not on any inhalers    Bipolar type I  -Patient not on any medications     DVT prophylaxis: Heparin  GI prophylaxis: None  Diet: Low-sodium
saturation low at 12, ferritin within normal limit 121, TIBC within normal limit to 69  -IV Venofer 300 mg x 3 completed     Moderate persistent asthma  -Patient not on any inhalers     Bipolar type I  -Patient not on any medications     DVT prophylaxis: Heparin  GI prophylaxis: None  Diet: Low-sodium diet  CODE STATUS: Full code     Shavon Wolf MD  4/5/2023  8:56 AM      Attending Physician Statement  I have discussed the care of Karena Cole and I have examined the patient myselft and taken ros and hpi , including pertinent history and exam findings,  with the resident. I have reviewed the key elements of all parts of the encounter with the resident. I agree with the assessment, plan and orders as documented by the resident.   Patient seen and examined, blood pressure has been better controlled  Creatinine slightly better hyperkalemia has resolved  Not on any ACE ARB and spironolactone  Abdominal CT did not show any adrenal mass, patient does not have any symptoms of pheochromocytoma,  Already had work-up for secondary causes of hypertension  Compliance issue  Patient to get outpatient sleep study  Will discharge today if cleared by nephrology, does not seem to be needing dialysis at this point    Electronically signed by Yariel Treviño MD

## 2023-04-05 NOTE — CARE COORDINATION
SW spoke with sister about discharge plans, and informed her that patient can call his insurance card number and inquire about benefits for transportation. SW provided patient with number to insurance company to call about transportation benefits. Patient had not other questions.     Electronically signed by HENRIQUE Ellis on 4/5/2023 at 4:32 PM

## 2023-04-05 NOTE — PLAN OF CARE
Problem: Discharge Planning  Goal: Discharge to home or other facility with appropriate resources  Outcome: Progressing  Flowsheets (Taken 4/4/2023 1955)  Discharge to home or other facility with appropriate resources: Identify barriers to discharge with patient and caregiver     Problem: ABCDS Injury Assessment  Goal: Absence of physical injury  Outcome: Progressing  Flowsheets (Taken 4/4/2023 1959)  Absence of Physical Injury: Implement safety measures based on patient assessment     Problem: Safety - Adult  Goal: Free from fall injury  Outcome: Progressing  Flowsheets (Taken 4/4/2023 1959)  Free From Fall Injury: Instruct family/caregiver on patient safety     Problem: Pain  Goal: Verbalizes/displays adequate comfort level or baseline comfort level  Outcome: Progressing     Problem: Nutrition Deficit:  Goal: Optimize nutritional status  Outcome: Progressing

## 2023-04-05 NOTE — DISCHARGE SUMMARY
moderate cardiomegaly and mild vascular congestion. There is no septal thickening. 1. Moderate cardiomegaly and mild pulmonary vascular congestion without evidence of interstitial edema. 2. No confluent airspace consolidation. US RETROPERITONEAL COMPLETE    Result Date: 3/31/2023  EXAMINATION: RETROPERITONEAL ULTRASOUND OF THE KIDNEYS AND URINARY BLADDER 3/31/2023 COMPARISON: None HISTORY: ORDERING SYSTEM PROVIDED HISTORY: DEVYN on CKD TECHNOLOGIST PROVIDED HISTORY: DEVYN on CKD FINDINGS: Kidneys: Right kidney 11.7 cm in length. Increased right renal echogenicity would suggest chronic medical renal disease. Left kidney could not be evaluated due to overlying bowel gas and is not visualized. No right nephrolithiasis or hydronephrosis. Left kidney could not be evaluated overlying bowel gas. Increased right renal echogenicity consistent chronic medical renal disease. No hydronephrosis or nephrolithiasis. Consultations:    Consults:     Final Specialist Recommendations/Findings:   IP CONSULT TO INTERNAL MEDICINE  IP CONSULT TO CARDIOLOGY  IP CONSULT TO SOCIAL WORK  IP CONSULT TO NEPHROLOGY  IP CONSULT TO CRITICAL CARE  IP CONSULT TO DIETITIAN      The patient was seen and examined on day of discharge and this discharge summary is in conjunction with any daily progress note from day of discharge. Discharge plan:       Disposition: Home    Physician Follow Up:     Yelena Lloyd MD  [address] [place] [address] [address] [facility]  [phone]    Follow up on 4/6/2023  @ 11:45 am for new PCP appt and Hospital follow up    [place], [address] [facility]  [phone]    Follow up in 4 week(s)  hospital follow up with PFT    Bharat Arenas MD  [phone] N.  [address], [address] [facility]    Schedule an appointment as soon as possible for a visit  Please call and make an appointment ASAP, first appointment slot available       Requiring Further Evaluation/Follow Up

## 2023-04-06 ENCOUNTER — OFFICE VISIT (OUTPATIENT)
Dept: INTERNAL MEDICINE CLINIC | Age: 43
End: 2023-04-06

## 2023-04-06 ENCOUNTER — HOSPITAL ENCOUNTER (OUTPATIENT)
Age: 43
Setting detail: SPECIMEN
Discharge: HOME | End: 2023-04-06

## 2023-04-06 DIAGNOSIS — N17.9 AKI (ACUTE KIDNEY INJURY) (HCC): ICD-10-CM

## 2023-04-06 DIAGNOSIS — E66.01 OBESITY, MORBID, BMI 40.0-49.9 (HCC): ICD-10-CM

## 2023-04-06 DIAGNOSIS — E66.9 OBESITY (BMI 35.0-39.9 WITHOUT COMORBIDITY): ICD-10-CM

## 2023-04-06 DIAGNOSIS — Z09 HOSPITAL DISCHARGE FOLLOW-UP: ICD-10-CM

## 2023-04-06 DIAGNOSIS — J81.0 ACUTE PULMONARY EDEMA (HCC): Primary | ICD-10-CM

## 2023-04-06 LAB
ANION GAP SERPL CALCULATED.3IONS-SCNC: 13 MMOL/L (ref 9–17)
BUN SERPL-MCNC: 52 MG/DL (ref 6–20)
CALCIUM SERPL-MCNC: 9.2 MG/DL (ref 8.6–10.4)
CHLORIDE SERPL-SCNC: 101 MMOL/L (ref 98–107)
CO2 SERPL-SCNC: 21 MMOL/L (ref 20–31)
CREAT SERPL-MCNC: 3.98 MG/DL (ref 0.7–1.2)
GFR SERPL CREATININE-BSD FRML MDRD: 18 ML/MIN/1.73M2
GLUCOSE SERPL-MCNC: 77 MG/DL (ref 70–99)
POTASSIUM SERPL-SCNC: 5.7 MMOL/L (ref 3.7–5.3)
SODIUM SERPL-SCNC: 135 MMOL/L (ref 135–144)

## 2023-04-06 SDOH — ECONOMIC STABILITY: FOOD INSECURITY: WITHIN THE PAST 12 MONTHS, THE FOOD YOU BOUGHT JUST DIDN'T LAST AND YOU DIDN'T HAVE MONEY TO GET MORE.: NEVER TRUE

## 2023-04-06 SDOH — ECONOMIC STABILITY: INCOME INSECURITY: HOW HARD IS IT FOR YOU TO PAY FOR THE VERY BASICS LIKE FOOD, HOUSING, MEDICAL CARE, AND HEATING?: NOT HARD AT ALL

## 2023-04-06 SDOH — ECONOMIC STABILITY: HOUSING INSECURITY
IN THE LAST 12 MONTHS, WAS THERE A TIME WHEN YOU DID NOT HAVE A STEADY PLACE TO SLEEP OR SLEPT IN A SHELTER (INCLUDING NOW)?: NO

## 2023-04-06 SDOH — ECONOMIC STABILITY: FOOD INSECURITY: WITHIN THE PAST 12 MONTHS, YOU WORRIED THAT YOUR FOOD WOULD RUN OUT BEFORE YOU GOT MONEY TO BUY MORE.: NEVER TRUE

## 2023-04-06 ASSESSMENT — PATIENT HEALTH QUESTIONNAIRE - PHQ9
5. POOR APPETITE OR OVEREATING: 0
10. IF YOU CHECKED OFF ANY PROBLEMS, HOW DIFFICULT HAVE THESE PROBLEMS MADE IT FOR YOU TO DO YOUR WORK, TAKE CARE OF THINGS AT HOME, OR GET ALONG WITH OTHER PEOPLE: 0
SUM OF ALL RESPONSES TO PHQ9 QUESTIONS 1 & 2: 0
SUM OF ALL RESPONSES TO PHQ QUESTIONS 1-9: 4
7. TROUBLE CONCENTRATING ON THINGS, SUCH AS READING THE NEWSPAPER OR WATCHING TELEVISION: 0
SUM OF ALL RESPONSES TO PHQ QUESTIONS 1-9: 4
SUM OF ALL RESPONSES TO PHQ QUESTIONS 1-9: 4
4. FEELING TIRED OR HAVING LITTLE ENERGY: 2
8. MOVING OR SPEAKING SO SLOWLY THAT OTHER PEOPLE COULD HAVE NOTICED. OR THE OPPOSITE, BEING SO FIGETY OR RESTLESS THAT YOU HAVE BEEN MOVING AROUND A LOT MORE THAN USUAL: 0
9. THOUGHTS THAT YOU WOULD BE BETTER OFF DEAD, OR OF HURTING YOURSELF: 0
SUM OF ALL RESPONSES TO PHQ QUESTIONS 1-9: 4
3. TROUBLE FALLING OR STAYING ASLEEP: 2
2. FEELING DOWN, DEPRESSED OR HOPELESS: 0
6. FEELING BAD ABOUT YOURSELF - OR THAT YOU ARE A FAILURE OR HAVE LET YOURSELF OR YOUR FAMILY DOWN: 0
1. LITTLE INTEREST OR PLEASURE IN DOING THINGS: 0

## 2023-04-06 NOTE — LETTER
April 6, 2023       Christine Berg YOB: 1980   [address] [facility] Date of Visit:  4/6/2023       To Whom It May Concern: It is my medical opinion that Jackie Arboleda {Work release (duty restriction):65704}. If you have any questions or concerns, please don't hesitate to call.     Sincerely,        Michelle Cornell MD

## 2023-04-06 NOTE — PROGRESS NOTES
Visit Information    Have you changed or started any medications since your last visit including any over-the-counter medicines, vitamins, or herbal medicines? no   Are you having any side effects from any of your medications? -  no  Have you stopped taking any of your medications? Is so, why? -  no    Have you seen any other physician or provider since your last visit? Yes - Records Obtained  Have you had any other diagnostic tests since your last visit? Yes - Records Obtained  Have you been seen in the emergency room and/or had an admission to a hospital since we last saw you? Yes - Records Obtained  Have you had your routine dental cleaning in the past 6 months? no    Have you activated your [employer] account? If not, what are your barriers?  No:      Patient Care Team:  Rachel Gillespie MD as PCP - General (Internal Medicine)    Medical History Review  Past Medical, Family, and Social History reviewed and does contribute to the patient presenting condition    Health Maintenance   Topic Date Due    COVID-19 Vaccine (1) Never done    Depression Monitoring  Never done    HIV screen  Never done    Shingles vaccine (1 of 2) Never done    A1C test (Diabetic or Prediabetic)  03/16/2021    Flu vaccine (Season Ended) 08/01/2023    GFR test (Diabetes, CKD 3-4, OR last GFR 15-59)  04/05/2024    DTaP/Tdap/Td vaccine (2 - Td or Tdap) 02/26/2025    Lipids  03/18/2027    Pneumococcal 0-64 years Vaccine  Completed    Hepatitis C screen  Completed    Hepatitis A vaccine  Aged Out    Hib vaccine  Aged Out    Meningococcal (ACWY) vaccine  Aged Out
1    Allergies:      Patient has no known allergies. Social History:    Tobacco:    reports that he has never smoked. He has never used smokeless tobacco.  Alcohol:      reports no history of alcohol use. Drug Use:  reports no history of drug use.     Family History:    Family History   Problem Relation Age of Onset    High Blood Pressure Mother     Diabetes Maternal Grandmother        Review of Systems:    Positive and Negative as described in HPI    Constitutional:  negative for  fevers, chills, sweats, fatigue, and weight loss  HEENT:  negative for vision or hearing changes,   Respiratory:  negative for shortness of breath, cough, or congestion  Cardiovascular:  negative for  chest pain, palpitations  Gastrointestinal:  negative for nausea, vomiting, diarrhea, constipation, abdominal pain  Genitourinary:  negative for frequency, dysuria  Integument/Breast:  negative for rash, skin lesions  Musculoskeletal:  negative for muscle aches or joint pain  Neurological:  negative for headaches, dizziness, lightheadedness, numbness, pain and tingling extrimities  Behavior/Psych:  negative for depression and anxiety      Physical Exam:    Vitals:  BP (!) 148/84   Pulse (!) 48   Ht 6' 5\" (1.956 m)   Wt 299 lb (135.6 kg)   SpO2 97%   BMI 35.46 kg/m²     General appearance - alert, well appearing, and in no acute distress  Mental status - oriented to person, place, and time with normal affect  Head - normocephalic and atraumatic  Eyes - pupils equal and reactive, extraocular eye movements intact, conjunctiva clear  Ears - hearing appears to be intact  Nose - no drainage noted  Mouth - mucous membranes moist  Neck - supple, no carotid bruits, thyroid not palpable  Chest - clear to auscultation, normal effort  Heart - normal rate, regular rhythm, no murmurs  Abdomen - soft, nontender, nondistended, bowel sounds present all four quadrants, no masses, hepatomegaly or splenomegaly  Neurological - normal speech, no focal

## 2023-04-07 VITALS
HEIGHT: 77 IN | OXYGEN SATURATION: 97 % | DIASTOLIC BLOOD PRESSURE: 80 MMHG | SYSTOLIC BLOOD PRESSURE: 140 MMHG | WEIGHT: 299 LBS | HEART RATE: 48 BPM | BODY MASS INDEX: 35.3 KG/M2

## 2023-04-07 LAB
ABSOLUTE EOS #: 0.14 K/UL (ref 0–0.44)
ABSOLUTE IMMATURE GRANULOCYTE: 0 K/UL (ref 0–0.3)
ABSOLUTE LYMPH #: 1.22 K/UL (ref 1.1–3.7)
ABSOLUTE MONO #: 0.95 K/UL (ref 0.1–1.2)
BASOPHILS # BLD: 1 % (ref 0–2)
BASOPHILS ABSOLUTE: 0.07 K/UL (ref 0–0.2)
EOSINOPHILS RELATIVE PERCENT: 2 % (ref 1–4)
HCT VFR BLD AUTO: 31.8 % (ref 40.7–50.3)
HGB BLD-MCNC: 9 G/DL (ref 13–17)
IMMATURE GRANULOCYTES: 0 %
LYMPHOCYTES # BLD: 18 % (ref 24–43)
MCH RBC QN AUTO: 23.7 PG (ref 25.2–33.5)
MCHC RBC AUTO-ENTMCNC: 28.3 G/DL (ref 28.4–34.8)
MCV RBC AUTO: 83.9 FL (ref 82.6–102.9)
MONOCYTES # BLD: 14 % (ref 3–12)
MORPHOLOGY: ABNORMAL
MORPHOLOGY: ABNORMAL
NRBC AUTOMATED: 0 PER 100 WBC
PDW BLD-RTO: 16.9 % (ref 11.8–14.4)
PLATELET # BLD AUTO: 346 K/UL (ref 138–453)
PMV BLD AUTO: 12.4 FL (ref 8.1–13.5)
RBC # BLD: 3.79 M/UL (ref 4.21–5.77)
SEG NEUTROPHILS: 65 % (ref 36–65)
SEGMENTED NEUTROPHILS ABSOLUTE COUNT: 4.42 K/UL (ref 1.5–8.1)
WBC # BLD AUTO: 6.8 K/UL (ref 3.5–11.3)

## 2023-04-17 DIAGNOSIS — N17.9 AKI (ACUTE KIDNEY INJURY) (HCC): Primary | ICD-10-CM

## 2023-05-02 ENCOUNTER — HOSPITAL ENCOUNTER (OUTPATIENT)
Age: 43
Discharge: HOME | End: 2023-05-02
Payer: COMMERCIAL

## 2023-05-02 DIAGNOSIS — N17.9 AKI (ACUTE KIDNEY INJURY) (HCC): ICD-10-CM

## 2023-05-02 PROCEDURE — 80048 BASIC METABOLIC PNL TOTAL CA: CPT

## 2023-05-02 PROCEDURE — 36415 COLL VENOUS BLD VENIPUNCTURE: CPT

## 2023-05-03 LAB
ANION GAP SERPL CALCULATED.3IONS-SCNC: 24 MMOL/L (ref 9–17)
BUN SERPL-MCNC: 49 MG/DL (ref 6–20)
CALCIUM SERPL-MCNC: 10.2 MG/DL (ref 8.6–10.4)
CHLORIDE SERPL-SCNC: 118 MMOL/L (ref 98–107)
CO2 SERPL-SCNC: 13 MMOL/L (ref 20–31)
CREAT SERPL-MCNC: 3.9 MG/DL (ref 0.7–1.2)
GFR SERPL CREATININE-BSD FRML MDRD: 19 ML/MIN/1.73M2
GLUCOSE SERPL-MCNC: 140 MG/DL (ref 70–99)
POTASSIUM SERPL-SCNC: 4.5 MMOL/L (ref 3.7–5.3)
SODIUM SERPL-SCNC: 155 MMOL/L (ref 135–144)

## 2023-05-17 ENCOUNTER — HOSPITAL ENCOUNTER (OUTPATIENT)
Age: 43
Setting detail: SPECIMEN
Discharge: HOME | End: 2023-05-17

## 2023-05-17 ENCOUNTER — OFFICE VISIT (OUTPATIENT)
Dept: INTERNAL MEDICINE CLINIC | Age: 43
End: 2023-05-17
Payer: COMMERCIAL

## 2023-05-17 VITALS
HEART RATE: 93 BPM | OXYGEN SATURATION: 98 % | BODY MASS INDEX: 33.58 KG/M2 | SYSTOLIC BLOOD PRESSURE: 172 MMHG | WEIGHT: 284.4 LBS | DIASTOLIC BLOOD PRESSURE: 100 MMHG | HEIGHT: 77 IN

## 2023-05-17 DIAGNOSIS — I10 ESSENTIAL HYPERTENSION: ICD-10-CM

## 2023-05-17 DIAGNOSIS — E87.5 HYPERKALEMIA: ICD-10-CM

## 2023-05-17 DIAGNOSIS — E87.0 HYPERNATREMIA: ICD-10-CM

## 2023-05-17 DIAGNOSIS — N18.31 STAGE 3A CHRONIC KIDNEY DISEASE (HCC): ICD-10-CM

## 2023-05-17 DIAGNOSIS — I10 UNCONTROLLED HYPERTENSION: Primary | ICD-10-CM

## 2023-05-17 PROCEDURE — 3077F SYST BP >= 140 MM HG: CPT | Performed by: INTERNAL MEDICINE

## 2023-05-17 PROCEDURE — G8427 DOCREV CUR MEDS BY ELIG CLIN: HCPCS | Performed by: INTERNAL MEDICINE

## 2023-05-17 PROCEDURE — G8417 CALC BMI ABV UP PARAM F/U: HCPCS | Performed by: INTERNAL MEDICINE

## 2023-05-17 PROCEDURE — 3080F DIAST BP >= 90 MM HG: CPT | Performed by: INTERNAL MEDICINE

## 2023-05-17 PROCEDURE — 99214 OFFICE O/P EST MOD 30 MIN: CPT | Performed by: INTERNAL MEDICINE

## 2023-05-17 PROCEDURE — 1036F TOBACCO NON-USER: CPT | Performed by: INTERNAL MEDICINE

## 2023-05-17 RX ORDER — HYDRALAZINE HYDROCHLORIDE 50 MG/1
50 TABLET, FILM COATED ORAL 2 TIMES DAILY
Qty: 60 TABLET | Refills: 2 | Status: SHIPPED | OUTPATIENT
Start: 2023-05-17

## 2023-05-17 RX ORDER — CARVEDILOL 12.5 MG/1
12.5 TABLET ORAL 2 TIMES DAILY
Qty: 60 TABLET | Refills: 3 | Status: SHIPPED | OUTPATIENT
Start: 2023-05-17

## 2023-05-17 RX ORDER — SODIUM ZIRCONIUM CYCLOSILICATE 5 G/5G
POWDER, FOR SUSPENSION ORAL
COMMUNITY
Start: 2023-04-24

## 2023-05-17 RX ORDER — NIFEDIPINE 60 MG/1
120 TABLET, FILM COATED, EXTENDED RELEASE ORAL DAILY
Qty: 60 TABLET | Refills: 4 | Status: SHIPPED | OUTPATIENT
Start: 2023-05-17

## 2023-05-17 NOTE — PROGRESS NOTES
[address] [facility] [address]  [place] [address]  Dept: [phone]  Dept Fax: [phone]     Name: Argelia Rayo  : 1980           Chief Complaint   Patient presents with    Chronic Kidney Disease     Review labs        History of Present Illness:    HPI    Past Medical History:    Past Medical History:   Diagnosis Date    [facility] 10/9/2021    Bipolar 1 disorder ([facility] 2015    Bronchitis     Bronchitis     Hypertension     Moderate persistent asthma 2015    Obesity       Reviewed all health maintenance requirements and ordered appropriate tests  Health Maintenance Due   Topic Date Due    COVID-19 Vaccine (1) Never done    HIV screen  Never done    Shingles vaccine (1 of 2) Never done    A1C test (Diabetic or Prediabetic)  2021       Past Surgical History:    No past surgical history on file. Medications:      Current Outpatient Medications:     NIFEdipine (ADALAT CC) 60 MG extended release tablet, Take 2 tablets by mouth daily, Disp: 60 tablet, Rfl: 4    carvedilol (COREG) 12.5 MG tablet, Take 1 tablet by mouth 2 times daily, Disp: 60 tablet, Rfl: 3    hydrALAZINE (APRESOLINE) 50 MG tablet, Take 1 tablet by mouth in the morning and at bedtime, Disp: 60 tablet, Rfl: 2    cloNIDine (CATAPRES) 0.2 MG/24HR PTWK, Place 1 patch onto the skin once a week, Disp: 4 patch, Rfl: 3    Blood Pressure KIT, 1 kit by Does not apply route daily, Disp: 1 kit, Rfl: 0    LOKELMA 5 g PACK oral suspension, , Disp: , Rfl:     Allergies:      Patient has no known allergies. Social History:    Tobacco:    reports that he has never smoked. He has never used smokeless tobacco.  Alcohol:      reports no history of alcohol use. Drug Use:  reports no history of drug use.     Family History:    Family History   Problem Relation Age of Onset    High Blood Pressure Mother     Diabetes Maternal Grandmother        Review of Systems:    Positive and Negative

## 2023-05-17 NOTE — PROGRESS NOTES
Visit Information    Have you changed or started any medications since your last visit including any over-the-counter medicines, vitamins, or herbal medicines? no   Are you having any side effects from any of your medications? -  no  Have you stopped taking any of your medications? Is so, why? -  no    Have you seen any other physician or provider since your last visit? No  Have you had any other diagnostic tests since your last visit? No  Have you been seen in the emergency room and/or had an admission to a hospital since we last saw you? No  Have you had your routine dental cleaning in the past 6 months? no    Have you activated your [employer] account? If not, what are your barriers?  No:      Patient Care Team:  Julienne Soulier, MD as PCP - General (Internal Medicine)  Julienne Soulier, MD as PCP - Empaneled Provider    Medical History Review  Past Medical, Family, and Social History reviewed and does contribute to the patient presenting condition    Health Maintenance   Topic Date Due    COVID-19 Vaccine (1) Never done    HIV screen  Never done    Shingles vaccine (1 of 2) Never done    A1C test (Diabetic or Prediabetic)  03/16/2021    Flu vaccine (Season Ended) 08/01/2023    Depression Monitoring  04/06/2024    GFR test (Diabetes, CKD 3-4, OR last GFR 15-59)  05/02/2024    DTaP/Tdap/Td vaccine (2 - Td or Tdap) 02/26/2025    Lipids  03/18/2027    Pneumococcal 0-64 years Vaccine  Completed    Hepatitis C screen  Completed    Hepatitis A vaccine  Aged Out    Hib vaccine  Aged Out    Meningococcal (ACWY) vaccine  Aged Out    Depression Screen  Discontinued

## 2023-05-18 LAB
ANION GAP SERPL CALCULATED.3IONS-SCNC: 15 MMOL/L (ref 9–17)
BUN SERPL-MCNC: 42 MG/DL (ref 6–20)
CALCIUM SERPL-MCNC: 9.4 MG/DL (ref 8.6–10.4)
CHLORIDE SERPL-SCNC: 105 MMOL/L (ref 98–107)
CO2 SERPL-SCNC: 18 MMOL/L (ref 20–31)
CREAT SERPL-MCNC: 3.35 MG/DL (ref 0.7–1.2)
GFR SERPL CREATININE-BSD FRML MDRD: 22 ML/MIN/1.73M2
GLUCOSE SERPL-MCNC: 89 MG/DL (ref 70–99)
POTASSIUM SERPL-SCNC: 4.5 MMOL/L (ref 3.7–5.3)
SODIUM SERPL-SCNC: 138 MMOL/L (ref 135–144)

## 2023-05-23 ENCOUNTER — TELEPHONE (OUTPATIENT)
Dept: CARDIOTHORACIC SURGERY | Age: 43
End: 2023-05-23

## 2023-05-23 NOTE — TELEPHONE ENCOUNTER
Attempted to call patient for a 2nd time in regards to moving appt out due to needing testing, was able to LVM for the patient to call our office back

## 2023-05-23 NOTE — TELEPHONE ENCOUNTER
Attempted to contact patient in regards to needing testing prior to appt, patients phone was busy attempted to contact patients emergency contact phone never rang so no message was left.

## 2023-06-05 DIAGNOSIS — I71.21 ANEURYSM OF ASCENDING AORTA WITHOUT RUPTURE (HCC): Primary | ICD-10-CM

## 2023-06-06 ENCOUNTER — TELEPHONE (OUTPATIENT)
Dept: VASCULAR SURGERY | Age: 43
End: 2023-06-06

## 2023-06-06 NOTE — TELEPHONE ENCOUNTER
----- Message from Mera Monday, [address] [place] sent at 6/5/2023  4:15 PM EDT -----  Regarding: RE: NEEDS CT CHEST  LVM for patient to call the office back in regards to setting up appt for CT    ----- Message -----  From: BHAVYA Mcrae NP  Sent: 6/5/2023   1:26 PM EDT  To: [facility] Clinical Staff  Subject: NEEDS CT CHEST                                   Make sure he gets CT chest before appointment   Thanks  Scott Sorto

## 2023-06-20 ENCOUNTER — TELEPHONE (OUTPATIENT)
Dept: VASCULAR SURGERY | Age: 43
End: 2023-06-20

## 2023-06-20 NOTE — TELEPHONE ENCOUNTER
Received a call from GRACIA Fritz who requested we discharge patient from the practice due to numerous cancellations and no show for testing that is ordered. The office has left numerous messages for the patient with no return call. I left a message for the patient informing him that the request has been put in for him to be discharged, however if he would like to be treated please call the office so that we can scheduled his testing and office visit. I have also tried to contact the alternate number on his chart and it is disconnected. I have informed [facility] the  of the situation.

## 2023-06-28 ENCOUNTER — TELEPHONE (OUTPATIENT)
Dept: INTERNAL MEDICINE CLINIC | Age: 43
End: 2023-06-28

## 2023-07-26 RX ORDER — CLONIDINE 0.2 MG/24H
PATCH, EXTENDED RELEASE TRANSDERMAL
Qty: 4 PATCH | Refills: 3 | OUTPATIENT
Start: 2023-07-26

## 2023-09-19 ENCOUNTER — OFFICE VISIT (OUTPATIENT)
Dept: PODIATRY | Age: 43
End: 2023-09-19
Payer: COMMERCIAL

## 2023-09-19 VITALS — HEIGHT: 77 IN | WEIGHT: 278 LBS | BODY MASS INDEX: 32.82 KG/M2

## 2023-09-19 DIAGNOSIS — M79.671 PAIN IN BOTH FEET: ICD-10-CM

## 2023-09-19 DIAGNOSIS — L85.3 XEROSIS OF SKIN: ICD-10-CM

## 2023-09-19 DIAGNOSIS — I89.0 LYMPHATIC EDEMA: Primary | ICD-10-CM

## 2023-09-19 DIAGNOSIS — M79.672 PAIN IN BOTH FEET: ICD-10-CM

## 2023-09-19 DIAGNOSIS — B35.1 DERMATOPHYTOSIS OF NAIL: ICD-10-CM

## 2023-09-19 PROCEDURE — G8427 DOCREV CUR MEDS BY ELIG CLIN: HCPCS | Performed by: PODIATRIST

## 2023-09-19 PROCEDURE — 1036F TOBACCO NON-USER: CPT | Performed by: PODIATRIST

## 2023-09-19 PROCEDURE — 11721 DEBRIDE NAIL 6 OR MORE: CPT | Performed by: PODIATRIST

## 2023-09-19 PROCEDURE — 99203 OFFICE O/P NEW LOW 30 MIN: CPT | Performed by: PODIATRIST

## 2023-09-19 PROCEDURE — G8417 CALC BMI ABV UP PARAM F/U: HCPCS | Performed by: PODIATRIST

## 2023-09-19 RX ORDER — IBUPROFEN 600 MG/1
800 TABLET ORAL 3 TIMES DAILY PRN
COMMUNITY
Start: 2023-09-14

## 2023-09-19 RX ORDER — LISINOPRIL AND HYDROCHLOROTHIAZIDE 12.5; 1 MG/1; MG/1
TABLET ORAL
COMMUNITY
Start: 2023-09-14

## 2023-09-19 NOTE — PROGRESS NOTES
[address] [facility],[address] PODIATRY [address] [address]  Dept: [phone]  Dept Fax: [phone]    NEW PATIENT PROGRESS NOTE  Date of patient's visit: 9/19/2023  Patient's Name:  Yuliya Lazar YOB: 1980            Patient Care Team:  Kim Prater MD as PCP - General (Internal Medicine)  Kim Prater MD as PCP - Empaneled Provider  Kallie Rodríguez DPM as Physician (Podiatry)        Chief Complaint   Patient presents with    New Patient     To establish care    Foot Pain     Right foot pain and swelling. Pt states he noticed black spots on feet x2 weeks         HPI:   Yuliya Lazar is a 39 y.o. male who presents to the office today complaining of right foot pain. Symptoms began 2 week(s) ago. Patient relates pain is Present. Pain is rated 9 out of 10 and is described as constant, severe, excruciating. Treatments prior to today's visit include: none today . Currently denies F/C/N/V. Pt's primary care physician is Kim Prater MD last seen 05/17/2023     No Known Allergies    Past Medical History:   Diagnosis Date    DEVYN (acute kidney injury) ([address]) 10/9/2021    Bipolar 1 disorder ([address]) 2/26/2015    Bronchitis     Bronchitis     Hypertension     Moderate persistent asthma 2/26/2015    Obesity        Prior to Admission medications    Medication Sig Start Date End Date Taking?  Authorizing Provider   NIFEdipine (ADALAT CC) 60 MG extended release tablet Take 2 tablets by mouth daily 5/17/23  Yes Kim Prater MD   lisinopril-hydroCHLOROthiazide (PRINZIDE;ZESTORETIC) 10-12.5 MG per tablet  9/14/23   Historical Provider, MD   ibuprofen (ADVIL;MOTRIN) 600 MG tablet Take 800 mg by mouth 3 times daily as needed 9/14/23   Historical Provider, MD HOBSON 5 g PACK oral suspension  4/24/23   Historical Provider, MD   carvedilol (COREG) 12.5 MG tablet Take 1 tablet by mouth 2 times daily  Patient not taking: Reported

## 2023-10-04 ENCOUNTER — APPOINTMENT (OUTPATIENT)
Dept: ULTRASOUND IMAGING | Age: 43
DRG: 190 | End: 2023-10-04
Payer: COMMERCIAL

## 2023-10-04 ENCOUNTER — APPOINTMENT (OUTPATIENT)
Dept: GENERAL RADIOLOGY | Age: 43
DRG: 190 | End: 2023-10-04
Payer: COMMERCIAL

## 2023-10-04 ENCOUNTER — HOSPITAL ENCOUNTER (INPATIENT)
Age: 43
LOS: 15 days | Discharge: NURSING, REHAB OR LONG-TERM CARE | DRG: 190 | End: 2023-10-19
Attending: EMERGENCY MEDICINE | Admitting: INTERNAL MEDICINE
Payer: COMMERCIAL

## 2023-10-04 DIAGNOSIS — I10 UNCONTROLLED HYPERTENSION: ICD-10-CM

## 2023-10-04 DIAGNOSIS — N17.9 ACUTE RENAL FAILURE, UNSPECIFIED ACUTE RENAL FAILURE TYPE (HCC): Primary | ICD-10-CM

## 2023-10-04 DIAGNOSIS — I73.9 PERIPHERAL VASCULAR DISEASE (HCC): ICD-10-CM

## 2023-10-04 DIAGNOSIS — I21.4 NSTEMI (NON-ST ELEVATED MYOCARDIAL INFARCTION) (HCC): ICD-10-CM

## 2023-10-04 DIAGNOSIS — I16.1 HYPERTENSIVE EMERGENCY: ICD-10-CM

## 2023-10-04 PROBLEM — N18.9 ACUTE KIDNEY INJURY SUPERIMPOSED ON CKD (HCC): Status: ACTIVE | Noted: 2023-10-04

## 2023-10-04 PROBLEM — N18.4 STAGE 4 CHRONIC KIDNEY DISEASE (HCC): Status: ACTIVE | Noted: 2022-11-20

## 2023-10-04 LAB
ANION GAP SERPL CALCULATED.3IONS-SCNC: 14 MMOL/L (ref 9–17)
ANTI-XA UNFRAC HEPARIN: <0.1 IU/L
ANTI-XA UNFRAC HEPARIN: <0.1 IU/L
BASOPHILS # BLD: 0.07 K/UL (ref 0–0.2)
BASOPHILS NFR BLD: 2 % (ref 0–2)
BNP SERPL-MCNC: 3372 PG/ML
BUN SERPL-MCNC: 60 MG/DL (ref 6–20)
CALCIUM SERPL-MCNC: 8.9 MG/DL (ref 8.6–10.4)
CHLORIDE SERPL-SCNC: 100 MMOL/L (ref 98–107)
CO2 SERPL-SCNC: 19 MMOL/L (ref 20–31)
CREAT SERPL-MCNC: 5.8 MG/DL (ref 0.7–1.2)
D DIMER PPP FEU-MCNC: 0.71 UG/ML FEU (ref 0–0.57)
EOSINOPHIL # BLD: 0.2 K/UL (ref 0–0.44)
EOSINOPHILS RELATIVE PERCENT: 5 % (ref 1–4)
ERYTHROCYTE [DISTWIDTH] IN BLOOD BY AUTOMATED COUNT: 14.4 % (ref 11.8–14.4)
GFR SERPL CREATININE-BSD FRML MDRD: 12 ML/MIN/1.73M2
GLUCOSE SERPL-MCNC: 134 MG/DL (ref 70–99)
HCT VFR BLD AUTO: 29.5 % (ref 40.7–50.3)
HGB BLD-MCNC: 9.2 G/DL (ref 13–17)
IMM GRANULOCYTES # BLD AUTO: <0.03 K/UL (ref 0–0.3)
IMM GRANULOCYTES NFR BLD: 0 %
INR PPP: 1.1
LYMPHOCYTES NFR BLD: 0.92 K/UL (ref 1.1–3.7)
LYMPHOCYTES RELATIVE PERCENT: 22 % (ref 24–43)
MCH RBC QN AUTO: 25.8 PG (ref 25.2–33.5)
MCHC RBC AUTO-ENTMCNC: 31.2 G/DL (ref 28.4–34.8)
MCV RBC AUTO: 82.9 FL (ref 82.6–102.9)
MONOCYTES NFR BLD: 0.6 K/UL (ref 0.1–1.2)
MONOCYTES NFR BLD: 15 % (ref 3–12)
NEUTROPHILS NFR BLD: 56 % (ref 36–65)
NEUTS SEG NFR BLD: 2.33 K/UL (ref 1.5–8.1)
NRBC BLD-RTO: 0 PER 100 WBC
PARTIAL THROMBOPLASTIN TIME: 29.9 SEC (ref 23–36.5)
PLATELET # BLD AUTO: 248 K/UL (ref 138–453)
PMV BLD AUTO: 12.4 FL (ref 8.1–13.5)
POTASSIUM SERPL-SCNC: 4 MMOL/L (ref 3.7–5.3)
PROTHROMBIN TIME: 14 SEC (ref 11.7–14.9)
RBC # BLD AUTO: 3.56 M/UL (ref 4.21–5.77)
SODIUM SERPL-SCNC: 133 MMOL/L (ref 135–144)
TROPONIN I SERPL HS-MCNC: 95 NG/L (ref 0–22)
TROPONIN I SERPL HS-MCNC: 96 NG/L (ref 0–22)
URATE SERPL-MCNC: 10.2 MG/DL (ref 3.4–7)
WBC OTHER # BLD: 4.1 K/UL (ref 3.5–11.3)

## 2023-10-04 PROCEDURE — 85025 COMPLETE CBC W/AUTO DIFF WBC: CPT

## 2023-10-04 PROCEDURE — 99223 1ST HOSP IP/OBS HIGH 75: CPT | Performed by: INTERNAL MEDICINE

## 2023-10-04 PROCEDURE — 76770 US EXAM ABDO BACK WALL COMP: CPT

## 2023-10-04 PROCEDURE — 84484 ASSAY OF TROPONIN QUANT: CPT

## 2023-10-04 PROCEDURE — 93005 ELECTROCARDIOGRAM TRACING: CPT | Performed by: INTERNAL MEDICINE

## 2023-10-04 PROCEDURE — 2500000003 HC RX 250 WO HCPCS: Performed by: INTERNAL MEDICINE

## 2023-10-04 PROCEDURE — 80048 BASIC METABOLIC PNL TOTAL CA: CPT

## 2023-10-04 PROCEDURE — 85730 THROMBOPLASTIN TIME PARTIAL: CPT

## 2023-10-04 PROCEDURE — 83880 ASSAY OF NATRIURETIC PEPTIDE: CPT

## 2023-10-04 PROCEDURE — 84550 ASSAY OF BLOOD/URIC ACID: CPT

## 2023-10-04 PROCEDURE — 6370000000 HC RX 637 (ALT 250 FOR IP): Performed by: INTERNAL MEDICINE

## 2023-10-04 PROCEDURE — 99285 EMERGENCY DEPT VISIT HI MDM: CPT

## 2023-10-04 PROCEDURE — 85379 FIBRIN DEGRADATION QUANT: CPT

## 2023-10-04 PROCEDURE — 96375 TX/PRO/DX INJ NEW DRUG ADDON: CPT

## 2023-10-04 PROCEDURE — 99222 1ST HOSP IP/OBS MODERATE 55: CPT | Performed by: INTERNAL MEDICINE

## 2023-10-04 PROCEDURE — 2500000003 HC RX 250 WO HCPCS: Performed by: STUDENT IN AN ORGANIZED HEALTH CARE EDUCATION/TRAINING PROGRAM

## 2023-10-04 PROCEDURE — 93005 ELECTROCARDIOGRAM TRACING: CPT | Performed by: STUDENT IN AN ORGANIZED HEALTH CARE EDUCATION/TRAINING PROGRAM

## 2023-10-04 PROCEDURE — 96374 THER/PROPH/DIAG INJ IV PUSH: CPT

## 2023-10-04 PROCEDURE — 2060000000 HC ICU INTERMEDIATE R&B

## 2023-10-04 PROCEDURE — 71046 X-RAY EXAM CHEST 2 VIEWS: CPT

## 2023-10-04 PROCEDURE — 6360000002 HC RX W HCPCS: Performed by: STUDENT IN AN ORGANIZED HEALTH CARE EDUCATION/TRAINING PROGRAM

## 2023-10-04 PROCEDURE — 85610 PROTHROMBIN TIME: CPT

## 2023-10-04 PROCEDURE — 6360000002 HC RX W HCPCS: Performed by: INTERNAL MEDICINE

## 2023-10-04 PROCEDURE — 85520 HEPARIN ASSAY: CPT

## 2023-10-04 PROCEDURE — 73630 X-RAY EXAM OF FOOT: CPT

## 2023-10-04 RX ORDER — COLCHICINE 0.6 MG/1
0.6 TABLET ORAL ONCE
Status: COMPLETED | OUTPATIENT
Start: 2023-10-04 | End: 2023-10-04

## 2023-10-04 RX ORDER — HEPARIN SODIUM 1000 [USP'U]/ML
2000 INJECTION, SOLUTION INTRAVENOUS; SUBCUTANEOUS PRN
Status: DISCONTINUED | OUTPATIENT
Start: 2023-10-04 | End: 2023-10-07

## 2023-10-04 RX ORDER — SODIUM CHLORIDE 0.9 % (FLUSH) 0.9 %
5-40 SYRINGE (ML) INJECTION PRN
Status: DISCONTINUED | OUTPATIENT
Start: 2023-10-04 | End: 2023-10-19 | Stop reason: HOSPADM

## 2023-10-04 RX ORDER — ACETAMINOPHEN 325 MG/1
650 TABLET ORAL EVERY 6 HOURS PRN
Status: DISCONTINUED | OUTPATIENT
Start: 2023-10-04 | End: 2023-10-19 | Stop reason: HOSPADM

## 2023-10-04 RX ORDER — HEPARIN SODIUM 1000 [USP'U]/ML
4000 INJECTION, SOLUTION INTRAVENOUS; SUBCUTANEOUS PRN
Status: DISCONTINUED | OUTPATIENT
Start: 2023-10-04 | End: 2023-10-07

## 2023-10-04 RX ORDER — ONDANSETRON 2 MG/ML
4 INJECTION INTRAMUSCULAR; INTRAVENOUS EVERY 6 HOURS PRN
Status: DISCONTINUED | OUTPATIENT
Start: 2023-10-04 | End: 2023-10-19 | Stop reason: HOSPADM

## 2023-10-04 RX ORDER — ACETAMINOPHEN 650 MG/1
650 SUPPOSITORY RECTAL EVERY 6 HOURS PRN
Status: DISCONTINUED | OUTPATIENT
Start: 2023-10-04 | End: 2023-10-19 | Stop reason: HOSPADM

## 2023-10-04 RX ORDER — CLONIDINE 0.2 MG/24H
1 PATCH, EXTENDED RELEASE TRANSDERMAL WEEKLY
Status: DISCONTINUED | OUTPATIENT
Start: 2023-10-04 | End: 2023-10-13

## 2023-10-04 RX ORDER — ASPIRIN 81 MG/1
81 TABLET, CHEWABLE ORAL DAILY
Status: DISCONTINUED | OUTPATIENT
Start: 2023-10-05 | End: 2023-10-19 | Stop reason: HOSPADM

## 2023-10-04 RX ORDER — ATORVASTATIN CALCIUM 80 MG/1
80 TABLET, FILM COATED ORAL NIGHTLY
Status: DISCONTINUED | OUTPATIENT
Start: 2023-10-04 | End: 2023-10-19 | Stop reason: HOSPADM

## 2023-10-04 RX ORDER — SODIUM CHLORIDE 9 MG/ML
INJECTION, SOLUTION INTRAVENOUS PRN
Status: DISCONTINUED | OUTPATIENT
Start: 2023-10-04 | End: 2023-10-19 | Stop reason: HOSPADM

## 2023-10-04 RX ORDER — NIFEDIPINE 30 MG/1
120 TABLET, EXTENDED RELEASE ORAL DAILY
Status: DISCONTINUED | OUTPATIENT
Start: 2023-10-04 | End: 2023-10-14

## 2023-10-04 RX ORDER — SODIUM CHLORIDE 0.9 % (FLUSH) 0.9 %
5-40 SYRINGE (ML) INJECTION EVERY 12 HOURS SCHEDULED
Status: DISCONTINUED | OUTPATIENT
Start: 2023-10-04 | End: 2023-10-19 | Stop reason: HOSPADM

## 2023-10-04 RX ORDER — POLYETHYLENE GLYCOL 3350 17 G/17G
17 POWDER, FOR SOLUTION ORAL DAILY PRN
Status: DISCONTINUED | OUTPATIENT
Start: 2023-10-04 | End: 2023-10-19 | Stop reason: HOSPADM

## 2023-10-04 RX ORDER — ONDANSETRON 4 MG/1
4 TABLET, ORALLY DISINTEGRATING ORAL EVERY 8 HOURS PRN
Status: DISCONTINUED | OUTPATIENT
Start: 2023-10-04 | End: 2023-10-19 | Stop reason: HOSPADM

## 2023-10-04 RX ORDER — FENTANYL CITRATE 50 UG/ML
50 INJECTION, SOLUTION INTRAMUSCULAR; INTRAVENOUS ONCE
Status: COMPLETED | OUTPATIENT
Start: 2023-10-04 | End: 2023-10-04

## 2023-10-04 RX ORDER — HEPARIN SODIUM 10000 [USP'U]/100ML
5-30 INJECTION, SOLUTION INTRAVENOUS CONTINUOUS
Status: DISCONTINUED | OUTPATIENT
Start: 2023-10-04 | End: 2023-10-07

## 2023-10-04 RX ORDER — CARVEDILOL 12.5 MG/1
12.5 TABLET ORAL 2 TIMES DAILY
Status: DISCONTINUED | OUTPATIENT
Start: 2023-10-04 | End: 2023-10-07

## 2023-10-04 RX ORDER — HYDRALAZINE HYDROCHLORIDE 50 MG/1
50 TABLET, FILM COATED ORAL 2 TIMES DAILY
Status: DISCONTINUED | OUTPATIENT
Start: 2023-10-04 | End: 2023-10-07

## 2023-10-04 RX ORDER — HEPARIN SODIUM 1000 [USP'U]/ML
4000 INJECTION, SOLUTION INTRAVENOUS; SUBCUTANEOUS ONCE
Status: COMPLETED | OUTPATIENT
Start: 2023-10-04 | End: 2023-10-04

## 2023-10-04 RX ORDER — OXYCODONE HYDROCHLORIDE 5 MG/1
5 TABLET ORAL EVERY 4 HOURS PRN
Status: DISCONTINUED | OUTPATIENT
Start: 2023-10-04 | End: 2023-10-06

## 2023-10-04 RX ADMIN — HYDRALAZINE HYDROCHLORIDE 50 MG: 50 TABLET, FILM COATED ORAL at 15:09

## 2023-10-04 RX ADMIN — HEPARIN SODIUM 7 UNITS/KG/HR: 10000 INJECTION, SOLUTION INTRAVENOUS at 11:28

## 2023-10-04 RX ADMIN — NICARDIPINE HYDROCHLORIDE 7 MG/HR: 0.1 INJECTION, SOLUTION INTRAVENOUS at 22:24

## 2023-10-04 RX ADMIN — NICARDIPINE HYDROCHLORIDE 5 MG/HR: 0.1 INJECTION, SOLUTION INTRAVENOUS at 10:55

## 2023-10-04 RX ADMIN — COLCHICINE 0.6 MG: 0.6 TABLET, FILM COATED ORAL at 14:42

## 2023-10-04 RX ADMIN — CARVEDILOL 12.5 MG: 12.5 TABLET, FILM COATED ORAL at 21:12

## 2023-10-04 RX ADMIN — OXYCODONE HYDROCHLORIDE 5 MG: 5 TABLET ORAL at 22:22

## 2023-10-04 RX ADMIN — HEPARIN SODIUM 4000 UNITS: 1000 INJECTION INTRAVENOUS; SUBCUTANEOUS at 19:19

## 2023-10-04 RX ADMIN — NICARDIPINE HYDROCHLORIDE 12.5 MG/HR: 0.1 INJECTION, SOLUTION INTRAVENOUS at 16:37

## 2023-10-04 RX ADMIN — NICARDIPINE HYDROCHLORIDE 9 MG/HR: 0.1 INJECTION, SOLUTION INTRAVENOUS at 20:25

## 2023-10-04 RX ADMIN — NIFEDIPINE 120 MG: 30 TABLET, FILM COATED, EXTENDED RELEASE ORAL at 15:09

## 2023-10-04 RX ADMIN — HEPARIN SODIUM 4000 UNITS: 1000 INJECTION INTRAVENOUS; SUBCUTANEOUS at 11:26

## 2023-10-04 RX ADMIN — ATORVASTATIN CALCIUM 80 MG: 80 TABLET, FILM COATED ORAL at 21:12

## 2023-10-04 RX ADMIN — CARVEDILOL 12.5 MG: 12.5 TABLET, FILM COATED ORAL at 15:09

## 2023-10-04 RX ADMIN — HYDRALAZINE HYDROCHLORIDE 50 MG: 50 TABLET, FILM COATED ORAL at 21:12

## 2023-10-04 RX ADMIN — OXYCODONE HYDROCHLORIDE 5 MG: 5 TABLET ORAL at 18:28

## 2023-10-04 RX ADMIN — FENTANYL CITRATE 50 MCG: 50 INJECTION INTRAMUSCULAR; INTRAVENOUS at 09:23

## 2023-10-04 ASSESSMENT — ENCOUNTER SYMPTOMS
ABDOMINAL PAIN: 0
BACK PAIN: 0
NAUSEA: 0
COUGH: 0
VOMITING: 0
SHORTNESS OF BREATH: 0

## 2023-10-04 ASSESSMENT — PAIN DESCRIPTION - ORIENTATION
ORIENTATION: RIGHT
ORIENTATION: MID;RIGHT
ORIENTATION: RIGHT

## 2023-10-04 ASSESSMENT — PAIN SCALES - GENERAL
PAINLEVEL_OUTOF10: 10
PAINLEVEL_OUTOF10: 8
PAINLEVEL_OUTOF10: 7
PAINLEVEL_OUTOF10: 10

## 2023-10-04 ASSESSMENT — PAIN DESCRIPTION - LOCATION
LOCATION: CHEST;FOOT
LOCATION: FOOT
LOCATION: FOOT

## 2023-10-04 ASSESSMENT — PAIN DESCRIPTION - DESCRIPTORS
DESCRIPTORS: SHARP;STABBING
DESCRIPTORS: ACHING

## 2023-10-04 ASSESSMENT — PAIN - FUNCTIONAL ASSESSMENT: PAIN_FUNCTIONAL_ASSESSMENT: 0-10

## 2023-10-04 NOTE — ED TRIAGE NOTES
Pt arrived to ED 30 via triage. Pt co CP and foot pain. Pt states that it started 1 hr pta. Pt states that it is mid sternal and non radiating. Pt states that he is also having Rt foot pain. Pt states that he was seen for the foot pain at a prior visit. Pt was given 1 nitro and 324 of asprin by EMS. Pt is resting on stretcher with call light within reach. Breathing is non labored and no acute distress is noted.    Will continue to follow plan of care

## 2023-10-04 NOTE — H&P
[facility]  Office: [id number], DO, Fransico Galaviz, DO, Malia Multani, DO, Sofi Pope Blood, DO, Jose Townsend MD, Luis Canchola MD, Carlos Murphy MD, Ebbie Meigs, MD,  Cathy Miranda MD, Fabby Parnell MD, Govind Rincon, DO, Marleen Day MD,  Neri Selby MD, Dereck Mooney MD, Raymond Matthews DO, Naima Navarro MD,  Emma Elmore, DO, Verner Crandall, MD, Emily Conteh MD, Tamar Shannon MD, Kevin Villarreal MD,  Donovan Stinson MD, Cinda Islas MD, Bridgette Arias MD, Yecenia Michaels MD, Joi Ferrari DO, Arlin Luke MD,  Ashish Lopez MD, Edward Squires MD, Jovanna Elder, CNP,  Jessica Santiago, CNP,, Juan Cabezas, CNP,  Mabel Smith, DNP, Leobardo Morrison, CNP, Comfort Paredes, CNP, Connie Guevara, CNP, Court Silva, CNP, Modesta Ina, CNP, Jessica Dorman, CNP, Bonita Thomas, CNS, Natalie Sharp, CNP, Chacho Horne, [facility]    HISTORY AND PHYSICAL EXAMINATION            Date:   10/4/2023  Patient name:  Yuliya Lazar  Date of admission:  10/4/2023  8:50 AM  MRN:   [id number]  Account:  [de-identified]  YOB: 1980  PCP:    Kim Prater MD  Room:   30/30  Code Status:    Prior    Chief Complaint:     Chief Complaint   Patient presents with    Chest Pain    Foot Pain     Rt foot       History Obtained From:     patient, electronic medical record    History of Present Illness:     Yuliya Lazar is a 39 y.o. Non- / non  male who presents with Chest Pain and Foot Pain (Rt foot)   and is admitted to the hospital for the management of NSTEMI (non-ST elevated myocardial infarction) ([address] 25-year-old gentleman says he is feeling dizzy at home along with chest pain and shortness of breath as well as right foot pain. The chest pain is substernal and sharp in nature and has been intermittent. He has had no radiation and it is gone now.   Started

## 2023-10-04 NOTE — ED NOTES
Pt back from x-ray     [place], [place]  10/04/23 1007
Pt taken to x-ray     [facility], RN  10/1980
The following labs were labeled with appropriate pt sticker and tubed to lab:     [] Blue     [] Lavender   [] on ice  [x] Green/yellow  [] Green/black [] on ice  [] Horton Sly  [] on ice  [] Yellow  [] Red  [] Pink  [] Type/ Screen  [] ABG  [] VBG    [] COVID-19 swab    [] Rapid  [] PCR  [] Flu swab  [] Peds Viral Panel     [] Urine Sample  [] Fecal Sample  [] Pelvic Cultures  [] Blood Cultures  [] X 2  [] STREP Cultures       Angel Mcgowan RN  10/04/23 1019
Ultrasound at bedside     [place], [place]  10/04/23 1748
Urinal emptied. 1000mL of clear, straw colored urine out.       Courtney Caceres RN  10/04/23 1925
admission [ZE]   1023 Here with renal failure elevated troponins. We will repeat EKG. Was given aspirin by first responders. Will start heparin and Cardene drip. Patient hypertensive emergency. Plan for admission [ZE]   [id number] with cardiology they agree with work-up and plan. Awaiting for nephrology callback as well as medicine callback for admission [ZE]   36 Spoke with medicine patient admitted to their service [ZE]      ED Course User Index  [ZE] Ferrell Closs,               Skin Assessment:        Pain Score:  Pain Assessment  Pain Assessment: 0-10  Pain Level: 10  Pain Location: Chest, Foot  Pain Orientation: Mid, Right      SOCIAL HISTORY       Social History     Socioeconomic History    Marital status: Single     Spouse name: None    Number of children: None    Years of education: None    Highest education level: None   Tobacco Use    Smoking status: Never    Smokeless tobacco: Never   Substance and Sexual Activity    Alcohol use: No    Drug use: No     Social Determinants of Health     Financial Resource Strain: Low Risk  (4/6/2023)    Overall Financial Resource Strain (CARDIA)     Difficulty of Paying Living Expenses: Not hard at all   Food Insecurity: No Food Insecurity (4/6/2023)    Hunger Vital Sign     Worried About Running Out of Food in the Last Year: Never true     Ran Out of Food in the Last Year: Never true   Transportation Needs: Unmet Transportation Needs (4/6/2023)    PRAPARE - Transportation     Lack of Transportation (Non-Medical): Yes   Housing Stability: Unknown (4/6/2023)    Housing Stability Vital Sign     Unstable Housing in the Last Year: No       FAMILY HISTORY       Family History   Problem Relation Age of Onset    High Blood Pressure Mother     Diabetes Maternal Grandmother        ALLERGIES     Patient has no known allergies.     CURRENT MEDICATIONS       Previous Medications    BLOOD PRESSURE KIT    1 kit by Does not apply route daily    CARVEDILOL (COREG) 12.5 MG

## 2023-10-05 ENCOUNTER — APPOINTMENT (OUTPATIENT)
Dept: CT IMAGING | Age: 43
DRG: 190 | End: 2023-10-05
Payer: COMMERCIAL

## 2023-10-05 LAB
25(OH)D3 SERPL-MCNC: 18.6 NG/ML
ALBUMIN SERPL-MCNC: 3.2 G/DL (ref 3.5–5.2)
ALBUMIN/GLOB SERPL: 0.8 {RATIO} (ref 1–2.5)
ALP SERPL-CCNC: 57 U/L (ref 40–129)
ALT SERPL-CCNC: 10 U/L (ref 5–41)
ANION GAP SERPL CALCULATED.3IONS-SCNC: 14 MMOL/L (ref 9–17)
ANTI-XA UNFRAC HEPARIN: 0.21 IU/L
ANTI-XA UNFRAC HEPARIN: 0.32 IU/L
ANTI-XA UNFRAC HEPARIN: <0.1 IU/L
AST SERPL-CCNC: 17 U/L
BILIRUB SERPL-MCNC: 0.2 MG/DL (ref 0.3–1.2)
BUN SERPL-MCNC: 59 MG/DL (ref 6–20)
CALCIUM SERPL-MCNC: 8.2 MG/DL (ref 8.6–10.4)
CHLORIDE SERPL-SCNC: 103 MMOL/L (ref 98–107)
CHOLEST SERPL-MCNC: 153 MG/DL
CHOLESTEROL/HDL RATIO: 2.4
CO2 SERPL-SCNC: 17 MMOL/L (ref 20–31)
CREAT SERPL-MCNC: 5.2 MG/DL (ref 0.7–1.2)
EKG ATRIAL RATE: 87 BPM
EKG ATRIAL RATE: 97 BPM
EKG P AXIS: 23 DEGREES
EKG P AXIS: 23 DEGREES
EKG P-R INTERVAL: 190 MS
EKG P-R INTERVAL: 194 MS
EKG Q-T INTERVAL: 382 MS
EKG Q-T INTERVAL: 398 MS
EKG QRS DURATION: 92 MS
EKG QRS DURATION: 96 MS
EKG QTC CALCULATION (BAZETT): 478 MS
EKG QTC CALCULATION (BAZETT): 485 MS
EKG R AXIS: 11 DEGREES
EKG R AXIS: 17 DEGREES
EKG T AXIS: 102 DEGREES
EKG T AXIS: 97 DEGREES
EKG VENTRICULAR RATE: 87 BPM
EKG VENTRICULAR RATE: 97 BPM
ERYTHROCYTE [DISTWIDTH] IN BLOOD BY AUTOMATED COUNT: 15 % (ref 11.8–14.4)
GFR SERPL CREATININE-BSD FRML MDRD: 13 ML/MIN/1.73M2
GLUCOSE SERPL-MCNC: 108 MG/DL (ref 70–99)
HCT VFR BLD AUTO: 28.5 % (ref 40.7–50.3)
HDLC SERPL-MCNC: 63 MG/DL
HGB BLD-MCNC: 9.2 G/DL (ref 13–17)
LDLC SERPL CALC-MCNC: 80 MG/DL (ref 0–130)
MCH RBC QN AUTO: 27.1 PG (ref 25.2–33.5)
MCHC RBC AUTO-ENTMCNC: 32.3 G/DL (ref 28.4–34.8)
MCV RBC AUTO: 83.8 FL (ref 82.6–102.9)
NRBC BLD-RTO: 0 PER 100 WBC
PHOSPHATE SERPL-MCNC: 4.5 MG/DL (ref 2.5–4.5)
PLATELET # BLD AUTO: 466 K/UL (ref 138–453)
PMV BLD AUTO: 12.2 FL (ref 8.1–13.5)
POTASSIUM SERPL-SCNC: 4.4 MMOL/L (ref 3.7–5.3)
PROT SERPL-MCNC: 7.4 G/DL (ref 6.4–8.3)
PTH-INTACT SERPL-MCNC: 221 PG/ML (ref 14–72)
RBC # BLD AUTO: 3.4 M/UL (ref 4.21–5.77)
SODIUM SERPL-SCNC: 134 MMOL/L (ref 135–144)
TRIGL SERPL-MCNC: 51 MG/DL
WBC OTHER # BLD: 5.4 K/UL (ref 3.5–11.3)

## 2023-10-05 PROCEDURE — 6370000000 HC RX 637 (ALT 250 FOR IP): Performed by: INTERNAL MEDICINE

## 2023-10-05 PROCEDURE — 82306 VITAMIN D 25 HYDROXY: CPT

## 2023-10-05 PROCEDURE — 80061 LIPID PANEL: CPT

## 2023-10-05 PROCEDURE — 99232 SBSQ HOSP IP/OBS MODERATE 35: CPT | Performed by: INTERNAL MEDICINE

## 2023-10-05 PROCEDURE — 2500000003 HC RX 250 WO HCPCS: Performed by: HOSPITALIST

## 2023-10-05 PROCEDURE — 85520 HEPARIN ASSAY: CPT

## 2023-10-05 PROCEDURE — 2580000003 HC RX 258: Performed by: INTERNAL MEDICINE

## 2023-10-05 PROCEDURE — 93010 ELECTROCARDIOGRAM REPORT: CPT | Performed by: INTERNAL MEDICINE

## 2023-10-05 PROCEDURE — 83970 ASSAY OF PARATHORMONE: CPT

## 2023-10-05 PROCEDURE — 84100 ASSAY OF PHOSPHORUS: CPT

## 2023-10-05 PROCEDURE — 85027 COMPLETE CBC AUTOMATED: CPT

## 2023-10-05 PROCEDURE — 6370000000 HC RX 637 (ALT 250 FOR IP): Performed by: HOSPITALIST

## 2023-10-05 PROCEDURE — 36415 COLL VENOUS BLD VENIPUNCTURE: CPT

## 2023-10-05 PROCEDURE — 2060000000 HC ICU INTERMEDIATE R&B

## 2023-10-05 PROCEDURE — 6360000002 HC RX W HCPCS: Performed by: NURSE PRACTITIONER

## 2023-10-05 PROCEDURE — 99232 SBSQ HOSP IP/OBS MODERATE 35: CPT | Performed by: HOSPITALIST

## 2023-10-05 PROCEDURE — 6360000002 HC RX W HCPCS: Performed by: INTERNAL MEDICINE

## 2023-10-05 PROCEDURE — 80053 COMPREHEN METABOLIC PANEL: CPT

## 2023-10-05 PROCEDURE — 2500000003 HC RX 250 WO HCPCS: Performed by: INTERNAL MEDICINE

## 2023-10-05 PROCEDURE — 73700 CT LOWER EXTREMITY W/O DYE: CPT

## 2023-10-05 RX ORDER — MORPHINE SULFATE 2 MG/ML
2 INJECTION, SOLUTION INTRAMUSCULAR; INTRAVENOUS ONCE
Status: COMPLETED | OUTPATIENT
Start: 2023-10-05 | End: 2023-10-05

## 2023-10-05 RX ORDER — FUROSEMIDE 40 MG/1
80 TABLET ORAL DAILY
Status: DISCONTINUED | OUTPATIENT
Start: 2023-10-05 | End: 2023-10-07

## 2023-10-05 RX ORDER — NICARDIPINE HYDROCHLORIDE 0.1 MG/ML
2.5-15 INJECTION INTRAVENOUS CONTINUOUS
Status: DISCONTINUED | OUTPATIENT
Start: 2023-10-05 | End: 2023-10-06

## 2023-10-05 RX ORDER — OXYCODONE HYDROCHLORIDE AND ACETAMINOPHEN 5; 325 MG/1; MG/1
1 TABLET ORAL EVERY 4 HOURS PRN
Status: DISCONTINUED | OUTPATIENT
Start: 2023-10-05 | End: 2023-10-19 | Stop reason: HOSPADM

## 2023-10-05 RX ORDER — CALCITRIOL 0.25 UG/1
0.25 CAPSULE, LIQUID FILLED ORAL DAILY
Status: DISCONTINUED | OUTPATIENT
Start: 2023-10-05 | End: 2023-10-13

## 2023-10-05 RX ADMIN — NIFEDIPINE 120 MG: 30 TABLET, FILM COATED, EXTENDED RELEASE ORAL at 08:28

## 2023-10-05 RX ADMIN — HEPARIN SODIUM 2000 UNITS: 1000 INJECTION INTRAVENOUS; SUBCUTANEOUS at 02:38

## 2023-10-05 RX ADMIN — HEPARIN SODIUM 13 UNITS/KG/HR: 10000 INJECTION, SOLUTION INTRAVENOUS at 05:45

## 2023-10-05 RX ADMIN — HEPARIN SODIUM 13 UNITS/KG/HR: 10000 INJECTION, SOLUTION INTRAVENOUS at 20:50

## 2023-10-05 RX ADMIN — NICARDIPINE HYDROCHLORIDE 10.5 MG/HR: 0.1 INJECTION INTRAVENOUS at 23:35

## 2023-10-05 RX ADMIN — OXYCODONE AND ACETAMINOPHEN 1 TABLET: 5; 325 TABLET ORAL at 18:51

## 2023-10-05 RX ADMIN — NICARDIPINE HYDROCHLORIDE 6 MG/HR: 0.1 INJECTION, SOLUTION INTRAVENOUS at 00:42

## 2023-10-05 RX ADMIN — NICARDIPINE HYDROCHLORIDE 6.5 MG/HR: 0.1 INJECTION, SOLUTION INTRAVENOUS at 12:21

## 2023-10-05 RX ADMIN — FUROSEMIDE 80 MG: 40 TABLET ORAL at 10:48

## 2023-10-05 RX ADMIN — HYDRALAZINE HYDROCHLORIDE 50 MG: 50 TABLET, FILM COATED ORAL at 08:28

## 2023-10-05 RX ADMIN — HYDRALAZINE HYDROCHLORIDE 50 MG: 50 TABLET, FILM COATED ORAL at 20:01

## 2023-10-05 RX ADMIN — SODIUM CHLORIDE, PRESERVATIVE FREE 10 ML: 5 INJECTION INTRAVENOUS at 20:01

## 2023-10-05 RX ADMIN — CARVEDILOL 12.5 MG: 12.5 TABLET, FILM COATED ORAL at 20:02

## 2023-10-05 RX ADMIN — NICARDIPINE HYDROCHLORIDE 6 MG/HR: 0.1 INJECTION, SOLUTION INTRAVENOUS at 03:46

## 2023-10-05 RX ADMIN — CALCITRIOL 0.25 MCG: 0.25 CAPSULE ORAL at 10:48

## 2023-10-05 RX ADMIN — NICARDIPINE HYDROCHLORIDE 5.5 MG/HR: 0.1 INJECTION, SOLUTION INTRAVENOUS at 15:15

## 2023-10-05 RX ADMIN — NICARDIPINE HYDROCHLORIDE 7.5 MG/HR: 0.1 INJECTION, SOLUTION INTRAVENOUS at 09:29

## 2023-10-05 RX ADMIN — NICARDIPINE HYDROCHLORIDE 8 MG/HR: 0.1 INJECTION INTRAVENOUS at 19:01

## 2023-10-05 RX ADMIN — NICARDIPINE HYDROCHLORIDE 10.5 MG/HR: 0.1 INJECTION INTRAVENOUS at 21:22

## 2023-10-05 RX ADMIN — ATORVASTATIN CALCIUM 80 MG: 80 TABLET, FILM COATED ORAL at 20:02

## 2023-10-05 RX ADMIN — OXYCODONE HYDROCHLORIDE 5 MG: 5 TABLET ORAL at 08:31

## 2023-10-05 RX ADMIN — HEPARIN SODIUM 4000 UNITS: 1000 INJECTION INTRAVENOUS; SUBCUTANEOUS at 11:01

## 2023-10-05 RX ADMIN — OXYCODONE AND ACETAMINOPHEN 1 TABLET: 5; 325 TABLET ORAL at 23:12

## 2023-10-05 RX ADMIN — NICARDIPINE HYDROCHLORIDE 7.5 MG/HR: 0.1 INJECTION, SOLUTION INTRAVENOUS at 06:41

## 2023-10-05 RX ADMIN — OXYCODONE HYDROCHLORIDE 5 MG: 5 TABLET ORAL at 15:17

## 2023-10-05 RX ADMIN — MORPHINE SULFATE 2 MG: 2 INJECTION, SOLUTION INTRAMUSCULAR; INTRAVENOUS at 00:38

## 2023-10-05 RX ADMIN — CARVEDILOL 12.5 MG: 12.5 TABLET, FILM COATED ORAL at 08:27

## 2023-10-05 RX ADMIN — ASPIRIN 81 MG: 81 TABLET, CHEWABLE ORAL at 08:28

## 2023-10-05 ASSESSMENT — PAIN DESCRIPTION - DESCRIPTORS
DESCRIPTORS: SHARP;SHOOTING
DESCRIPTORS: ACHING
DESCRIPTORS: SHARP;SHOOTING
DESCRIPTORS: ACHING

## 2023-10-05 ASSESSMENT — PAIN DESCRIPTION - ORIENTATION
ORIENTATION: RIGHT

## 2023-10-05 ASSESSMENT — PAIN SCALES - GENERAL
PAINLEVEL_OUTOF10: 10
PAINLEVEL_OUTOF10: 10
PAINLEVEL_OUTOF10: 8
PAINLEVEL_OUTOF10: 3
PAINLEVEL_OUTOF10: 10
PAINLEVEL_OUTOF10: 9
PAINLEVEL_OUTOF10: 8

## 2023-10-05 ASSESSMENT — PAIN DESCRIPTION - LOCATION
LOCATION: FOOT

## 2023-10-05 ASSESSMENT — PAIN DESCRIPTION - PAIN TYPE: TYPE: CHRONIC PAIN

## 2023-10-05 ASSESSMENT — PAIN DESCRIPTION - ONSET: ONSET: ON-GOING

## 2023-10-05 ASSESSMENT — PAIN DESCRIPTION - FREQUENCY: FREQUENCY: CONTINUOUS

## 2023-10-05 ASSESSMENT — PAIN - FUNCTIONAL ASSESSMENT: PAIN_FUNCTIONAL_ASSESSMENT: PREVENTS OR INTERFERES SOME ACTIVE ACTIVITIES AND ADLS

## 2023-10-05 NOTE — PLAN OF CARE
Problem: Discharge Planning  Goal: Discharge to home or other facility with appropriate resources  10/5/2023 1116 by Tanner Mathur RN  Outcome: Progressing  10/5/2023 0504 by Luciano Swenson RN  Outcome: Progressing     Problem: Pain  Goal: Verbalizes/displays adequate comfort level or baseline comfort level  10/5/2023 1116 by Tanner Mathur RN  Outcome: Progressing  10/5/2023 0504 by Luciano Swenson RN  Outcome: Progressing     Problem: Safety - Adult  Goal: Free from fall injury  10/5/2023 1116 by Tanner Mathur RN  Outcome: Progressing  10/5/2023 0504 by Luicano Swenson RN  Outcome: Progressing

## 2023-10-06 ENCOUNTER — APPOINTMENT (OUTPATIENT)
Dept: VASCULAR LAB | Age: 43
DRG: 190 | End: 2023-10-06
Payer: COMMERCIAL

## 2023-10-06 LAB
ALBUMIN SERPL-MCNC: 3 G/DL (ref 3.5–5.2)
ALBUMIN/GLOB SERPL: 0.7 {RATIO} (ref 1–2.5)
ALP SERPL-CCNC: 60 U/L (ref 40–129)
ALT SERPL-CCNC: 12 U/L (ref 5–41)
ANION GAP SERPL CALCULATED.3IONS-SCNC: 15 MMOL/L (ref 9–17)
ANTI-XA UNFRAC HEPARIN: 0.12 IU/L
ANTI-XA UNFRAC HEPARIN: 0.15 IU/L
ANTI-XA UNFRAC HEPARIN: 0.21 IU/L
ANTI-XA UNFRAC HEPARIN: 0.37 IU/L
ANTI-XA UNFRAC HEPARIN: <0.1 IU/L
AST SERPL-CCNC: 22 U/L
BILIRUB SERPL-MCNC: 0.3 MG/DL (ref 0.3–1.2)
BUN SERPL-MCNC: 50 MG/DL (ref 6–20)
CALCIUM SERPL-MCNC: 8.4 MG/DL (ref 8.6–10.4)
CHLORIDE SERPL-SCNC: 100 MMOL/L (ref 98–107)
CO2 SERPL-SCNC: 16 MMOL/L (ref 20–31)
CREAT SERPL-MCNC: 5.1 MG/DL (ref 0.7–1.2)
ERYTHROCYTE [DISTWIDTH] IN BLOOD BY AUTOMATED COUNT: 14.4 % (ref 11.8–14.4)
GFR SERPL CREATININE-BSD FRML MDRD: 13 ML/MIN/1.73M2
GLUCOSE BLD-MCNC: 117 MG/DL (ref 75–110)
GLUCOSE SERPL-MCNC: 114 MG/DL (ref 70–99)
HCT VFR BLD AUTO: 32 % (ref 40.7–50.3)
HGB BLD-MCNC: 9.5 G/DL (ref 13–17)
INR PPP: 1.1
MCH RBC QN AUTO: 25 PG (ref 25.2–33.5)
MCHC RBC AUTO-ENTMCNC: 29.7 G/DL (ref 28.4–34.8)
MCV RBC AUTO: 84.2 FL (ref 82.6–102.9)
NRBC BLD-RTO: 0 PER 100 WBC
PARTIAL THROMBOPLASTIN TIME: 52 SEC (ref 23–36.5)
PLATELET # BLD AUTO: 236 K/UL (ref 138–453)
PMV BLD AUTO: 11.7 FL (ref 8.1–13.5)
POTASSIUM SERPL-SCNC: 4.5 MMOL/L (ref 3.7–5.3)
PROT SERPL-MCNC: 7.6 G/DL (ref 6.4–8.3)
PROTHROMBIN TIME: 13.6 SEC (ref 11.7–14.9)
RBC # BLD AUTO: 3.8 M/UL (ref 4.21–5.77)
SODIUM SERPL-SCNC: 131 MMOL/L (ref 135–144)
WBC OTHER # BLD: 8.1 K/UL (ref 3.5–11.3)

## 2023-10-06 PROCEDURE — 36415 COLL VENOUS BLD VENIPUNCTURE: CPT

## 2023-10-06 PROCEDURE — 6360000002 HC RX W HCPCS: Performed by: INTERNAL MEDICINE

## 2023-10-06 PROCEDURE — 88305 TISSUE EXAM BY PATHOLOGIST: CPT

## 2023-10-06 PROCEDURE — 6360000002 HC RX W HCPCS: Performed by: STUDENT IN AN ORGANIZED HEALTH CARE EDUCATION/TRAINING PROGRAM

## 2023-10-06 PROCEDURE — 99223 1ST HOSP IP/OBS HIGH 75: CPT | Performed by: INTERNAL MEDICINE

## 2023-10-06 PROCEDURE — 99233 SBSQ HOSP IP/OBS HIGH 50: CPT | Performed by: INTERNAL MEDICINE

## 2023-10-06 PROCEDURE — 99223 1ST HOSP IP/OBS HIGH 75: CPT | Performed by: PODIATRIST

## 2023-10-06 PROCEDURE — 2060000000 HC ICU INTERMEDIATE R&B

## 2023-10-06 PROCEDURE — 2580000003 HC RX 258: Performed by: HOSPITALIST

## 2023-10-06 PROCEDURE — 2500000003 HC RX 250 WO HCPCS: Performed by: HOSPITALIST

## 2023-10-06 PROCEDURE — 6370000000 HC RX 637 (ALT 250 FOR IP): Performed by: INTERNAL MEDICINE

## 2023-10-06 PROCEDURE — 85520 HEPARIN ASSAY: CPT

## 2023-10-06 PROCEDURE — 80053 COMPREHEN METABOLIC PANEL: CPT

## 2023-10-06 PROCEDURE — 6370000000 HC RX 637 (ALT 250 FOR IP): Performed by: HOSPITALIST

## 2023-10-06 PROCEDURE — 99232 SBSQ HOSP IP/OBS MODERATE 35: CPT | Performed by: HOSPITALIST

## 2023-10-06 PROCEDURE — 82947 ASSAY GLUCOSE BLOOD QUANT: CPT

## 2023-10-06 PROCEDURE — 2580000003 HC RX 258: Performed by: INTERNAL MEDICINE

## 2023-10-06 PROCEDURE — 85730 THROMBOPLASTIN TIME PARTIAL: CPT

## 2023-10-06 PROCEDURE — 85027 COMPLETE CBC AUTOMATED: CPT

## 2023-10-06 PROCEDURE — 93923 UPR/LXTR ART STDY 3+ LVLS: CPT

## 2023-10-06 PROCEDURE — 85610 PROTHROMBIN TIME: CPT

## 2023-10-06 PROCEDURE — 6360000002 HC RX W HCPCS: Performed by: PODIATRIST

## 2023-10-06 PROCEDURE — 0HBMXZX EXCISION OF RIGHT FOOT SKIN, EXTERNAL APPROACH, DIAGNOSTIC: ICD-10-PCS | Performed by: PODIATRIST

## 2023-10-06 PROCEDURE — 2500000003 HC RX 250 WO HCPCS: Performed by: PODIATRIST

## 2023-10-06 PROCEDURE — 93923 UPR/LXTR ART STDY 3+ LVLS: CPT | Performed by: STUDENT IN AN ORGANIZED HEALTH CARE EDUCATION/TRAINING PROGRAM

## 2023-10-06 RX ORDER — LIDOCAINE HYDROCHLORIDE 10 MG/ML
10 INJECTION, SOLUTION EPIDURAL; INFILTRATION; INTRACAUDAL; PERINEURAL ONCE
Status: COMPLETED | OUTPATIENT
Start: 2023-10-06 | End: 2023-10-06

## 2023-10-06 RX ORDER — SODIUM BICARBONATE 650 MG/1
1300 TABLET ORAL 2 TIMES DAILY
Status: DISCONTINUED | OUTPATIENT
Start: 2023-10-06 | End: 2023-10-11

## 2023-10-06 RX ORDER — MORPHINE SULFATE 2 MG/ML
2 INJECTION, SOLUTION INTRAMUSCULAR; INTRAVENOUS EVERY 4 HOURS PRN
Status: DISCONTINUED | OUTPATIENT
Start: 2023-10-06 | End: 2023-10-14

## 2023-10-06 RX ORDER — LABETALOL HYDROCHLORIDE 5 MG/ML
20 INJECTION, SOLUTION INTRAVENOUS EVERY 4 HOURS PRN
Status: DISCONTINUED | OUTPATIENT
Start: 2023-10-06 | End: 2023-10-07

## 2023-10-06 RX ORDER — LABETALOL HYDROCHLORIDE 5 MG/ML
10 INJECTION, SOLUTION INTRAVENOUS ONCE
Status: DISCONTINUED | OUTPATIENT
Start: 2023-10-06 | End: 2023-10-06

## 2023-10-06 RX ADMIN — MORPHINE SULFATE 2 MG: 2 INJECTION, SOLUTION INTRAMUSCULAR; INTRAVENOUS at 14:53

## 2023-10-06 RX ADMIN — LIDOCAINE HYDROCHLORIDE 10 ML: 10 INJECTION, SOLUTION EPIDURAL; INFILTRATION; INTRACAUDAL; PERINEURAL at 14:40

## 2023-10-06 RX ADMIN — NICARDIPINE HYDROCHLORIDE 8.5 MG/HR: 0.1 INJECTION INTRAVENOUS at 06:23

## 2023-10-06 RX ADMIN — OXYCODONE AND ACETAMINOPHEN 1 TABLET: 5; 325 TABLET ORAL at 04:22

## 2023-10-06 RX ADMIN — FUROSEMIDE 80 MG: 40 TABLET ORAL at 09:41

## 2023-10-06 RX ADMIN — SODIUM CHLORIDE 12.5 MG/HR: 9 INJECTION, SOLUTION INTRAVENOUS at 20:52

## 2023-10-06 RX ADMIN — CARVEDILOL 12.5 MG: 12.5 TABLET, FILM COATED ORAL at 09:40

## 2023-10-06 RX ADMIN — ASPIRIN 81 MG: 81 TABLET, CHEWABLE ORAL at 09:40

## 2023-10-06 RX ADMIN — SODIUM BICARBONATE 1300 MG: 648 TABLET ORAL at 21:12

## 2023-10-06 RX ADMIN — CALCITRIOL 0.25 MCG: 0.25 CAPSULE ORAL at 09:43

## 2023-10-06 RX ADMIN — NICARDIPINE HYDROCHLORIDE 8.5 MG/HR: 0.1 INJECTION INTRAVENOUS at 09:24

## 2023-10-06 RX ADMIN — MORPHINE SULFATE 2 MG: 2 INJECTION, SOLUTION INTRAMUSCULAR; INTRAVENOUS at 21:22

## 2023-10-06 RX ADMIN — HEPARIN SODIUM 2000 UNITS: 1000 INJECTION INTRAVENOUS; SUBCUTANEOUS at 01:46

## 2023-10-06 RX ADMIN — SODIUM CHLORIDE, PRESERVATIVE FREE 10 ML: 5 INJECTION INTRAVENOUS at 09:45

## 2023-10-06 RX ADMIN — SODIUM CHLORIDE 12.5 MG/HR: 9 INJECTION, SOLUTION INTRAVENOUS at 22:43

## 2023-10-06 RX ADMIN — HEPARIN SODIUM 15 UNITS/KG/HR: 10000 INJECTION, SOLUTION INTRAVENOUS at 16:13

## 2023-10-06 RX ADMIN — HYDRALAZINE HYDROCHLORIDE 50 MG: 50 TABLET, FILM COATED ORAL at 21:11

## 2023-10-06 RX ADMIN — SODIUM CHLORIDE 11 MG/HR: 9 INJECTION, SOLUTION INTRAVENOUS at 16:15

## 2023-10-06 RX ADMIN — HYDRALAZINE HYDROCHLORIDE 50 MG: 50 TABLET, FILM COATED ORAL at 09:43

## 2023-10-06 RX ADMIN — NIFEDIPINE 120 MG: 30 TABLET, FILM COATED, EXTENDED RELEASE ORAL at 09:43

## 2023-10-06 RX ADMIN — SODIUM CHLORIDE 11 MG/HR: 9 INJECTION, SOLUTION INTRAVENOUS at 18:20

## 2023-10-06 RX ADMIN — CARVEDILOL 12.5 MG: 12.5 TABLET, FILM COATED ORAL at 21:11

## 2023-10-06 RX ADMIN — OXYCODONE AND ACETAMINOPHEN 1 TABLET: 5; 325 TABLET ORAL at 18:16

## 2023-10-06 RX ADMIN — SODIUM CHLORIDE 11 MG/HR: 9 INJECTION, SOLUTION INTRAVENOUS at 15:52

## 2023-10-06 RX ADMIN — NICARDIPINE HYDROCHLORIDE 8.5 MG/HR: 0.1 INJECTION INTRAVENOUS at 04:21

## 2023-10-06 RX ADMIN — OXYCODONE AND ACETAMINOPHEN 1 TABLET: 5; 325 TABLET ORAL at 09:39

## 2023-10-06 RX ADMIN — LABETALOL HYDROCHLORIDE 20 MG: 5 INJECTION, SOLUTION INTRAVENOUS at 22:11

## 2023-10-06 RX ADMIN — ATORVASTATIN CALCIUM 80 MG: 80 TABLET, FILM COATED ORAL at 21:11

## 2023-10-06 RX ADMIN — NICARDIPINE HYDROCHLORIDE 8.5 MG/HR: 0.1 INJECTION INTRAVENOUS at 01:44

## 2023-10-06 RX ADMIN — SODIUM CHLORIDE 8.5 MG/HR: 9 INJECTION, SOLUTION INTRAVENOUS at 15:39

## 2023-10-06 RX ADMIN — SODIUM CHLORIDE 8.5 MG/HR: 9 INJECTION, SOLUTION INTRAVENOUS at 11:59

## 2023-10-06 RX ADMIN — OXYCODONE HYDROCHLORIDE 5 MG: 5 TABLET ORAL at 01:12

## 2023-10-06 ASSESSMENT — PAIN DESCRIPTION - DESCRIPTORS
DESCRIPTORS: ACHING
DESCRIPTORS: ACHING;DULL;SHARP;STABBING;THROBBING
DESCRIPTORS: ACHING
DESCRIPTORS: ACHING;DISCOMFORT
DESCRIPTORS: ACHING
DESCRIPTORS: ACHING
DESCRIPTORS: ACHING;DISCOMFORT
DESCRIPTORS: ACHING;DISCOMFORT

## 2023-10-06 ASSESSMENT — PAIN DESCRIPTION - PAIN TYPE
TYPE: CHRONIC PAIN

## 2023-10-06 ASSESSMENT — PAIN DESCRIPTION - ORIENTATION
ORIENTATION: RIGHT
ORIENTATION: RIGHT
ORIENTATION: OUTER;RIGHT
ORIENTATION: RIGHT
ORIENTATION: RIGHT
ORIENTATION: RIGHT;OUTER
ORIENTATION: RIGHT
ORIENTATION: RIGHT

## 2023-10-06 ASSESSMENT — PAIN DESCRIPTION - FREQUENCY
FREQUENCY: CONTINUOUS
FREQUENCY: CONTINUOUS

## 2023-10-06 ASSESSMENT — PAIN - FUNCTIONAL ASSESSMENT
PAIN_FUNCTIONAL_ASSESSMENT: PREVENTS OR INTERFERES SOME ACTIVE ACTIVITIES AND ADLS
PAIN_FUNCTIONAL_ASSESSMENT: PREVENTS OR INTERFERES WITH ALL ACTIVE AND SOME PASSIVE ACTIVITIES
PAIN_FUNCTIONAL_ASSESSMENT: PREVENTS OR INTERFERES SOME ACTIVE ACTIVITIES AND ADLS
PAIN_FUNCTIONAL_ASSESSMENT: PREVENTS OR INTERFERES WITH MANY ACTIVE NOT PASSIVE ACTIVITIES

## 2023-10-06 ASSESSMENT — PAIN DESCRIPTION - ONSET
ONSET: ON-GOING
ONSET: ON-GOING

## 2023-10-06 ASSESSMENT — ENCOUNTER SYMPTOMS
DIARRHEA: 0
VOMITING: 0
ABDOMINAL DISTENTION: 0
NAUSEA: 0
COLOR CHANGE: 1

## 2023-10-06 ASSESSMENT — PAIN DESCRIPTION - LOCATION
LOCATION: FOOT

## 2023-10-06 ASSESSMENT — PAIN SCALES - GENERAL
PAINLEVEL_OUTOF10: 5
PAINLEVEL_OUTOF10: 5
PAINLEVEL_OUTOF10: 8
PAINLEVEL_OUTOF10: 10
PAINLEVEL_OUTOF10: 7
PAINLEVEL_OUTOF10: 6
PAINLEVEL_OUTOF10: 0
PAINLEVEL_OUTOF10: 8
PAINLEVEL_OUTOF10: 5
PAINLEVEL_OUTOF10: 7

## 2023-10-06 ASSESSMENT — PAIN SCALES - WONG BAKER: WONGBAKER_NUMERICALRESPONSE: 0

## 2023-10-06 NOTE — PLAN OF CARE
Problem: Discharge Planning  Goal: Discharge to home or other facility with appropriate resources  10/6/2023 0401 by Eligio Andrew RN  Outcome: Progressing     Problem: Pain  Goal: Verbalizes/displays adequate comfort level or baseline comfort level  10/6/2023 0401 by Eligio Andrew RN  Outcome: Progressing     Problem: Safety - Adult  Goal: Free from fall injury  Recent Flowsheet Documentation  Taken 10/6/2023 0833 by Kelvin Monroy RN  Free From Fall Injury: Instruct family/caregiver on patient safety  10/6/2023 0401 by Eligio Andrew RN  Outcome: Progressing

## 2023-10-07 ENCOUNTER — APPOINTMENT (OUTPATIENT)
Dept: GENERAL RADIOLOGY | Age: 43
DRG: 190 | End: 2023-10-07
Payer: COMMERCIAL

## 2023-10-07 PROBLEM — E87.20 METABOLIC ACIDOSIS: Status: ACTIVE | Noted: 2023-10-07

## 2023-10-07 LAB
ANION GAP SERPL CALCULATED.3IONS-SCNC: 13 MMOL/L (ref 9–17)
ANTI-XA UNFRAC HEPARIN: <0.1 IU/L
ANTI-XA UNFRAC HEPARIN: <0.1 IU/L
BUN SERPL-MCNC: 49 MG/DL (ref 6–20)
CALCIUM SERPL-MCNC: 9.1 MG/DL (ref 8.6–10.4)
CHLORIDE SERPL-SCNC: 98 MMOL/L (ref 98–107)
CO2 SERPL-SCNC: 18 MMOL/L (ref 20–31)
CREAT SERPL-MCNC: 5.2 MG/DL (ref 0.7–1.2)
ECHO BSA: 2.62 M2
GFR SERPL CREATININE-BSD FRML MDRD: 13 ML/MIN/1.73M2
GLUCOSE SERPL-MCNC: 109 MG/DL (ref 70–99)
POTASSIUM SERPL-SCNC: 4.3 MMOL/L (ref 3.7–5.3)
SODIUM SERPL-SCNC: 129 MMOL/L (ref 135–144)
VAS LEFT ABI: 1.26
VAS LEFT ARM BP: 159 MMHG
VAS LEFT CALF PRESSURE: 186 MMHG
VAS LEFT DORSALIS PEDIS BP: 189 MMHG
VAS LEFT PTA BP: 201 MMHG
VAS LEFT TBI: 1.11
VAS LEFT THIGH PRESSURE: 207 MMHG
VAS LEFT TOE PRESSURE: 176 MMHG
VAS RIGHT ABI: 1.28
VAS RIGHT ARM BP: 149 MMHG
VAS RIGHT CALF PRESSURE: 218 MMHG
VAS RIGHT DORSALIS PEDIS BP: 181 MMHG
VAS RIGHT PTA BP: 204 MMHG
VAS RIGHT TBI: 1.05
VAS RIGHT THIGH PRESSURE: 186 MMHG
VAS RIGHT TOE PRESSURE: 167 MMHG

## 2023-10-07 PROCEDURE — 2700000000 HC OXYGEN THERAPY PER DAY

## 2023-10-07 PROCEDURE — 99233 SBSQ HOSP IP/OBS HIGH 50: CPT | Performed by: NURSE PRACTITIONER

## 2023-10-07 PROCEDURE — 6370000000 HC RX 637 (ALT 250 FOR IP): Performed by: INTERNAL MEDICINE

## 2023-10-07 PROCEDURE — 6360000002 HC RX W HCPCS: Performed by: INTERNAL MEDICINE

## 2023-10-07 PROCEDURE — 2500000003 HC RX 250 WO HCPCS: Performed by: HOSPITALIST

## 2023-10-07 PROCEDURE — 99233 SBSQ HOSP IP/OBS HIGH 50: CPT | Performed by: HOSPITALIST

## 2023-10-07 PROCEDURE — 94761 N-INVAS EAR/PLS OXIMETRY MLT: CPT

## 2023-10-07 PROCEDURE — 6360000002 HC RX W HCPCS

## 2023-10-07 PROCEDURE — 36415 COLL VENOUS BLD VENIPUNCTURE: CPT

## 2023-10-07 PROCEDURE — 6370000000 HC RX 637 (ALT 250 FOR IP): Performed by: HOSPITALIST

## 2023-10-07 PROCEDURE — 87641 MR-STAPH DNA AMP PROBE: CPT

## 2023-10-07 PROCEDURE — 2500000003 HC RX 250 WO HCPCS: Performed by: INTERNAL MEDICINE

## 2023-10-07 PROCEDURE — 94660 CPAP INITIATION&MGMT: CPT

## 2023-10-07 PROCEDURE — 71045 X-RAY EXAM CHEST 1 VIEW: CPT

## 2023-10-07 PROCEDURE — 99232 SBSQ HOSP IP/OBS MODERATE 35: CPT | Performed by: INTERNAL MEDICINE

## 2023-10-07 PROCEDURE — 2000000000 HC ICU R&B

## 2023-10-07 PROCEDURE — 6360000002 HC RX W HCPCS: Performed by: NURSE PRACTITIONER

## 2023-10-07 PROCEDURE — 80048 BASIC METABOLIC PNL TOTAL CA: CPT

## 2023-10-07 PROCEDURE — 06HY33Z INSERTION OF INFUSION DEVICE INTO LOWER VEIN, PERCUTANEOUS APPROACH: ICD-10-PCS | Performed by: INTERNAL MEDICINE

## 2023-10-07 PROCEDURE — 85520 HEPARIN ASSAY: CPT

## 2023-10-07 PROCEDURE — 2580000003 HC RX 258: Performed by: HOSPITALIST

## 2023-10-07 PROCEDURE — 6370000000 HC RX 637 (ALT 250 FOR IP): Performed by: NURSE PRACTITIONER

## 2023-10-07 PROCEDURE — 2580000003 HC RX 258: Performed by: INTERNAL MEDICINE

## 2023-10-07 RX ORDER — BUMETANIDE 0.25 MG/ML
2 INJECTION INTRAMUSCULAR; INTRAVENOUS 2 TIMES DAILY
Status: DISCONTINUED | OUTPATIENT
Start: 2023-10-07 | End: 2023-10-09

## 2023-10-07 RX ORDER — LABETALOL HYDROCHLORIDE 5 MG/ML
20 INJECTION, SOLUTION INTRAVENOUS EVERY 4 HOURS PRN
Status: DISCONTINUED | OUTPATIENT
Start: 2023-10-07 | End: 2023-10-19 | Stop reason: HOSPADM

## 2023-10-07 RX ORDER — ALPRAZOLAM 0.5 MG/1
0.5 TABLET ORAL 3 TIMES DAILY PRN
Status: DISCONTINUED | OUTPATIENT
Start: 2023-10-07 | End: 2023-10-19 | Stop reason: HOSPADM

## 2023-10-07 RX ORDER — CARVEDILOL 25 MG/1
25 TABLET ORAL 2 TIMES DAILY
Status: DISCONTINUED | OUTPATIENT
Start: 2023-10-07 | End: 2023-10-13

## 2023-10-07 RX ORDER — HYDRALAZINE HYDROCHLORIDE 50 MG/1
100 TABLET, FILM COATED ORAL EVERY 8 HOURS SCHEDULED
Status: DISCONTINUED | OUTPATIENT
Start: 2023-10-07 | End: 2023-10-10

## 2023-10-07 RX ORDER — HEPARIN SODIUM 5000 [USP'U]/ML
5000 INJECTION, SOLUTION INTRAVENOUS; SUBCUTANEOUS EVERY 8 HOURS SCHEDULED
Status: DISCONTINUED | OUTPATIENT
Start: 2023-10-07 | End: 2023-10-19 | Stop reason: HOSPADM

## 2023-10-07 RX ORDER — HEPARIN SODIUM 1000 [USP'U]/ML
1600 INJECTION, SOLUTION INTRAVENOUS; SUBCUTANEOUS PRN
Status: DISCONTINUED | OUTPATIENT
Start: 2023-10-07 | End: 2023-10-11 | Stop reason: SDUPTHER

## 2023-10-07 RX ORDER — HEPARIN SODIUM 1000 [USP'U]/ML
1900 INJECTION, SOLUTION INTRAVENOUS; SUBCUTANEOUS PRN
Status: DISCONTINUED | OUTPATIENT
Start: 2023-10-07 | End: 2023-10-11 | Stop reason: SDUPTHER

## 2023-10-07 RX ADMIN — SODIUM CHLORIDE 12.5 MG/HR: 9 INJECTION, SOLUTION INTRAVENOUS at 00:50

## 2023-10-07 RX ADMIN — OXYCODONE AND ACETAMINOPHEN 1 TABLET: 5; 325 TABLET ORAL at 01:12

## 2023-10-07 RX ADMIN — HYDRALAZINE HYDROCHLORIDE 100 MG: 50 TABLET, FILM COATED ORAL at 22:43

## 2023-10-07 RX ADMIN — SODIUM CHLORIDE 5.5 MG/HR: 9 INJECTION, SOLUTION INTRAVENOUS at 13:41

## 2023-10-07 RX ADMIN — HEPARIN SODIUM 5000 UNITS: 5000 INJECTION INTRAVENOUS; SUBCUTANEOUS at 15:27

## 2023-10-07 RX ADMIN — BUMETANIDE 2 MG: 0.25 INJECTION INTRAMUSCULAR; INTRAVENOUS at 15:14

## 2023-10-07 RX ADMIN — FUROSEMIDE 80 MG: 40 TABLET ORAL at 09:55

## 2023-10-07 RX ADMIN — SODIUM CHLORIDE 9.5 MG/HR: 9 INJECTION, SOLUTION INTRAVENOUS at 10:18

## 2023-10-07 RX ADMIN — HEPARIN SODIUM 1900 UNITS: 1000 INJECTION INTRAVENOUS; SUBCUTANEOUS at 20:30

## 2023-10-07 RX ADMIN — HEPARIN SODIUM 19 UNITS/KG/HR: 10000 INJECTION, SOLUTION INTRAVENOUS at 06:49

## 2023-10-07 RX ADMIN — BUMETANIDE 2 MG: 0.25 INJECTION INTRAMUSCULAR; INTRAVENOUS at 22:42

## 2023-10-07 RX ADMIN — CARVEDILOL 12.5 MG: 12.5 TABLET, FILM COATED ORAL at 07:50

## 2023-10-07 RX ADMIN — HEPARIN SODIUM 4000 UNITS: 1000 INJECTION INTRAVENOUS; SUBCUTANEOUS at 02:07

## 2023-10-07 RX ADMIN — SODIUM CHLORIDE 10.5 MG/HR: 9 INJECTION, SOLUTION INTRAVENOUS at 05:13

## 2023-10-07 RX ADMIN — SODIUM CHLORIDE 15 MG/HR: 9 INJECTION, SOLUTION INTRAVENOUS at 22:50

## 2023-10-07 RX ADMIN — SODIUM CHLORIDE 15 MG/HR: 9 INJECTION, SOLUTION INTRAVENOUS at 20:28

## 2023-10-07 RX ADMIN — HEPARIN SODIUM 5000 UNITS: 5000 INJECTION INTRAVENOUS; SUBCUTANEOUS at 22:39

## 2023-10-07 RX ADMIN — ASPIRIN 81 MG: 81 TABLET, CHEWABLE ORAL at 07:50

## 2023-10-07 RX ADMIN — HYDRALAZINE HYDROCHLORIDE 50 MG: 50 TABLET, FILM COATED ORAL at 07:50

## 2023-10-07 RX ADMIN — SODIUM CHLORIDE 15 MG/HR: 9 INJECTION, SOLUTION INTRAVENOUS at 18:51

## 2023-10-07 RX ADMIN — SODIUM CHLORIDE, PRESERVATIVE FREE 10 ML: 5 INJECTION INTRAVENOUS at 22:54

## 2023-10-07 RX ADMIN — SODIUM CHLORIDE 13 MG/HR: 9 INJECTION, SOLUTION INTRAVENOUS at 16:40

## 2023-10-07 RX ADMIN — SODIUM BICARBONATE 1300 MG: 648 TABLET ORAL at 22:42

## 2023-10-07 RX ADMIN — SODIUM CHLORIDE, PRESERVATIVE FREE 10 ML: 5 INJECTION INTRAVENOUS at 07:52

## 2023-10-07 RX ADMIN — ALPRAZOLAM 0.5 MG: 0.5 TABLET ORAL at 16:35

## 2023-10-07 RX ADMIN — CALCITRIOL 0.25 MCG: 0.25 CAPSULE ORAL at 07:50

## 2023-10-07 RX ADMIN — CARVEDILOL 25 MG: 25 TABLET, FILM COATED ORAL at 22:38

## 2023-10-07 RX ADMIN — HEPARIN SODIUM 1600 UNITS: 1000 INJECTION INTRAVENOUS; SUBCUTANEOUS at 21:00

## 2023-10-07 RX ADMIN — SODIUM CHLORIDE 12.5 MG/HR: 9 INJECTION, SOLUTION INTRAVENOUS at 02:53

## 2023-10-07 RX ADMIN — OXYCODONE AND ACETAMINOPHEN 1 TABLET: 5; 325 TABLET ORAL at 07:51

## 2023-10-07 RX ADMIN — SODIUM BICARBONATE 1300 MG: 648 TABLET ORAL at 07:50

## 2023-10-07 RX ADMIN — SODIUM CHLORIDE 10.5 MG/HR: 9 INJECTION, SOLUTION INTRAVENOUS at 07:42

## 2023-10-07 RX ADMIN — ATORVASTATIN CALCIUM 80 MG: 80 TABLET, FILM COATED ORAL at 22:38

## 2023-10-07 RX ADMIN — NIFEDIPINE 120 MG: 30 TABLET, FILM COATED, EXTENDED RELEASE ORAL at 07:50

## 2023-10-07 ASSESSMENT — ENCOUNTER SYMPTOMS
DIARRHEA: 0
NAUSEA: 0
COLOR CHANGE: 1
SHORTNESS OF BREATH: 1
VOMITING: 0
ABDOMINAL DISTENTION: 0

## 2023-10-07 ASSESSMENT — PAIN DESCRIPTION - LOCATION
LOCATION: FOOT

## 2023-10-07 ASSESSMENT — PAIN DESCRIPTION - DESCRIPTORS
DESCRIPTORS: ACHING;BURNING;THROBBING
DESCRIPTORS: ACHING
DESCRIPTORS: ACHING;BURNING;SHARP;SHOOTING

## 2023-10-07 ASSESSMENT — PAIN DESCRIPTION - ORIENTATION
ORIENTATION: RIGHT

## 2023-10-07 ASSESSMENT — PAIN SCALES - GENERAL
PAINLEVEL_OUTOF10: 8
PAINLEVEL_OUTOF10: 9
PAINLEVEL_OUTOF10: 5
PAINLEVEL_OUTOF10: 5

## 2023-10-07 ASSESSMENT — PAIN DESCRIPTION - ONSET
ONSET: ON-GOING
ONSET: ON-GOING

## 2023-10-07 ASSESSMENT — PAIN DESCRIPTION - PAIN TYPE
TYPE: ACUTE PAIN
TYPE: ACUTE PAIN

## 2023-10-07 ASSESSMENT — PAIN - FUNCTIONAL ASSESSMENT
PAIN_FUNCTIONAL_ASSESSMENT: PREVENTS OR INTERFERES WITH MANY ACTIVE NOT PASSIVE ACTIVITIES

## 2023-10-07 ASSESSMENT — PAIN DESCRIPTION - FREQUENCY
FREQUENCY: CONTINUOUS
FREQUENCY: CONTINUOUS

## 2023-10-07 NOTE — PLAN OF CARE
Critical care asked to eval pt due to desaturation secondary to pulmonary edema secondary to refused dialysis. Upon evaluation pt on non-rebreather sats in the low 90's with labored breathing. Asked pt his wishes regarding intubation and with Bala Medrano as a witness he stated no to intubation even if it were to save his life.

## 2023-10-08 ENCOUNTER — APPOINTMENT (OUTPATIENT)
Dept: GENERAL RADIOLOGY | Age: 43
DRG: 190 | End: 2023-10-08
Payer: COMMERCIAL

## 2023-10-08 LAB
ANION GAP SERPL CALCULATED.3IONS-SCNC: 13 MMOL/L (ref 9–17)
ANION GAP SERPL CALCULATED.3IONS-SCNC: 15 MMOL/L (ref 9–17)
BASOPHILS # BLD: 0 K/UL (ref 0–0.2)
BASOPHILS # BLD: 0.05 K/UL (ref 0–0.2)
BASOPHILS NFR BLD: 0 % (ref 0–2)
BASOPHILS NFR BLD: 1 % (ref 0–2)
BNP SERPL-MCNC: 2977 PG/ML
BNP SERPL-MCNC: 3883 PG/ML
BUN SERPL-MCNC: 44 MG/DL (ref 6–20)
BUN SERPL-MCNC: 53 MG/DL (ref 6–20)
CA-I BLD-SCNC: 1.03 MMOL/L (ref 1.13–1.33)
CALCIUM SERPL-MCNC: 7.1 MG/DL (ref 8.6–10.4)
CALCIUM SERPL-MCNC: 8.6 MG/DL (ref 8.6–10.4)
CHLORIDE SERPL-SCNC: 107 MMOL/L (ref 98–107)
CHLORIDE SERPL-SCNC: 98 MMOL/L (ref 98–107)
CO2 SERPL-SCNC: 16 MMOL/L (ref 20–31)
CO2 SERPL-SCNC: 19 MMOL/L (ref 20–31)
CREAT SERPL-MCNC: 4.4 MG/DL (ref 0.7–1.2)
CREAT SERPL-MCNC: 5.6 MG/DL (ref 0.7–1.2)
EOSINOPHIL # BLD: 0.05 K/UL (ref 0–0.44)
EOSINOPHIL # BLD: 0.09 K/UL (ref 0–0.44)
EOSINOPHILS RELATIVE PERCENT: 1 % (ref 1–4)
EOSINOPHILS RELATIVE PERCENT: 2 % (ref 1–4)
ERYTHROCYTE [DISTWIDTH] IN BLOOD BY AUTOMATED COUNT: 14.6 % (ref 11.8–14.4)
ERYTHROCYTE [DISTWIDTH] IN BLOOD BY AUTOMATED COUNT: 14.6 % (ref 11.8–14.4)
GFR SERPL CREATININE-BSD FRML MDRD: 12 ML/MIN/1.73M2
GFR SERPL CREATININE-BSD FRML MDRD: 16 ML/MIN/1.73M2
GLUCOSE SERPL-MCNC: 107 MG/DL (ref 70–99)
GLUCOSE SERPL-MCNC: 133 MG/DL (ref 70–99)
HCT VFR BLD AUTO: 24.4 % (ref 40.7–50.3)
HCT VFR BLD AUTO: 43.9 % (ref 40.7–50.3)
HGB BLD-MCNC: 13.8 G/DL (ref 13–17)
HGB BLD-MCNC: 7.7 G/DL (ref 13–17)
IMM GRANULOCYTES # BLD AUTO: 0 K/UL (ref 0–0.3)
IMM GRANULOCYTES # BLD AUTO: <0.03 K/UL (ref 0–0.3)
IMM GRANULOCYTES NFR BLD: 0 %
IMM GRANULOCYTES NFR BLD: 0 %
LYMPHOCYTES NFR BLD: 0.32 K/UL (ref 1.1–3.7)
LYMPHOCYTES NFR BLD: 0.88 K/UL (ref 1.1–3.7)
LYMPHOCYTES RELATIVE PERCENT: 12 % (ref 24–43)
LYMPHOCYTES RELATIVE PERCENT: 12 % (ref 24–43)
MAGNESIUM SERPL-MCNC: 1.5 MG/DL (ref 1.6–2.6)
MAGNESIUM SERPL-MCNC: 1.8 MG/DL (ref 1.6–2.6)
MCH RBC QN AUTO: 25.4 PG (ref 25.2–33.5)
MCH RBC QN AUTO: 25.7 PG (ref 25.2–33.5)
MCHC RBC AUTO-ENTMCNC: 31.4 G/DL (ref 28.4–34.8)
MCHC RBC AUTO-ENTMCNC: 31.6 G/DL (ref 28.4–34.8)
MCV RBC AUTO: 80.5 FL (ref 82.6–102.9)
MCV RBC AUTO: 81.8 FL (ref 82.6–102.9)
MONOCYTES NFR BLD: 0.16 K/UL (ref 0.1–1.2)
MONOCYTES NFR BLD: 0.66 K/UL (ref 0.1–1.2)
MONOCYTES NFR BLD: 6 % (ref 3–12)
MONOCYTES NFR BLD: 9 % (ref 3–12)
MORPHOLOGY: ABNORMAL
MORPHOLOGY: ABNORMAL
MRSA, DNA, NASAL: NEGATIVE
NEUTROPHILS NFR BLD: 76 % (ref 36–65)
NEUTROPHILS NFR BLD: 80 % (ref 36–65)
NEUTS SEG NFR BLD: 2.17 K/UL (ref 1.5–8.1)
NEUTS SEG NFR BLD: 5.43 K/UL (ref 1.5–8.1)
NRBC BLD-RTO: 0 PER 100 WBC
NRBC BLD-RTO: 0 PER 100 WBC
PLATELET # BLD AUTO: 201 K/UL (ref 138–453)
PLATELET # BLD AUTO: ABNORMAL K/UL (ref 138–453)
PLATELET, FLUORESCENCE: 180 K/UL (ref 138–453)
PLATELETS.RETICULATED NFR BLD AUTO: 8.6 % (ref 1.1–10.3)
PMV BLD AUTO: 12.2 FL (ref 8.1–13.5)
POTASSIUM SERPL-SCNC: 3.7 MMOL/L (ref 3.7–5.3)
POTASSIUM SERPL-SCNC: 4.8 MMOL/L (ref 3.7–5.3)
RBC # BLD AUTO: 3.03 M/UL (ref 4.21–5.77)
RBC # BLD AUTO: 5.37 M/UL (ref 4.21–5.77)
RBC # BLD: ABNORMAL 10*6/UL
SODIUM SERPL-SCNC: 132 MMOL/L (ref 135–144)
SODIUM SERPL-SCNC: 136 MMOL/L (ref 135–144)
SPECIMEN DESCRIPTION: NORMAL
WBC OTHER # BLD: 2.7 K/UL (ref 3.5–11.3)
WBC OTHER # BLD: 7.1 K/UL (ref 3.5–11.3)

## 2023-10-08 PROCEDURE — 94660 CPAP INITIATION&MGMT: CPT

## 2023-10-08 PROCEDURE — 2500000003 HC RX 250 WO HCPCS: Performed by: INTERNAL MEDICINE

## 2023-10-08 PROCEDURE — 2500000003 HC RX 250 WO HCPCS: Performed by: HOSPITALIST

## 2023-10-08 PROCEDURE — 36415 COLL VENOUS BLD VENIPUNCTURE: CPT

## 2023-10-08 PROCEDURE — 80048 BASIC METABOLIC PNL TOTAL CA: CPT

## 2023-10-08 PROCEDURE — 99232 SBSQ HOSP IP/OBS MODERATE 35: CPT | Performed by: INTERNAL MEDICINE

## 2023-10-08 PROCEDURE — 6370000000 HC RX 637 (ALT 250 FOR IP): Performed by: INTERNAL MEDICINE

## 2023-10-08 PROCEDURE — 5A09357 ASSISTANCE WITH RESPIRATORY VENTILATION, LESS THAN 24 CONSECUTIVE HOURS, CONTINUOUS POSITIVE AIRWAY PRESSURE: ICD-10-PCS | Performed by: INTERNAL MEDICINE

## 2023-10-08 PROCEDURE — 2580000003 HC RX 258: Performed by: INTERNAL MEDICINE

## 2023-10-08 PROCEDURE — 71045 X-RAY EXAM CHEST 1 VIEW: CPT

## 2023-10-08 PROCEDURE — 6370000000 HC RX 637 (ALT 250 FOR IP): Performed by: NURSE PRACTITIONER

## 2023-10-08 PROCEDURE — 2500000003 HC RX 250 WO HCPCS: Performed by: STUDENT IN AN ORGANIZED HEALTH CARE EDUCATION/TRAINING PROGRAM

## 2023-10-08 PROCEDURE — 85055 RETICULATED PLATELET ASSAY: CPT

## 2023-10-08 PROCEDURE — 94761 N-INVAS EAR/PLS OXIMETRY MLT: CPT

## 2023-10-08 PROCEDURE — 2580000003 HC RX 258: Performed by: HOSPITALIST

## 2023-10-08 PROCEDURE — 82330 ASSAY OF CALCIUM: CPT

## 2023-10-08 PROCEDURE — 83880 ASSAY OF NATRIURETIC PEPTIDE: CPT

## 2023-10-08 PROCEDURE — 6360000002 HC RX W HCPCS

## 2023-10-08 PROCEDURE — 83735 ASSAY OF MAGNESIUM: CPT

## 2023-10-08 PROCEDURE — 90935 HEMODIALYSIS ONE EVALUATION: CPT

## 2023-10-08 PROCEDURE — 99291 CRITICAL CARE FIRST HOUR: CPT | Performed by: INTERNAL MEDICINE

## 2023-10-08 PROCEDURE — 5A1D70Z PERFORMANCE OF URINARY FILTRATION, INTERMITTENT, LESS THAN 6 HOURS PER DAY: ICD-10-PCS | Performed by: INTERNAL MEDICINE

## 2023-10-08 PROCEDURE — 2700000000 HC OXYGEN THERAPY PER DAY

## 2023-10-08 PROCEDURE — 6360000002 HC RX W HCPCS: Performed by: PODIATRIST

## 2023-10-08 PROCEDURE — 99233 SBSQ HOSP IP/OBS HIGH 50: CPT | Performed by: INTERNAL MEDICINE

## 2023-10-08 PROCEDURE — 6360000002 HC RX W HCPCS: Performed by: NURSE PRACTITIONER

## 2023-10-08 PROCEDURE — 85025 COMPLETE CBC W/AUTO DIFF WBC: CPT

## 2023-10-08 PROCEDURE — 2000000000 HC ICU R&B

## 2023-10-08 RX ORDER — BUMETANIDE 0.25 MG/ML
2 INJECTION INTRAMUSCULAR; INTRAVENOUS ONCE
Status: COMPLETED | OUTPATIENT
Start: 2023-10-08 | End: 2023-10-08

## 2023-10-08 RX ORDER — CALCIUM GLUCONATE 20 MG/ML
1000 INJECTION, SOLUTION INTRAVENOUS ONCE
Status: COMPLETED | OUTPATIENT
Start: 2023-10-08 | End: 2023-10-08

## 2023-10-08 RX ADMIN — SODIUM CHLORIDE, PRESERVATIVE FREE 10 ML: 5 INJECTION INTRAVENOUS at 20:18

## 2023-10-08 RX ADMIN — CALCIUM GLUCONATE 1000 MG: 20 INJECTION, SOLUTION INTRAVENOUS at 06:37

## 2023-10-08 RX ADMIN — SODIUM BICARBONATE 1300 MG: 648 TABLET ORAL at 20:16

## 2023-10-08 RX ADMIN — SODIUM CHLORIDE 12.5 MG/HR: 9 INJECTION, SOLUTION INTRAVENOUS at 00:30

## 2023-10-08 RX ADMIN — CARVEDILOL 25 MG: 25 TABLET, FILM COATED ORAL at 07:52

## 2023-10-08 RX ADMIN — HYDRALAZINE HYDROCHLORIDE 100 MG: 50 TABLET, FILM COATED ORAL at 06:31

## 2023-10-08 RX ADMIN — HYDRALAZINE HYDROCHLORIDE 100 MG: 50 TABLET, FILM COATED ORAL at 20:19

## 2023-10-08 RX ADMIN — NIFEDIPINE 120 MG: 30 TABLET, FILM COATED, EXTENDED RELEASE ORAL at 07:52

## 2023-10-08 RX ADMIN — MORPHINE SULFATE 2 MG: 2 INJECTION, SOLUTION INTRAMUSCULAR; INTRAVENOUS at 01:25

## 2023-10-08 RX ADMIN — HYDRALAZINE HYDROCHLORIDE 100 MG: 50 TABLET, FILM COATED ORAL at 13:28

## 2023-10-08 RX ADMIN — ATORVASTATIN CALCIUM 80 MG: 80 TABLET, FILM COATED ORAL at 20:16

## 2023-10-08 RX ADMIN — SODIUM BICARBONATE 1300 MG: 648 TABLET ORAL at 07:52

## 2023-10-08 RX ADMIN — HEPARIN SODIUM 1900 UNITS: 1000 INJECTION INTRAVENOUS; SUBCUTANEOUS at 01:59

## 2023-10-08 RX ADMIN — HEPARIN SODIUM 5000 UNITS: 5000 INJECTION INTRAVENOUS; SUBCUTANEOUS at 13:30

## 2023-10-08 RX ADMIN — HEPARIN SODIUM 1600 UNITS: 1000 INJECTION INTRAVENOUS; SUBCUTANEOUS at 01:59

## 2023-10-08 RX ADMIN — HEPARIN SODIUM 5000 UNITS: 5000 INJECTION INTRAVENOUS; SUBCUTANEOUS at 06:31

## 2023-10-08 RX ADMIN — CALCITRIOL 0.25 MCG: 0.25 CAPSULE ORAL at 07:52

## 2023-10-08 RX ADMIN — BUMETANIDE 2 MG: 0.25 INJECTION INTRAMUSCULAR; INTRAVENOUS at 02:04

## 2023-10-08 RX ADMIN — ASPIRIN 81 MG: 81 TABLET, CHEWABLE ORAL at 07:52

## 2023-10-08 RX ADMIN — CARVEDILOL 25 MG: 25 TABLET, FILM COATED ORAL at 20:16

## 2023-10-08 RX ADMIN — HEPARIN SODIUM 5000 UNITS: 5000 INJECTION INTRAVENOUS; SUBCUTANEOUS at 21:46

## 2023-10-08 RX ADMIN — BUMETANIDE 2 MG: 0.25 INJECTION INTRAMUSCULAR; INTRAVENOUS at 07:53

## 2023-10-08 RX ADMIN — BUMETANIDE 2 MG: 0.25 INJECTION INTRAMUSCULAR; INTRAVENOUS at 20:16

## 2023-10-08 ASSESSMENT — PULMONARY FUNCTION TESTS
PIF_VALUE: 16
PIF_VALUE: 26
PIF_VALUE: 16
PIF_VALUE: 17
PIF_VALUE: 17
PIF_VALUE: 14
PIF_VALUE: 15
PIF_VALUE: 4

## 2023-10-08 ASSESSMENT — ENCOUNTER SYMPTOMS
DIARRHEA: 0
SHORTNESS OF BREATH: 1
NAUSEA: 0
VOMITING: 0
ABDOMINAL DISTENTION: 0
COLOR CHANGE: 1

## 2023-10-08 ASSESSMENT — PAIN DESCRIPTION - LOCATION: LOCATION: GROIN

## 2023-10-08 ASSESSMENT — PAIN SCALES - GENERAL
PAINLEVEL_OUTOF10: 0
PAINLEVEL_OUTOF10: 0
PAINLEVEL_OUTOF10: 8

## 2023-10-08 ASSESSMENT — PAIN DESCRIPTION - ORIENTATION: ORIENTATION: LEFT

## 2023-10-08 NOTE — PLAN OF CARE
Problem: Respiratory - Adult  Goal: Achieves optimal ventilation and oxygenation  Outcome: Progressing   NON INVASIVE VENTILATION  PROVIDE OPTIMAL VENTILATION/ACCEPTABLE SP02  IMPLEMENT NON INVASIVE VENTILATION PROTOCOL  ASSESSMENT SKIN INTEGRITY  PATIENT EDUCATION AS NEEDED  BIPAP AS NEEDED PROVIDE ADEQUATE OXYGENATION WITH ACCEPTABLE SP02/ABG'S    [x]  IDENTIFY APPROPRIATE OXYGEN THERAPY  [x]   MONITOR SP02/ABG'S AS NEEDED   [x]   PATIENT EDUCATION AS NEEDED

## 2023-10-08 NOTE — PLAN OF CARE
Problem: Discharge Planning  Goal: Discharge to home or other facility with appropriate resources  10/8/2023 0806 by Doyle Martinez RN  Outcome: Progressing  10/8/2023 0414 by Alejandro Chun RN  Outcome: Progressing     Problem: Pain  Goal: Verbalizes/displays adequate comfort level or baseline comfort level  10/8/2023 0806 by Doyle Martinez RN  Outcome: Progressing  10/8/2023 0414 by Alejandro Chun RN  Outcome: Progressing  10/7/2023 1942 by Key Bear RN  Outcome: Progressing     Problem: Safety - Adult  Goal: Free from fall injury  10/8/2023 0806 by Doyle Martinez RN  Outcome: Progressing  10/8/2023 0414 by Alejandro Chun RN  Outcome: Progressing  10/7/2023 1942 by Key Bear RN  Outcome: Progressing     Problem: Skin/Tissue Integrity  Goal: Absence of new skin breakdown  Description: 1. Monitor for areas of redness and/or skin breakdown  2. Assess vascular access sites hourly  3. Every 4-6 hours minimum:  Change oxygen saturation probe site  4. Every 4-6 hours:  If on nasal continuous positive airway pressure, respiratory therapy assess nares and determine need for appliance change or resting period.   10/8/2023 0806 by Doyle Martinez RN  Outcome: Progressing  10/8/2023 0414 by Alejandro Chun RN  Outcome: Progressing  10/7/2023 1942 by Key Bear RN  Outcome: Progressing     Problem: Respiratory - Adult  Goal: Achieves optimal ventilation and oxygenation  10/8/2023 0806 by Doyle Martinez RN  Outcome: Progressing  10/8/2023 0414 by Alejandro Chun RN  Outcome: Progressing  10/8/2023 0305 by Sera Tellez RCP  Outcome: Progressing     Problem: ABCDS Injury Assessment  Goal: Absence of physical injury  Outcome: Progressing

## 2023-10-08 NOTE — PLAN OF CARE
Problem: Discharge Planning  Goal: Discharge to home or other facility with appropriate resources  Outcome: Progressing     Problem: Pain  Goal: Verbalizes/displays adequate comfort level or baseline comfort level  10/8/2023 0414 by Alejandro Chun RN  Outcome: Progressing     Problem: Safety - Adult  Goal: Free from fall injury  10/8/2023 0414 by Alejandro Chun RN  Outcome: Progressing     Problem: Skin/Tissue Integrity  Goal: Absence of new skin breakdown  Description: 1. Monitor for areas of redness and/or skin breakdown  2. Assess vascular access sites hourly  3. Every 4-6 hours minimum:  Change oxygen saturation probe site  4. Every 4-6 hours:  If on nasal continuous positive airway pressure, respiratory therapy assess nares and determine need for appliance change or resting period.   10/8/2023 0414 by Alejandro Chun RN  Outcome: Progressing      Problem: Respiratory - Adult  Goal: Achieves optimal ventilation and oxygenation  10/8/2023 0414 by Alejandro Chun RN  Outcome: Progressing

## 2023-10-08 NOTE — PROCEDURES
PROCEDURE NOTE - CENTRAL VENOUS LINE PLACEMENT    PATIENT NAME: 83[address] [facility] RECORD NO. [id number]  DATE: 10/7/2023  ATTENDING PHYSICIAN: Dr. Zeke Henriquez DIAGNOSIS:  vascular access  POSTOPERATIVE DIAGNOSIS:  Same  PROCEDURE PERFORMED:  Left Femoral Vein Central Line Insertion  PERFORMING PHYSICIAN: Roberto Hubbard MD/ Kody Gomez MD  ANESTHESIA:  Local utilizing 1% lidocaine  ESTIMATED BLOOD LOSS:  Less than 25 ml  COMPLICATIONS:  None immediately appreciated. DISCUSSION:  Ander Urena is a 39y.o.-year-old male who requires central IV access vascular access. The history and physical examination were reviewed and confirmed. CONSENT: The patient was counseled regarding the procedure, its indications, risks, potential complications and alternatives, and any questions were answered. Consent was obtained to proceed. PROCEDURE:  A timeout was initiated by the bedside nurse and was confirmed by those present. The patient was placed in a supine position. The skin overlying the Left Femoral Vein was prepped with chlorhexadine and draped in sterile fashion. The skin was infiltrated with local anesthetic. The vessel and surrounding anatomy was visualized using ultrasound. Through the anesthetized region, the introducer needle was inserted into the femoral vein returning dark red non pulsatile blood. A guidewire was placed through the center of the needle with no resistance. Ultrasound confirmed presence of wire in the vein. A small incision made in the skin with a #11 scalpel blade. The dilator was inserted into the skin and vein over guidewire using Seldinger technique. The dilator was then removed and the 14f 24cm catheter was placed in the vein over the guidewire using Seldinger technique. The guidewire was then removed and all ports aspirated and flushed appropriately. The appropriate doses of heparin 1.6 & 1.9 cc's, were provided to the corresponding catheters.  The catheter then

## 2023-10-08 NOTE — H&P
Critical Care - History and Physical Examination    Patient's name:  Sean Mcclellan  Medical Record Number: [id number]  Patient's account/billing number: [de-identified]  Patient's YOB: 1980  Age: 39 y.o. Date of Admission: 10/4/2023  8:50 AM  Date of History and Physical Examination: 10/7/2023      Primary Care Physician: Rober Haney MD  Attending Physician: Dr. Governor Neha MD    Code Status: Limited    Chief complaint:   Chief Complaint   Patient presents with    Chest Pain    Foot Pain     Rt foot         HISTORY OF PRESENT ILLNESS:      History was obtained from chart review and the patient. Sean Mcclellan is a 39 y. o. with PMH of   -Hypertension-on lisinopril hydrochlorothiazide-10-12 0.5, nifedipine 60 mg, carvedilol 12.5 twice daily, hydralazine 50 mg twice daily, clonidine 0.2 mg once a week  -CKD stage IV with baseline creatinine 3.8- 4.2    Presented to ER on 10/4 with chest pain and shortness of breath as well as right foot pain. Chest pain was substernal and sharp in nature without any radiation, tropes were elevated however flat 95-96. Patient had hypertensive urgency on presentation with blood pressure of 182/128. Lab work showed elevated creatinine of 5.8, patient noted to have decreased urine output with some fluid overload. Nephrology was consulted and recommended dialysis however patient declined initially and currently he was ready for it so patient was transferred to the ICU for Eagle catheter placement and dialysis. Today patient had a worsening respiratory status and he was maintaining saturation on 15 L of nonrebreather. Chest x-ray showed pulmonary congestion. CODE STATUS was discussed with patient and patient refused intubation or chest compressions even if it saves his life.    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

## 2023-10-08 NOTE — ACP (ADVANCE CARE PLANNING)
Resuscitation/Code Status Note on Brendan Loyd (YOB: 1980)    On October 7, 08 05 PM, resuscitation/code status decision was based on a thorough discussion with the patient. The code status was made Limited Limited Code details: Intubation/Re-intubation No; Defibrillation/Cardioversion No; Chest Compressions No; Resuscitative Medications No; Other Declines intubation, CPR, and associated treatments. .    Electronically signed by Nicole Rodriguez MD on 10/7/23 at 8:04 PM EDT

## 2023-10-09 PROBLEM — I73.9 PERIPHERAL VASCULAR DISEASE (HCC): Status: ACTIVE | Noted: 2023-10-09

## 2023-10-09 LAB
ANION GAP SERPL CALCULATED.3IONS-SCNC: 12 MMOL/L (ref 9–17)
BASOPHILS # BLD: 0.04 K/UL (ref 0–0.2)
BASOPHILS NFR BLD: 1 % (ref 0–2)
BUN SERPL-MCNC: 60 MG/DL (ref 6–20)
CALCIUM SERPL-MCNC: 8.8 MG/DL (ref 8.6–10.4)
CHLORIDE SERPL-SCNC: 98 MMOL/L (ref 98–107)
CO2 SERPL-SCNC: 22 MMOL/L (ref 20–31)
CREAT SERPL-MCNC: 5.8 MG/DL (ref 0.7–1.2)
EOSINOPHIL # BLD: 0.23 K/UL (ref 0–0.44)
EOSINOPHILS RELATIVE PERCENT: 4 % (ref 1–4)
ERYTHROCYTE [DISTWIDTH] IN BLOOD BY AUTOMATED COUNT: 14.6 % (ref 11.8–14.4)
GFR SERPL CREATININE-BSD FRML MDRD: 12 ML/MIN/1.73M2
GLUCOSE SERPL-MCNC: 113 MG/DL (ref 70–99)
HCT VFR BLD AUTO: 23.5 % (ref 40.7–50.3)
HGB BLD-MCNC: 7.3 G/DL (ref 13–17)
IMM GRANULOCYTES # BLD AUTO: 0.03 K/UL (ref 0–0.3)
IMM GRANULOCYTES NFR BLD: 1 %
LYMPHOCYTES NFR BLD: 1.1 K/UL (ref 1.1–3.7)
LYMPHOCYTES RELATIVE PERCENT: 18 % (ref 24–43)
MCH RBC QN AUTO: 25.8 PG (ref 25.2–33.5)
MCHC RBC AUTO-ENTMCNC: 31.1 G/DL (ref 28.4–34.8)
MCV RBC AUTO: 83 FL (ref 82.6–102.9)
MONOCYTES NFR BLD: 0.69 K/UL (ref 0.1–1.2)
MONOCYTES NFR BLD: 11 % (ref 3–12)
NEUTROPHILS NFR BLD: 66 % (ref 36–65)
NEUTS SEG NFR BLD: 4.01 K/UL (ref 1.5–8.1)
NRBC BLD-RTO: 0 PER 100 WBC
PLATELET # BLD AUTO: 196 K/UL (ref 138–453)
PMV BLD AUTO: 12.9 FL (ref 8.1–13.5)
POTASSIUM SERPL-SCNC: 4.6 MMOL/L (ref 3.7–5.3)
RBC # BLD AUTO: 2.83 M/UL (ref 4.21–5.77)
RBC # BLD: ABNORMAL 10*6/UL
SODIUM SERPL-SCNC: 132 MMOL/L (ref 135–144)
WBC OTHER # BLD: 6.1 K/UL (ref 3.5–11.3)

## 2023-10-09 PROCEDURE — 2580000003 HC RX 258: Performed by: INTERNAL MEDICINE

## 2023-10-09 PROCEDURE — 6370000000 HC RX 637 (ALT 250 FOR IP): Performed by: INTERNAL MEDICINE

## 2023-10-09 PROCEDURE — 36415 COLL VENOUS BLD VENIPUNCTURE: CPT

## 2023-10-09 PROCEDURE — 99232 SBSQ HOSP IP/OBS MODERATE 35: CPT | Performed by: INTERNAL MEDICINE

## 2023-10-09 PROCEDURE — 94761 N-INVAS EAR/PLS OXIMETRY MLT: CPT

## 2023-10-09 PROCEDURE — 85025 COMPLETE CBC W/AUTO DIFF WBC: CPT

## 2023-10-09 PROCEDURE — 6360000002 HC RX W HCPCS: Performed by: NURSE PRACTITIONER

## 2023-10-09 PROCEDURE — 1200000000 HC SEMI PRIVATE

## 2023-10-09 PROCEDURE — 99291 CRITICAL CARE FIRST HOUR: CPT | Performed by: INTERNAL MEDICINE

## 2023-10-09 PROCEDURE — 94660 CPAP INITIATION&MGMT: CPT

## 2023-10-09 PROCEDURE — 6370000000 HC RX 637 (ALT 250 FOR IP): Performed by: NURSE PRACTITIONER

## 2023-10-09 PROCEDURE — 80048 BASIC METABOLIC PNL TOTAL CA: CPT

## 2023-10-09 PROCEDURE — 2500000003 HC RX 250 WO HCPCS: Performed by: INTERNAL MEDICINE

## 2023-10-09 RX ORDER — BUMETANIDE 0.25 MG/ML
3 INJECTION INTRAMUSCULAR; INTRAVENOUS 2 TIMES DAILY
Status: DISCONTINUED | OUTPATIENT
Start: 2023-10-09 | End: 2023-10-11

## 2023-10-09 RX ORDER — SODIUM CHLORIDE 9 MG/ML
INJECTION, SOLUTION INTRAVENOUS CONTINUOUS
Status: DISCONTINUED | OUTPATIENT
Start: 2023-10-09 | End: 2023-10-19 | Stop reason: HOSPADM

## 2023-10-09 RX ORDER — ETOMIDATE 2 MG/ML
INJECTION INTRAVENOUS
Status: DISPENSED
Start: 2023-10-09 | End: 2023-10-09

## 2023-10-09 RX ORDER — ROCURONIUM BROMIDE 10 MG/ML
INJECTION, SOLUTION INTRAVENOUS
Status: DISPENSED
Start: 2023-10-09 | End: 2023-10-09

## 2023-10-09 RX ADMIN — ATORVASTATIN CALCIUM 80 MG: 80 TABLET, FILM COATED ORAL at 20:44

## 2023-10-09 RX ADMIN — SODIUM BICARBONATE 1300 MG: 648 TABLET ORAL at 20:44

## 2023-10-09 RX ADMIN — HYDRALAZINE HYDROCHLORIDE 100 MG: 50 TABLET, FILM COATED ORAL at 20:46

## 2023-10-09 RX ADMIN — SODIUM CHLORIDE, PRESERVATIVE FREE 10 ML: 5 INJECTION INTRAVENOUS at 20:45

## 2023-10-09 RX ADMIN — CALCITRIOL 0.25 MCG: 0.25 CAPSULE ORAL at 09:00

## 2023-10-09 RX ADMIN — HYDRALAZINE HYDROCHLORIDE 100 MG: 50 TABLET, FILM COATED ORAL at 14:27

## 2023-10-09 RX ADMIN — HEPARIN SODIUM 5000 UNITS: 5000 INJECTION INTRAVENOUS; SUBCUTANEOUS at 05:36

## 2023-10-09 RX ADMIN — NIFEDIPINE 120 MG: 30 TABLET, FILM COATED, EXTENDED RELEASE ORAL at 11:00

## 2023-10-09 RX ADMIN — ASPIRIN 81 MG: 81 TABLET, CHEWABLE ORAL at 09:00

## 2023-10-09 RX ADMIN — CARVEDILOL 25 MG: 25 TABLET, FILM COATED ORAL at 20:44

## 2023-10-09 RX ADMIN — HYDRALAZINE HYDROCHLORIDE 100 MG: 50 TABLET, FILM COATED ORAL at 05:37

## 2023-10-09 RX ADMIN — CARVEDILOL 25 MG: 25 TABLET, FILM COATED ORAL at 10:00

## 2023-10-09 RX ADMIN — HEPARIN SODIUM 5000 UNITS: 5000 INJECTION INTRAVENOUS; SUBCUTANEOUS at 20:44

## 2023-10-09 RX ADMIN — SODIUM BICARBONATE 1300 MG: 648 TABLET ORAL at 10:00

## 2023-10-09 RX ADMIN — BUMETANIDE 3 MG: 0.25 INJECTION INTRAMUSCULAR; INTRAVENOUS at 22:31

## 2023-10-09 ASSESSMENT — ENCOUNTER SYMPTOMS
ABDOMINAL DISTENTION: 0
VOMITING: 0
NAUSEA: 0
DIARRHEA: 0
COLOR CHANGE: 1
SHORTNESS OF BREATH: 1

## 2023-10-09 ASSESSMENT — PAIN SCALES - GENERAL
PAINLEVEL_OUTOF10: 0

## 2023-10-09 ASSESSMENT — PULMONARY FUNCTION TESTS: PIF_VALUE: 14

## 2023-10-09 NOTE — DISCHARGE INSTRUCTIONS
Follow up with your PCP in 3 days. Call for an appointment as soon as possible. Follow-up with specialist as instructed. Call for an appointment as soon as possible. Medications as instructed. Return to the emergency department immediately for any new or worsening concerns. =    Foot and Ankle:  Change every other day to right lower extremity: betadine, 4x4 gauze, kerlix, coban  Follow up in the [facility], [place] by [facility] at [address] within 1 week of discharge, call for an appointment.

## 2023-10-09 NOTE — PLAN OF CARE
Problem: Discharge Planning  Goal: Discharge to home or other facility with appropriate resources  10/9/2023 1002 by Lis Valero RN  Outcome: Progressing     Problem: Pain  Goal: Verbalizes/displays adequate comfort level or baseline comfort level  10/9/2023 1002 by Lis Valero RN  Outcome: Progressing     Problem: Safety - Adult  Goal: Free from fall injury  10/9/2023 1002 by Lis Valero RN  Outcome: Progressing     Problem: Skin/Tissue Integrity  Goal: Absence of new skin breakdown  Description: 1. Monitor for areas of redness and/or skin breakdown  2. Assess vascular access sites hourly  3. Every 4-6 hours minimum:  Change oxygen saturation probe site  4. Every 4-6 hours:  If on nasal continuous positive airway pressure, respiratory therapy assess nares and determine need for appliance change or resting period.   10/9/2023 1002 by Lis Valero RN  Outcome: Progressing     Problem: Respiratory - Adult  Goal: Achieves optimal ventilation and oxygenation  10/9/2023 1002 by Lis Valero RN  Outcome: Progressing     Problem: ABCDS Injury Assessment  Goal: Absence of physical injury  10/9/2023 1002 by Lis Valero RN  Outcome: Progressing

## 2023-10-09 NOTE — TRANSFER CENTER NOTE
Critical care team - Resident sign-out to medicine service      Date and time: 10/9/2023 2:51 PM  Patient's name:  Lyndon Garcia Record Number: [id number]  Patient's account/billing number: [de-identified]  Patient's YOB: 1980  Age: 39 y.o. Date of Admission: 10/4/2023  8:50 AM  Length of stay during current admission: 5    Primary Care Physician: Courtney Gibbs MD    Code Status: Limited    Mode of physician to physician communication:        [] Via telephone   [] In person     Date and time of sign-out: 10/9/2023 2:51 PM    Accepting Internal Medicine resident: Michael Graves    Accepting Medicine team: [facility]    Accepting team's attending: Dr. Aleah Prather    Patient's current ICU Bed:  6754     Patient's assigned bed on floor:  343        [] Med-Surg Monitored [] Step-down       [] Psychiatry ICU       [] Psych floor     Reason for ICU admission:     HD monitoring    ICU course summary:     41-year-old male past medical history of hypertension, CKD, presented to the emergency department 10/4 with complaint of chest pain and shortness of breath. Was found to be hypertensive 182/128. Creatinine 5.8. Was complaining of decreased urine output as well as swelling to the lower extremities. His troponins were found to be elevated, for which cardiology was consulted for. NSTEMI, type I versus type II. No urgent catheterization. Managed medically. Given the new acute on chronic renal failure, nephrology had been consulted. There had been multiple discussions about obtaining hemodialysis, however the patient was declining. The patient was also declining intubation if needed as his respiratory status had been worsening. On 10/7, the patient's respiratory status had significantly worsened, requiring BiPAP. He had agreed to receiving hemodialysis and a Eagle catheter was placed. He was transferred to the ICU for monitoring as it was his first hemodialysis session.   Unfortunately his dialysis

## 2023-10-09 NOTE — PLAN OF CARE
Problem: Respiratory - Adult  Goal: Achieves optimal ventilation and oxygenation  10/8/2023 2113 by Thaddeus Bermudez RCP  Outcome: Progressing

## 2023-10-09 NOTE — PLAN OF CARE
Problem: Discharge Planning  Goal: Discharge to home or other facility with appropriate resources  Outcome: Progressing     Problem: Pain  Goal: Verbalizes/displays adequate comfort level or baseline comfort level  Outcome: Progressing     Problem: Safety - Adult  Goal: Free from fall injury  Outcome: Progressing     Problem: Skin/Tissue Integrity  Goal: Absence of new skin breakdown  Description: 1. Monitor for areas of redness and/or skin breakdown  2. Assess vascular access sites hourly  3. Every 4-6 hours minimum:  Change oxygen saturation probe site  4. Every 4-6 hours:  If on nasal continuous positive airway pressure, respiratory therapy assess nares and determine need for appliance change or resting period.   Outcome: Progressing     Problem: Respiratory - Adult  Goal: Achieves optimal ventilation and oxygenation  10/9/2023 0107 by Jean Hope RN  Outcome: Progressing     Problem: ABCDS Injury Assessment  Goal: Absence of physical injury  Outcome: Progressing

## 2023-10-10 ENCOUNTER — APPOINTMENT (OUTPATIENT)
Age: 43
DRG: 190 | End: 2023-10-10
Payer: COMMERCIAL

## 2023-10-10 PROBLEM — L97.412: Status: ACTIVE | Noted: 2023-10-10

## 2023-10-10 PROBLEM — I87.2: Status: ACTIVE | Noted: 2023-10-10

## 2023-10-10 LAB
ANION GAP SERPL CALCULATED.3IONS-SCNC: 15 MMOL/L (ref 9–16)
BASOPHILS # BLD: 0.05 K/UL (ref 0–0.2)
BASOPHILS NFR BLD: 1 % (ref 0–2)
BUN SERPL-MCNC: 61 MG/DL (ref 6–20)
CALCIUM SERPL-MCNC: 8.5 MG/DL (ref 8.6–10.4)
CHLORIDE SERPL-SCNC: 98 MMOL/L (ref 98–107)
CO2 SERPL-SCNC: 19 MMOL/L (ref 20–31)
CREAT SERPL-MCNC: 5.4 MG/DL (ref 0.7–1.2)
EOSINOPHIL # BLD: 0.38 K/UL (ref 0–0.44)
EOSINOPHILS RELATIVE PERCENT: 6 % (ref 1–4)
ERYTHROCYTE [DISTWIDTH] IN BLOOD BY AUTOMATED COUNT: 14.6 % (ref 11.8–14.4)
FERRITIN SERPL-MCNC: 249 NG/ML (ref 30–400)
FOLATE SERPL-MCNC: 9.7 NG/ML
GFR SERPL CREATININE-BSD FRML MDRD: 13 ML/MIN/1.73M2
GLUCOSE SERPL-MCNC: 94 MG/DL (ref 74–99)
HCT VFR BLD AUTO: 24.7 % (ref 40.7–50.3)
HGB BLD-MCNC: 7.3 G/DL (ref 13–17)
IMM GRANULOCYTES # BLD AUTO: <0.03 K/UL (ref 0–0.3)
IMM GRANULOCYTES NFR BLD: 0 %
IMM RETICS NFR: 19.2 % (ref 2.7–18.3)
IRON SATN MFR SERPL: 19 % (ref 20–55)
IRON SERPL-MCNC: 38 UG/DL (ref 59–158)
LYMPHOCYTES NFR BLD: 1.04 K/UL (ref 1.1–3.7)
LYMPHOCYTES RELATIVE PERCENT: 16 % (ref 24–43)
MCH RBC QN AUTO: 25.2 PG (ref 25.2–33.5)
MCHC RBC AUTO-ENTMCNC: 29.6 G/DL (ref 28.4–34.8)
MCV RBC AUTO: 85.2 FL (ref 82.6–102.9)
MONOCYTES NFR BLD: 0.61 K/UL (ref 0.1–1.2)
MONOCYTES NFR BLD: 9 % (ref 3–12)
NEUTROPHILS NFR BLD: 69 % (ref 36–65)
NEUTS SEG NFR BLD: 4.59 K/UL (ref 1.5–8.1)
NRBC BLD-RTO: 0 PER 100 WBC
PLATELET # BLD AUTO: 250 K/UL (ref 138–453)
PMV BLD AUTO: 12.7 FL (ref 8.1–13.5)
POTASSIUM SERPL-SCNC: 4.5 MMOL/L (ref 3.7–5.3)
RBC # BLD AUTO: 2.9 M/UL (ref 4.21–5.77)
RBC # BLD: ABNORMAL 10*6/UL
RETIC HEMOGLOBIN: 23.3 PG (ref 28.2–35.7)
RETICS # AUTO: 0.05 M/UL (ref 0.03–0.08)
RETICS/RBC NFR AUTO: 1.6 % (ref 0.5–1.9)
SODIUM SERPL-SCNC: 132 MMOL/L (ref 136–145)
TIBC SERPL-MCNC: 196 UG/DL (ref 250–450)
UNSATURATED IRON BINDING CAPACITY: 158 UG/DL (ref 112–347)
VIT B12 SERPL-MCNC: 450 PG/ML (ref 232–1245)
WBC OTHER # BLD: 6.7 K/UL (ref 3.5–11.3)

## 2023-10-10 PROCEDURE — 83550 IRON BINDING TEST: CPT

## 2023-10-10 PROCEDURE — 6370000000 HC RX 637 (ALT 250 FOR IP): Performed by: INTERNAL MEDICINE

## 2023-10-10 PROCEDURE — 83540 ASSAY OF IRON: CPT

## 2023-10-10 PROCEDURE — 86317 IMMUNOASSAY INFECTIOUS AGENT: CPT

## 2023-10-10 PROCEDURE — 36415 COLL VENOUS BLD VENIPUNCTURE: CPT

## 2023-10-10 PROCEDURE — 2500000003 HC RX 250 WO HCPCS: Performed by: INTERNAL MEDICINE

## 2023-10-10 PROCEDURE — 2700000000 HC OXYGEN THERAPY PER DAY

## 2023-10-10 PROCEDURE — 99233 SBSQ HOSP IP/OBS HIGH 50: CPT | Performed by: SURGERY

## 2023-10-10 PROCEDURE — 85025 COMPLETE CBC W/AUTO DIFF WBC: CPT

## 2023-10-10 PROCEDURE — 82607 VITAMIN B-12: CPT

## 2023-10-10 PROCEDURE — 1200000000 HC SEMI PRIVATE

## 2023-10-10 PROCEDURE — 97530 THERAPEUTIC ACTIVITIES: CPT

## 2023-10-10 PROCEDURE — 6370000000 HC RX 637 (ALT 250 FOR IP): Performed by: HOSPITALIST

## 2023-10-10 PROCEDURE — 6360000002 HC RX W HCPCS: Performed by: NURSE PRACTITIONER

## 2023-10-10 PROCEDURE — 86803 HEPATITIS C AB TEST: CPT

## 2023-10-10 PROCEDURE — 97535 SELF CARE MNGMENT TRAINING: CPT

## 2023-10-10 PROCEDURE — 94761 N-INVAS EAR/PLS OXIMETRY MLT: CPT

## 2023-10-10 PROCEDURE — 82746 ASSAY OF FOLIC ACID SERUM: CPT

## 2023-10-10 PROCEDURE — 80048 BASIC METABOLIC PNL TOTAL CA: CPT

## 2023-10-10 PROCEDURE — 99232 SBSQ HOSP IP/OBS MODERATE 35: CPT | Performed by: INTERNAL MEDICINE

## 2023-10-10 PROCEDURE — 97162 PT EVAL MOD COMPLEX 30 MIN: CPT

## 2023-10-10 PROCEDURE — 2580000003 HC RX 258: Performed by: INTERNAL MEDICINE

## 2023-10-10 PROCEDURE — 82728 ASSAY OF FERRITIN: CPT

## 2023-10-10 PROCEDURE — 86704 HEP B CORE ANTIBODY TOTAL: CPT

## 2023-10-10 PROCEDURE — 97166 OT EVAL MOD COMPLEX 45 MIN: CPT

## 2023-10-10 PROCEDURE — 87340 HEPATITIS B SURFACE AG IA: CPT

## 2023-10-10 PROCEDURE — 6370000000 HC RX 637 (ALT 250 FOR IP): Performed by: NURSE PRACTITIONER

## 2023-10-10 PROCEDURE — 85045 AUTOMATED RETICULOCYTE COUNT: CPT

## 2023-10-10 RX ORDER — HYDRALAZINE HYDROCHLORIDE 50 MG/1
50 TABLET, FILM COATED ORAL EVERY 8 HOURS SCHEDULED
Status: DISCONTINUED | OUTPATIENT
Start: 2023-10-10 | End: 2023-10-13

## 2023-10-10 RX ORDER — FOLIC ACID 1 MG/1
1 TABLET ORAL DAILY
Status: DISCONTINUED | OUTPATIENT
Start: 2023-10-11 | End: 2023-10-11

## 2023-10-10 RX ADMIN — HYDRALAZINE HYDROCHLORIDE 100 MG: 50 TABLET, FILM COATED ORAL at 04:29

## 2023-10-10 RX ADMIN — HEPARIN SODIUM 5000 UNITS: 5000 INJECTION INTRAVENOUS; SUBCUTANEOUS at 14:18

## 2023-10-10 RX ADMIN — ATORVASTATIN CALCIUM 80 MG: 80 TABLET, FILM COATED ORAL at 21:32

## 2023-10-10 RX ADMIN — ASPIRIN 81 MG: 81 TABLET, CHEWABLE ORAL at 08:48

## 2023-10-10 RX ADMIN — SODIUM CHLORIDE, PRESERVATIVE FREE 10 ML: 5 INJECTION INTRAVENOUS at 21:37

## 2023-10-10 RX ADMIN — BUMETANIDE 3 MG: 0.25 INJECTION INTRAMUSCULAR; INTRAVENOUS at 21:35

## 2023-10-10 RX ADMIN — OXYCODONE AND ACETAMINOPHEN 1 TABLET: 5; 325 TABLET ORAL at 04:37

## 2023-10-10 RX ADMIN — HEPARIN SODIUM 5000 UNITS: 5000 INJECTION INTRAVENOUS; SUBCUTANEOUS at 22:21

## 2023-10-10 RX ADMIN — SODIUM BICARBONATE 1300 MG: 648 TABLET ORAL at 08:48

## 2023-10-10 RX ADMIN — SODIUM CHLORIDE, PRESERVATIVE FREE 10 ML: 5 INJECTION INTRAVENOUS at 08:49

## 2023-10-10 RX ADMIN — BUMETANIDE 3 MG: 0.25 INJECTION INTRAMUSCULAR; INTRAVENOUS at 08:48

## 2023-10-10 RX ADMIN — CARVEDILOL 25 MG: 25 TABLET, FILM COATED ORAL at 08:49

## 2023-10-10 RX ADMIN — CALCITRIOL 0.25 MCG: 0.25 CAPSULE ORAL at 08:48

## 2023-10-10 RX ADMIN — HYDRALAZINE HYDROCHLORIDE 100 MG: 50 TABLET, FILM COATED ORAL at 14:18

## 2023-10-10 RX ADMIN — HEPARIN SODIUM 5000 UNITS: 5000 INJECTION INTRAVENOUS; SUBCUTANEOUS at 04:28

## 2023-10-10 RX ADMIN — SODIUM BICARBONATE 1300 MG: 648 TABLET ORAL at 21:32

## 2023-10-10 RX ADMIN — NIFEDIPINE 120 MG: 30 TABLET, FILM COATED, EXTENDED RELEASE ORAL at 08:48

## 2023-10-10 ASSESSMENT — ENCOUNTER SYMPTOMS
DIARRHEA: 0
NAUSEA: 0
SHORTNESS OF BREATH: 1
ABDOMINAL DISTENTION: 0
COLOR CHANGE: 1
VOMITING: 0

## 2023-10-10 ASSESSMENT — PAIN SCALES - GENERAL: PAINLEVEL_OUTOF10: 8

## 2023-10-10 NOTE — PLAN OF CARE
Problem: Discharge Planning  Goal: Discharge to home or other facility with appropriate resources  Outcome: Progressing     Problem: Pain  Goal: Verbalizes/displays adequate comfort level or baseline comfort level  Outcome: Progressing     Problem: Safety - Adult  Goal: Free from fall injury  Outcome: Progressing     Problem: Skin/Tissue Integrity  Goal: Absence of new skin breakdown  Description: 1. Monitor for areas of redness and/or skin breakdown  2. Assess vascular access sites hourly  3. Every 4-6 hours minimum:  Change oxygen saturation probe site  4. Every 4-6 hours:  If on nasal continuous positive airway pressure, respiratory therapy assess nares and determine need for appliance change or resting period.   Outcome: Progressing     Problem: Respiratory - Adult  Goal: Achieves optimal ventilation and oxygenation  Outcome: Progressing     Problem: ABCDS Injury Assessment  Goal: Absence of physical injury  Outcome: Progressing

## 2023-10-11 ENCOUNTER — APPOINTMENT (OUTPATIENT)
Dept: DIALYSIS | Age: 43
DRG: 190 | End: 2023-10-11
Payer: COMMERCIAL

## 2023-10-11 ENCOUNTER — NURSE ONLY (OUTPATIENT)
Dept: LAB | Age: 43
End: 2023-10-11

## 2023-10-11 ENCOUNTER — APPOINTMENT (OUTPATIENT)
Age: 43
DRG: 190 | End: 2023-10-11
Payer: COMMERCIAL

## 2023-10-11 ENCOUNTER — APPOINTMENT (OUTPATIENT)
Dept: INTERVENTIONAL RADIOLOGY/VASCULAR | Age: 43
DRG: 190 | End: 2023-10-11
Payer: COMMERCIAL

## 2023-10-11 LAB
ANION GAP SERPL CALCULATED.3IONS-SCNC: 12 MMOL/L (ref 9–17)
BASOPHILS # BLD: 0.07 K/UL (ref 0–0.2)
BASOPHILS NFR BLD: 1 % (ref 0–2)
BUN SERPL-MCNC: 64 MG/DL (ref 6–20)
CALCIUM SERPL-MCNC: 8.7 MG/DL (ref 8.6–10.4)
CHLORIDE SERPL-SCNC: 97 MMOL/L (ref 98–107)
CO2 SERPL-SCNC: 23 MMOL/L (ref 20–31)
CREAT SERPL-MCNC: 6.1 MG/DL (ref 0.7–1.2)
ECHO AO ROOT DIAM: 4.2 CM
ECHO AO ROOT INDEX: 1.63 CM/M2
ECHO AV AREA PEAK VELOCITY: 2.7 CM2
ECHO AV AREA VTI: 3.2 CM2
ECHO AV AREA/BSA PEAK VELOCITY: 1 CM2/M2
ECHO AV AREA/BSA VTI: 1.2 CM2/M2
ECHO AV MEAN GRADIENT: 3 MMHG
ECHO AV MEAN VELOCITY: 0.9 M/S
ECHO AV PEAK GRADIENT: 6 MMHG
ECHO AV PEAK VELOCITY: 1.3 M/S
ECHO AV VELOCITY RATIO: 0.92
ECHO AV VTI: 18.6 CM
ECHO BSA: 2.62 M2
ECHO LA AREA 2C: 27.8 CM2
ECHO LA AREA 4C: 26.6 CM2
ECHO LA DIAMETER INDEX: 1.86 CM/M2
ECHO LA DIAMETER: 4.8 CM
ECHO LA MAJOR AXIS: 6.3 CM
ECHO LA MINOR AXIS: 6.6 CM
ECHO LA TO AORTIC ROOT RATIO: 1.14
ECHO LA VOL 2C: 100 ML (ref 18–58)
ECHO LA VOL 4C: 91 ML (ref 18–58)
ECHO LA VOL BP: 96 ML (ref 18–58)
ECHO LA VOL/BSA BIPLANE: 37 ML/M2 (ref 16–34)
ECHO LA VOLUME INDEX A2C: 39 ML/M2 (ref 16–34)
ECHO LA VOLUME INDEX A4C: 35 ML/M2 (ref 16–34)
ECHO LV E' LATERAL VELOCITY: 9 CM/S
ECHO LV EDV A2C: 78 ML
ECHO LV EDV A4C: 56 ML
ECHO LV EDV INDEX A4C: 22 ML/M2
ECHO LV EDV NDEX A2C: 30 ML/M2
ECHO LV EJECTION FRACTION A2C: 62 %
ECHO LV EJECTION FRACTION A4C: 61 %
ECHO LV EJECTION FRACTION BIPLANE: 60 % (ref 55–100)
ECHO LV ESV A2C: 30 ML
ECHO LV ESV A4C: 22 ML
ECHO LV ESV INDEX A2C: 12 ML/M2
ECHO LV ESV INDEX A4C: 9 ML/M2
ECHO LV FRACTIONAL SHORTENING: 39 % (ref 28–44)
ECHO LV INTERNAL DIMENSION DIASTOLE INDEX: 2.21 CM/M2
ECHO LV INTERNAL DIMENSION DIASTOLIC: 5.7 CM (ref 4.2–5.9)
ECHO LV INTERNAL DIMENSION SYSTOLIC INDEX: 1.36 CM/M2
ECHO LV INTERNAL DIMENSION SYSTOLIC: 3.5 CM
ECHO LV IVSD: 1.4 CM (ref 0.6–1)
ECHO LV MASS 2D: 357.5 G (ref 88–224)
ECHO LV MASS INDEX 2D: 138.6 G/M2 (ref 49–115)
ECHO LV POSTERIOR WALL DIASTOLIC: 1.4 CM (ref 0.6–1)
ECHO LV RELATIVE WALL THICKNESS RATIO: 0.49
ECHO LVOT AREA: 2.8 CM2
ECHO LVOT AV VTI INDEX: 1.13
ECHO LVOT DIAM: 1.9 CM
ECHO LVOT MEAN GRADIENT: 3 MMHG
ECHO LVOT PEAK GRADIENT: 6 MMHG
ECHO LVOT PEAK VELOCITY: 1.2 M/S
ECHO LVOT STROKE VOLUME INDEX: 23.2 ML/M2
ECHO LVOT SV: 59.8 ML
ECHO LVOT VTI: 21.1 CM
ECHO MV A VELOCITY: 1.15 M/S
ECHO MV AREA VTI: 3.2 CM2
ECHO MV E VELOCITY: 0.79 M/S
ECHO MV E/A RATIO: 0.69
ECHO MV E/E' LATERAL: 8.78
ECHO MV LVOT VTI INDEX: 0.88
ECHO MV MAX VELOCITY: 1.2 M/S
ECHO MV MEAN GRADIENT: 3 MMHG
ECHO MV MEAN VELOCITY: 0.7 M/S
ECHO MV PEAK GRADIENT: 6 MMHG
ECHO MV VTI: 18.5 CM
ECHO PV MAX VELOCITY: 1.2 M/S
ECHO PV PEAK GRADIENT: 6 MMHG
ECHO RA AREA 4C: 19.5 CM2
ECHO RV BASAL DIMENSION: 3.6 CM
ECHO RV FREE WALL PEAK S': 15 CM/S
ECHO RV MID DIMENSION: 3.7 CM
ECHO RV TAPSE: 3 CM (ref 1.7–?)
EOSINOPHIL # BLD: 0.58 K/UL (ref 0–0.44)
EOSINOPHILS RELATIVE PERCENT: 10 % (ref 1–4)
ERYTHROCYTE [DISTWIDTH] IN BLOOD BY AUTOMATED COUNT: 14.3 % (ref 11.8–14.4)
GFR SERPL CREATININE-BSD FRML MDRD: 11 ML/MIN/1.73M2
GLUCOSE SERPL-MCNC: 95 MG/DL (ref 70–99)
HBV SURFACE AB SERPL IA-ACNC: 16.01 MIU/ML
HBV SURFACE AG SERPL QL IA: NONREACTIVE
HCT VFR BLD AUTO: 23.5 % (ref 40.7–50.3)
HCV AB SERPL QL IA: NONREACTIVE
HGB BLD-MCNC: 7.4 G/DL (ref 13–17)
IMM GRANULOCYTES # BLD AUTO: <0.03 K/UL (ref 0–0.3)
IMM GRANULOCYTES NFR BLD: 0 %
INR PPP: 1.1
LYMPHOCYTES NFR BLD: 1.29 K/UL (ref 1.1–3.7)
LYMPHOCYTES RELATIVE PERCENT: 21 % (ref 24–43)
MCH RBC QN AUTO: 25.3 PG (ref 25.2–33.5)
MCHC RBC AUTO-ENTMCNC: 31.5 G/DL (ref 28.4–34.8)
MCV RBC AUTO: 80.2 FL (ref 82.6–102.9)
MONOCYTES NFR BLD: 0.82 K/UL (ref 0.1–1.2)
MONOCYTES NFR BLD: 14 % (ref 3–12)
NEUTROPHILS NFR BLD: 54 % (ref 36–65)
NEUTS SEG NFR BLD: 3.26 K/UL (ref 1.5–8.1)
NRBC BLD-RTO: 0 PER 100 WBC
PARTIAL THROMBOPLASTIN TIME: 29.8 SEC (ref 23–36.5)
PATH REV BLD -IMP: NORMAL
PLATELET # BLD AUTO: 271 K/UL (ref 138–453)
PMV BLD AUTO: 12.6 FL (ref 8.1–13.5)
POTASSIUM SERPL-SCNC: 4.5 MMOL/L (ref 3.7–5.3)
PROTHROMBIN TIME: 13.9 SEC (ref 11.7–14.9)
RBC # BLD AUTO: 2.93 M/UL (ref 4.21–5.77)
RBC # BLD: ABNORMAL 10*6/UL
SODIUM SERPL-SCNC: 132 MMOL/L (ref 135–144)
SURGICAL PATHOLOGY REPORT: NORMAL
WBC OTHER # BLD: 6 K/UL (ref 3.5–11.3)

## 2023-10-11 PROCEDURE — 80048 BASIC METABOLIC PNL TOTAL CA: CPT

## 2023-10-11 PROCEDURE — 6370000000 HC RX 637 (ALT 250 FOR IP): Performed by: NURSE PRACTITIONER

## 2023-10-11 PROCEDURE — 2580000003 HC RX 258: Performed by: INTERNAL MEDICINE

## 2023-10-11 PROCEDURE — 90935 HEMODIALYSIS ONE EVALUATION: CPT

## 2023-10-11 PROCEDURE — 93306 TTE W/DOPPLER COMPLETE: CPT | Performed by: INTERNAL MEDICINE

## 2023-10-11 PROCEDURE — 93306 TTE W/DOPPLER COMPLETE: CPT

## 2023-10-11 PROCEDURE — 99232 SBSQ HOSP IP/OBS MODERATE 35: CPT | Performed by: INTERNAL MEDICINE

## 2023-10-11 PROCEDURE — 2700000000 HC OXYGEN THERAPY PER DAY

## 2023-10-11 PROCEDURE — 6370000000 HC RX 637 (ALT 250 FOR IP): Performed by: INTERNAL MEDICINE

## 2023-10-11 PROCEDURE — 6360000002 HC RX W HCPCS: Performed by: INTERNAL MEDICINE

## 2023-10-11 PROCEDURE — 36558 INSERT TUNNELED CV CATH: CPT

## 2023-10-11 PROCEDURE — 02H633Z INSERTION OF INFUSION DEVICE INTO RIGHT ATRIUM, PERCUTANEOUS APPROACH: ICD-10-PCS | Performed by: RADIOLOGY

## 2023-10-11 PROCEDURE — 36415 COLL VENOUS BLD VENIPUNCTURE: CPT

## 2023-10-11 PROCEDURE — 6370000000 HC RX 637 (ALT 250 FOR IP): Performed by: SURGERY

## 2023-10-11 PROCEDURE — 6360000002 HC RX W HCPCS: Performed by: NURSE PRACTITIONER

## 2023-10-11 PROCEDURE — 0JH63XZ INSERTION OF TUNNELED VASCULAR ACCESS DEVICE INTO CHEST SUBCUTANEOUS TISSUE AND FASCIA, PERCUTANEOUS APPROACH: ICD-10-PCS | Performed by: RADIOLOGY

## 2023-10-11 PROCEDURE — 94760 N-INVAS EAR/PLS OXIMETRY 1: CPT

## 2023-10-11 PROCEDURE — 6360000002 HC RX W HCPCS: Performed by: RADIOLOGY

## 2023-10-11 PROCEDURE — 85730 THROMBOPLASTIN TIME PARTIAL: CPT

## 2023-10-11 PROCEDURE — 1200000000 HC SEMI PRIVATE

## 2023-10-11 PROCEDURE — 85025 COMPLETE CBC W/AUTO DIFF WBC: CPT

## 2023-10-11 PROCEDURE — 77001 FLUOROGUIDE FOR VEIN DEVICE: CPT

## 2023-10-11 PROCEDURE — C1769 GUIDE WIRE: HCPCS

## 2023-10-11 PROCEDURE — 2580000003 HC RX 258: Performed by: RADIOLOGY

## 2023-10-11 PROCEDURE — 85610 PROTHROMBIN TIME: CPT

## 2023-10-11 PROCEDURE — 99233 SBSQ HOSP IP/OBS HIGH 50: CPT | Performed by: NURSE PRACTITIONER

## 2023-10-11 PROCEDURE — 76937 US GUIDE VASCULAR ACCESS: CPT

## 2023-10-11 RX ORDER — BUMETANIDE 1 MG/1
2 TABLET ORAL DAILY
Status: DISCONTINUED | OUTPATIENT
Start: 2023-10-12 | End: 2023-10-19 | Stop reason: HOSPADM

## 2023-10-11 RX ORDER — HEPARIN SODIUM 1000 [USP'U]/ML
1900 INJECTION, SOLUTION INTRAVENOUS; SUBCUTANEOUS PRN
Status: DISCONTINUED | OUTPATIENT
Start: 2023-10-11 | End: 2023-10-19 | Stop reason: HOSPADM

## 2023-10-11 RX ORDER — HEPARIN 100 UNIT/ML
SYRINGE INTRAVENOUS PRN
Status: COMPLETED | OUTPATIENT
Start: 2023-10-11 | End: 2023-10-11

## 2023-10-11 RX ORDER — NEPHROCAP 1 MG
1 CAP ORAL DAILY
Status: DISCONTINUED | OUTPATIENT
Start: 2023-10-11 | End: 2023-10-19 | Stop reason: HOSPADM

## 2023-10-11 RX ORDER — HEPARIN SODIUM (PORCINE) LOCK FLUSH IV SOLN 100 UNIT/ML 100 UNIT/ML
SOLUTION INTRAVENOUS PRN
Status: COMPLETED | OUTPATIENT
Start: 2023-10-11 | End: 2023-10-11

## 2023-10-11 RX ORDER — OXYCODONE HYDROCHLORIDE AND ACETAMINOPHEN 5; 325 MG/1; MG/1
1 TABLET ORAL ONCE
Status: COMPLETED | OUTPATIENT
Start: 2023-10-11 | End: 2023-10-11

## 2023-10-11 RX ORDER — FENTANYL CITRATE 50 UG/ML
INJECTION, SOLUTION INTRAMUSCULAR; INTRAVENOUS PRN
Status: COMPLETED | OUTPATIENT
Start: 2023-10-11 | End: 2023-10-11

## 2023-10-11 RX ADMIN — ASPIRIN 81 MG: 81 TABLET, CHEWABLE ORAL at 09:52

## 2023-10-11 RX ADMIN — ATORVASTATIN CALCIUM 80 MG: 80 TABLET, FILM COATED ORAL at 20:41

## 2023-10-11 RX ADMIN — CALCITRIOL 0.25 MCG: 0.25 CAPSULE ORAL at 09:51

## 2023-10-11 RX ADMIN — OXYCODONE AND ACETAMINOPHEN 1 TABLET: 5; 325 TABLET ORAL at 09:52

## 2023-10-11 RX ADMIN — CARVEDILOL 25 MG: 25 TABLET, FILM COATED ORAL at 09:52

## 2023-10-11 RX ADMIN — HYDRALAZINE HYDROCHLORIDE 50 MG: 50 TABLET, FILM COATED ORAL at 05:50

## 2023-10-11 RX ADMIN — HEPARIN SODIUM 5000 UNITS: 5000 INJECTION INTRAVENOUS; SUBCUTANEOUS at 23:07

## 2023-10-11 RX ADMIN — NEPHROCAP 1 MG: 1 CAP ORAL at 09:51

## 2023-10-11 RX ADMIN — HEPARIN SODIUM (PORCINE) LOCK FLUSH IV SOLN 100 UNIT/ML 1.9 ML: 100 SOLUTION at 15:35

## 2023-10-11 RX ADMIN — SODIUM CHLORIDE, PRESERVATIVE FREE 10 ML: 5 INJECTION INTRAVENOUS at 09:52

## 2023-10-11 RX ADMIN — HYDRALAZINE HYDROCHLORIDE 50 MG: 50 TABLET, FILM COATED ORAL at 14:32

## 2023-10-11 RX ADMIN — SODIUM CHLORIDE: 9 INJECTION, SOLUTION INTRAVENOUS at 11:05

## 2023-10-11 RX ADMIN — FENTANYL CITRATE 25 MCG: 50 INJECTION, SOLUTION INTRAMUSCULAR; INTRAVENOUS at 15:34

## 2023-10-11 RX ADMIN — HEPARIN SODIUM 1900 UNITS: 1000 INJECTION INTRAVENOUS; SUBCUTANEOUS at 18:40

## 2023-10-11 RX ADMIN — HEPARIN SODIUM 1900 UNITS: 1000 INJECTION INTRAVENOUS; SUBCUTANEOUS at 18:39

## 2023-10-11 RX ADMIN — IRON SUCROSE 200 MG: 20 INJECTION, SOLUTION INTRAVENOUS at 05:26

## 2023-10-11 RX ADMIN — HYDRALAZINE HYDROCHLORIDE 50 MG: 50 TABLET, FILM COATED ORAL at 23:11

## 2023-10-11 RX ADMIN — CARVEDILOL 25 MG: 25 TABLET, FILM COATED ORAL at 20:41

## 2023-10-11 RX ADMIN — HEPARIN 1.9 ML: 100 SYRINGE at 15:36

## 2023-10-11 RX ADMIN — FENTANYL CITRATE 50 MCG: 50 INJECTION, SOLUTION INTRAMUSCULAR; INTRAVENOUS at 15:25

## 2023-10-11 RX ADMIN — NIFEDIPINE 120 MG: 30 TABLET, FILM COATED, EXTENDED RELEASE ORAL at 09:51

## 2023-10-11 ASSESSMENT — PAIN DESCRIPTION - DESCRIPTORS
DESCRIPTORS: PATIENT UNABLE TO DESCRIBE
DESCRIPTORS: ACHING;DISCOMFORT;SHARP;SHOOTING

## 2023-10-11 ASSESSMENT — PAIN - FUNCTIONAL ASSESSMENT: PAIN_FUNCTIONAL_ASSESSMENT: PREVENTS OR INTERFERES SOME ACTIVE ACTIVITIES AND ADLS

## 2023-10-11 ASSESSMENT — PAIN DESCRIPTION - ORIENTATION
ORIENTATION: RIGHT
ORIENTATION: RIGHT

## 2023-10-11 ASSESSMENT — PAIN DESCRIPTION - ONSET
ONSET: ON-GOING
ONSET: ON-GOING

## 2023-10-11 ASSESSMENT — PAIN SCALES - GENERAL
PAINLEVEL_OUTOF10: 8
PAINLEVEL_OUTOF10: 4
PAINLEVEL_OUTOF10: 8
PAINLEVEL_OUTOF10: 0
PAINLEVEL_OUTOF10: 8

## 2023-10-11 ASSESSMENT — PAIN DESCRIPTION - LOCATION
LOCATION: FOOT
LOCATION: FOOT

## 2023-10-11 ASSESSMENT — PAIN DESCRIPTION - PAIN TYPE
TYPE: ACUTE PAIN
TYPE: ACUTE PAIN

## 2023-10-11 ASSESSMENT — PAIN DESCRIPTION - FREQUENCY
FREQUENCY: CONTINUOUS
FREQUENCY: CONTINUOUS

## 2023-10-11 NOTE — BRIEF OP NOTE
Brief Postoperative Note    Kartik Stewart  YOB: 1980  [id number]    Pre-operative Diagnosis: Acute Renal Failure      Post-operative Diagnosis: Same    Procedure: Tunneled Dialysis Catheter    Medication Given: fentanyl    Anesthesia: 1%Lidocaine     Surgeons/Assistants: Tyson Mustafa MD    Estimated Blood Loss: Minimal    Complications: none    14 Fr x 23 cm tip to cuff palindrome tunneled HD Catheter placed successfully via the Site:  Right Internal Jugular Vein. Catheter secured to skin, dressing applied. Catheter locked with Heparin. May use HD cath for dialysis.     Electronically signed by TOMMIE Jenkins on 10/11/2023 at 3:52 PM

## 2023-10-11 NOTE — PLAN OF CARE
Problem: Discharge Planning  Goal: Discharge to home or other facility with appropriate resources  10/11/2023 0603 by Marshal Vasquez RN  Outcome: Progressing  10/10/2023 1751 by Mario Morgan RN  Outcome: Progressing     Problem: Pain  Goal: Verbalizes/displays adequate comfort level or baseline comfort level  10/11/2023 0603 by Marshal Vasquez RN  Outcome: Progressing  10/10/2023 1751 by Mario Morgan RN  Outcome: Progressing     Problem: Safety - Adult  Goal: Free from fall injury  10/11/2023 0603 by Marshal Vasquez RN  Outcome: Progressing  10/10/2023 1751 by Mario Morgan RN  Outcome: Progressing     Problem: Skin/Tissue Integrity  Goal: Absence of new skin breakdown  Description: 1. Monitor for areas of redness and/or skin breakdown  2. Assess vascular access sites hourly  3. Every 4-6 hours minimum:  Change oxygen saturation probe site  4. Every 4-6 hours:  If on nasal continuous positive airway pressure, respiratory therapy assess nares and determine need for appliance change or resting period.   10/11/2023 0603 by Marshal Vasquez RN  Outcome: Progressing  10/10/2023 1751 by Mario Morgan RN  Outcome: Progressing     Problem: Respiratory - Adult  Goal: Achieves optimal ventilation and oxygenation  10/11/2023 0603 by Marshal Vasquez RN  Outcome: Progressing  10/10/2023 1751 by Mario Morgan RN  Outcome: Progressing     Problem: ABCDS Injury Assessment  Goal: Absence of physical injury  10/11/2023 0603 by Marshal Vasquez RN  Outcome: Progressing  10/10/2023 1751 by Mario Mogran RN  Outcome: Progressing

## 2023-10-11 NOTE — OR NURSING
14.5 x 23 cm CVC to right IJ. Brisk blood return noted. Line sutured in place. Lines capped. Dressing on. Report called to HD. Lines flushed with heparin.

## 2023-10-11 NOTE — ACP (ADVANCE CARE PLANNING)
Advance Care Planning     Advance Care Planning Activator (Inpatient)  Conversation Note      Date of ACP Conversation: 10/11/2023     Conversation Conducted with: Patient with Decision Making Capacity    ACP Activator: Kiran Villela RN    Toby Smiley relates he is single  Toby Smiley relates he has no children  Toby Smiley relates his parents are both . He does not have a [address] what a [address] is  Explained that we have to follow [facility] Next of [address] [place] [address] if no [address]. Let Toby Smiley know that per Jeremy Sings his decision makers would be his 6 sisters working together each having an equal say to make his decisions. Explained that we have to make an effort to reach all 6 of his sisters and give them all the opportunity. Asked if he wanted to fill out a [address] and name someone to be his medical decision maker if he is unable to. Toby Smiley said there is no one to be his [address]  Let Toby Smiley know that if he decides on someone we can help him complete the [address] here at the hospital.    Let him know that doctors offices would also have [address] documents and could help him. Health Care Decision Maker:     Current Designated Health Care Decision Maker:   Patient's 6 sisters are his joint decision makers per [facility] Next of [facility] Today we documented Decision Maker(s) consistent with Legal Next of Kin hierarchy. Toby Smiley did not give names of his 6 sisters or numbers. We have one sister Aleah Aguillon listed with phone number as emergency contact. Care Preferences    Ventilation: \"If you were in your present state of health and suddenly became very ill and were unable to breathe on your own, what would your preference be about the use of a ventilator (breathing machine) if it were available to you? \"      Would the patient desire the use of ventilator (breathing machine)?: no    \"If your health worsens and it becomes clear that your chance of recovery is unlikely, what would your preference be about the use of a ventilator

## 2023-10-11 NOTE — CONSULTS
Consultation Note  Podiatric Medicine and Surgery     Subjective     Chief Complaint: Medial and lateral ankle wounds    HPI:  Chago Hinojosa is a 39 y.o. male presented to [facility] ED for dizziness, chest pain, and painful medial and lateral ankle wounds present for 2 months. Patient stated that the ankle wounds started as black dots and became open sores over time. Patient stated the areas are excruciatingly painful when weightbearing but are also painful at rest. Patient admitted to history of gout in his feet. Upon evaluation in the ED patient was found to have an NSTEMI and was admitted to the hospital for management. PCP is Jerrye Hamman, MD    ROS:   Review of Systems   Constitutional:  Negative for fatigue and fever. Cardiovascular:  Positive for chest pain and leg swelling. Gastrointestinal:  Negative for abdominal distention, diarrhea, nausea and vomiting. Musculoskeletal:  Positive for gait problem and joint swelling. Skin:  Positive for color change, rash and wound. Past Medical History   has a past medical history of DEVYN (acute kidney injury) ([address]), Bipolar 1 disorder ([address]), Bronchitis, Bronchitis, Hypertension, Moderate persistent asthma, and Obesity. Past Surgical History   has no past surgical history on file. Medications  Prior to Admission medications    Medication Sig Start Date End Date Taking?  Authorizing Provider   lisinopril-hydroCHLOROthiazide (PRINZIDE;ZESTORETIC) 10-12.5 MG per tablet  9/14/23   Kitty Gan MD   ibuprofen (ADVIL;MOTRIN) 600 MG tablet Take 800 mg by mouth 3 times daily as needed  Patient not taking: Reported on 10/4/2023 9/14/23   Kitty Gan MD   LOKELMA 5 g PACK oral suspension  4/24/23   Kitty Gan MD   NIFEdipine (ADALAT CC) 60 MG extended release tablet Take 2 tablets by mouth daily 5/17/23   Jerrye Hamman, MD   carvedilol (COREG) 12.5 MG tablet Take 1 tablet by mouth 2 times daily  Patient not taking:
Nephrology Consult Note    Reason for Consult:  acute kidney injury on top of chronic kidney disease stage IV  Requesting Physician:  Dr. Perez    Chief Complaint:  feeling unwell and dizzy with the patient on worsening renal function over time  History Obtained From:  patient    History of Present Illness: This is a 39 y.o. male who presents with uncontrolled hypertension and feeling dizzy and lightheaded. Systemic blood pressure was elevated. He was put on a Cardene drip. He also had associated chest pain and was put on a heparin drip as well. He is feeling a bit better now. Still having some chest discomfort. Lungs. There is fair. His creatinine was 5.8 and creatinine back April of 2023 was in the range of 3-4. There is some consideration for dialysis. The patient has not followed up with nephrology since he was discharged from [facility] in about March/April of 2023. Past medical history includes bipolar disorder, bronchitis, obesity, hypertension and moderate, persistent asthma. Medications are reviewed. No known drug allergies. Past Medical History:        Diagnosis Date    DEVYN (acute kidney injury) ([address] [facility]) 10/9/2021    Bipolar 1 disorder ([address] [facility]) 2/26/2015    Bronchitis     Bronchitis     Hypertension     Moderate persistent asthma 2/26/2015    Obesity        Past Surgical History:    History reviewed. No pertinent surgical history.     Current Medications:    niCARdipine (CARDENE) 20 mg in 0.86 % sodium chloride 200 mL infusion, Continuous  heparin (porcine) injection 4,000 Units, PRN  heparin (porcine) injection 2,000 Units, PRN  heparin 25,000 units in dextrose 5% 250 mL (premix) infusion, Continuous  NIFEdipine (ADALAT CC) extended release tablet 120 mg, Daily  hydrALAZINE (APRESOLINE) tablet 50 mg, BID  cloNIDine (CATAPRES) 0.2 MG/24HR 1 patch, Weekly  carvedilol (COREG) tablet 12.5 mg, BID  sodium chloride flush 0.9 % injection 5-40 mL, 2 times per day  sodium
[facility] Cardiology Cardiology    Consult / H&P               Today's Date: 10/6/2023  Patient Name: Anisa Bowman  Date of admission: 10/4/2023  8:50 AM  Patient's age: 39 y.o., 1980  Admission Dx: NSTEMI (non-ST elevated myocardial infarction) ([address]) [I21.4]  Hypertensive emergency [I16.1]  Acute renal failure, unspecified acute renal failure type ([address]) [N17.9]    Reason for Consult:  Cardiac evaluation    Requesting Physician: Eladia Khan DO    CHIEF COMPLAINT:      History Obtained From:  patient    HISTORY OF PRESENT ILLNESS:      The patient is a 39 y.o. M presented to the hospital with chest pain and foot pain. Patient described his chest pain to be 7/10, substenral, sharp, and intermittent. It was associated with shortness of breath and dizziness. His labwork was significant for trops of 95, 96 and BNP was 3372. BUN/Cr 59/5.2, H/H 9.2/28.5, elevated D-dimer of 0.71  CXR showed borderline CM  EKG showed sinus rhythm with PAC  Echo on 03/23 EF 55%    Past Medical History:   has a past medical history of DEVYN (acute kidney injury) ([address]), Bipolar 1 disorder ([address]), Bronchitis, Bronchitis, Hypertension, Moderate persistent asthma, and Obesity. Past Surgical History:   has no past surgical history on file. Home Medications:    Prior to Admission medications    Medication Sig Start Date End Date Taking?  Authorizing Provider   lisinopril-hydroCHLOROthiazide (PRINZIDE;ZESTORETIC) 10-12.5 MG per tablet  9/14/23   Kitty Gan MD   ibuprofen (ADVIL;MOTRIN) 600 MG tablet Take 800 mg by mouth 3 times daily as needed  Patient not taking: Reported on 10/4/2023 9/14/23   Kitty Gan MD   LOKELMA 5 g PACK oral suspension  4/24/23   Kitty Gan MD   NIFEdipine (ADALAT CC) 60 MG extended release tablet Take 2 tablets by mouth daily 5/17/23   Arthur Almaguer MD   carvedilol (COREG) 12.5 MG tablet Take 1 tablet by mouth 2 times daily  Patient not taking: Reported on 9/19/2023 5/17/23
DNRCCA means. I explained that if he got into trouble with his breathing with the [facility] he could get intubated. I explained that if he is sure he would not want intubation/ventilator we could add no intubation to the order. This aligns with his expressed wishes. I discussed this with Dr. Reggie Steele. She will address with patient. We discussed the importance of naming a  decision maker so that in the event he is unable to tell us what he wants that decision maker can speak on his behalf for medical care. I explained that as long as he can make his own decisions we will ask him, but there may come a time when he is unable to express to us his wishes. I encouraged him to consider who he would want to speak for him. Explained that if he chooses someone to be his decision maker it is important for him to let this person know his wishes as far as his health care. The decision maker is supposed to tell us what the patient would want. Elis Baig relates there is no friend or family that he would want to give that responsibility to. I encouraged him to think about it. If he decides in the future on a decision maker I let him know that we can help him fill one out here at the hospital and that most doctor's offices would have the [facility] document. I explained that if there was a time when he could not express his wishes and there was no DPOAH, we (the hospital) is required to follow the state of [place] next of kin law. I reviewed the [place] next of kin hierarchy with Elis Baig. Elis Baig relates he is not   Elis Baig relates he has no children  Elis Baig relates his parents are both   Elis Baig relates he has 6 sisters. I explained that per [place] next of kin law his 6 sisters would be joint decision makers for him. I explained that they would each have an equal say in decision making. I told Elis Baig that we would like to have his 6 sisters names and phone numbers on file. Elis Baig relates we have one his sister Kaitlyn.

## 2023-10-11 NOTE — PLAN OF CARE
Problem: Discharge Planning  Goal: Discharge to home or other facility with appropriate resources  10/11/2023 1608 by Radha Gorman RN  Outcome: Progressing  10/11/2023 0603 by Jae Marroquin RN  Outcome: Progressing     Problem: Pain  Goal: Verbalizes/displays adequate comfort level or baseline comfort level  10/11/2023 1608 by Radha Gorman RN  Outcome: Progressing  10/11/2023 0603 by Jae Marroquin RN  Outcome: Progressing     Problem: Safety - Adult  Goal: Free from fall injury  10/11/2023 1608 by Radha Gorman RN  Outcome: Progressing  10/11/2023 0603 by Jae Marroquin RN  Outcome: Progressing     Problem: Skin/Tissue Integrity  Goal: Absence of new skin breakdown  Description: 1. Monitor for areas of redness and/or skin breakdown  2. Assess vascular access sites hourly  3. Every 4-6 hours minimum:  Change oxygen saturation probe site  4. Every 4-6 hours:  If on nasal continuous positive airway pressure, respiratory therapy assess nares and determine need for appliance change or resting period.   10/11/2023 1608 by Radha Gorman RN  Outcome: Progressing  10/11/2023 0603 by Jae Marroquin RN  Outcome: Progressing     Problem: Respiratory - Adult  Goal: Achieves optimal ventilation and oxygenation  10/11/2023 1608 by Radha Gorman RN  Outcome: Progressing  10/11/2023 0603 by Jae Marroquin RN  Outcome: Progressing     Problem: ABCDS Injury Assessment  Goal: Absence of physical injury  10/11/2023 1608 by Radha Gorman RN  Outcome: Progressing  10/11/2023 0603 by Jae Marroquin RN  Outcome: Progressing

## 2023-10-12 ENCOUNTER — APPOINTMENT (OUTPATIENT)
Dept: DIALYSIS | Age: 43
DRG: 190 | End: 2023-10-12
Payer: COMMERCIAL

## 2023-10-12 LAB
ANION GAP SERPL CALCULATED.3IONS-SCNC: 14 MMOL/L (ref 9–17)
BUN SERPL-MCNC: 50 MG/DL (ref 6–20)
CALCIUM SERPL-MCNC: 9 MG/DL (ref 8.6–10.4)
CHLORIDE SERPL-SCNC: 97 MMOL/L (ref 98–107)
CO2 SERPL-SCNC: 20 MMOL/L (ref 20–31)
CREAT SERPL-MCNC: 5.1 MG/DL (ref 0.7–1.2)
ERYTHROCYTE [DISTWIDTH] IN BLOOD BY AUTOMATED COUNT: 14.3 % (ref 11.8–14.4)
GFR SERPL CREATININE-BSD FRML MDRD: 13 ML/MIN/1.73M2
GLUCOSE SERPL-MCNC: 90 MG/DL (ref 70–99)
HBV CORE AB SER QL: NONREACTIVE
HCT VFR BLD AUTO: 29.1 % (ref 40.7–50.3)
HGB BLD-MCNC: 8.5 G/DL (ref 13–17)
MCH RBC QN AUTO: 25.4 PG (ref 25.2–33.5)
MCHC RBC AUTO-ENTMCNC: 29.2 G/DL (ref 28.4–34.8)
MCV RBC AUTO: 86.9 FL (ref 82.6–102.9)
NRBC BLD-RTO: 0 PER 100 WBC
PLATELET # BLD AUTO: 297 K/UL (ref 138–453)
PMV BLD AUTO: 12 FL (ref 8.1–13.5)
POTASSIUM SERPL-SCNC: 4.6 MMOL/L (ref 3.7–5.3)
RBC # BLD AUTO: 3.35 M/UL (ref 4.21–5.77)
SODIUM SERPL-SCNC: 131 MMOL/L (ref 135–144)
WBC OTHER # BLD: 6 K/UL (ref 3.5–11.3)

## 2023-10-12 PROCEDURE — 2580000003 HC RX 258: Performed by: INTERNAL MEDICINE

## 2023-10-12 PROCEDURE — 97530 THERAPEUTIC ACTIVITIES: CPT

## 2023-10-12 PROCEDURE — 80048 BASIC METABOLIC PNL TOTAL CA: CPT

## 2023-10-12 PROCEDURE — 6370000000 HC RX 637 (ALT 250 FOR IP): Performed by: INTERNAL MEDICINE

## 2023-10-12 PROCEDURE — 6370000000 HC RX 637 (ALT 250 FOR IP): Performed by: HOSPITALIST

## 2023-10-12 PROCEDURE — 6360000002 HC RX W HCPCS: Performed by: INTERNAL MEDICINE

## 2023-10-12 PROCEDURE — 6360000002 HC RX W HCPCS: Performed by: NURSE PRACTITIONER

## 2023-10-12 PROCEDURE — 1200000000 HC SEMI PRIVATE

## 2023-10-12 PROCEDURE — 99232 SBSQ HOSP IP/OBS MODERATE 35: CPT | Performed by: INTERNAL MEDICINE

## 2023-10-12 PROCEDURE — 85027 COMPLETE CBC AUTOMATED: CPT

## 2023-10-12 PROCEDURE — 6360000002 HC RX W HCPCS: Performed by: PODIATRIST

## 2023-10-12 PROCEDURE — 90935 HEMODIALYSIS ONE EVALUATION: CPT

## 2023-10-12 PROCEDURE — 6370000000 HC RX 637 (ALT 250 FOR IP): Performed by: NURSE PRACTITIONER

## 2023-10-12 PROCEDURE — 36415 COLL VENOUS BLD VENIPUNCTURE: CPT

## 2023-10-12 PROCEDURE — 97110 THERAPEUTIC EXERCISES: CPT

## 2023-10-12 PROCEDURE — 2580000003 HC RX 258: Performed by: RADIOLOGY

## 2023-10-12 PROCEDURE — 36556 INSERT NON-TUNNEL CV CATH: CPT

## 2023-10-12 PROCEDURE — 6370000000 HC RX 637 (ALT 250 FOR IP): Performed by: SURGERY

## 2023-10-12 RX ADMIN — BUMETANIDE 2 MG: 1 TABLET ORAL at 21:00

## 2023-10-12 RX ADMIN — HEPARIN SODIUM 1900 UNITS: 1000 INJECTION INTRAVENOUS; SUBCUTANEOUS at 16:12

## 2023-10-12 RX ADMIN — MORPHINE SULFATE 2 MG: 2 INJECTION, SOLUTION INTRAMUSCULAR; INTRAVENOUS at 09:26

## 2023-10-12 RX ADMIN — NIFEDIPINE 120 MG: 30 TABLET, FILM COATED, EXTENDED RELEASE ORAL at 15:44

## 2023-10-12 RX ADMIN — NEPHROCAP 1 MG: 1 CAP ORAL at 15:44

## 2023-10-12 RX ADMIN — IRON SUCROSE 200 MG: 20 INJECTION, SOLUTION INTRAVENOUS at 06:08

## 2023-10-12 RX ADMIN — HEPARIN SODIUM 1900 UNITS: 1000 INJECTION INTRAVENOUS; SUBCUTANEOUS at 16:13

## 2023-10-12 RX ADMIN — ALPRAZOLAM 0.5 MG: 0.5 TABLET ORAL at 20:55

## 2023-10-12 RX ADMIN — ATORVASTATIN CALCIUM 80 MG: 80 TABLET, FILM COATED ORAL at 20:56

## 2023-10-12 RX ADMIN — ASPIRIN 81 MG: 81 TABLET, CHEWABLE ORAL at 15:44

## 2023-10-12 RX ADMIN — HEPARIN SODIUM 5000 UNITS: 5000 INJECTION INTRAVENOUS; SUBCUTANEOUS at 15:43

## 2023-10-12 RX ADMIN — SODIUM CHLORIDE, PRESERVATIVE FREE 10 ML: 5 INJECTION INTRAVENOUS at 20:56

## 2023-10-12 RX ADMIN — CARVEDILOL 25 MG: 25 TABLET, FILM COATED ORAL at 20:55

## 2023-10-12 RX ADMIN — SODIUM CHLORIDE: 9 INJECTION, SOLUTION INTRAVENOUS at 09:25

## 2023-10-12 RX ADMIN — HEPARIN SODIUM 5000 UNITS: 5000 INJECTION INTRAVENOUS; SUBCUTANEOUS at 22:16

## 2023-10-12 RX ADMIN — HEPARIN SODIUM 5000 UNITS: 5000 INJECTION INTRAVENOUS; SUBCUTANEOUS at 06:08

## 2023-10-12 RX ADMIN — SODIUM CHLORIDE, PRESERVATIVE FREE 10 ML: 5 INJECTION INTRAVENOUS at 15:44

## 2023-10-12 RX ADMIN — HYDRALAZINE HYDROCHLORIDE 50 MG: 50 TABLET, FILM COATED ORAL at 20:55

## 2023-10-12 RX ADMIN — CALCITRIOL 0.25 MCG: 0.25 CAPSULE ORAL at 15:44

## 2023-10-12 ASSESSMENT — PAIN DESCRIPTION - ORIENTATION
ORIENTATION: RIGHT

## 2023-10-12 ASSESSMENT — PAIN DESCRIPTION - PAIN TYPE
TYPE: ACUTE PAIN

## 2023-10-12 ASSESSMENT — PAIN DESCRIPTION - DESCRIPTORS
DESCRIPTORS: SHARP
DESCRIPTORS: SHARP;SHOOTING
DESCRIPTORS: SHARP;SHOOTING;SORE
DESCRIPTORS: SHARP;SHOOTING;SORE

## 2023-10-12 ASSESSMENT — PAIN DESCRIPTION - FREQUENCY
FREQUENCY: CONTINUOUS

## 2023-10-12 ASSESSMENT — PAIN DESCRIPTION - LOCATION
LOCATION: FOOT

## 2023-10-12 ASSESSMENT — PAIN SCALES - GENERAL
PAINLEVEL_OUTOF10: 8
PAINLEVEL_OUTOF10: 9
PAINLEVEL_OUTOF10: 10
PAINLEVEL_OUTOF10: 8

## 2023-10-12 ASSESSMENT — PAIN DESCRIPTION - ONSET
ONSET: ON-GOING

## 2023-10-12 ASSESSMENT — PAIN - FUNCTIONAL ASSESSMENT: PAIN_FUNCTIONAL_ASSESSMENT: PREVENTS OR INTERFERES SOME ACTIVE ACTIVITIES AND ADLS

## 2023-10-12 NOTE — PLAN OF CARE
Problem: Discharge Planning  Goal: Discharge to home or other facility with appropriate resources  10/12/2023 0551 by Eb Kirk RN  Outcome: Progressing  10/11/2023 1608 by Elaine Best RN  Outcome: Progressing     Problem: Pain  Goal: Verbalizes/displays adequate comfort level or baseline comfort level  10/12/2023 0551 by Eb Kirk RN  Outcome: Progressing  10/11/2023 1608 by Elaine Best RN  Outcome: Progressing     Problem: Safety - Adult  Goal: Free from fall injury  10/12/2023 0551 by Eb Kirk RN  Outcome: Progressing  10/11/2023 1608 by Elaine Best RN  Outcome: Progressing     Problem: Skin/Tissue Integrity  Goal: Absence of new skin breakdown  Description: 1. Monitor for areas of redness and/or skin breakdown  2. Assess vascular access sites hourly  3. Every 4-6 hours minimum:  Change oxygen saturation probe site  4. Every 4-6 hours:  If on nasal continuous positive airway pressure, respiratory therapy assess nares and determine need for appliance change or resting period.   10/12/2023 0551 by Eb Kirk RN  Outcome: Progressing  10/11/2023 1608 by Elaine Best RN  Outcome: Progressing     Problem: Respiratory - Adult  Goal: Achieves optimal ventilation and oxygenation  10/12/2023 0551 by Eb Kirk RN  Outcome: Progressing  10/11/2023 1608 by Elaine Best RN  Outcome: Progressing     Problem: ABCDS Injury Assessment  Goal: Absence of physical injury  10/12/2023 0551 by Eb Kirk RN  Outcome: Progressing  10/11/2023 1608 by Elaine Best RN  Outcome: Progressing

## 2023-10-12 NOTE — PLAN OF CARE
Problem: Discharge Planning  Goal: Discharge to home or other facility with appropriate resources  10/12/2023 1414 by Fermín Cole RN  Outcome: Progressing  10/12/2023 0551 by Paola Rivas RN  Outcome: Progressing     Problem: Pain  Goal: Verbalizes/displays adequate comfort level or baseline comfort level  10/12/2023 1414 by Fermín Cole RN  Outcome: Progressing  10/12/2023 0551 by Paola Rivas RN  Outcome: Progressing     Problem: Safety - Adult  Goal: Free from fall injury  10/12/2023 1414 by Fermín Cole RN  Outcome: Progressing  10/12/2023 0551 by Paola Rivas RN  Outcome: Progressing     Problem: Skin/Tissue Integrity  Goal: Absence of new skin breakdown  Description: 1. Monitor for areas of redness and/or skin breakdown  2. Assess vascular access sites hourly  3. Every 4-6 hours minimum:  Change oxygen saturation probe site  4. Every 4-6 hours:  If on nasal continuous positive airway pressure, respiratory therapy assess nares and determine need for appliance change or resting period.   10/12/2023 1414 by Fermín Cole RN  Outcome: Progressing  10/12/2023 0551 by Paola Rivas RN  Outcome: Progressing     Problem: Respiratory - Adult  Goal: Achieves optimal ventilation and oxygenation  10/12/2023 1414 by Fermín Cole RN  Outcome: Progressing  10/12/2023 0551 by Paola Rivas RN  Outcome: Progressing     Problem: ABCDS Injury Assessment  Goal: Absence of physical injury  10/12/2023 1414 by Fermín Cole RN  Outcome: Progressing  10/12/2023 0551 by Paola Rivas RN  Outcome: Progressing

## 2023-10-13 LAB
ANION GAP SERPL CALCULATED.3IONS-SCNC: 15 MMOL/L (ref 9–17)
BUN SERPL-MCNC: 42 MG/DL (ref 6–20)
CALCIUM SERPL-MCNC: 8.9 MG/DL (ref 8.6–10.4)
CHLORIDE SERPL-SCNC: 93 MMOL/L (ref 98–107)
CO2 SERPL-SCNC: 26 MMOL/L (ref 20–31)
CREAT SERPL-MCNC: 4.9 MG/DL (ref 0.7–1.2)
GFR SERPL CREATININE-BSD FRML MDRD: 14 ML/MIN/1.73M2
GLUCOSE SERPL-MCNC: 107 MG/DL (ref 70–99)
POTASSIUM SERPL-SCNC: 4 MMOL/L (ref 3.7–5.3)
SODIUM SERPL-SCNC: 134 MMOL/L (ref 135–144)

## 2023-10-13 PROCEDURE — 36415 COLL VENOUS BLD VENIPUNCTURE: CPT

## 2023-10-13 PROCEDURE — 99231 SBSQ HOSP IP/OBS SF/LOW 25: CPT | Performed by: INTERNAL MEDICINE

## 2023-10-13 PROCEDURE — 99232 SBSQ HOSP IP/OBS MODERATE 35: CPT | Performed by: INTERNAL MEDICINE

## 2023-10-13 PROCEDURE — 2580000003 HC RX 258: Performed by: INTERNAL MEDICINE

## 2023-10-13 PROCEDURE — 6370000000 HC RX 637 (ALT 250 FOR IP): Performed by: INTERNAL MEDICINE

## 2023-10-13 PROCEDURE — 1200000000 HC SEMI PRIVATE

## 2023-10-13 PROCEDURE — 6370000000 HC RX 637 (ALT 250 FOR IP): Performed by: HOSPITALIST

## 2023-10-13 PROCEDURE — 97116 GAIT TRAINING THERAPY: CPT

## 2023-10-13 PROCEDURE — 97110 THERAPEUTIC EXERCISES: CPT

## 2023-10-13 PROCEDURE — 90935 HEMODIALYSIS ONE EVALUATION: CPT

## 2023-10-13 PROCEDURE — 6360000002 HC RX W HCPCS: Performed by: NURSE PRACTITIONER

## 2023-10-13 PROCEDURE — 80048 BASIC METABOLIC PNL TOTAL CA: CPT

## 2023-10-13 PROCEDURE — 6360000002 HC RX W HCPCS: Performed by: INTERNAL MEDICINE

## 2023-10-13 RX ORDER — NEPHROCAP 1 MG
1 CAP ORAL DAILY
Qty: 30 CAPSULE | Refills: 1 | Status: SHIPPED | OUTPATIENT
Start: 2023-10-13 | End: 2023-10-17 | Stop reason: SDUPTHER

## 2023-10-13 RX ORDER — CARVEDILOL 6.25 MG/1
6.25 TABLET ORAL 2 TIMES DAILY
Qty: 60 TABLET | Refills: 3 | Status: SHIPPED | OUTPATIENT
Start: 2023-10-13 | End: 2023-10-17 | Stop reason: HOSPADM

## 2023-10-13 RX ORDER — ASPIRIN 81 MG/1
81 TABLET, CHEWABLE ORAL DAILY
Qty: 30 TABLET | Refills: 3 | Status: SHIPPED | OUTPATIENT
Start: 2023-10-13 | End: 2023-10-17 | Stop reason: SDUPTHER

## 2023-10-13 RX ORDER — CALCITRIOL 0.25 UG/1
0.25 CAPSULE, LIQUID FILLED ORAL
Qty: 30 CAPSULE | Refills: 3 | Status: SHIPPED | OUTPATIENT
Start: 2023-10-16 | End: 2023-10-17 | Stop reason: SDUPTHER

## 2023-10-13 RX ORDER — CARVEDILOL 6.25 MG/1
6.25 TABLET ORAL 2 TIMES DAILY
Status: DISCONTINUED | OUTPATIENT
Start: 2023-10-13 | End: 2023-10-14

## 2023-10-13 RX ORDER — LISINOPRIL 5 MG/1
5 TABLET ORAL DAILY
Qty: 30 TABLET | Refills: 3 | Status: SHIPPED | OUTPATIENT
Start: 2023-10-13 | End: 2023-10-17 | Stop reason: SDUPTHER

## 2023-10-13 RX ORDER — ATORVASTATIN CALCIUM 80 MG/1
80 TABLET, FILM COATED ORAL NIGHTLY
Qty: 30 TABLET | Refills: 3 | Status: SHIPPED | OUTPATIENT
Start: 2023-10-13 | End: 2023-10-17 | Stop reason: SDUPTHER

## 2023-10-13 RX ORDER — LISINOPRIL 10 MG/1
5 TABLET ORAL DAILY
Status: DISCONTINUED | OUTPATIENT
Start: 2023-10-13 | End: 2023-10-19 | Stop reason: HOSPADM

## 2023-10-13 RX ORDER — CLONIDINE HYDROCHLORIDE 0.1 MG/1
0.1 TABLET ORAL 3 TIMES DAILY
Status: DISCONTINUED | OUTPATIENT
Start: 2023-10-13 | End: 2023-10-14

## 2023-10-13 RX ORDER — CALCITRIOL 0.25 UG/1
0.25 CAPSULE, LIQUID FILLED ORAL
Status: DISCONTINUED | OUTPATIENT
Start: 2023-10-16 | End: 2023-10-19 | Stop reason: HOSPADM

## 2023-10-13 RX ORDER — CLONIDINE HYDROCHLORIDE 0.1 MG/1
0.1 TABLET ORAL 3 TIMES DAILY
Qty: 60 TABLET | Refills: 3 | Status: SHIPPED | OUTPATIENT
Start: 2023-10-13

## 2023-10-13 RX ORDER — BUMETANIDE 2 MG/1
2 TABLET ORAL DAILY
Qty: 30 TABLET | Refills: 3 | Status: SHIPPED | OUTPATIENT
Start: 2023-10-13 | End: 2023-10-17 | Stop reason: SDUPTHER

## 2023-10-13 RX ADMIN — NEPHROCAP 1 MG: 1 CAP ORAL at 20:15

## 2023-10-13 RX ADMIN — LISINOPRIL 5 MG: 10 TABLET ORAL at 20:16

## 2023-10-13 RX ADMIN — ASPIRIN 81 MG: 81 TABLET, CHEWABLE ORAL at 20:28

## 2023-10-13 RX ADMIN — SODIUM CHLORIDE, PRESERVATIVE FREE 10 ML: 5 INJECTION INTRAVENOUS at 10:00

## 2023-10-13 RX ADMIN — IRON SUCROSE 200 MG: 20 INJECTION, SOLUTION INTRAVENOUS at 05:34

## 2023-10-13 RX ADMIN — CARVEDILOL 6.25 MG: 6.25 TABLET, FILM COATED ORAL at 20:17

## 2023-10-13 RX ADMIN — HEPARIN SODIUM 5000 UNITS: 5000 INJECTION INTRAVENOUS; SUBCUTANEOUS at 05:35

## 2023-10-13 RX ADMIN — CLONIDINE HYDROCHLORIDE 0.1 MG: 0.1 TABLET ORAL at 20:19

## 2023-10-13 RX ADMIN — SODIUM CHLORIDE, PRESERVATIVE FREE 10 ML: 5 INJECTION INTRAVENOUS at 20:18

## 2023-10-13 RX ADMIN — BUMETANIDE 2 MG: 1 TABLET ORAL at 20:15

## 2023-10-13 RX ADMIN — HEPARIN SODIUM 1900 UNITS: 1000 INJECTION INTRAVENOUS; SUBCUTANEOUS at 17:38

## 2023-10-13 RX ADMIN — HEPARIN SODIUM 1900 UNITS: 1000 INJECTION INTRAVENOUS; SUBCUTANEOUS at 17:39

## 2023-10-13 RX ADMIN — ALPRAZOLAM 0.5 MG: 0.5 TABLET ORAL at 20:17

## 2023-10-13 RX ADMIN — EPOETIN ALFA-EPBX 8000 UNITS: 10000 INJECTION, SOLUTION INTRAVENOUS; SUBCUTANEOUS at 17:38

## 2023-10-13 RX ADMIN — HEPARIN SODIUM 5000 UNITS: 5000 INJECTION INTRAVENOUS; SUBCUTANEOUS at 20:19

## 2023-10-13 RX ADMIN — NIFEDIPINE 120 MG: 30 TABLET, FILM COATED, EXTENDED RELEASE ORAL at 20:15

## 2023-10-13 RX ADMIN — ATORVASTATIN CALCIUM 80 MG: 80 TABLET, FILM COATED ORAL at 20:16

## 2023-10-13 ASSESSMENT — PAIN DESCRIPTION - LOCATION: LOCATION: BACK

## 2023-10-13 ASSESSMENT — PAIN SCALES - GENERAL
PAINLEVEL_OUTOF10: 0
PAINLEVEL_OUTOF10: 1

## 2023-10-13 ASSESSMENT — PAIN DESCRIPTION - DESCRIPTORS: DESCRIPTORS: ACHING

## 2023-10-13 ASSESSMENT — PAIN DESCRIPTION - ONSET: ONSET: ON-GOING

## 2023-10-13 ASSESSMENT — PAIN DESCRIPTION - PAIN TYPE: TYPE: ACUTE PAIN

## 2023-10-13 ASSESSMENT — PAIN DESCRIPTION - FREQUENCY: FREQUENCY: CONTINUOUS

## 2023-10-13 NOTE — DISCHARGE INSTR - COC
Continuity of Care Form    Patient Name: Nava Pulido   :  1980  MRN:  [id number]    Admit date:  10/4/2023  Discharge date:  10/19/23    Code Status Order: Limited   Advance Directives:     Admitting Physician:  Stone Snowden MD  PCP: Celestina Oakley MD    Discharging Nurse: [facility] Unit/Room#: 5850/8386-45  Discharging Unit Phone Number: [phone]    Emergency Contact:   Extended Emergency Contact Information  Primary Emergency Contact: Matteo Zazueta [facility] [address] Phone: [phone]  Mobile Phone: [phone]  Relation: Brother/Sister  Secondary Emergency Contact: xxx,sister #2  Relation: Brother/Sister    Past Surgical History:  Past Surgical History:   Procedure Laterality Date    IR TUNNELED CATHETER PLACEMENT GREATER THAN 5 YEARS  10/11/2023    IR TUNNELED CATHETER PLACEMENT GREATER THAN 5 YEARS 10/11/2023 Argelia Gimenez MD [facility]       Immunization History:   Immunization History   Administered Date(s) Administered    Influenza, Karstenael Cierao (age 10 mo+) AND AFLURIA, (age 1 y+), PF, 0.5mL 10/13/2021    Pneumococcal, PPSV23, PNEUMOVAX 21, (age 2y+), SC/IM, 0.5mL 2015    TDaP, ADACEL (age 6y-58y), David Kannan (age 10y+), IM, 0.5mL 2015       Active Problems:  Patient Active Problem List   Diagnosis Code    Moderate persistent asthma J45.40    Bipolar 1 disorder ([address] [facility]) F31.9    Essential hypertension I10    Obesity, morbid, BMI 40.0-49.9 ([address] [facility]) E66.01    Need for prophylactic vaccination with combined diphtheria-tetanus-pertussis (DTP) vaccine Z23    DEVYN (acute kidney injury) ([address]) N17.9    Hypoxia R09.02    Troponin level elevated R79.89    Obesity (BMI 35.0-39.9 without comorbidity) E66.9    Sleep-related breathing disorder G47.30    Hypertensive emergency I16.1    Hyponatremia E87.1    Stage 4 chronic kidney disease ([address]) N18.4    Ascending aortic aneurysm (HCC) I71.21    Acute pulmonary edema (HCC) J81.0    Pneumonia

## 2023-10-14 LAB
ANION GAP SERPL CALCULATED.3IONS-SCNC: 13 MMOL/L (ref 9–17)
BUN SERPL-MCNC: 34 MG/DL (ref 6–20)
CALCIUM SERPL-MCNC: 8.7 MG/DL (ref 8.6–10.4)
CHLORIDE SERPL-SCNC: 93 MMOL/L (ref 98–107)
CO2 SERPL-SCNC: 27 MMOL/L (ref 20–31)
CREAT SERPL-MCNC: 4.9 MG/DL (ref 0.7–1.2)
GFR SERPL CREATININE-BSD FRML MDRD: 14 ML/MIN/1.73M2
GLUCOSE SERPL-MCNC: 107 MG/DL (ref 70–99)
POTASSIUM SERPL-SCNC: 4.4 MMOL/L (ref 3.7–5.3)
SODIUM SERPL-SCNC: 133 MMOL/L (ref 135–144)

## 2023-10-14 PROCEDURE — 6370000000 HC RX 637 (ALT 250 FOR IP): Performed by: INTERNAL MEDICINE

## 2023-10-14 PROCEDURE — 80048 BASIC METABOLIC PNL TOTAL CA: CPT

## 2023-10-14 PROCEDURE — 99232 SBSQ HOSP IP/OBS MODERATE 35: CPT | Performed by: INTERNAL MEDICINE

## 2023-10-14 PROCEDURE — 97116 GAIT TRAINING THERAPY: CPT

## 2023-10-14 PROCEDURE — 97110 THERAPEUTIC EXERCISES: CPT

## 2023-10-14 PROCEDURE — 36415 COLL VENOUS BLD VENIPUNCTURE: CPT

## 2023-10-14 PROCEDURE — 6360000002 HC RX W HCPCS: Performed by: NURSE PRACTITIONER

## 2023-10-14 PROCEDURE — 2580000003 HC RX 258: Performed by: INTERNAL MEDICINE

## 2023-10-14 PROCEDURE — 1200000000 HC SEMI PRIVATE

## 2023-10-14 RX ORDER — CARVEDILOL 3.12 MG/1
3.12 TABLET ORAL 2 TIMES DAILY
Status: DISCONTINUED | OUTPATIENT
Start: 2023-10-14 | End: 2023-10-19 | Stop reason: HOSPADM

## 2023-10-14 RX ORDER — NIFEDIPINE 30 MG/1
30 TABLET, EXTENDED RELEASE ORAL DAILY
Status: DISCONTINUED | OUTPATIENT
Start: 2023-10-15 | End: 2023-10-15

## 2023-10-14 RX ADMIN — NEPHROCAP 1 MG: 1 CAP ORAL at 08:21

## 2023-10-14 RX ADMIN — ATORVASTATIN CALCIUM 80 MG: 80 TABLET, FILM COATED ORAL at 21:07

## 2023-10-14 RX ADMIN — HEPARIN SODIUM 5000 UNITS: 5000 INJECTION INTRAVENOUS; SUBCUTANEOUS at 21:07

## 2023-10-14 RX ADMIN — ASPIRIN 81 MG: 81 TABLET, CHEWABLE ORAL at 08:21

## 2023-10-14 RX ADMIN — SODIUM CHLORIDE, PRESERVATIVE FREE 10 ML: 5 INJECTION INTRAVENOUS at 08:21

## 2023-10-14 RX ADMIN — HEPARIN SODIUM 5000 UNITS: 5000 INJECTION INTRAVENOUS; SUBCUTANEOUS at 06:18

## 2023-10-14 RX ADMIN — HEPARIN SODIUM 5000 UNITS: 5000 INJECTION INTRAVENOUS; SUBCUTANEOUS at 15:47

## 2023-10-14 RX ADMIN — ACETAMINOPHEN 650 MG: 325 TABLET ORAL at 11:54

## 2023-10-14 RX ADMIN — SODIUM CHLORIDE, PRESERVATIVE FREE 10 ML: 5 INJECTION INTRAVENOUS at 21:08

## 2023-10-14 ASSESSMENT — ENCOUNTER SYMPTOMS
ALLERGIC/IMMUNOLOGIC NEGATIVE: 1
GASTROINTESTINAL NEGATIVE: 1
EYES NEGATIVE: 1
RESPIRATORY NEGATIVE: 1

## 2023-10-14 ASSESSMENT — PAIN DESCRIPTION - ORIENTATION: ORIENTATION: OTHER (COMMENT)

## 2023-10-14 ASSESSMENT — PAIN DESCRIPTION - LOCATION
LOCATION: FOOT
LOCATION: FOOT

## 2023-10-14 ASSESSMENT — PAIN SCALES - GENERAL
PAINLEVEL_OUTOF10: 4
PAINLEVEL_OUTOF10: 4

## 2023-10-14 ASSESSMENT — PAIN DESCRIPTION - DESCRIPTORS
DESCRIPTORS: ACHING
DESCRIPTORS: ACHING

## 2023-10-14 NOTE — PLAN OF CARE
Problem: Discharge Planning  Goal: Discharge to home or other facility with appropriate resources  10/14/2023 1554 by Viridiana Ellis RN  Outcome: Progressing  10/14/2023 0437 by Joselito Jay RN  Outcome: Progressing     Problem: Pain  Goal: Verbalizes/displays adequate comfort level or baseline comfort level  10/14/2023 1554 by Viridiana Ellis RN  Outcome: Progressing  10/14/2023 0437 by Joselito Jay RN  Outcome: Progressing     Problem: Safety - Adult  Goal: Free from fall injury  10/14/2023 1554 by Viridiana Ellis RN  Outcome: Progressing  10/14/2023 0437 by Joselito Jay RN  Outcome: Progressing     Problem: Skin/Tissue Integrity  Goal: Absence of new skin breakdown  Description: 1. Monitor for areas of redness and/or skin breakdown  2. Assess vascular access sites hourly  3. Every 4-6 hours minimum:  Change oxygen saturation probe site  4. Every 4-6 hours:  If on nasal continuous positive airway pressure, respiratory therapy assess nares and determine need for appliance change or resting period.   10/14/2023 1554 by Viridiana Ellis RN  Outcome: Progressing  10/14/2023 0437 by Joselito Jay RN  Outcome: Progressing     Problem: Respiratory - Adult  Goal: Achieves optimal ventilation and oxygenation  10/14/2023 1554 by Viridiana Ellis RN  Outcome: Progressing  10/14/2023 0437 by Joselito Jay RN  Outcome: Progressing     Problem: ABCDS Injury Assessment  Goal: Absence of physical injury  10/14/2023 1554 by Viridiana Ellis RN  Outcome: Progressing  10/14/2023 0437 by Joselito Jay RN  Outcome: Progressing

## 2023-10-15 PROCEDURE — 6360000002 HC RX W HCPCS: Performed by: NURSE PRACTITIONER

## 2023-10-15 PROCEDURE — 99232 SBSQ HOSP IP/OBS MODERATE 35: CPT | Performed by: INTERNAL MEDICINE

## 2023-10-15 PROCEDURE — 6370000000 HC RX 637 (ALT 250 FOR IP): Performed by: INTERNAL MEDICINE

## 2023-10-15 PROCEDURE — 1200000000 HC SEMI PRIVATE

## 2023-10-15 PROCEDURE — 2580000003 HC RX 258: Performed by: INTERNAL MEDICINE

## 2023-10-15 RX ORDER — MIDODRINE HYDROCHLORIDE 5 MG/1
5 TABLET ORAL 3 TIMES DAILY PRN
Status: DISCONTINUED | OUTPATIENT
Start: 2023-10-15 | End: 2023-10-19 | Stop reason: HOSPADM

## 2023-10-15 RX ADMIN — ATORVASTATIN CALCIUM 80 MG: 80 TABLET, FILM COATED ORAL at 20:31

## 2023-10-15 RX ADMIN — HEPARIN SODIUM 5000 UNITS: 5000 INJECTION INTRAVENOUS; SUBCUTANEOUS at 20:30

## 2023-10-15 RX ADMIN — HEPARIN SODIUM 5000 UNITS: 5000 INJECTION INTRAVENOUS; SUBCUTANEOUS at 06:02

## 2023-10-15 RX ADMIN — NEPHROCAP 1 MG: 1 CAP ORAL at 12:57

## 2023-10-15 RX ADMIN — ASPIRIN 81 MG: 81 TABLET, CHEWABLE ORAL at 09:12

## 2023-10-15 RX ADMIN — SODIUM CHLORIDE, PRESERVATIVE FREE 5 ML: 5 INJECTION INTRAVENOUS at 09:13

## 2023-10-15 RX ADMIN — ACETAMINOPHEN 650 MG: 325 TABLET ORAL at 09:12

## 2023-10-15 ASSESSMENT — ENCOUNTER SYMPTOMS
GASTROINTESTINAL NEGATIVE: 1
EYES NEGATIVE: 1
DIARRHEA: 0
COLOR CHANGE: 1
ALLERGIC/IMMUNOLOGIC NEGATIVE: 1
RESPIRATORY NEGATIVE: 1
NAUSEA: 0
ABDOMINAL DISTENTION: 0
SHORTNESS OF BREATH: 1
VOMITING: 0

## 2023-10-15 ASSESSMENT — PAIN SCALES - GENERAL
PAINLEVEL_OUTOF10: 5
PAINLEVEL_OUTOF10: 0
PAINLEVEL_OUTOF10: 6
PAINLEVEL_OUTOF10: 6
PAINLEVEL_OUTOF10: 10

## 2023-10-15 ASSESSMENT — PAIN DESCRIPTION - DESCRIPTORS
DESCRIPTORS: TINGLING;ACHING
DESCRIPTORS: TINGLING

## 2023-10-15 ASSESSMENT — PAIN DESCRIPTION - LOCATION
LOCATION: FOOT

## 2023-10-15 ASSESSMENT — PAIN DESCRIPTION - PAIN TYPE: TYPE: ACUTE PAIN

## 2023-10-15 NOTE — PLAN OF CARE
Problem: Pain  Goal: Verbalizes/displays adequate comfort level or baseline comfort level  10/15/2023 0538 by Amanda Sousa RN  Outcome: Progressing

## 2023-10-15 NOTE — PLAN OF CARE
Problem: Discharge Planning  Goal: Discharge to home or other facility with appropriate resources  Outcome: Progressing     Problem: Pain  Goal: Verbalizes/displays adequate comfort level or baseline comfort level  10/15/2023 1806 by Cathy Macias RN  Outcome: Progressing  10/15/2023 0538 by Pricila Lay RN  Outcome: Progressing     Problem: Safety - Adult  Goal: Free from fall injury  Outcome: Progressing     Problem: Skin/Tissue Integrity  Goal: Absence of new skin breakdown  Description: 1. Monitor for areas of redness and/or skin breakdown  2. Assess vascular access sites hourly  3. Every 4-6 hours minimum:  Change oxygen saturation probe site  4. Every 4-6 hours:  If on nasal continuous positive airway pressure, respiratory therapy assess nares and determine need for appliance change or resting period.   Outcome: Progressing     Problem: Respiratory - Adult  Goal: Achieves optimal ventilation and oxygenation  Outcome: Progressing     Problem: ABCDS Injury Assessment  Goal: Absence of physical injury  Outcome: Progressing

## 2023-10-16 PROBLEM — E87.70 HYPERVOLEMIA: Status: ACTIVE | Noted: 2023-10-16

## 2023-10-16 PROBLEM — N18.6 ESRD ON DIALYSIS (HCC): Status: ACTIVE | Noted: 2023-10-16

## 2023-10-16 PROBLEM — D63.8 ANEMIA OF CHRONIC DISEASE: Status: ACTIVE | Noted: 2023-10-16

## 2023-10-16 PROBLEM — Z99.2 ESRD ON DIALYSIS (HCC): Status: ACTIVE | Noted: 2023-10-16

## 2023-10-16 LAB
ANION GAP SERPL CALCULATED.3IONS-SCNC: 14 MMOL/L (ref 9–17)
BUN SERPL-MCNC: 66 MG/DL (ref 6–20)
CALCIUM SERPL-MCNC: 8.7 MG/DL (ref 8.6–10.4)
CHLORIDE SERPL-SCNC: 94 MMOL/L (ref 98–107)
CO2 SERPL-SCNC: 23 MMOL/L (ref 20–31)
CREAT SERPL-MCNC: 6.3 MG/DL (ref 0.7–1.2)
DERMATOLOGY PATHOLOGY REPORT: NORMAL
GFR SERPL CREATININE-BSD FRML MDRD: 10 ML/MIN/1.73M2
GLUCOSE SERPL-MCNC: 96 MG/DL (ref 70–99)
POTASSIUM SERPL-SCNC: 4.7 MMOL/L (ref 3.7–5.3)
SODIUM SERPL-SCNC: 131 MMOL/L (ref 135–144)

## 2023-10-16 PROCEDURE — 6370000000 HC RX 637 (ALT 250 FOR IP): Performed by: INTERNAL MEDICINE

## 2023-10-16 PROCEDURE — 1200000000 HC SEMI PRIVATE

## 2023-10-16 PROCEDURE — 2580000003 HC RX 258: Performed by: INTERNAL MEDICINE

## 2023-10-16 PROCEDURE — 90935 HEMODIALYSIS ONE EVALUATION: CPT | Performed by: INTERNAL MEDICINE

## 2023-10-16 PROCEDURE — 97530 THERAPEUTIC ACTIVITIES: CPT

## 2023-10-16 PROCEDURE — 80048 BASIC METABOLIC PNL TOTAL CA: CPT

## 2023-10-16 PROCEDURE — 97535 SELF CARE MNGMENT TRAINING: CPT

## 2023-10-16 PROCEDURE — 97116 GAIT TRAINING THERAPY: CPT

## 2023-10-16 PROCEDURE — 90935 HEMODIALYSIS ONE EVALUATION: CPT

## 2023-10-16 PROCEDURE — 6360000002 HC RX W HCPCS: Performed by: INTERNAL MEDICINE

## 2023-10-16 PROCEDURE — 94760 N-INVAS EAR/PLS OXIMETRY 1: CPT

## 2023-10-16 PROCEDURE — 36415 COLL VENOUS BLD VENIPUNCTURE: CPT

## 2023-10-16 PROCEDURE — 6360000002 HC RX W HCPCS: Performed by: NURSE PRACTITIONER

## 2023-10-16 PROCEDURE — 99231 SBSQ HOSP IP/OBS SF/LOW 25: CPT | Performed by: INTERNAL MEDICINE

## 2023-10-16 RX ADMIN — CALCITRIOL 0.25 MCG: 0.25 CAPSULE ORAL at 15:25

## 2023-10-16 RX ADMIN — CARVEDILOL 3.12 MG: 3.12 TABLET, FILM COATED ORAL at 15:25

## 2023-10-16 RX ADMIN — MIDODRINE HYDROCHLORIDE 5 MG: 5 TABLET ORAL at 22:21

## 2023-10-16 RX ADMIN — HEPARIN SODIUM 1900 UNITS: 1000 INJECTION INTRAVENOUS; SUBCUTANEOUS at 12:38

## 2023-10-16 RX ADMIN — ATORVASTATIN CALCIUM 80 MG: 80 TABLET, FILM COATED ORAL at 20:05

## 2023-10-16 RX ADMIN — LISINOPRIL 5 MG: 10 TABLET ORAL at 15:25

## 2023-10-16 RX ADMIN — NEPHROCAP 1 MG: 1 CAP ORAL at 15:25

## 2023-10-16 RX ADMIN — SODIUM CHLORIDE, PRESERVATIVE FREE 10 ML: 5 INJECTION INTRAVENOUS at 20:05

## 2023-10-16 RX ADMIN — ASPIRIN 81 MG: 81 TABLET, CHEWABLE ORAL at 15:25

## 2023-10-16 RX ADMIN — EPOETIN ALFA-EPBX 8000 UNITS: 10000 INJECTION, SOLUTION INTRAVENOUS; SUBCUTANEOUS at 17:33

## 2023-10-16 RX ADMIN — HEPARIN SODIUM 5000 UNITS: 5000 INJECTION INTRAVENOUS; SUBCUTANEOUS at 06:19

## 2023-10-16 RX ADMIN — HEPARIN SODIUM 5000 UNITS: 5000 INJECTION INTRAVENOUS; SUBCUTANEOUS at 20:05

## 2023-10-16 ASSESSMENT — ENCOUNTER SYMPTOMS
COLOR CHANGE: 1
ABDOMINAL DISTENTION: 0
SHORTNESS OF BREATH: 1
NAUSEA: 0
DIARRHEA: 0
VOMITING: 0

## 2023-10-16 ASSESSMENT — PAIN SCALES - GENERAL: PAINLEVEL_OUTOF10: 0

## 2023-10-16 NOTE — DIALYSIS
Dialysis Time Out  To be done by RN and tech or 2 RNs  Staff Names 3 Gianni Childs and Ashely Moffett RN    [x]  Identity of the patient using 2 patient identifiers  [x]  Consent for treatment  [x]  Equipment-proper machine and dialyzer  [x]  B-Hep B status  [x]  Orders- to include bath, blood flow, dialyzer, time and fluid removal  [x]  Access-Correct site and in working order  [x]  Time for patient to ask questions.

## 2023-10-16 NOTE — PLAN OF CARE
Problem: Pain  Goal: Verbalizes/displays adequate comfort level or baseline comfort level  10/16/2023 0208 by Elver Pham RN  Outcome: Progressing

## 2023-10-17 LAB
ANION GAP SERPL CALCULATED.3IONS-SCNC: 15 MMOL/L (ref 9–17)
BUN SERPL-MCNC: 53 MG/DL (ref 6–20)
CALCIUM SERPL-MCNC: 8.9 MG/DL (ref 8.6–10.4)
CHLORIDE SERPL-SCNC: 91 MMOL/L (ref 98–107)
CO2 SERPL-SCNC: 27 MMOL/L (ref 20–31)
CREAT SERPL-MCNC: 5.5 MG/DL (ref 0.7–1.2)
GFR SERPL CREATININE-BSD FRML MDRD: 12 ML/MIN/1.73M2
GLUCOSE SERPL-MCNC: 98 MG/DL (ref 70–99)
POTASSIUM SERPL-SCNC: 4.3 MMOL/L (ref 3.7–5.3)
SODIUM SERPL-SCNC: 133 MMOL/L (ref 135–144)

## 2023-10-17 PROCEDURE — 6370000000 HC RX 637 (ALT 250 FOR IP): Performed by: INTERNAL MEDICINE

## 2023-10-17 PROCEDURE — 99231 SBSQ HOSP IP/OBS SF/LOW 25: CPT | Performed by: STUDENT IN AN ORGANIZED HEALTH CARE EDUCATION/TRAINING PROGRAM

## 2023-10-17 PROCEDURE — 1200000000 HC SEMI PRIVATE

## 2023-10-17 PROCEDURE — 99232 SBSQ HOSP IP/OBS MODERATE 35: CPT | Performed by: INTERNAL MEDICINE

## 2023-10-17 PROCEDURE — 97116 GAIT TRAINING THERAPY: CPT

## 2023-10-17 PROCEDURE — 36415 COLL VENOUS BLD VENIPUNCTURE: CPT

## 2023-10-17 PROCEDURE — 6360000002 HC RX W HCPCS: Performed by: NURSE PRACTITIONER

## 2023-10-17 PROCEDURE — 97110 THERAPEUTIC EXERCISES: CPT

## 2023-10-17 PROCEDURE — 80048 BASIC METABOLIC PNL TOTAL CA: CPT

## 2023-10-17 RX ORDER — ASPIRIN 81 MG/1
81 TABLET, CHEWABLE ORAL DAILY
Qty: 30 TABLET | Refills: 3 | DISCHARGE
Start: 2023-10-17

## 2023-10-17 RX ORDER — CALCITRIOL 0.25 UG/1
0.25 CAPSULE, LIQUID FILLED ORAL
Qty: 30 CAPSULE | Refills: 3 | DISCHARGE
Start: 2023-10-18

## 2023-10-17 RX ORDER — LISINOPRIL 5 MG/1
5 TABLET ORAL DAILY
Qty: 30 TABLET | Refills: 3 | DISCHARGE
Start: 2023-10-17

## 2023-10-17 RX ORDER — BUMETANIDE 2 MG/1
2 TABLET ORAL DAILY
Qty: 30 TABLET | Refills: 3 | DISCHARGE
Start: 2023-10-17

## 2023-10-17 RX ORDER — CARVEDILOL 3.12 MG/1
3.12 TABLET ORAL 2 TIMES DAILY
Qty: 60 TABLET | Refills: 3 | DISCHARGE
Start: 2023-10-17

## 2023-10-17 RX ORDER — MIDODRINE HYDROCHLORIDE 5 MG/1
5 TABLET ORAL 3 TIMES DAILY PRN
Qty: 90 TABLET | Refills: 3 | DISCHARGE
Start: 2023-10-17

## 2023-10-17 RX ORDER — ATORVASTATIN CALCIUM 80 MG/1
80 TABLET, FILM COATED ORAL NIGHTLY
Qty: 30 TABLET | Refills: 3 | DISCHARGE
Start: 2023-10-17

## 2023-10-17 RX ORDER — NEPHROCAP 1 MG
1 CAP ORAL DAILY
Qty: 30 CAPSULE | Refills: 1 | DISCHARGE
Start: 2023-10-17

## 2023-10-17 RX ADMIN — MIDODRINE HYDROCHLORIDE 5 MG: 5 TABLET ORAL at 12:33

## 2023-10-17 RX ADMIN — HEPARIN SODIUM 5000 UNITS: 5000 INJECTION INTRAVENOUS; SUBCUTANEOUS at 06:30

## 2023-10-17 RX ADMIN — ATORVASTATIN CALCIUM 80 MG: 80 TABLET, FILM COATED ORAL at 20:59

## 2023-10-17 RX ADMIN — NEPHROCAP 1 MG: 1 CAP ORAL at 09:06

## 2023-10-17 RX ADMIN — ASPIRIN 81 MG: 81 TABLET, CHEWABLE ORAL at 09:06

## 2023-10-17 RX ADMIN — HEPARIN SODIUM 5000 UNITS: 5000 INJECTION INTRAVENOUS; SUBCUTANEOUS at 13:29

## 2023-10-17 RX ADMIN — HEPARIN SODIUM 5000 UNITS: 5000 INJECTION INTRAVENOUS; SUBCUTANEOUS at 20:59

## 2023-10-17 NOTE — PLAN OF CARE
Ice the knee and use crutches or walker for the 1st 1-2 days.  Full weightbear as tolerated.  In 2 days you may remove the dressing, shower, increase walking as tolerated and wean off the crutches.   Problem: Discharge Planning  Goal: Discharge to home or other facility with appropriate resources  Outcome: Progressing

## 2023-10-18 LAB
ANION GAP SERPL CALCULATED.3IONS-SCNC: 16 MMOL/L (ref 9–16)
BUN SERPL-MCNC: 69 MG/DL (ref 6–20)
CALCIUM SERPL-MCNC: 9.3 MG/DL (ref 8.6–10.4)
CHLORIDE SERPL-SCNC: 94 MMOL/L (ref 98–107)
CO2 SERPL-SCNC: 24 MMOL/L (ref 20–31)
CREAT SERPL-MCNC: 6.1 MG/DL (ref 0.7–1.2)
ERYTHROCYTE [DISTWIDTH] IN BLOOD BY AUTOMATED COUNT: 15.1 % (ref 11.8–14.4)
GLUCOSE SERPL-MCNC: 88 MG/DL (ref 74–99)
HCT VFR BLD AUTO: 26.3 % (ref 40.7–50.3)
HGB BLD-MCNC: 7.9 G/DL (ref 13–17)
MCH RBC QN AUTO: 25.5 PG (ref 25.2–33.5)
MCHC RBC AUTO-ENTMCNC: 30 G/DL (ref 28.4–34.8)
MCV RBC AUTO: 84.8 FL (ref 82.6–102.9)
NRBC BLD-RTO: 0 PER 100 WBC
PLATELET # BLD AUTO: 359 K/UL (ref 138–453)
PMV BLD AUTO: 11.3 FL (ref 8.1–13.5)
POTASSIUM SERPL-SCNC: 4.3 MMOL/L (ref 3.7–5.3)
RBC # BLD AUTO: 3.1 M/UL (ref 4.21–5.77)
SODIUM SERPL-SCNC: 134 MMOL/L (ref 136–145)
WBC OTHER # BLD: 6.8 K/UL (ref 3.5–11.3)

## 2023-10-18 PROCEDURE — 6360000002 HC RX W HCPCS: Performed by: INTERNAL MEDICINE

## 2023-10-18 PROCEDURE — 1200000000 HC SEMI PRIVATE

## 2023-10-18 PROCEDURE — 99231 SBSQ HOSP IP/OBS SF/LOW 25: CPT | Performed by: STUDENT IN AN ORGANIZED HEALTH CARE EDUCATION/TRAINING PROGRAM

## 2023-10-18 PROCEDURE — 6370000000 HC RX 637 (ALT 250 FOR IP): Performed by: INTERNAL MEDICINE

## 2023-10-18 PROCEDURE — 36415 COLL VENOUS BLD VENIPUNCTURE: CPT

## 2023-10-18 PROCEDURE — 80048 BASIC METABOLIC PNL TOTAL CA: CPT

## 2023-10-18 PROCEDURE — 85027 COMPLETE CBC AUTOMATED: CPT

## 2023-10-18 PROCEDURE — 2580000003 HC RX 258: Performed by: INTERNAL MEDICINE

## 2023-10-18 PROCEDURE — 6360000002 HC RX W HCPCS: Performed by: NURSE PRACTITIONER

## 2023-10-18 PROCEDURE — 90935 HEMODIALYSIS ONE EVALUATION: CPT

## 2023-10-18 PROCEDURE — 90935 HEMODIALYSIS ONE EVALUATION: CPT | Performed by: INTERNAL MEDICINE

## 2023-10-18 RX ADMIN — HEPARIN SODIUM 5000 UNITS: 5000 INJECTION INTRAVENOUS; SUBCUTANEOUS at 22:06

## 2023-10-18 RX ADMIN — ATORVASTATIN CALCIUM 80 MG: 80 TABLET, FILM COATED ORAL at 22:06

## 2023-10-18 RX ADMIN — CALCITRIOL 0.25 MCG: 0.25 CAPSULE ORAL at 14:04

## 2023-10-18 RX ADMIN — HEPARIN SODIUM 5000 UNITS: 5000 INJECTION INTRAVENOUS; SUBCUTANEOUS at 14:04

## 2023-10-18 RX ADMIN — NEPHROCAP 1 MG: 1 CAP ORAL at 14:04

## 2023-10-18 RX ADMIN — CARVEDILOL 3.12 MG: 3.12 TABLET, FILM COATED ORAL at 14:04

## 2023-10-18 RX ADMIN — HEPARIN SODIUM 1900 UNITS: 1000 INJECTION INTRAVENOUS; SUBCUTANEOUS at 13:38

## 2023-10-18 RX ADMIN — LISINOPRIL 5 MG: 10 TABLET ORAL at 14:04

## 2023-10-18 RX ADMIN — ASPIRIN 81 MG: 81 TABLET, CHEWABLE ORAL at 14:04

## 2023-10-18 RX ADMIN — SODIUM CHLORIDE, PRESERVATIVE FREE 10 ML: 5 INJECTION INTRAVENOUS at 14:04

## 2023-10-18 RX ADMIN — HEPARIN SODIUM 5000 UNITS: 5000 INJECTION INTRAVENOUS; SUBCUTANEOUS at 06:02

## 2023-10-18 RX ADMIN — EPOETIN ALFA-EPBX 8000 UNITS: 10000 INJECTION, SOLUTION INTRAVENOUS; SUBCUTANEOUS at 13:38

## 2023-10-18 ASSESSMENT — PAIN SCALES - GENERAL: PAINLEVEL_OUTOF10: 0

## 2023-10-18 NOTE — PLAN OF CARE
Problem: Discharge Planning  Goal: Discharge to home or other facility with appropriate resources  Outcome: Progressing     Problem: Pain  Goal: Verbalizes/displays adequate comfort level or baseline comfort level  Outcome: Progressing     Problem: Safety - Adult  Goal: Free from fall injury  Outcome: Progressing     Problem: Skin/Tissue Integrity  Goal: Absence of new skin breakdown  Description: 1. Monitor for areas of redness and/or skin breakdown  2. Assess vascular access sites hourly  3. Every 4-6 hours minimum:  Change oxygen saturation probe site  4. Every 4-6 hours:  If on nasal continuous positive airway pressure, respiratory therapy assess nares and determine need for appliance change or resting period.   Outcome: Progressing     Problem: Respiratory - Adult  Goal: Achieves optimal ventilation and oxygenation  10/18/2023 1705 by Eboni Morgan RN  Outcome: Progressing  10/18/2023 1201 by Mari Costa RCP  Outcome: Progressing     Problem: ABCDS Injury Assessment  Goal: Absence of physical injury  Outcome: Progressing

## 2023-10-19 VITALS
HEIGHT: 77 IN | WEIGHT: 268.96 LBS | TEMPERATURE: 97.7 F | BODY MASS INDEX: 31.76 KG/M2 | DIASTOLIC BLOOD PRESSURE: 64 MMHG | RESPIRATION RATE: 16 BRPM | SYSTOLIC BLOOD PRESSURE: 101 MMHG | HEART RATE: 65 BPM | OXYGEN SATURATION: 100 %

## 2023-10-19 LAB
ANION GAP SERPL CALCULATED.3IONS-SCNC: 14 MMOL/L (ref 9–17)
BUN SERPL-MCNC: 46 MG/DL (ref 6–20)
CALCIUM SERPL-MCNC: 9.2 MG/DL (ref 8.6–10.4)
CHLORIDE SERPL-SCNC: 91 MMOL/L (ref 98–107)
CO2 SERPL-SCNC: 27 MMOL/L (ref 20–31)
CREAT SERPL-MCNC: 5.7 MG/DL (ref 0.7–1.2)
GFR SERPL CREATININE-BSD FRML MDRD: 12 ML/MIN/1.73M2
GLUCOSE SERPL-MCNC: 81 MG/DL (ref 70–99)
POTASSIUM SERPL-SCNC: 4.2 MMOL/L (ref 3.7–5.3)
SODIUM SERPL-SCNC: 132 MMOL/L (ref 135–144)

## 2023-10-19 PROCEDURE — 80048 BASIC METABOLIC PNL TOTAL CA: CPT

## 2023-10-19 PROCEDURE — 6370000000 HC RX 637 (ALT 250 FOR IP): Performed by: INTERNAL MEDICINE

## 2023-10-19 PROCEDURE — 99232 SBSQ HOSP IP/OBS MODERATE 35: CPT | Performed by: INTERNAL MEDICINE

## 2023-10-19 PROCEDURE — 2580000003 HC RX 258: Performed by: INTERNAL MEDICINE

## 2023-10-19 PROCEDURE — 99231 SBSQ HOSP IP/OBS SF/LOW 25: CPT | Performed by: STUDENT IN AN ORGANIZED HEALTH CARE EDUCATION/TRAINING PROGRAM

## 2023-10-19 PROCEDURE — 97530 THERAPEUTIC ACTIVITIES: CPT

## 2023-10-19 PROCEDURE — 36415 COLL VENOUS BLD VENIPUNCTURE: CPT

## 2023-10-19 PROCEDURE — 6360000002 HC RX W HCPCS: Performed by: NURSE PRACTITIONER

## 2023-10-19 PROCEDURE — 97535 SELF CARE MNGMENT TRAINING: CPT

## 2023-10-19 RX ADMIN — ASPIRIN 81 MG: 81 TABLET, CHEWABLE ORAL at 08:24

## 2023-10-19 RX ADMIN — NEPHROCAP 1 MG: 1 CAP ORAL at 08:24

## 2023-10-19 RX ADMIN — HEPARIN SODIUM 5000 UNITS: 5000 INJECTION INTRAVENOUS; SUBCUTANEOUS at 14:39

## 2023-10-19 RX ADMIN — CARVEDILOL 3.12 MG: 3.12 TABLET, FILM COATED ORAL at 08:24

## 2023-10-19 RX ADMIN — SODIUM CHLORIDE, PRESERVATIVE FREE 10 ML: 5 INJECTION INTRAVENOUS at 08:25

## 2023-10-19 RX ADMIN — LISINOPRIL 5 MG: 10 TABLET ORAL at 08:24

## 2023-10-19 RX ADMIN — HEPARIN SODIUM 5000 UNITS: 5000 INJECTION INTRAVENOUS; SUBCUTANEOUS at 06:30
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
## 2023-10-19 NOTE — PLAN OF CARE
Problem: Discharge Planning  Goal: Discharge to home or other facility with appropriate resources  Outcome: Progressing  Flowsheets (Taken 10/19/2023 0800)  Discharge to home or other facility with appropriate resources: Identify barriers to discharge with patient and caregiver     Problem: Pain  Goal: Verbalizes/displays adequate comfort level or baseline comfort level  Outcome: Progressing     Problem: Safety - Adult  Goal: Free from fall injury  Outcome: Progressing     Problem: Skin/Tissue Integrity  Goal: Absence of new skin breakdown  Description: 1. Monitor for areas of redness and/or skin breakdown  2. Assess vascular access sites hourly  3. Every 4-6 hours minimum:  Change oxygen saturation probe site  4. Every 4-6 hours:  If on nasal continuous positive airway pressure, respiratory therapy assess nares and determine need for appliance change or resting period.   Outcome: Progressing     Problem: Respiratory - Adult  Goal: Achieves optimal ventilation and oxygenation  Outcome: Progressing  Flowsheets (Taken 10/19/2023 0800)  Achieves optimal ventilation and oxygenation: Assess for changes in respiratory status     Problem: ABCDS Injury Assessment  Goal: Absence of physical injury  Outcome: Progressing CONSTITUTIONAL: +fever  HEENT: no dysphagia  CV: no chest pain  RESP: no SOB  GI: no nausea/vomiting  : no dysuria  DERM: +rash  MSK: no back pain  NEURO: no LOC  ENDO: no diabetes

## 2023-10-19 NOTE — PLAN OF CARE
Problem: Discharge Planning  Goal: Discharge to home or other facility with appropriate resources  10/19/2023 1547 by Otoniel Drummond RN  Outcome: Completed  10/19/2023 1007 by Otoniel Drummond RN  Outcome: Progressing  Flowsheets (Taken 10/19/2023 0800)  Discharge to home or other facility with appropriate resources: Identify barriers to discharge with patient and caregiver     Problem: Pain  Goal: Verbalizes/displays adequate comfort level or baseline comfort level  10/19/2023 1547 by Otoniel Drummond RN  Outcome: Completed  10/19/2023 1007 by Otoniel Drummond RN  Outcome: Progressing     Problem: Safety - Adult  Goal: Free from fall injury  10/19/2023 1547 by Otoniel Drummond RN  Outcome: Completed  10/19/2023 1007 by Otoniel Drummond RN  Outcome: Progressing     Problem: Skin/Tissue Integrity  Goal: Absence of new skin breakdown  Description: 1. Monitor for areas of redness and/or skin breakdown  2. Assess vascular access sites hourly  3. Every 4-6 hours minimum:  Change oxygen saturation probe site  4. Every 4-6 hours:  If on nasal continuous positive airway pressure, respiratory therapy assess nares and determine need for appliance change or resting period.   10/19/2023 1547 by Otoniel Drummond RN  Outcome: Completed  10/19/2023 1007 by Otoniel Drummond RN  Outcome: Progressing     Problem: Respiratory - Adult  Goal: Achieves optimal ventilation and oxygenation  10/19/2023 1547 by Otoniel Drummond RN  Outcome: Completed  10/19/2023 1007 by Otoniel Drummond RN  Outcome: Progressing  Flowsheets (Taken 10/19/2023 0800)  Achieves optimal ventilation and oxygenation: Assess for changes in respiratory status     Problem: ABCDS Injury Assessment  Goal: Absence of physical injury  10/19/2023 1547 by Otoniel Drummond RN  Outcome: Completed  10/19/2023 1007 by Otoniel Drummond RN  Outcome: Progressing

## 2023-10-21 ENCOUNTER — HOSPITAL ENCOUNTER (OUTPATIENT)
Dept: DIALYSIS | Age: 43
Setting detail: DIALYSIS SERIES
Discharge: HOME | End: 2023-10-21
Payer: COMMERCIAL

## 2023-10-21 VITALS
HEART RATE: 56 BPM | DIASTOLIC BLOOD PRESSURE: 87 MMHG | RESPIRATION RATE: 18 BRPM | WEIGHT: 267.2 LBS | TEMPERATURE: 98.1 F | BODY MASS INDEX: 31.68 KG/M2 | SYSTOLIC BLOOD PRESSURE: 134 MMHG

## 2023-10-21 LAB
ANION GAP SERPL CALCULATED.3IONS-SCNC: 14 MMOL/L (ref 9–17)
BUN SERPL-MCNC: 81 MG/DL (ref 6–20)
CALCIUM SERPL-MCNC: 8.8 MG/DL (ref 8.6–10.4)
CHLORIDE SERPL-SCNC: 98 MMOL/L (ref 98–107)
CO2 SERPL-SCNC: 25 MMOL/L (ref 20–31)
CREAT SERPL-MCNC: 8.2 MG/DL (ref 0.7–1.2)
GFR SERPL CREATININE-BSD FRML MDRD: 8 ML/MIN/1.73M2
GLUCOSE SERPL-MCNC: 97 MG/DL (ref 70–99)
POTASSIUM SERPL-SCNC: 4.6 MMOL/L (ref 3.7–5.3)
SODIUM SERPL-SCNC: 137 MMOL/L (ref 135–144)

## 2023-10-21 PROCEDURE — 80048 BASIC METABOLIC PNL TOTAL CA: CPT

## 2023-10-21 PROCEDURE — 6360000002 HC RX W HCPCS: Performed by: INTERNAL MEDICINE

## 2023-10-21 PROCEDURE — 90935 HEMODIALYSIS ONE EVALUATION: CPT

## 2023-10-21 RX ORDER — 0.9 % SODIUM CHLORIDE 0.9 %
250 INTRAVENOUS SOLUTION INTRAVENOUS PRN
Status: DISCONTINUED | OUTPATIENT
Start: 2023-10-21 | End: 2023-10-22 | Stop reason: HOSPADM

## 2023-10-21 RX ORDER — HEPARIN SODIUM 1000 [USP'U]/ML
1900 INJECTION, SOLUTION INTRAVENOUS; SUBCUTANEOUS PRN
Status: DISCONTINUED | OUTPATIENT
Start: 2023-10-21 | End: 2023-10-22 | Stop reason: HOSPADM

## 2023-10-21 RX ORDER — 0.9 % SODIUM CHLORIDE 0.9 %
150 INTRAVENOUS SOLUTION INTRAVENOUS PRN
Status: DISCONTINUED | OUTPATIENT
Start: 2023-10-21 | End: 2023-10-22 | Stop reason: HOSPADM

## 2023-10-21 RX ADMIN — HEPARIN SODIUM 1900 UNITS: 1000 INJECTION INTRAVENOUS; SUBCUTANEOUS at 11:10

## 2023-10-21 RX ADMIN — HEPARIN SODIUM 1900 UNITS: 1000 INJECTION INTRAVENOUS; SUBCUTANEOUS at 11:11

## 2023-10-21 NOTE — PROGRESS NOTES
Dialysis Time Out  To be done by RN and tech or 2 RNs  Staff Names [address] [facility]    [x]  Identity of the patient using 2 patient identifiers  [x]  Consent for treatment  [x]  Equipment-proper machine and dialyzer  [x]  B-Hep B status  [x]  Orders- to include bath, blood flow, dialyzer, time and fluid removal  [x]  Access-Correct site and in working order  [x]  Time for patient to ask questions.

## 2023-10-21 NOTE — DISCHARGE SUMMARY
coban  Follow up in the [facility] and [address] at [address] [address] within 1 week of discharge, call for an appointment. Diet: regular diet    Activity: As tolerated    Instructions to Patient: as above.      Discharge Medications:      Medication List        START taking these medications      aspirin 81 MG chewable tablet  Take 1 tablet by mouth daily     atorvastatin 80 MG tablet  Commonly known as: LIPITOR  Take 1 tablet by mouth nightly     bumetanide 2 MG tablet  Commonly known as: BUMEX  Take 1 tablet by mouth daily     calcitRIOL 0.25 MCG capsule  Commonly known as: ROCALTROL  Take 1 capsule by mouth three times a week     cloNIDine 0.1 MG tablet  Commonly known as: CATAPRES  Take 1 tablet by mouth 3 times daily     lisinopril 5 MG tablet  Commonly known as: PRINIVIL;ZESTRIL  Take 1 tablet by mouth daily     midodrine 5 MG tablet  Commonly known as: PROAMATINE  Take 1 tablet by mouth 3 times daily as needed (SBP < 90)     Virt-Caps 1 MG Caps  Take 1 capsule by mouth daily            CHANGE how you take these medications      carvedilol 3.125 MG tablet  Commonly known as: COREG  Take 1 tablet by mouth 2 times daily  What changed:   medication strength  how much to take            CONTINUE taking these medications      Blood Pressure Kit  1 kit by Does not apply route daily            STOP taking these medications      cloNIDine 0.2 MG/24HR Ptwk  Commonly known as: CATAPRES     hydrALAZINE 50 MG tablet  Commonly known as: APRESOLINE     ibuprofen 600 MG tablet  Commonly known as: ADVIL;MOTRIN     lisinopril-hydroCHLOROthiazide 10-12.5 MG per tablet  Commonly known as: PRINZIDE;ZESTORETIC     Lokelma 5 g Pack oral suspension  Generic drug: sodium zirconium cyclosilicate     NIFEdipine 60 MG extended release tablet  Commonly known as: ADALAT CC               Where to Get Your Medications        You can get these medications from any pharmacy    Bring a paper prescription for each of these

## 2023-12-01 ENCOUNTER — INITIAL CONSULT (OUTPATIENT)
Dept: VASCULAR SURGERY | Age: 43
End: 2023-12-01
Payer: COMMERCIAL

## 2023-12-01 VITALS
HEIGHT: 77 IN | DIASTOLIC BLOOD PRESSURE: 80 MMHG | RESPIRATION RATE: 19 BRPM | WEIGHT: 284 LBS | BODY MASS INDEX: 33.53 KG/M2 | OXYGEN SATURATION: 98 % | SYSTOLIC BLOOD PRESSURE: 119 MMHG | HEART RATE: 72 BPM

## 2023-12-01 DIAGNOSIS — N18.6 END STAGE RENAL DISEASE (HCC): Primary | ICD-10-CM

## 2023-12-01 PROCEDURE — G8417 CALC BMI ABV UP PARAM F/U: HCPCS | Performed by: SURGERY

## 2023-12-01 PROCEDURE — G8427 DOCREV CUR MEDS BY ELIG CLIN: HCPCS | Performed by: SURGERY

## 2023-12-01 PROCEDURE — 3079F DIAST BP 80-89 MM HG: CPT | Performed by: SURGERY

## 2023-12-01 PROCEDURE — 1036F TOBACCO NON-USER: CPT | Performed by: SURGERY

## 2023-12-01 PROCEDURE — G8484 FLU IMMUNIZE NO ADMIN: HCPCS | Performed by: SURGERY

## 2023-12-01 PROCEDURE — 3074F SYST BP LT 130 MM HG: CPT | Performed by: SURGERY

## 2023-12-01 PROCEDURE — 99204 OFFICE O/P NEW MOD 45 MIN: CPT | Performed by: SURGERY

## 2023-12-01 RX ORDER — AMLODIPINE BESYLATE 2.5 MG/1
2.5 TABLET ORAL DAILY
COMMUNITY

## 2023-12-01 RX ORDER — SEVELAMER HYDROCHLORIDE 800 MG/1
800 TABLET, FILM COATED ORAL
COMMUNITY

## 2023-12-01 NOTE — PROGRESS NOTES
[address] [facility] [address] [facility] [address]  Dept: [phone]     Patient: Polina Carmona  : 1980  MRN: [id number]  DOS: 2023    Referring provider:  Ricardo Patricia MD         HPI:  Polina Carmona is a 39 y.o. male who comes to the office for the first time regarding end-stage renal disease. He is currently being dialyzed through a right IJ tunneled catheter. He has never had dialysis access construction in the past.  He is currently using a wheelchair and has trouble standing up. I do not know the details of his inability to walk well. He does walk with a cane and with a walker. He has high blood pressure and asthma. He has bipolar 1 disorder as well as his end-stage renal disease.   Past Medical History:   Diagnosis Date    DEVYN (acute kidney injury) ([facility]) 10/9/2021    Bipolar 1 disorder ([address] [place] [address]) 2015    Bronchitis     Bronchitis     Hypertension     Moderate persistent asthma 2015    Obesity     Peripheral vascular disease ([facility]) 10/9/2023     Family History   Problem Relation Age of Onset    High Blood Pressure Mother     Diabetes Maternal Grandmother       Social History     Socioeconomic History    Marital status: Single     Spouse name: Not on file    Number of children: Not on file    Years of education: Not on file    Highest education level: Not on file   Occupational History    Not on file   Tobacco Use    Smoking status: Never    Smokeless tobacco: Never   Substance and Sexual Activity    Alcohol use: No    Drug use: No    Sexual activity: Not on file   Other Topics Concern    Not on file   Social History Narrative    Not on file     Social Determinants of Health     Financial Resource Strain: Low Risk  (2023)    Overall Financial Resource Strain (CARDIA)     Difficulty of Paying Living Expenses: Not hard at all   Food Insecurity: Not on file (2023)   Transportation

## 2023-12-13 ENCOUNTER — TELEPHONE (OUTPATIENT)
Dept: VASCULAR SURGERY | Age: 43
End: 2023-12-13

## 2023-12-13 NOTE — TELEPHONE ENCOUNTER
Got the information that patient is in [address] [place] [address]. Called and spoke to Anne Marie and rescheduled patient for Tuesday 12/19/2023 9;30 am, arrive 8:00 am at [facility].  She is aware patient needs to arrive at 8:00 am, nothing to eat / drink after midnight the night before.

## 2023-12-15 NOTE — TELEPHONE ENCOUNTER
Called and spoke to Radha at [address] [place] [address] - patients procedure for Tuesday 12/19/2023 is now at [facility] and arrival time is 9:00 am.  Laurie read back the information and said they would make the change.

## 2024-01-05 ENCOUNTER — OFFICE VISIT (OUTPATIENT)
Dept: VASCULAR SURGERY | Age: 44
End: 2024-01-05

## 2024-01-05 VITALS
WEIGHT: 290 LBS | HEART RATE: 82 BPM | OXYGEN SATURATION: 96 % | BODY MASS INDEX: 34.24 KG/M2 | HEIGHT: 77 IN | DIASTOLIC BLOOD PRESSURE: 86 MMHG | RESPIRATION RATE: 16 BRPM | SYSTOLIC BLOOD PRESSURE: 130 MMHG

## 2024-01-05 DIAGNOSIS — N18.6 END STAGE RENAL DISEASE (HCC): Primary | ICD-10-CM

## 2024-01-05 PROCEDURE — 99024 POSTOP FOLLOW-UP VISIT: CPT | Performed by: SURGERY

## 2024-01-05 RX ORDER — SEVELAMER CARBONATE 800 MG/1
TABLET, FILM COATED ORAL
COMMUNITY
Start: 2023-12-17

## 2024-01-05 NOTE — PROGRESS NOTES
[facility]  [address] [address] [address]  [place] [address]  Dept: [phone]     Patient: Bennett Richardson  : 1980  MRN: [id number]  DOS: 2024            HPI:  Bennett Richardson is a 43 y.o. male who returns to the office regarding his newly constructed right upper extremity AV graft.  His veins were very poor caliber even with anesthetic on board.  I chose to go ahead with a right upper extremity brachial axillary graft with 7 mm PTFE.  He is doing well and has no complaints.    Review of Systems    Vitals:    24 1351   BP: 130/86   Site: Left Upper Arm   Position: Sitting   Cuff Size: Large Adult   Pulse: 82   Resp: 16   SpO2: 96%   Weight: 131.5 kg (290 lb)   Height: 1.956 m (6' 5\")          Physical Exam  On examination he has an audible bruit and a palpable thrill in the graft.  The incisions have healed well.  The sutures will be removed today.  There is no seroma or hematoma.  He has a palpable distal radial pulse.  The hand is warm and well-perfused.  There is no upper extremity edema.  Assessment:  1. End stage renal disease (HCC)          Plan:  At this point he can go ahead and use the graft on Monday and we will see how this works for him.  If it works well over the next 2 to 4 weeks we can remove the tunneled dialysis access catheter.    Electronically signed by:  Mauro Tse MD

## 2024-02-02 ENCOUNTER — OFFICE VISIT (OUTPATIENT)
Dept: VASCULAR SURGERY | Age: 44
End: 2024-02-02

## 2024-02-02 VITALS
OXYGEN SATURATION: 100 % | SYSTOLIC BLOOD PRESSURE: 122 MMHG | HEIGHT: 77 IN | BODY MASS INDEX: 35.19 KG/M2 | WEIGHT: 298 LBS | DIASTOLIC BLOOD PRESSURE: 82 MMHG | HEART RATE: 96 BPM | RESPIRATION RATE: 16 BRPM

## 2024-02-02 DIAGNOSIS — N18.6 END STAGE RENAL DISEASE (HCC): Primary | ICD-10-CM

## 2024-02-02 NOTE — PROGRESS NOTES
Division of Vascular Surgery           The patient is back for tunneled catheter removal.  His newly constructed right brachial axillary graft is working well.  He has no problems with dialysis through the graft.    Procedure Note    Preoperative/Postoperative Diagnosis: ESRD on hemodialysis    Procedure: Removal of tunneled central venous dialysis catheter    Surgeon: Dr. Tse    EBL: 3 ml    Complications: none    Procedure:  Patient's dressing around his tunneled CV dialysis catheter was removed, the sutures were cut and the area cleaned.  Local anesthetic was delivered around the cuff.  Gentle traction and blunt dissection performed.  The cuff was identified and the scar tissue was excised with a scalpel around this, releasing the catheter.  The tunneled catheter was then removed completely intact.  Manual pressure was held for hemostasis and dressing applied.  Patient tolerated procedure well, ok to shower.    Electronically signed by Mauro Tse MD on 2/2/2024 at 1:37 PM

## 2024-02-15 ENCOUNTER — TELEPHONE (OUTPATIENT)
Dept: SURGERY | Age: 44
End: 2024-02-15

## 2024-02-15 NOTE — TELEPHONE ENCOUNTER
Are you having issues cannulating? yes    If so which needle are you having issues with? Nevous and arterial     Is the bruit and thrill present? yes    Is the patient running at their prescribed Blood Flow Rate? no    Does the patient have a good cleaning number? yes    Is there any prolonged bleeding after treatment? yes    What days does the patient run dialysis so we can ensure if we do schedule the Fistulagram its not on a Dialysis day. TTS mornings

## 2024-05-10 ENCOUNTER — OFFICE VISIT (OUTPATIENT)
Dept: VASCULAR SURGERY | Age: 44
End: 2024-05-10
Payer: MEDICARE

## 2024-05-10 VITALS
HEIGHT: 77 IN | HEART RATE: 80 BPM | SYSTOLIC BLOOD PRESSURE: 102 MMHG | OXYGEN SATURATION: 96 % | DIASTOLIC BLOOD PRESSURE: 60 MMHG | RESPIRATION RATE: 16 BRPM | BODY MASS INDEX: 28.1 KG/M2 | WEIGHT: 238 LBS

## 2024-05-10 DIAGNOSIS — N18.6 END STAGE RENAL DISEASE (HCC): Primary | ICD-10-CM

## 2024-05-10 PROCEDURE — G8417 CALC BMI ABV UP PARAM F/U: HCPCS | Performed by: SURGERY

## 2024-05-10 PROCEDURE — 99213 OFFICE O/P EST LOW 20 MIN: CPT | Performed by: SURGERY

## 2024-05-10 PROCEDURE — 3078F DIAST BP <80 MM HG: CPT | Performed by: SURGERY

## 2024-05-10 PROCEDURE — 1036F TOBACCO NON-USER: CPT | Performed by: SURGERY

## 2024-05-10 PROCEDURE — 3074F SYST BP LT 130 MM HG: CPT | Performed by: SURGERY

## 2024-05-10 PROCEDURE — G8427 DOCREV CUR MEDS BY ELIG CLIN: HCPCS | Performed by: SURGERY

## 2024-05-10 RX ORDER — OXYCODONE HYDROCHLORIDE AND ACETAMINOPHEN 5; 325 MG/1; MG/1
TABLET ORAL
COMMUNITY
Start: 2024-02-19

## 2024-05-10 NOTE — PROGRESS NOTES
[facility], [facility]  [address] [address] [address]  [place] [address]  Dept: [phone]     Patient: Bennett Richardson  : 1980  MRN: [id number]  DOS: 5/10/2024            HPI:  Bennett Richardson is a 44 y.o. male who returns to the office regarding bilateral hand numbness and pain during dialysis.  It is not only his fistula arm.  He has a brachioaxillary graft on the right.  It is working well.  I recently removed his catheter.  His hand pain occurs only when he has dialysis.  It is not present when he is not being dialyzed.    Review of Systems    Vitals:    05/10/24 1307   BP: 102/60   Site: Left Upper Arm   Position: Sitting   Cuff Size: Large Adult   Pulse: 80   Resp: 16   SpO2: 96%   Weight: 108 kg (238 lb)   Height: 1.956 m (6' 5\")          Physical Exam  On examination he has a weakly palpable radial pulse on the right and a moderate radial pulse on the left.  His hands are warm and adequately perfused with adequate capillary refill.  Assessment:  1. End stage renal disease (HCC)          Plan:  At this point bilateral hand pain and numbness would be a product of reduction of intravascular volume in my opinion during dialysis.  I explained to him that if it were unilateral it would be more related to a fistula issue.  He understands and agrees we can see him on appearing basis    Electronically signed by:  Mauro Tse MD